# Patient Record
Sex: MALE | Race: WHITE | Employment: OTHER | URBAN - METROPOLITAN AREA
[De-identification: names, ages, dates, MRNs, and addresses within clinical notes are randomized per-mention and may not be internally consistent; named-entity substitution may affect disease eponyms.]

---

## 2017-07-17 ENCOUNTER — APPOINTMENT (OUTPATIENT)
Dept: GENERAL RADIOLOGY | Facility: HOSPITAL | Age: 77
End: 2017-07-17
Attending: EMERGENCY MEDICINE
Payer: MEDICARE

## 2017-07-17 ENCOUNTER — HOSPITAL ENCOUNTER (EMERGENCY)
Facility: HOSPITAL | Age: 77
Discharge: HOME | End: 2017-07-17
Attending: EMERGENCY MEDICINE
Payer: MEDICARE

## 2017-07-17 VITALS
HEIGHT: 67 IN | HEART RATE: 70 BPM | DIASTOLIC BLOOD PRESSURE: 66 MMHG | TEMPERATURE: 98 F | BODY MASS INDEX: 28.25 KG/M2 | SYSTOLIC BLOOD PRESSURE: 153 MMHG | WEIGHT: 180 LBS | OXYGEN SATURATION: 98 % | RESPIRATION RATE: 18 BRPM

## 2017-07-17 DIAGNOSIS — R26.2 INABILITY TO AMBULATE DUE TO HIP: ICD-10-CM
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
## 2017-07-17 PROCEDURE — 99283 EMERGENCY DEPT VISIT LOW MDM: CPT

## 2017-07-17 PROCEDURE — 73502 X-RAY EXAM HIP UNI 2-3 VIEWS: CPT | Performed by: EMERGENCY MEDICINE

## 2017-07-17 NOTE — ED INITIAL ASSESSMENT (HPI)
Pt fell last night out of bed from the bed to a carpeted floor. Pt denies symptoms prior to the fall. He states he then crawled to the bathroom d/t right hip pain. Denies loc or other pain.

## 2017-07-17 NOTE — ED PROVIDER NOTES
Patient Seen in: [facility] Emergency Department    History   Patient presents with:  Fall (musculoskeletal, neurologic)    Stated Complaint: fall hip pain    HPI    80-year-old male presents emergency department and fell last night out of bed onto Capsule SR 24 Hr,  Take 240 mg by mouth daily. Atorvastatin Calcium (LIPITOR) 20 MG Oral Tab,  Take 20 mg by mouth nightly. Warfarin Sodium (COUMADIN) 4 MG Oral Tab,  Take 4 mg by mouth See Admin Instructions.  Warfarin 5mg daily with 10mg on Tuesday on no carotid bruit  CV: Regular rate and rhythm no murmur rub  Respiratory: Clear to auscultation bilaterally no crackles no wheezes no accessory muscle use  Abdomen: Soft nontender nondistended, no rebound no guarding  no hepatosplenomegaly bowel sounds are definitely need some fall precautions but wants to go home.   Family is comfortable this.  ============================================================  ED Course  ------------------------------------------------------------  MDM     Patient was made aware

## 2017-09-15 ENCOUNTER — LAB ENCOUNTER (OUTPATIENT)
Dept: LAB | Facility: HOSPITAL | Age: 77
End: 2017-09-15
Attending: NURSE PRACTITIONER
Payer: MEDICARE

## 2017-09-15 DIAGNOSIS — Z85.46 HISTORY OF PROSTATE CANCER: ICD-10-CM

## 2017-09-15 DIAGNOSIS — R19.7 DIARRHEA, UNSPECIFIED TYPE: ICD-10-CM

## 2017-09-15 LAB
ALBUMIN SERPL-MCNC: 3.5 G/DL (ref 3.5–4.8)
ALP LIVER SERPL-CCNC: 97 U/L (ref 45–117)
ALT SERPL-CCNC: 26 U/L (ref 17–63)
AST SERPL-CCNC: 26 U/L (ref 15–41)
BASOPHILS # BLD AUTO: 0.04 X10(3) UL (ref 0–0.1)
BASOPHILS NFR BLD AUTO: 0.7 %
BILIRUB SERPL-MCNC: 0.9 MG/DL (ref 0.1–2)
BUN BLD-MCNC: 16 MG/DL (ref 8–20)
CALCIUM BLD-MCNC: 8.7 MG/DL (ref 8.3–10.3)
CHLORIDE: 109 MMOL/L (ref 101–111)
CO2: 26 MMOL/L (ref 22–32)
CREAT BLD-MCNC: 1.17 MG/DL (ref 0.7–1.3)
EOSINOPHIL # BLD AUTO: 0.19 X10(3) UL (ref 0–0.3)
EOSINOPHIL NFR BLD AUTO: 3.2 %
ERYTHROCYTE [DISTWIDTH] IN BLOOD BY AUTOMATED COUNT: 15.2 % (ref 11.5–16)
GLUCOSE BLD-MCNC: 127 MG/DL (ref 70–99)
HCT VFR BLD AUTO: 35 % (ref 37–53)
HGB BLD-MCNC: 11.1 G/DL (ref 13–17)
IMMATURE GRANULOCYTE COUNT: 0.05 X10(3) UL (ref 0–1)
IMMATURE GRANULOCYTE RATIO %: 0.8 %
LYMPHOCYTES # BLD AUTO: 1.16 X10(3) UL (ref 0.9–4)
LYMPHOCYTES NFR BLD AUTO: 19.6 %
M PROTEIN MFR SERPL ELPH: 6.6 G/DL (ref 6.1–8.3)
MCH RBC QN AUTO: 29 PG (ref 27–33.2)
MCHC RBC AUTO-ENTMCNC: 31.7 G/DL (ref 31–37)
MCV RBC AUTO: 91.4 FL (ref 80–99)
MONOCYTES # BLD AUTO: 1.02 X10(3) UL (ref 0.1–0.6)
MONOCYTES NFR BLD AUTO: 17.2 %
NEUTROPHIL ABS PRELIM: 3.46 X10 (3) UL (ref 1.3–6.7)
NEUTROPHILS # BLD AUTO: 3.46 X10(3) UL (ref 1.3–6.7)
NEUTROPHILS NFR BLD AUTO: 58.5 %
PLATELET # BLD AUTO: 199 10(3)UL (ref 150–450)
POTASSIUM SERPL-SCNC: 3.3 MMOL/L (ref 3.6–5.1)
PSA SERPL-MCNC: 0.13 NG/ML (ref 0.01–4)
RBC # BLD AUTO: 3.83 X10(6)UL (ref 3.8–5.8)
RED CELL DISTRIBUTION WIDTH-SD: 50.2 FL (ref 35.1–46.3)
SODIUM SERPL-SCNC: 142 MMOL/L (ref 136–144)
WBC # BLD AUTO: 5.9 X10(3) UL (ref 4–13)

## 2017-09-15 PROCEDURE — 36415 COLL VENOUS BLD VENIPUNCTURE: CPT

## 2017-09-15 PROCEDURE — 84153 ASSAY OF PSA TOTAL: CPT

## 2017-09-15 PROCEDURE — 85025 COMPLETE CBC W/AUTO DIFF WBC: CPT

## 2017-09-15 PROCEDURE — 80053 COMPREHEN METABOLIC PANEL: CPT

## 2017-09-27 ENCOUNTER — APPOINTMENT (OUTPATIENT)
Dept: LAB | Facility: HOSPITAL | Age: 77
End: 2017-09-27
Attending: NURSE PRACTITIONER
Payer: MEDICARE

## 2017-09-27 DIAGNOSIS — E87.6 HYPOKALEMIA: ICD-10-CM

## 2017-09-27 DIAGNOSIS — D64.9 ANEMIA, UNSPECIFIED TYPE: ICD-10-CM

## 2017-09-27 PROCEDURE — 36415 COLL VENOUS BLD VENIPUNCTURE: CPT

## 2017-09-27 PROCEDURE — 84132 ASSAY OF SERUM POTASSIUM: CPT

## 2017-09-27 PROCEDURE — 83550 IRON BINDING TEST: CPT

## 2017-09-27 PROCEDURE — 82728 ASSAY OF FERRITIN: CPT

## 2017-09-27 PROCEDURE — 83540 ASSAY OF IRON: CPT

## 2017-10-23 PROBLEM — R63.4 WEIGHT LOSS: Status: ACTIVE | Noted: 2017-10-23

## 2017-10-23 PROBLEM — D64.9 ANEMIA: Status: ACTIVE | Noted: 2017-10-23

## 2017-10-23 PROBLEM — K52.832 LYMPHOCYTIC COLITIS: Status: ACTIVE | Noted: 2017-10-23

## 2018-01-02 ENCOUNTER — APPOINTMENT (OUTPATIENT)
Dept: LAB | Age: 78
End: 2018-01-02
Attending: NURSE PRACTITIONER
Payer: MEDICARE

## 2018-01-02 DIAGNOSIS — D64.9 ANEMIA, UNSPECIFIED TYPE: ICD-10-CM

## 2018-01-02 LAB
FOLATE (FOLIC ACID), SERUM: 17.3 NG/ML (ref 8.7–24)
HAV AB SERPL IA-ACNC: 786 PG/ML (ref 193–986)

## 2018-01-02 PROCEDURE — 82746 ASSAY OF FOLIC ACID SERUM: CPT

## 2018-01-02 PROCEDURE — 36415 COLL VENOUS BLD VENIPUNCTURE: CPT

## 2018-01-02 PROCEDURE — 82607 VITAMIN B-12: CPT

## 2018-01-09 ENCOUNTER — NURSE ONLY (OUTPATIENT)
Dept: LAB | Age: 78
End: 2018-01-09
Attending: INTERNAL MEDICINE
Payer: MEDICARE

## 2018-01-09 DIAGNOSIS — D64.9 ANEMIA, UNSPECIFIED TYPE: ICD-10-CM

## 2018-01-09 LAB
BASOPHILS # BLD AUTO: 0.08 X10(3) UL (ref 0–0.1)
BASOPHILS NFR BLD AUTO: 1.5 %
EOSINOPHIL # BLD AUTO: 0.24 X10(3) UL (ref 0–0.3)
EOSINOPHIL NFR BLD AUTO: 4.5 %
ERYTHROCYTE [DISTWIDTH] IN BLOOD BY AUTOMATED COUNT: 14.6 % (ref 11.5–16)
HCT VFR BLD AUTO: 37.1 % (ref 37–53)
HGB BLD-MCNC: 11.9 G/DL (ref 13–17)
IMMATURE GRANULOCYTE COUNT: 0.03 X10(3) UL (ref 0–1)
IMMATURE GRANULOCYTE RATIO %: 0.6 %
LYMPHOCYTES # BLD AUTO: 1.2 X10(3) UL (ref 0.9–4)
LYMPHOCYTES NFR BLD AUTO: 22.3 %
MCH RBC QN AUTO: 28.4 PG (ref 27–33.2)
MCHC RBC AUTO-ENTMCNC: 32.1 G/DL (ref 31–37)
MCV RBC AUTO: 88.5 FL (ref 80–99)
MONOCYTES # BLD AUTO: 0.65 X10(3) UL (ref 0.1–0.6)
MONOCYTES NFR BLD AUTO: 12.1 %
NEUTROPHIL ABS PRELIM: 3.19 X10 (3) UL (ref 1.3–6.7)
NEUTROPHILS # BLD AUTO: 3.19 X10(3) UL (ref 1.3–6.7)
NEUTROPHILS NFR BLD AUTO: 59 %
PLATELET # BLD AUTO: 172 10(3)UL (ref 150–450)
RBC # BLD AUTO: 4.19 X10(6)UL (ref 3.8–5.8)
RED CELL DISTRIBUTION WIDTH-SD: 46.8 FL (ref 35.1–46.3)
WBC # BLD AUTO: 5.4 X10(3) UL (ref 4–13)

## 2018-01-09 PROCEDURE — 36415 COLL VENOUS BLD VENIPUNCTURE: CPT

## 2018-01-09 PROCEDURE — 85025 COMPLETE CBC W/AUTO DIFF WBC: CPT

## 2018-03-08 ENCOUNTER — NURSE ONLY (OUTPATIENT)
Dept: LAB | Age: 78
End: 2018-03-08
Attending: FAMILY MEDICINE
Payer: MEDICARE

## 2018-03-08 DIAGNOSIS — I10 HTN (HYPERTENSION): ICD-10-CM

## 2018-03-08 DIAGNOSIS — I48.91 ATRIAL FIBRILLATION (HCC): Primary | ICD-10-CM

## 2018-03-08 DIAGNOSIS — E78.2 MIXED HYPERLIPIDEMIA: ICD-10-CM

## 2018-03-08 DIAGNOSIS — E11.9 DIABETES MELLITUS (HCC): ICD-10-CM

## 2018-03-08 LAB
ALBUMIN SERPL-MCNC: 3.9 G/DL (ref 3.5–4.8)
ALP LIVER SERPL-CCNC: 130 U/L (ref 45–117)
ALT SERPL-CCNC: 20 U/L (ref 17–63)
AST SERPL-CCNC: 23 U/L (ref 15–41)
BILIRUB SERPL-MCNC: 1 MG/DL (ref 0.1–2)
BUN BLD-MCNC: 20 MG/DL (ref 8–20)
CALCIUM BLD-MCNC: 9.1 MG/DL (ref 8.3–10.3)
CHLORIDE: 107 MMOL/L (ref 101–111)
CHOLEST SMN-MCNC: 117 MG/DL (ref ?–200)
CO2: 25 MMOL/L (ref 22–32)
CREAT BLD-MCNC: 1.14 MG/DL (ref 0.7–1.3)
DIGOXIN: 0.1 NG/ML (ref 0.8–2)
EST. AVERAGE GLUCOSE BLD GHB EST-MCNC: 146 MG/DL (ref 68–126)
GLUCOSE BLD-MCNC: 137 MG/DL (ref 70–99)
HBA1C MFR BLD HPLC: 6.7 % (ref ?–5.7)
HDLC SERPL-MCNC: 44 MG/DL (ref 45–?)
HDLC SERPL: 2.66 {RATIO} (ref ?–4.97)
LDLC SERPL CALC-MCNC: 57 MG/DL (ref ?–130)
M PROTEIN MFR SERPL ELPH: 7.3 G/DL (ref 6.1–8.3)
NONHDLC SERPL-MCNC: 73 MG/DL (ref ?–130)
POTASSIUM SERPL-SCNC: 4.3 MMOL/L (ref 3.6–5.1)
SODIUM SERPL-SCNC: 140 MMOL/L (ref 136–144)
TRIGL SERPL-MCNC: 81 MG/DL (ref ?–150)
VLDLC SERPL CALC-MCNC: 16 MG/DL (ref 5–40)

## 2018-03-08 PROCEDURE — 36415 COLL VENOUS BLD VENIPUNCTURE: CPT

## 2018-03-08 PROCEDURE — 83036 HEMOGLOBIN GLYCOSYLATED A1C: CPT

## 2018-03-08 PROCEDURE — 80053 COMPREHEN METABOLIC PANEL: CPT

## 2018-03-08 PROCEDURE — 80162 ASSAY OF DIGOXIN TOTAL: CPT

## 2018-03-08 PROCEDURE — 80061 LIPID PANEL: CPT

## 2018-05-03 ENCOUNTER — NURSE ONLY (OUTPATIENT)
Dept: LAB | Age: 78
End: 2018-05-03
Attending: NURSE PRACTITIONER
Payer: MEDICARE

## 2018-05-03 DIAGNOSIS — Z79.01 LONG TERM (CURRENT) USE OF ANTICOAGULANTS: ICD-10-CM

## 2018-05-03 DIAGNOSIS — I48.91 ATRIAL FIBRILLATION (HCC): Primary | ICD-10-CM

## 2018-05-03 PROCEDURE — 36415 COLL VENOUS BLD VENIPUNCTURE: CPT

## 2018-05-03 PROCEDURE — 85025 COMPLETE CBC W/AUTO DIFF WBC: CPT

## 2018-05-03 PROCEDURE — 80162 ASSAY OF DIGOXIN TOTAL: CPT

## 2018-05-24 RX ORDER — DEXTROSE MONOHYDRATE 25 G/50ML
50 INJECTION, SOLUTION INTRAVENOUS
Status: CANCELLED | OUTPATIENT
Start: 2018-05-24

## 2018-05-24 RX ORDER — SODIUM CHLORIDE, SODIUM LACTATE, POTASSIUM CHLORIDE, CALCIUM CHLORIDE 600; 310; 30; 20 MG/100ML; MG/100ML; MG/100ML; MG/100ML
INJECTION, SOLUTION INTRAVENOUS CONTINUOUS
Status: CANCELLED | OUTPATIENT
Start: 2018-05-24

## 2018-05-24 RX ORDER — ACETAMINOPHEN 500 MG
1000 TABLET ORAL ONCE
Status: CANCELLED | OUTPATIENT
Start: 2018-05-24 | End: 2018-05-24

## 2018-05-24 RX ORDER — WARFARIN SODIUM 5 MG/1
5 TABLET ORAL NIGHTLY
Status: ON HOLD | COMMUNITY
End: 2018-06-26

## 2018-06-05 ENCOUNTER — NURSE ONLY (OUTPATIENT)
Dept: LAB | Age: 78
End: 2018-06-05
Attending: FAMILY MEDICINE
Payer: MEDICARE

## 2018-06-05 DIAGNOSIS — M16.12 PRIMARY OSTEOARTHRITIS OF LEFT HIP: ICD-10-CM

## 2018-06-05 DIAGNOSIS — Z01.818 PREOP EXAMINATION: Primary | ICD-10-CM

## 2018-06-05 DIAGNOSIS — E11.9 DIABETES MELLITUS (HCC): ICD-10-CM

## 2018-06-05 PROCEDURE — 85730 THROMBOPLASTIN TIME PARTIAL: CPT

## 2018-06-05 PROCEDURE — 36415 COLL VENOUS BLD VENIPUNCTURE: CPT

## 2018-06-05 PROCEDURE — 85610 PROTHROMBIN TIME: CPT

## 2018-06-05 PROCEDURE — 83036 HEMOGLOBIN GLYCOSYLATED A1C: CPT

## 2018-06-05 PROCEDURE — 80053 COMPREHEN METABOLIC PANEL: CPT

## 2018-06-07 ENCOUNTER — APPOINTMENT (OUTPATIENT)
Dept: LAB | Age: 78
End: 2018-06-07
Attending: FAMILY MEDICINE
Payer: MEDICARE

## 2018-06-07 DIAGNOSIS — Z01.818 PREOP EXAMINATION: ICD-10-CM

## 2018-06-07 DIAGNOSIS — E11.9 DIABETES MELLITUS (HCC): ICD-10-CM

## 2018-06-07 DIAGNOSIS — M16.12 PRIMARY OSTEOARTHRITIS OF LEFT HIP: ICD-10-CM

## 2018-06-07 PROCEDURE — 81003 URINALYSIS AUTO W/O SCOPE: CPT

## 2018-06-07 NOTE — CM/SW NOTE
06/07/18 1700   CM/SW Referral Data   Referral Source Family   Reason for Referral Discharge planning   Informant Children  (dtr)   Patient Info   Patient's Home Environment [facility]   Patient lives with Spouse   Patient Status Prior t

## 2018-06-18 ENCOUNTER — HOSPITAL ENCOUNTER (OUTPATIENT)
Dept: GENERAL RADIOLOGY | Facility: HOSPITAL | Age: 78
Discharge: HOME | End: 2018-06-18
Attending: FAMILY MEDICINE
Payer: MEDICARE

## 2018-06-18 ENCOUNTER — LABORATORY ENCOUNTER (OUTPATIENT)
Dept: LAB | Facility: HOSPITAL | Age: 78
End: 2018-06-18
Payer: MEDICARE

## 2018-06-18 ENCOUNTER — HOSPITAL ENCOUNTER (OUTPATIENT)
Dept: PHYSICAL THERAPY | Facility: HOSPITAL | Age: 78
Discharge: HOME | End: 2018-06-18
Payer: MEDICARE

## 2018-06-18 DIAGNOSIS — M16.12 DEGENERATIVE JOINT DISEASE OF LEFT HIP: ICD-10-CM

## 2018-06-18 DIAGNOSIS — M16.12 ARTHRITIS OF LEFT HIP: ICD-10-CM

## 2018-06-18 DIAGNOSIS — Z01.818 PREOP EXAMINATION: ICD-10-CM

## 2018-06-18 PROCEDURE — 86900 BLOOD TYPING SEROLOGIC ABO: CPT

## 2018-06-18 PROCEDURE — 71046 X-RAY EXAM CHEST 2 VIEWS: CPT | Performed by: FAMILY MEDICINE

## 2018-06-18 PROCEDURE — 86850 RBC ANTIBODY SCREEN: CPT

## 2018-06-18 PROCEDURE — 86901 BLOOD TYPING SEROLOGIC RH(D): CPT

## 2018-06-23 ENCOUNTER — APPOINTMENT (OUTPATIENT)
Dept: LAB | Facility: HOSPITAL | Age: 78
DRG: 470 | End: 2018-06-23
Attending: ORTHOPAEDIC SURGERY
Payer: MEDICARE

## 2018-06-23 DIAGNOSIS — M16.12 DEGENERATIVE JOINT DISEASE OF LEFT HIP: ICD-10-CM

## 2018-06-23 PROCEDURE — 87081 CULTURE SCREEN ONLY: CPT

## 2018-06-25 ENCOUNTER — ANESTHESIA EVENT (OUTPATIENT)
Dept: SURGERY | Facility: HOSPITAL | Age: 78
DRG: 470 | End: 2018-06-25
Payer: MEDICARE

## 2018-06-25 ENCOUNTER — LAB ENCOUNTER (OUTPATIENT)
Dept: LAB | Facility: HOSPITAL | Age: 78
DRG: 470 | End: 2018-06-25
Attending: FAMILY MEDICINE
Payer: MEDICARE

## 2018-06-25 DIAGNOSIS — Z01.818 PREOP EXAMINATION: Primary | ICD-10-CM

## 2018-06-25 PROCEDURE — 36415 COLL VENOUS BLD VENIPUNCTURE: CPT

## 2018-06-25 PROCEDURE — 87640 STAPH A DNA AMP PROBE: CPT

## 2018-06-25 PROCEDURE — 85025 COMPLETE CBC W/AUTO DIFF WBC: CPT

## 2018-06-25 PROCEDURE — 87641 MR-STAPH DNA AMP PROBE: CPT

## 2018-06-26 ENCOUNTER — HOSPITAL ENCOUNTER (INPATIENT)
Facility: HOSPITAL | Age: 78
LOS: 2 days | Discharge: HOME HEALTH | DRG: 470 | End: 2018-06-28
Attending: ORTHOPAEDIC SURGERY | Admitting: ORTHOPAEDIC SURGERY
Payer: MEDICARE

## 2018-06-26 ENCOUNTER — APPOINTMENT (OUTPATIENT)
Dept: GENERAL RADIOLOGY | Facility: HOSPITAL | Age: 78
DRG: 470 | End: 2018-06-26
Attending: PHYSICIAN ASSISTANT
Payer: MEDICARE

## 2018-06-26 ENCOUNTER — APPOINTMENT (OUTPATIENT)
Dept: GENERAL RADIOLOGY | Facility: HOSPITAL | Age: 78
DRG: 470 | End: 2018-06-26
Attending: ORTHOPAEDIC SURGERY
Payer: MEDICARE

## 2018-06-26 ENCOUNTER — ANESTHESIA (OUTPATIENT)
Dept: SURGERY | Facility: HOSPITAL | Age: 78
DRG: 470 | End: 2018-06-26
Payer: MEDICARE

## 2018-06-26 DIAGNOSIS — M16.12 DEGENERATIVE JOINT DISEASE OF LEFT HIP: Primary | ICD-10-CM

## 2018-06-26 PROBLEM — I25.10 CORONARY ATHEROSCLEROSIS: Chronic | Status: ACTIVE | Noted: 2018-06-26

## 2018-06-26 PROBLEM — I25.10 CORONARY ATHEROSCLEROSIS OF UNSPECIFIED TYPE OF VESSEL, NATIVE OR GRAFT: Chronic | Status: ACTIVE | Noted: 2018-06-26

## 2018-06-26 LAB
ERYTHROCYTE [DISTWIDTH] IN BLOOD BY AUTOMATED COUNT: 15.9 % (ref 11.5–16)
GLUCOSE BLD-MCNC: 149 MG/DL (ref 65–99)
GLUCOSE BLD-MCNC: 182 MG/DL (ref 65–99)
GLUCOSE BLD-MCNC: 245 MG/DL (ref 65–99)
HCT VFR BLD AUTO: 35 % (ref 37–53)
HGB BLD-MCNC: 11.1 G/DL (ref 13–17)
INR BLD: 1.58 (ref 0.9–1.1)
MCH RBC QN AUTO: 29.1 PG (ref 27–33.2)
MCHC RBC AUTO-ENTMCNC: 31.7 G/DL (ref 31–37)
MCV RBC AUTO: 91.6 FL (ref 80–99)
PLATELET # BLD AUTO: 134 10(3)UL (ref 150–450)
PSA SERPL DL<=0.01 NG/ML-MCNC: 19.4 SECONDS (ref 12.4–14.7)
RBC # BLD AUTO: 3.82 X10(6)UL (ref 3.8–5.8)
RED CELL DISTRIBUTION WIDTH-SD: 53 FL (ref 35.1–46.3)
WBC # BLD AUTO: 7.1 X10(3) UL (ref 4–13)

## 2018-06-26 PROCEDURE — 0SRB01A REPLACEMENT OF LEFT HIP JOINT WITH METAL SYNTHETIC SUBSTITUTE, UNCEMENTED, OPEN APPROACH: ICD-10-PCS | Performed by: ORTHOPAEDIC SURGERY

## 2018-06-26 PROCEDURE — 99223 1ST HOSP IP/OBS HIGH 75: CPT | Performed by: INTERNAL MEDICINE

## 2018-06-26 PROCEDURE — 73502 X-RAY EXAM HIP UNI 2-3 VIEWS: CPT | Performed by: PHYSICIAN ASSISTANT

## 2018-06-26 PROCEDURE — 76001 XR FLUOROSCOPE EXAM >1 HR EXTENSIVE (CPT=76001): CPT | Performed by: ORTHOPAEDIC SURGERY

## 2018-06-26 DEVICE — BONE SCREW 6.5X35 SELF-TAP: Type: IMPLANTABLE DEVICE | Site: HIP | Status: FUNCTIONAL

## 2018-06-26 DEVICE — BIOLOX® DELTA, CERAMIC FEMORAL HEAD, M, Ø 36/0, TAPER 12/14
Type: IMPLANTABLE DEVICE | Site: HIP | Status: FUNCTIONAL
Brand: BIOLOX® DELTA

## 2018-06-26 DEVICE — CONT LONG NEU LINER II 36X52: Type: IMPLANTABLE DEVICE | Site: HIP | Status: FUNCTIONAL

## 2018-06-26 DEVICE — CONT CLUSTER-HOLE SHELL 52 II: Type: IMPLANTABLE DEVICE | Site: HIP | Status: FUNCTIONAL

## 2018-06-26 DEVICE — IMPLANTABLE DEVICE: Type: IMPLANTABLE DEVICE | Site: HIP | Status: FUNCTIONAL

## 2018-06-26 RX ORDER — MEPERIDINE HYDROCHLORIDE 25 MG/ML
12.5 INJECTION INTRAMUSCULAR; INTRAVENOUS; SUBCUTANEOUS AS NEEDED
Status: DISCONTINUED | OUTPATIENT
Start: 2018-06-26 | End: 2018-06-26 | Stop reason: HOSPADM

## 2018-06-26 RX ORDER — SODIUM CHLORIDE, SODIUM LACTATE, POTASSIUM CHLORIDE, CALCIUM CHLORIDE 600; 310; 30; 20 MG/100ML; MG/100ML; MG/100ML; MG/100ML
INJECTION, SOLUTION INTRAVENOUS CONTINUOUS
Status: DISCONTINUED | OUTPATIENT
Start: 2018-06-26 | End: 2018-06-26 | Stop reason: HOSPADM

## 2018-06-26 RX ORDER — MIDAZOLAM HYDROCHLORIDE 1 MG/ML
1 INJECTION INTRAMUSCULAR; INTRAVENOUS EVERY 5 MIN PRN
Status: DISCONTINUED | OUTPATIENT
Start: 2018-06-26 | End: 2018-06-26 | Stop reason: HOSPADM

## 2018-06-26 RX ORDER — WARFARIN SODIUM 2 MG/1
4 TABLET ORAL ONCE
Status: DISCONTINUED | OUTPATIENT
Start: 2018-06-26 | End: 2018-06-26

## 2018-06-26 RX ORDER — OXYCODONE HYDROCHLORIDE 5 MG/1
2.5 TABLET ORAL EVERY 4 HOURS PRN
Status: DISPENSED | OUTPATIENT
Start: 2018-06-26 | End: 2018-06-28

## 2018-06-26 RX ORDER — ACETAMINOPHEN 500 MG
1000 TABLET ORAL ONCE
Status: DISCONTINUED | OUTPATIENT
Start: 2018-06-26 | End: 2018-06-26 | Stop reason: HOSPADM

## 2018-06-26 RX ORDER — HYDROMORPHONE HYDROCHLORIDE 1 MG/ML
0.8 INJECTION, SOLUTION INTRAMUSCULAR; INTRAVENOUS; SUBCUTANEOUS EVERY 2 HOUR PRN
Status: DISCONTINUED | OUTPATIENT
Start: 2018-06-26 | End: 2018-06-26

## 2018-06-26 RX ORDER — DIPHENHYDRAMINE HYDROCHLORIDE 50 MG/ML
12.5 INJECTION INTRAMUSCULAR; INTRAVENOUS EVERY 4 HOURS PRN
Status: DISCONTINUED | OUTPATIENT
Start: 2018-06-26 | End: 2018-06-26

## 2018-06-26 RX ORDER — HYDROMORPHONE HYDROCHLORIDE 1 MG/ML
0.2 INJECTION, SOLUTION INTRAMUSCULAR; INTRAVENOUS; SUBCUTANEOUS EVERY 2 HOUR PRN
Status: DISCONTINUED | OUTPATIENT
Start: 2018-06-26 | End: 2018-06-26

## 2018-06-26 RX ORDER — OXYCODONE HYDROCHLORIDE 5 MG/1
5 TABLET ORAL EVERY 4 HOURS PRN
Status: ACTIVE | OUTPATIENT
Start: 2018-06-26 | End: 2018-06-28

## 2018-06-26 RX ORDER — METOCLOPRAMIDE HYDROCHLORIDE 5 MG/ML
10 INJECTION INTRAMUSCULAR; INTRAVENOUS AS NEEDED
Status: DISCONTINUED | OUTPATIENT
Start: 2018-06-26 | End: 2018-06-26 | Stop reason: HOSPADM

## 2018-06-26 RX ORDER — HYDROMORPHONE HYDROCHLORIDE 1 MG/ML
0.4 INJECTION, SOLUTION INTRAMUSCULAR; INTRAVENOUS; SUBCUTANEOUS EVERY 5 MIN PRN
Status: DISCONTINUED | OUTPATIENT
Start: 2018-06-26 | End: 2018-06-26 | Stop reason: HOSPADM

## 2018-06-26 RX ORDER — BISACODYL 10 MG
10 SUPPOSITORY, RECTAL RECTAL
Status: DISCONTINUED | OUTPATIENT
Start: 2018-06-26 | End: 2018-06-28

## 2018-06-26 RX ORDER — ONDANSETRON 2 MG/ML
4 INJECTION INTRAMUSCULAR; INTRAVENOUS EVERY 4 HOURS PRN
Status: ACTIVE | OUTPATIENT
Start: 2018-06-26 | End: 2018-06-28

## 2018-06-26 RX ORDER — DIPHENHYDRAMINE HYDROCHLORIDE 50 MG/ML
12.5 INJECTION INTRAMUSCULAR; INTRAVENOUS AS NEEDED
Status: DISCONTINUED | OUTPATIENT
Start: 2018-06-26 | End: 2018-06-26 | Stop reason: HOSPADM

## 2018-06-26 RX ORDER — SODIUM CHLORIDE, SODIUM LACTATE, POTASSIUM CHLORIDE, CALCIUM CHLORIDE 600; 310; 30; 20 MG/100ML; MG/100ML; MG/100ML; MG/100ML
INJECTION, SOLUTION INTRAVENOUS CONTINUOUS
Status: DISCONTINUED | OUTPATIENT
Start: 2018-06-26 | End: 2018-06-28

## 2018-06-26 RX ORDER — DIPHENHYDRAMINE HCL 25 MG
25 CAPSULE ORAL EVERY 4 HOURS PRN
Status: DISCONTINUED | OUTPATIENT
Start: 2018-06-26 | End: 2018-06-26

## 2018-06-26 RX ORDER — ATORVASTATIN CALCIUM 10 MG/1
10 TABLET, FILM COATED ORAL NIGHTLY
COMMUNITY

## 2018-06-26 RX ORDER — ONDANSETRON 2 MG/ML
4 INJECTION INTRAMUSCULAR; INTRAVENOUS AS NEEDED
Status: DISCONTINUED | OUTPATIENT
Start: 2018-06-26 | End: 2018-06-26 | Stop reason: HOSPADM

## 2018-06-26 RX ORDER — HYDROMORPHONE HYDROCHLORIDE 1 MG/ML
0.4 INJECTION, SOLUTION INTRAMUSCULAR; INTRAVENOUS; SUBCUTANEOUS EVERY 2 HOUR PRN
Status: ACTIVE | OUTPATIENT
Start: 2018-06-26 | End: 2018-06-28

## 2018-06-26 RX ORDER — DEXTROSE MONOHYDRATE 25 G/50ML
50 INJECTION, SOLUTION INTRAVENOUS
Status: DISCONTINUED | OUTPATIENT
Start: 2018-06-26 | End: 2018-06-26 | Stop reason: HOSPADM

## 2018-06-26 RX ORDER — POLYETHYLENE GLYCOL 3350 17 G/17G
17 POWDER, FOR SOLUTION ORAL DAILY PRN
Status: DISCONTINUED | OUTPATIENT
Start: 2018-06-26 | End: 2018-06-28

## 2018-06-26 RX ORDER — TIZANIDINE 2 MG/1
2 TABLET ORAL 3 TIMES DAILY PRN
Status: DISCONTINUED | OUTPATIENT
Start: 2018-06-26 | End: 2018-06-28

## 2018-06-26 RX ORDER — HYDROMORPHONE HYDROCHLORIDE 1 MG/ML
INJECTION, SOLUTION INTRAMUSCULAR; INTRAVENOUS; SUBCUTANEOUS
Status: COMPLETED
Start: 2018-06-26 | End: 2018-06-26

## 2018-06-26 RX ORDER — METOCLOPRAMIDE HYDROCHLORIDE 5 MG/ML
10 INJECTION INTRAMUSCULAR; INTRAVENOUS EVERY 6 HOURS PRN
Status: ACTIVE | OUTPATIENT
Start: 2018-06-26 | End: 2018-06-28

## 2018-06-26 RX ORDER — DOCUSATE SODIUM 100 MG/1
100 CAPSULE, LIQUID FILLED ORAL 2 TIMES DAILY
Status: DISCONTINUED | OUTPATIENT
Start: 2018-06-26 | End: 2018-06-28

## 2018-06-26 RX ORDER — OXYCODONE HYDROCHLORIDE 10 MG/1
10 TABLET ORAL EVERY 4 HOURS PRN
Status: DISCONTINUED | OUTPATIENT
Start: 2018-06-26 | End: 2018-06-26

## 2018-06-26 RX ORDER — TRAMADOL HYDROCHLORIDE 50 MG/1
50 TABLET ORAL EVERY 12 HOURS
Status: DISCONTINUED | OUTPATIENT
Start: 2018-06-26 | End: 2018-06-28

## 2018-06-26 RX ORDER — BUPIVACAINE HYDROCHLORIDE AND EPINEPHRINE 5; 5 MG/ML; UG/ML
INJECTION, SOLUTION EPIDURAL; INTRACAUDAL; PERINEURAL AS NEEDED
Status: DISCONTINUED | OUTPATIENT
Start: 2018-06-26 | End: 2018-06-26 | Stop reason: HOSPADM

## 2018-06-26 RX ORDER — ATORVASTATIN CALCIUM 10 MG/1
10 TABLET, FILM COATED ORAL NIGHTLY
Status: DISCONTINUED | OUTPATIENT
Start: 2018-06-26 | End: 2018-06-28

## 2018-06-26 RX ORDER — HYDROMORPHONE HYDROCHLORIDE 1 MG/ML
0.3 INJECTION, SOLUTION INTRAMUSCULAR; INTRAVENOUS; SUBCUTANEOUS EVERY 2 HOUR PRN
Status: ACTIVE | OUTPATIENT
Start: 2018-06-26 | End: 2018-06-28

## 2018-06-26 RX ORDER — SODIUM CHLORIDE, SODIUM LACTATE, POTASSIUM CHLORIDE, CALCIUM CHLORIDE 600; 310; 30; 20 MG/100ML; MG/100ML; MG/100ML; MG/100ML
INJECTION, SOLUTION INTRAVENOUS CONTINUOUS
Status: DISCONTINUED | OUTPATIENT
Start: 2018-06-26 | End: 2018-06-26

## 2018-06-26 RX ORDER — SENNOSIDES 8.6 MG
17.2 TABLET ORAL NIGHTLY
Status: DISCONTINUED | OUTPATIENT
Start: 2018-06-26 | End: 2018-06-28

## 2018-06-26 RX ORDER — METFORMIN HYDROCHLORIDE 500 MG/1
500 TABLET, EXTENDED RELEASE ORAL AS NEEDED
Status: ON HOLD | COMMUNITY
End: 2018-06-28

## 2018-06-26 RX ORDER — DILTIAZEM HYDROCHLORIDE 240 MG/1
240 CAPSULE, COATED, EXTENDED RELEASE ORAL DAILY
Status: DISCONTINUED | OUTPATIENT
Start: 2018-06-26 | End: 2018-06-28

## 2018-06-26 RX ORDER — DILTIAZEM HYDROCHLORIDE 240 MG/1
240 CAPSULE, COATED, EXTENDED RELEASE ORAL DAILY
COMMUNITY

## 2018-06-26 RX ORDER — WARFARIN SODIUM 4 MG/1
4 TABLET ORAL NIGHTLY
COMMUNITY
End: 2021-09-26

## 2018-06-26 RX ORDER — HYDROMORPHONE HYDROCHLORIDE 1 MG/ML
0.4 INJECTION, SOLUTION INTRAMUSCULAR; INTRAVENOUS; SUBCUTANEOUS EVERY 2 HOUR PRN
Status: DISCONTINUED | OUTPATIENT
Start: 2018-06-26 | End: 2018-06-26

## 2018-06-26 RX ORDER — HYDROMORPHONE HYDROCHLORIDE 1 MG/ML
0.2 INJECTION, SOLUTION INTRAMUSCULAR; INTRAVENOUS; SUBCUTANEOUS EVERY 2 HOUR PRN
Status: ACTIVE | OUTPATIENT
Start: 2018-06-26 | End: 2018-06-28

## 2018-06-26 RX ORDER — OXYCODONE HYDROCHLORIDE 15 MG/1
15 TABLET ORAL EVERY 4 HOURS PRN
Status: DISCONTINUED | OUTPATIENT
Start: 2018-06-26 | End: 2018-06-26

## 2018-06-26 RX ORDER — SODIUM PHOSPHATE, DIBASIC AND SODIUM PHOSPHATE, MONOBASIC 7; 19 G/133ML; G/133ML
1 ENEMA RECTAL ONCE AS NEEDED
Status: DISCONTINUED | OUTPATIENT
Start: 2018-06-26 | End: 2018-06-28

## 2018-06-26 RX ORDER — METOPROLOL TARTRATE 100 MG/1
100 TABLET ORAL 2 TIMES DAILY
Status: DISCONTINUED | OUTPATIENT
Start: 2018-06-26 | End: 2018-06-28

## 2018-06-26 RX ORDER — CEFAZOLIN SODIUM/WATER 2 G/20 ML
2 SYRINGE (ML) INTRAVENOUS EVERY 8 HOURS
Status: COMPLETED | OUTPATIENT
Start: 2018-06-26 | End: 2018-06-27

## 2018-06-26 RX ORDER — ACETAMINOPHEN 325 MG/1
TABLET ORAL
Status: COMPLETED
Start: 2018-06-26 | End: 2018-06-26

## 2018-06-26 RX ORDER — CEFAZOLIN SODIUM/WATER 2 G/20 ML
2 SYRINGE (ML) INTRAVENOUS ONCE
Status: COMPLETED | OUTPATIENT
Start: 2018-06-26 | End: 2018-06-26

## 2018-06-26 RX ORDER — DIPHENHYDRAMINE HYDROCHLORIDE 50 MG/ML
25 INJECTION INTRAMUSCULAR; INTRAVENOUS ONCE AS NEEDED
Status: DISCONTINUED | OUTPATIENT
Start: 2018-06-26 | End: 2018-06-26

## 2018-06-26 RX ORDER — ACETAMINOPHEN 325 MG/1
650 TABLET ORAL ONCE
Status: COMPLETED | OUTPATIENT
Start: 2018-06-26 | End: 2018-06-26

## 2018-06-26 RX ORDER — ACETAMINOPHEN 325 MG/1
650 TABLET ORAL 4 TIMES DAILY
Status: DISPENSED | OUTPATIENT
Start: 2018-06-26 | End: 2018-06-28

## 2018-06-26 RX ORDER — WARFARIN SODIUM 2 MG/1
4 TABLET ORAL
Status: COMPLETED | OUTPATIENT
Start: 2018-06-26 | End: 2018-06-26

## 2018-06-26 RX ORDER — BUPIVACAINE HYDROCHLORIDE 2.5 MG/ML
INJECTION, SOLUTION EPIDURAL; INFILTRATION; INTRACAUDAL AS NEEDED
Status: DISCONTINUED | OUTPATIENT
Start: 2018-06-26 | End: 2018-06-26 | Stop reason: HOSPADM

## 2018-06-26 RX ORDER — OXYCODONE HYDROCHLORIDE 5 MG/1
5 TABLET ORAL EVERY 4 HOURS PRN
Status: DISCONTINUED | OUTPATIENT
Start: 2018-06-26 | End: 2018-06-26

## 2018-06-26 RX ORDER — OXYCODONE HYDROCHLORIDE 10 MG/1
10 TABLET ORAL EVERY 4 HOURS PRN
Status: ACTIVE | OUTPATIENT
Start: 2018-06-26 | End: 2018-06-28

## 2018-06-26 RX ORDER — NALOXONE HYDROCHLORIDE 0.4 MG/ML
80 INJECTION, SOLUTION INTRAMUSCULAR; INTRAVENOUS; SUBCUTANEOUS AS NEEDED
Status: DISCONTINUED | OUTPATIENT
Start: 2018-06-26 | End: 2018-06-26 | Stop reason: HOSPADM

## 2018-06-26 NOTE — ANESTHESIA POSTPROCEDURE EVALUATION
[address] Patient Status:  Surgery Admit   Age/Gender 66year old male MRN [id number]   [facility] SURGERY Attending Kinsey Bird MD   Hosp Day # 0 PCP COREY MCCRACKEN       Anesthesia Post-op Note    Procedure(s):  LEFT T

## 2018-06-26 NOTE — CONSULTS
[facility]  Report of Consultation    [place] Patient Status:  Inpatient    1940 MRN [id number]   [facility] 3SW-A Attending Hui Fleming MD   Hosp Day # 0 PCP Holden Mullins     Reason for Consultation:  Medical management st Comment: [employer]  9/25/2015: COLONOSCOPY N/A      Comment: Procedure: COLONOSCOPY;  Surgeon: David Campos DO;  Location:  ENDOSCOPY  No date: HIP REPLACEMENT SURGERY  No date: KNEE REPLACEMENT SURGERY  No date: LAMINECTOMY      Co Continuous  •  oxyCODONE HCl (OXY-IR) cap/tab 2.5 mg, 2.5 mg, Oral, Q4H PRN **OR** oxyCODONE HCl (OXY-IR) cap/tab 5 mg, 5 mg, Oral, Q4H PRN **OR** OxyCODONE HCl IR (ROXICODONE) immediate release tab 10 mg, 10 mg, Oral, Q4H PRN  •  HYDROmorphone HCl (DILAUD Epic.        ASSESSMENT / PLAN:   Severe left hip degenerative joint disease osteoarthritis status post left total hip arthroplasty– managed by orthopedics    1. Essential hypertension-continue home medication metoprolol and diltiazem.   Follow blood pressu

## 2018-06-26 NOTE — BRIEF OP NOTE
Pre-Operative Diagnosis: LEFT HIP DEGENERATIVE JOINT DISEASE     Post-Operative Diagnosis: LEFT HIP DEGENERATIVE JOINT DISEASE      Procedure Performed:   Procedure(s):  LEFT TOTAL HIP ARTHROPLASTY    Surgeon(s) and Role:     Alis Baltazar MD - Primary

## 2018-06-26 NOTE — OPERATIVE REPORT
[facility]    PATIENT'S NAME: Javon Santiago   ATTENDING PHYSICIAN: Nikia Lozano M.D. OPERATING PHYSICIAN: Nikia Lozano M.D.    PATIENT ACCOUNT#:   [de-identified]    LOCATION:  [address] [place] [address]  MEDICAL RECORD #:   [id number]       DATE OF BIRTH: fascia iliaca block was then performed by the anesthesiologist without apparent difficulty. The left lower extremity was prepped and draped in the usual sterile fashion.   After surgical time-out and infusion of prophylactic antibiotics, an oblique incisio fashion. Once again, various size trial neck and heads were place. The hip was reduced, taken through a range of motion, visualized with fluoroscopy, felt to be stable with appropriate positioning. Trials were again removed.   Wound was copiously irrigat

## 2018-06-26 NOTE — PROGRESS NOTES
[facility] Dosing Service  Warfarin (Coumadin) Initial Dosing    Estanislado Severin is a 66year old male with a history of afib (on outpatient coumadin) for whom pharmacy has been consulted to dose warfarin (COUMADIN) for by Tejal Grace PA-C.   Based on th

## 2018-06-26 NOTE — ANESTHESIA PREPROCEDURE EVALUATION
PRE-OP EVALUATION    Patient Name: rFanny Mendez    Pre-op Diagnosis: LEFT HIP DEGENERATIVE JOINT DISEASE    Procedure(s):  LEFT TOTAL HIP ARTHROPLASTY, DIRECT ANTERIOR APPROACH    Surgeon(s) and Role:     Kinsey Damon MD - Primary    Pre-op vitals re Cardiovascular      ECG reviewed.           (+) obesity  (+) hypertension   (+) hyperlipidemia  (+) CAD    (+) CABG/stent  (+) pacemaker/AICD                          Endo/Other      (+) diabetes         (+) anemia      (+) clottin Plan: general and regional  NPO status verified and patient meets guidelines. Post-procedure pain management plan discussed with surgeon and patient.   Surgeon requests: regional block  Comment: Spinal contraindicated due to elevated INR   Plan/risks d

## 2018-06-26 NOTE — CM/SW NOTE
Confirmed with pt his choice of [facility] is Jessika Cochran [phone].  Sent [facility] referral.    Jacqueline Whitehead, LCSW  pgr [phone]

## 2018-06-26 NOTE — CM/SW NOTE
Call from [place] at [facility] [phone]. This pt was referred to [facility] preoperatively and they are able to accept pt. CM/SW will see pt post operatively and sent information to [facility].

## 2018-06-26 NOTE — PHYSICAL THERAPY NOTE
PHYSICAL THERAPY HIP EVALUATION - INPATIENT     Room Number: 351/351-A  Evaluation Date: 6/26/2018  Type of Evaluation: Initial  Physician Order: PT Eval and Treat    Presenting Problem: s/p left JASPREET 6/26/18  Reason for Therapy: Mobility Dysfunction and Cathern [facility])   Home Layout: One level                Lives With: Spouse (dtr who is an rn is coming to stay with pt)  Drives: Yes  Patient Owned Equipment: Rolling walker;Cane (uses cane in apartment, rw when out of apartment)  Patient Regularly Uses: Glasses on the side of the bed?: A Little   How much help from another person does the patient currently need. ..   -   Moving to and from a bed to a chair (including a wheelchair)?: A Little   -   Need to walk in hospital room?: A Little   -   Climbing 3-5 steps w and TKA, pacemaker. In this PT evaluation, the patient presents with the following impairments left hip pain, edema, dec rom, strength, activity tolerance.   Functional outcome measures completed include Results of the AM-PAC \"6 clicks\" Inpatient Daily M

## 2018-06-26 NOTE — INTERVAL H&P NOTE
Pre-op Diagnosis: LEFT HIP DEGENERATIVE JOINT DISEASE    The above referenced H&P was reviewed by Breana Stevens MD on 6/26/2018, the patient was examined and no significant changes have occurred in the patient's condition since the H&P was performed.   I d

## 2018-06-27 LAB
ERYTHROCYTE [DISTWIDTH] IN BLOOD BY AUTOMATED COUNT: 15.8 % (ref 11.5–16)
EST. AVERAGE GLUCOSE BLD GHB EST-MCNC: 146 MG/DL (ref 68–126)
GLUCOSE BLD-MCNC: 176 MG/DL (ref 65–99)
GLUCOSE BLD-MCNC: 185 MG/DL (ref 65–99)
GLUCOSE BLD-MCNC: 188 MG/DL (ref 65–99)
GLUCOSE BLD-MCNC: 188 MG/DL (ref 65–99)
HBA1C MFR BLD HPLC: 6.7 % (ref ?–5.7)
HCT VFR BLD AUTO: 30.8 % (ref 37–53)
HGB BLD-MCNC: 10 G/DL (ref 13–17)
INR BLD: 1.65 (ref 0.9–1.1)
MCH RBC QN AUTO: 29.7 PG (ref 27–33.2)
MCHC RBC AUTO-ENTMCNC: 32.5 G/DL (ref 31–37)
MCV RBC AUTO: 91.4 FL (ref 80–99)
PLATELET # BLD AUTO: 131 10(3)UL (ref 150–450)
PSA SERPL DL<=0.01 NG/ML-MCNC: 20.1 SECONDS (ref 12.4–14.7)
RBC # BLD AUTO: 3.37 X10(6)UL (ref 3.8–5.8)
RED CELL DISTRIBUTION WIDTH-SD: 51.9 FL (ref 35.1–46.3)
WBC # BLD AUTO: 8.3 X10(3) UL (ref 4–13)

## 2018-06-27 PROCEDURE — 99232 SBSQ HOSP IP/OBS MODERATE 35: CPT | Performed by: HOSPITALIST

## 2018-06-27 PROCEDURE — 3E0T3BZ INTRODUCTION OF ANESTHETIC AGENT INTO PERIPHERAL NERVES AND PLEXI, PERCUTANEOUS APPROACH: ICD-10-PCS | Performed by: ANESTHESIOLOGY

## 2018-06-27 RX ORDER — HYDROCODONE BITARTRATE AND ACETAMINOPHEN 5; 325 MG/1; MG/1
2 TABLET ORAL EVERY 4 HOURS PRN
Status: DISCONTINUED | OUTPATIENT
Start: 2018-06-28 | End: 2018-06-28

## 2018-06-27 RX ORDER — WARFARIN SODIUM 2 MG/1
4 TABLET ORAL
Status: COMPLETED | OUTPATIENT
Start: 2018-06-27 | End: 2018-06-27

## 2018-06-27 RX ORDER — DEXTROSE MONOHYDRATE 25 G/50ML
50 INJECTION, SOLUTION INTRAVENOUS
Status: DISCONTINUED | OUTPATIENT
Start: 2018-06-27 | End: 2018-06-28

## 2018-06-27 RX ORDER — LISINOPRIL 5 MG/1
5 TABLET ORAL DAILY
Status: DISCONTINUED | OUTPATIENT
Start: 2018-06-27 | End: 2018-06-28

## 2018-06-27 RX ORDER — HYDROCODONE BITARTRATE AND ACETAMINOPHEN 5; 325 MG/1; MG/1
1 TABLET ORAL EVERY 4 HOURS PRN
Status: DISCONTINUED | OUTPATIENT
Start: 2018-06-28 | End: 2018-06-28

## 2018-06-27 NOTE — PHYSICAL THERAPY NOTE
PHYSICAL THERAPY HIP TREATMENT NOTE - INPATIENT      Room Number: 570/653-N     Session: 1&2  Number of Visits to Meet Established Goals: 4    Presenting Problem: s/p left JASPREET 6/26/18    Problem List  Active Problems:    Diabetes mellitus ([facility]    [employer] RESTRICTION  Weight Bearing Restriction: L lower extremity           L Lower Extremity: Weight Bearing as Tolerated    PAIN ASSESSMENT   Ratin (No pain at rest but increased to 5 with activity)  Location: Left hip at surgical site  Management Technique to gait pattern. Cues provided to improve step through. PM: Pt attended PM group session for mobility training and exercises. Pt shows improved tolerance to increased repetitions of exercises with cues for proper technique.  Pt continues to benefit fro Plan: Bed mobility; Endurance; Energy conservation;Patient education;Gait training;Range of motion;Strengthening;Transfer training  Rehab Potential : Good  Frequency (Obs): BID    CURRENT GOALS    Goal #1  Patient is able to demonstrate supine - sit EOB @ le

## 2018-06-27 NOTE — PROGRESS NOTES
[address] Patient Status:  Inpatient    1940 MRN [id number]   [facility] 3SW-A Attending David Canada MD   Hosp Day # 1 PCP COREY MCCRACKEN     Subjective:  Post-Operative Day: 1 Status Post left Total Hip Arthropla

## 2018-06-27 NOTE — OCCUPATIONAL THERAPY NOTE
OCCUPATIONAL THERAPY EVALUATION - INPATIENT     Room Number: 351/351-A  Evaluation Date: 6/27/2018  Type of Evaluation: Initial  Presenting Problem: s/p L JASPREET 6/26/18    Physician Order: IP Consult to Occupational Therapy  Reason for Therapy: ADL/IADL Dysf LAMINECTOMY      Comment: no hardware  No date: SKIN SURGERY  No date: TOTAL HIP REPLACEMENT Right  No date: TOTAL KNEE REPLACEMENT Right    OCCUPATIONAL PROFILE    HOME SITUATION  Type of Home: Independent living facility ([facility])  Home Layout: One l ASSESSMENT  AM-PAC ‘6-Clicks’ Inpatient Daily Activity Short Form  How much help from another person does the patient currently need…  -   Putting on and taking off regular lower body clothing?: A Little  -   Bathing (including washing, rinsing, drying)?: following performance deficits: increased pain, decreased balance, decreased endurance, decreased knowledge of hip precautions and incorporation into ADLs, decreased knowledge of adaptive techniques.  These deficits impact the patient’s ability to participa tasks

## 2018-06-27 NOTE — PROGRESS NOTES
Patient and coaches (wife and daughter) attended group discharge education class. Discharge education provided utilizing \"hip/knee replacement discharge instructions\" sheet. Teach back done. Questions solicited and answered. Tolerated activity well.

## 2018-06-27 NOTE — CM/SW NOTE
Spoke with Soy Seth with [facility] [phone]. Pt is accepted for RN/PT services. Update sent via [facility] re: d/c tomorrow with [facility] for 6/29. Will need to sent AVS at d/c.

## 2018-06-27 NOTE — PROGRESS NOTES
Pharmacy Dosing Service: Warfarin (Coumadin)  Keren Tinsley is a 66year old male with a history of afib (on outpatient coumadin) for whom pharmacy was consulted to dose warfarin (COUMADIN) on 6/26/18 by Scout Melvin PA-C.   Based on this indication, goal

## 2018-06-27 NOTE — PROGRESS NOTES
[facility]  Progress Note     Milan Rajan Patient Status:  Inpatient    1940 MRN [id number]   [facility] 3SW-A Attending Kelby Parisi MD   Hosp Day # 1 PCP Jenifer Samaniego     Chief Complaint: Medical management    S: Patient do 100 mg Oral BID   • atorvastatin  10 mg Oral Nightly   • DilTIAZem HCl ER Coated Beads  240 mg Oral Daily   • Metoprolol Tartrate  100 mg Oral BID       ASSESSMENT / PLAN:     1. S/p L JASPREET, per surgery  2.  DMII, hyperglycemia protocol, BS mildly elevated

## 2018-06-27 NOTE — RESPIRATORY THERAPY NOTE
NII - Equipment Use Daily Summary:                  . Set Mode:  CPAP WITH C-FLEX                . Usage in hours: 7:00                . 90% Pressure (EPAP) level: 4.0                . 90% Insp. Pressure (IPAP): James Verduzco AHI: 22.6                .  Elroy Doss

## 2018-06-28 VITALS
SYSTOLIC BLOOD PRESSURE: 122 MMHG | HEIGHT: 67 IN | WEIGHT: 206 LBS | HEART RATE: 90 BPM | DIASTOLIC BLOOD PRESSURE: 54 MMHG | RESPIRATION RATE: 18 BRPM | TEMPERATURE: 98 F | BODY MASS INDEX: 32.33 KG/M2 | OXYGEN SATURATION: 98 %

## 2018-06-28 PROBLEM — G47.33 OSA ON CPAP: Status: ACTIVE | Noted: 2018-06-28

## 2018-06-28 PROBLEM — Z99.89 OSA ON CPAP: Status: ACTIVE | Noted: 2018-06-28

## 2018-06-28 LAB
ERYTHROCYTE [DISTWIDTH] IN BLOOD BY AUTOMATED COUNT: 15.8 % (ref 11.5–16)
GLUCOSE BLD-MCNC: 153 MG/DL (ref 65–99)
GLUCOSE BLD-MCNC: 153 MG/DL (ref 65–99)
HCT VFR BLD AUTO: 30.6 % (ref 37–53)
HGB BLD-MCNC: 9.5 G/DL (ref 13–17)
INR BLD: 1.82 (ref 0.9–1.1)
MCH RBC QN AUTO: 28.3 PG (ref 27–33.2)
MCHC RBC AUTO-ENTMCNC: 31 G/DL (ref 31–37)
MCV RBC AUTO: 91.1 FL (ref 80–99)
PLATELET # BLD AUTO: 136 10(3)UL (ref 150–450)
PSA SERPL DL<=0.01 NG/ML-MCNC: 21.7 SECONDS (ref 12.4–14.7)
RBC # BLD AUTO: 3.36 X10(6)UL (ref 3.8–5.8)
RED CELL DISTRIBUTION WIDTH-SD: 52.6 FL (ref 35.1–46.3)
WBC # BLD AUTO: 9.8 X10(3) UL (ref 4–13)

## 2018-06-28 PROCEDURE — 99232 SBSQ HOSP IP/OBS MODERATE 35: CPT | Performed by: HOSPITALIST

## 2018-06-28 RX ORDER — WARFARIN SODIUM 2 MG/1
4 TABLET ORAL
Status: DISCONTINUED | OUTPATIENT
Start: 2018-06-28 | End: 2018-06-28

## 2018-06-28 RX ORDER — ONDANSETRON 4 MG/1
4 TABLET, ORALLY DISINTEGRATING ORAL EVERY 8 HOURS PRN
Qty: 20 TABLET | Refills: 1 | Status: SHIPPED | OUTPATIENT
Start: 2018-06-28 | End: 2018-07-27

## 2018-06-28 RX ORDER — HYDROCODONE BITARTRATE AND ACETAMINOPHEN 5; 325 MG/1; MG/1
1-2 TABLET ORAL EVERY 4 HOURS PRN
Qty: 50 TABLET | Refills: 0 | Status: SHIPPED | OUTPATIENT
Start: 2018-06-28 | End: 2018-07-27

## 2018-06-28 RX ORDER — AMOXICILLIN 250 MG
2 CAPSULE ORAL 2 TIMES DAILY
Qty: 120 TABLET | Refills: 0 | Status: SHIPPED | OUTPATIENT
Start: 2018-06-28 | End: 2019-02-04 | Stop reason: ALTCHOICE

## 2018-06-28 NOTE — DISCHARGE SUMMARY
[facility]  Discharge Summary    Naheed Willams Patient Status:  Inpatient    1940 MRN [id number]   [facility] 3SW-A Attending Chriss Marion MD   Hosp Day # 2 PCP Catarina Akhtar     Date of Admission: 2018 Disposition: Home or Se CHANGED    Acetaminophen (ACETAMINOPHEN EXTRA STRENGTH) 500 MG Oral Cap  Take 1,000 mg by mouth one time. DilTIAZem HCl ER Coated Beads (CARTIA XT) 240 MG Oral Capsule SR 24 Hr  Take 240 mg by mouth daily.     Warfarin Sodium 4 MG Oral Tab  Take 4 mg b

## 2018-06-28 NOTE — PROGRESS NOTES
[address] Patient Status:  Inpatient    1940 MRN [id number]   [facility] 3SW-A Attending Saeid Tariq MD   Hosp Day # 2 PCP COREY MCCRACKEN     Subjective:  Post-Operative Day: 2 Status Post left Total Hip Arthropla

## 2018-06-28 NOTE — PHYSICAL THERAPY NOTE
PHYSICAL THERAPY HIP TREATMENT NOTE - INPATIENT      Room Number: 437/434-U     Session: 3  Number of Visits to Meet Established Goals: 4    Presenting Problem: s/p left JASPREET 6/26/18    Problem List  Active Problems:    Di[facility] ([facility]    Essential ASSESSMENT   Ratin  Location: L hip  Management Techniques:  Activity promotion    BALANCE  Static Sitting: Good  Dynamic Sitting: Fair -  Static Standing: Poor +  Dynamic Standing: Poor +  ACTIVITY TOLERANCE  Room air  Spo2 monitored throughout activit with pt. Pt was trained on tub/bench transfer to simulate car transfer with mod assist level of assist while maintaining hip precautions. Hip precautions reviewed. Daughter observed session.      Exercises AM Session   Ankle Pumps     20 reps   Quad Sets 20 Patient is able to ambulate 300 feet with assistive device at assistance level: modified independent    Goal #4     Patient will negotiate 4 stairs/one curb w/ assistive device and supervision   Goal #5     Patient verbalizes and/or demonstrates all preca

## 2018-06-28 NOTE — PROGRESS NOTES
Abductor pillow provided and reviewed with patient for use at home. Patient verbalized understanding.

## 2018-06-28 NOTE — CM/SW NOTE
Informed by RN during d/c rounds that pt will d/c today. AVS sent via [facility]. Larissa Reeves [facility] [phone] re: above.

## 2018-06-28 NOTE — PROGRESS NOTES
[facility]  Progress Note     Maya Barger Patient Status:  Inpatient    1940 MRN [id number]   [facility] 3SW-A Attending Pascual Snell MD   Hosp Day # 2 PCP Joselito Sierra     Chief Complaint: Medical management    S: Patient do Insulin Aspart Pen  2-10 Units Subcutaneous TID CC and HS   • Senna  17.2 mg Oral Nightly   • docusate sodium  100 mg Oral BID   • atorvastatin  10 mg Oral Nightly   • DilTIAZem HCl ER Coated Beads  240 mg Oral Daily   • Metoprolol Tartrate  100 mg Oral BI

## 2018-06-28 NOTE — PROGRESS NOTES
Acute Pain Service    Post Op Day 2 Ortho Note    Assessed patient in chair. Patient rates pain 1-2/10 at rest and 3-4/10 with activity. Patient states Christina Pressley is working well to manage pain; denies itching/nausea/dizziness.     Patient able to bear weight on

## 2018-06-28 NOTE — RESPIRATORY THERAPY NOTE
NII : EQUIPMENT USE: DAILY SUMMARY                                            SET MODE:                                          USAGE IN HOURS:                                          90% EXP. PRESSURE(EPAP):

## 2018-06-28 NOTE — PROGRESS NOTES
Pharmacy Dosing Service: Warfarin (Coumadin)  Aayush Juarez is a 66year old male with a history of afib (on outpatient coumadin) for whom pharmacy was consulted to dose warfarin (COUMADIN) on 6/26/18 by Rhonda Taylor PA-C.   Based on this indication, goal

## 2018-06-28 NOTE — OCCUPATIONAL THERAPY NOTE
OCCUPATIONAL THERAPY TREATMENT NOTE - INPATIENT     Room Number: 351/351-A  Session: 1   Number of Visits to Meet Established Goals: 2    Presenting Problem: s/p L JASPREET 6/26/18    History related to current admission: Patient is 66year old male admitted on Right    SUBJECTIVE  \"I'm not moving right now, but you can talk to me\"    Patient self-stated goal is to dance again, reports he likes to marli.      OBJECTIVE  Precautions: JASPREET - anterior    WEIGHT BEARING RESTRICTION  Weight Bearing Restriction: [place] larry plan, with good verbal understanding. Multiple questions, all addressed at this time. Will reattempt tomorrow to address goals if patient does not discharge this pm as expected.   Patient End of Session: Up in chair;Needs met;Call light within reach;RN awar

## 2018-06-28 NOTE — CM/SW NOTE
06/28/18 1400   Discharge disposition   Expected discharge disposition Home-Health   Name of [address] services after discharge Skilled home care   Discharge transportation Private car

## 2018-07-05 ENCOUNTER — TELEPHONE (OUTPATIENT)
Dept: FAMILY MEDICINE CLINIC | Facility: CLINIC | Age: 78
End: 2018-07-05

## 2018-07-05 NOTE — TELEPHONE ENCOUNTER
Patient completed release of medical records form to obtain medical records from previous provider.  Release faxed to Dr. Cory Enamorado at [phone]

## 2018-07-23 ENCOUNTER — HOSPITAL ENCOUNTER (OUTPATIENT)
Dept: PHYSICAL THERAPY | Facility: HOSPITAL | Age: 78
Setting detail: THERAPIES SERIES
Discharge: HOME | End: 2018-07-23
Attending: ORTHOPAEDIC SURGERY
Payer: MEDICARE

## 2018-07-23 DIAGNOSIS — Z96.642 STATUS POST LEFT HIP REPLACEMENT: ICD-10-CM

## 2018-07-23 PROCEDURE — 97110 THERAPEUTIC EXERCISES: CPT

## 2018-07-23 PROCEDURE — 97161 PT EVAL LOW COMPLEX 20 MIN: CPT

## 2018-07-23 NOTE — PROGRESS NOTES
LOWER EXTREMITY EVALUATION:   Referring Physician: Dr. Ashley Velazquez  Diagnosis: L JASPREET     Date of Service: 7/23/2018     PATIENT SUMMARY   Galo Ellsworth is a 66year old male who presents to therapy today s/p L JASPREET on June 26th.  He states that he was walking moderately limited    Quads: R mildly limited; L mildly limited    Strength/MMT:   Hip Knee   Flexion: N/T  Extension: R 3/5; L 4/5  Abduction: R 4-/5; L 3-/5  ER: R 4/5; L 4-/5 Flexion: R 5/5; L 5/5  Extension: R 5/5; L 5/5        Special tests: none perf [phone].  If you have any questions, please contact me at Dept: [phone]    Sincerely,  Electronically signed by therapist: Chris Celestin PT    [de-identified] certification required: Yes  I certify the need for these services furnished under this tristen

## 2018-07-25 ENCOUNTER — APPOINTMENT (OUTPATIENT)
Dept: PHYSICAL THERAPY | Facility: HOSPITAL | Age: 78
End: 2018-07-25
Attending: ORTHOPAEDIC SURGERY
Payer: MEDICARE

## 2018-07-26 ENCOUNTER — TELEPHONE (OUTPATIENT)
Dept: FAMILY MEDICINE CLINIC | Facility: CLINIC | Age: 78
End: 2018-07-26

## 2018-07-26 PROBLEM — G47.33 OBSTRUCTIVE SLEEP APNEA: Status: ACTIVE | Noted: 2018-06-28

## 2018-07-27 ENCOUNTER — OFFICE VISIT (OUTPATIENT)
Dept: FAMILY MEDICINE CLINIC | Facility: CLINIC | Age: 78
End: 2018-07-27
Payer: MEDICARE

## 2018-07-27 VITALS
HEART RATE: 84 BPM | HEIGHT: 67 IN | DIASTOLIC BLOOD PRESSURE: 62 MMHG | BODY MASS INDEX: 31.39 KG/M2 | RESPIRATION RATE: 16 BRPM | TEMPERATURE: 98 F | SYSTOLIC BLOOD PRESSURE: 112 MMHG | WEIGHT: 200 LBS

## 2018-07-27 DIAGNOSIS — N18.30 CHRONIC KIDNEY DISEASE, STAGE III (MODERATE) (HCC): ICD-10-CM

## 2018-07-27 DIAGNOSIS — K52.832 LYMPHOCYTIC COLITIS: ICD-10-CM

## 2018-07-27 DIAGNOSIS — I25.10 ATHEROSCLEROSIS OF NATIVE CORONARY ARTERY OF NATIVE HEART WITHOUT ANGINA PECTORIS: ICD-10-CM

## 2018-07-27 DIAGNOSIS — G47.33 OBSTRUCTIVE SLEEP APNEA: ICD-10-CM

## 2018-07-27 DIAGNOSIS — I10 ESSENTIAL HYPERTENSION: ICD-10-CM

## 2018-07-27 DIAGNOSIS — R80.9 MICROALBUMINURIA DUE TO TYPE 2 DIABETES MELLITUS (HCC): ICD-10-CM

## 2018-07-27 DIAGNOSIS — E78.5 HYPERLIPIDEMIA, UNSPECIFIED HYPERLIPIDEMIA TYPE: ICD-10-CM

## 2018-07-27 DIAGNOSIS — E11.00 TYPE 2 DIABETES MELLITUS WITH HYPEROSMOLARITY WITHOUT COMA, WITHOUT LONG-TERM CURRENT USE OF INSULIN (HCC): Primary | ICD-10-CM

## 2018-07-27 DIAGNOSIS — D64.9 ANEMIA, UNSPECIFIED TYPE: ICD-10-CM

## 2018-07-27 DIAGNOSIS — Z95.0 CARDIAC PACEMAKER IN SITU: ICD-10-CM

## 2018-07-27 DIAGNOSIS — E11.29 MICROALBUMINURIA DUE TO TYPE 2 DIABETES MELLITUS (HCC): ICD-10-CM

## 2018-07-27 DIAGNOSIS — C61 PROSTATE CANCER (HCC): ICD-10-CM

## 2018-07-27 DIAGNOSIS — I49.5 SINOATRIAL NODE DYSFUNCTION (HCC): ICD-10-CM

## 2018-07-27 PROBLEM — R63.4 WEIGHT LOSS: Status: RESOLVED | Noted: 2017-10-23 | Resolved: 2018-07-27

## 2018-07-27 PROCEDURE — 99205 OFFICE O/P NEW HI 60 MIN: CPT | Performed by: FAMILY MEDICINE

## 2018-07-27 PROCEDURE — 1111F DSCHRG MED/CURRENT MED MERGE: CPT | Performed by: FAMILY MEDICINE

## 2018-07-27 NOTE — PROGRESS NOTES
Patient presents with:  Hospital F/U: follow up on left hip replacement     HPI:   Cresencio Wyatt is a 66year old male who presents to the office as a new patient. He is getting a new doctor. Used to see Kiesha Wills - who is out in [facility].   HE moved cl mellitus ([facility]     Essential hypertension     Obese     Prostate cancer ([facility]     Lymphocytic colitis     Anemia     Weight loss     Coronary atherosclerosis     Obstructive sleep apnea     Spinal stenosis of lumbar region     Sinoatrial node dysfunction ( ENDOSCOPY   • Hip replacement surgery Left 06/26/2018   • Hip replacement surgery Right     2000   • Knee replacement surgery      after 2000   • Laminectomy      no hardware   • Skin surgery     • Total hip replacement Right    • Total knee replacement Ri sounds normal; no masses,  no organomegaly  Extremities: extremities normal, atraumatic, no cyanosis or edema  Pulses: 2+ and symmetric  Neurologic: Alert and oriented X 3, normal strength and tone. . Normal coordination and gait     ASSESSMENT AND PLAN: visit.     Ignacia Duverney M.D.   EMG 3  07/27/18

## 2018-07-30 ENCOUNTER — HOSPITAL ENCOUNTER (OUTPATIENT)
Dept: PHYSICAL THERAPY | Facility: HOSPITAL | Age: 78
Setting detail: THERAPIES SERIES
Discharge: HOME | End: 2018-07-30
Attending: ORTHOPAEDIC SURGERY
Payer: MEDICARE

## 2018-07-30 PROCEDURE — 97110 THERAPEUTIC EXERCISES: CPT

## 2018-07-30 NOTE — PROGRESS NOTES
Dx: Manuel Mckay         Authorized # of Visits:  10         Next MD visit: none scheduled  Fall Risk: standard         Precautions: n/a             Subjective: Patient reports that he has not been doing his exercises at home.  Walks short distances at home w/o

## 2018-07-31 ENCOUNTER — NURSE ONLY (OUTPATIENT)
Dept: LAB | Age: 78
End: 2018-07-31
Attending: FAMILY MEDICINE
Payer: MEDICARE

## 2018-07-31 DIAGNOSIS — R69 DIAGNOSIS UNKNOWN: Primary | ICD-10-CM

## 2018-07-31 LAB
ALBUMIN SERPL-MCNC: 3.4 G/DL (ref 3.5–4.8)
ALBUMIN/GLOB SERPL: 1 {RATIO} (ref 1–2)
ALP LIVER SERPL-CCNC: 159 U/L (ref 45–117)
ALT SERPL-CCNC: 25 U/L (ref 17–63)
ANION GAP SERPL CALC-SCNC: 8 MMOL/L (ref 0–18)
AST SERPL-CCNC: 20 U/L (ref 15–41)
BILIRUB SERPL-MCNC: 0.6 MG/DL (ref 0.1–2)
BUN BLD-MCNC: 18 MG/DL (ref 8–20)
BUN/CREAT SERPL: 17.1 (ref 10–20)
CALCIUM BLD-MCNC: 8.5 MG/DL (ref 8.3–10.3)
CHLORIDE SERPL-SCNC: 112 MMOL/L (ref 101–111)
CO2 SERPL-SCNC: 22 MMOL/L (ref 22–32)
CREAT BLD-MCNC: 1.05 MG/DL (ref 0.7–1.3)
DEPRECATED HBV CORE AB SER IA-ACNC: 173.1 NG/ML (ref 30–530)
ERYTHROCYTE [DISTWIDTH] IN BLOOD BY AUTOMATED COUNT: 15.9 % (ref 11.5–16)
GLOBULIN PLAS-MCNC: 3.4 G/DL (ref 2.5–3.7)
GLUCOSE BLD-MCNC: 115 MG/DL (ref 70–99)
HCT VFR BLD AUTO: 32.8 % (ref 37–53)
HGB BLD-MCNC: 10 G/DL (ref 13–17)
IRON SATURATION: 12 % (ref 20–50)
IRON: 36 UG/DL (ref 45–182)
M PROTEIN MFR SERPL ELPH: 6.8 G/DL (ref 6.1–8.3)
MCH RBC QN AUTO: 27.8 PG (ref 27–33.2)
MCHC RBC AUTO-ENTMCNC: 30.5 G/DL (ref 31–37)
MCV RBC AUTO: 91.1 FL (ref 80–99)
OSMOLALITY SERPL CALC.SUM OF ELEC: 297 MOSM/KG (ref 275–295)
PLATELET # BLD AUTO: 205 10(3)UL (ref 150–450)
POTASSIUM SERPL-SCNC: 4.3 MMOL/L (ref 3.6–5.1)
RBC # BLD AUTO: 3.6 X10(6)UL (ref 3.8–5.8)
RED CELL DISTRIBUTION WIDTH-SD: 52.7 FL (ref 35.1–46.3)
SODIUM SERPL-SCNC: 142 MMOL/L (ref 136–144)
TOTAL IRON BINDING CAPACITY: 302 UG/DL (ref 240–450)
TRANSFERRIN SERPL-MCNC: 203 MG/DL (ref 200–360)
WBC # BLD AUTO: 5.6 X10(3) UL (ref 4–13)

## 2018-07-31 PROCEDURE — 36415 COLL VENOUS BLD VENIPUNCTURE: CPT

## 2018-07-31 PROCEDURE — 83550 IRON BINDING TEST: CPT

## 2018-07-31 PROCEDURE — 85027 COMPLETE CBC AUTOMATED: CPT

## 2018-07-31 PROCEDURE — 82728 ASSAY OF FERRITIN: CPT

## 2018-07-31 PROCEDURE — 80053 COMPREHEN METABOLIC PANEL: CPT

## 2018-07-31 PROCEDURE — 83540 ASSAY OF IRON: CPT

## 2018-08-01 ENCOUNTER — HOSPITAL ENCOUNTER (OUTPATIENT)
Dept: PHYSICAL THERAPY | Facility: HOSPITAL | Age: 78
Setting detail: THERAPIES SERIES
Discharge: HOME | End: 2018-08-01
Attending: ORTHOPAEDIC SURGERY
Payer: MEDICARE

## 2018-08-01 PROCEDURE — 97110 THERAPEUTIC EXERCISES: CPT

## 2018-08-01 NOTE — PROGRESS NOTES
Dx: Arnoldo Chau         Authorized # of Visits:  10         Next MD visit: none scheduled  Fall Risk: standard         Precautions: n/a             Subjective: Patient states that he felt ok after last session, just worn out. No pain in the L hip.     Objective

## 2018-08-03 ENCOUNTER — NURSE ONLY (OUTPATIENT)
Dept: LAB | Age: 78
End: 2018-08-03
Attending: FAMILY MEDICINE
Payer: MEDICARE

## 2018-08-03 DIAGNOSIS — E11.00 DM HYPEROSMOLARITY TYPE II (HCC): Primary | ICD-10-CM

## 2018-08-03 LAB
CREAT UR-SCNC: 75.4 MG/DL
MICROALBUMIN UR-MCNC: 6.09 MG/DL
MICROALBUMIN/CREAT 24H UR-RTO: 80.8 UG/MG (ref ?–30)

## 2018-08-03 PROCEDURE — 82043 UR ALBUMIN QUANTITATIVE: CPT

## 2018-08-03 PROCEDURE — 82570 ASSAY OF URINE CREATININE: CPT

## 2018-08-06 ENCOUNTER — APPOINTMENT (OUTPATIENT)
Dept: PHYSICAL THERAPY | Facility: HOSPITAL | Age: 78
End: 2018-08-06
Attending: ORTHOPAEDIC SURGERY
Payer: MEDICARE

## 2018-08-08 ENCOUNTER — HOSPITAL ENCOUNTER (OUTPATIENT)
Dept: PHYSICAL THERAPY | Facility: HOSPITAL | Age: 78
Setting detail: THERAPIES SERIES
Discharge: HOME | End: 2018-08-08
Attending: ORTHOPAEDIC SURGERY
Payer: MEDICARE

## 2018-08-08 PROCEDURE — 97110 THERAPEUTIC EXERCISES: CPT

## 2018-08-08 NOTE — PROGRESS NOTES
Dx: Laz Gilmore         Authorized # of Visits:  10         Next MD visit: none scheduled  Fall Risk: standard         Precautions: n/a             Subjective: Patient states that he felt ok after last session, just worn out. No pain in the L hip.     Objective Passive L hip flexion, ER, IR, abduction Passive L hip flexion, ER, IR, abduction Forward step up, 4\", 2x10           Charges: Issac 3( 45 min)    Total Timed Treatment: 45 min     Total Treatment Time: 45 min

## 2018-08-09 ENCOUNTER — HOSPITAL ENCOUNTER (OUTPATIENT)
Dept: PHYSICAL THERAPY | Facility: HOSPITAL | Age: 78
Setting detail: THERAPIES SERIES
Discharge: HOME | End: 2018-08-09
Attending: ORTHOPAEDIC SURGERY
Payer: MEDICARE

## 2018-08-09 PROCEDURE — 97110 THERAPEUTIC EXERCISES: CPT

## 2018-08-09 NOTE — PROGRESS NOTES
Dx:  Raissa Marquez         Authorized # of Visits:  10         Next MD visit: none scheduled  Fall Risk: standard         Precautions: n/a             Subjective: Patient reports that his hip is feeling stronger and he is starting to feel more confident with walk [id number] Heel raise, 2x15 Cone taps, 15  Standing march, 15 Supine hip extension stretch off table, 2x30 sec      Feet together trunk rotation, 3 min Partial forward lunge on step, 4\" step, [id number] Scott step over, 3 laps Supine quad stretch off table,

## 2018-08-13 ENCOUNTER — HOSPITAL ENCOUNTER (OUTPATIENT)
Dept: PHYSICAL THERAPY | Facility: HOSPITAL | Age: 78
Setting detail: THERAPIES SERIES
Discharge: HOME | End: 2018-08-13
Attending: ORTHOPAEDIC SURGERY
Payer: MEDICARE

## 2018-08-13 PROCEDURE — 97110 THERAPEUTIC EXERCISES: CPT

## 2018-08-13 NOTE — PROGRESS NOTES
Dx:  Lenin Jones         Authorized # of Visits:  10         Next MD visit: none scheduled  Fall Risk: standard         Precautions: n/a             Subjective: Patient reports that he felt more sluggish and achy over the weekend but that he feels better dinesh walk, yellow TB, 2 laps Standing hip extension, 15 Lateral walk, yellow TB, 3 laps     Partial forward lunge on step, 4\" step, [id number] Heel raise, 2x15 Cone taps, 15  Standing march, 15 Supine hip extension stretch off table, 2x30 sec Supine hip extensio

## 2018-08-15 ENCOUNTER — HOSPITAL ENCOUNTER (OUTPATIENT)
Dept: PHYSICAL THERAPY | Facility: HOSPITAL | Age: 78
Setting detail: THERAPIES SERIES
Discharge: HOME | End: 2018-08-15
Attending: ORTHOPAEDIC SURGERY
Payer: MEDICARE

## 2018-08-15 PROCEDURE — 97110 THERAPEUTIC EXERCISES: CPT

## 2018-08-15 NOTE — PROGRESS NOTES
Dx: Jo Ann Jackson         Authorized # of Visits:  10         Next MD visit: none scheduled  Fall Risk: standard         Precautions: n/a             Subjective: Patient hasn't felt as sluggish.  He feels that exercise and therapy aren't wearing him out as much a walk, 3 laps in // bars Lateral walk, 3 laps in // bars S/l modified abduction to extension, 2x10 Shuttle, 62#, 2x10  SLP 50#, 10 Black TB mini squat, 15 Shuttle, 62#, 2x15  SLP 50#, 15    Forward step up, 4\", 2x8 Forward step up, 4\", 2x10 Lateral walk,

## 2018-08-20 ENCOUNTER — HOSPITAL ENCOUNTER (OUTPATIENT)
Dept: PHYSICAL THERAPY | Facility: HOSPITAL | Age: 78
Setting detail: THERAPIES SERIES
Discharge: HOME | End: 2018-08-20
Attending: ORTHOPAEDIC SURGERY
Payer: MEDICARE

## 2018-08-20 PROCEDURE — 97110 THERAPEUTIC EXERCISES: CPT

## 2018-08-20 NOTE — PROGRESS NOTES
Dx: Raissa Marquez         Authorized # of Visits:  10         Next MD visit: none scheduled  Fall Risk: standard         Precautions: n/a             Subjective: Patient has not been using his RW since previous session and is only using SC.  He is slow and gets t Lateral walk, 3 laps in // bars Lateral walk, 3 laps in // bars S/l modified abduction to extension, 2x10 Shuttle, 62#, 2x10  SLP 50#, 10 Black TB mini squat, 15 Shuttle, 62#, 2x15  SLP 50#, 15 Supine marching, 2x10   Forward step up, 4\", 2x8 Forward st

## 2018-08-22 ENCOUNTER — HOSPITAL ENCOUNTER (OUTPATIENT)
Dept: PHYSICAL THERAPY | Facility: HOSPITAL | Age: 78
Setting detail: THERAPIES SERIES
Discharge: HOME | End: 2018-08-22
Attending: ORTHOPAEDIC SURGERY
Payer: MEDICARE

## 2018-08-22 PROCEDURE — 97110 THERAPEUTIC EXERCISES: CPT

## 2018-08-22 NOTE — PROGRESS NOTES
Dx:  Robby Manual         Authorized # of Visits:  10         Next MD visit: none scheduled  Fall Risk: standard         Precautions: n/a             Subjective: Patient feels that his hip is a little looser but that he feels \"a little shaky\" walking with the rollup, 20 Weight shift to single leg stance, 3 min Sit<>stand modified, 2x10 Sit<>stand modified, 2x6 Sit<>stand modified, 2x7 Yellow TB hip abduction, extension, 2x10 each      Lateral walk, 3 laps in // bars Lateral walk, 3 laps in // bars S/l modified

## 2018-08-28 ENCOUNTER — APPOINTMENT (OUTPATIENT)
Dept: PHYSICAL THERAPY | Facility: HOSPITAL | Age: 78
End: 2018-08-28
Attending: ORTHOPAEDIC SURGERY
Payer: MEDICARE

## 2018-08-31 ENCOUNTER — APPOINTMENT (OUTPATIENT)
Dept: PHYSICAL THERAPY | Facility: HOSPITAL | Age: 78
End: 2018-08-31
Attending: ORTHOPAEDIC SURGERY
Payer: MEDICARE

## 2018-09-05 ENCOUNTER — HOSPITAL ENCOUNTER (OUTPATIENT)
Dept: PHYSICAL THERAPY | Facility: HOSPITAL | Age: 78
Setting detail: THERAPIES SERIES
Discharge: HOME | End: 2018-09-05
Attending: ORTHOPAEDIC SURGERY
Payer: MEDICARE

## 2018-09-05 PROCEDURE — 97110 THERAPEUTIC EXERCISES: CPT

## 2018-09-05 NOTE — PROGRESS NOTES
LOWER EXTREMITY PROGRESS:   Referring Physician: Dr. Ignacia Marie  Diagnosis: L JASPREET     Date of Service: 9/5/2018     PATIENT SUMMARY   Lima Block is a 66year old male who has attended 10 sessions of PT following L JASPREET in June.  He has improved significant within 6 weeks. (progressing)  Patient will be able to walk 1/2 mile with no AD within 6 weeks.(progressing)  Patient will achieve TAE in FOTO score within 6 weeks to improve functional mobility.     Frequency / Duration: Patient will be seen for 2 x/week o

## 2018-09-11 ENCOUNTER — APPOINTMENT (OUTPATIENT)
Dept: PHYSICAL THERAPY | Facility: HOSPITAL | Age: 78
End: 2018-09-11
Attending: ORTHOPAEDIC SURGERY
Payer: MEDICARE

## 2018-09-12 ENCOUNTER — HOSPITAL ENCOUNTER (OUTPATIENT)
Dept: PHYSICAL THERAPY | Facility: HOSPITAL | Age: 78
Setting detail: THERAPIES SERIES
Discharge: HOME | End: 2018-09-12
Attending: ORTHOPAEDIC SURGERY
Payer: MEDICARE

## 2018-09-12 PROCEDURE — 97110 THERAPEUTIC EXERCISES: CPT

## 2018-09-12 NOTE — PROGRESS NOTES
Dx:  Roslyn Rene         Authorized # of Visits:  10         Next MD visit: none scheduled  Fall Risk: standard         Precautions: n/a             Subjective: Patient reports feeling fine in general but is having a hard time motivating himself to be active an step, 3 laps karaoke step, 3 laps     Sit<>stand, 2x5 Supine marching, 2x15 SB rollup, 20 Weight shift to single leg stance, 3 min Sit<>stand modified, 2x10 Sit<>stand modified, 2x6 Sit<>stand modified, 2x7 Yellow TB hip abduction, extension, 2x10 each red

## 2018-09-17 ENCOUNTER — HOSPITAL ENCOUNTER (OUTPATIENT)
Dept: PHYSICAL THERAPY | Facility: HOSPITAL | Age: 78
Setting detail: THERAPIES SERIES
Discharge: HOME | End: 2018-09-17
Attending: ORTHOPAEDIC SURGERY
Payer: MEDICARE

## 2018-09-17 PROCEDURE — 97110 THERAPEUTIC EXERCISES: CPT

## 2018-09-17 NOTE — PROGRESS NOTES
Dx: Teri Moss         Authorized # of Visits:  10         Next MD visit: none scheduled  Fall Risk: standard         Precautions: n/a             Subjective: Patient reports having a sore back over the weekend but no pain in the hip.  He has started taking a full range, 20 Scott step over, 4 laps Forward step up, 6\", 2x10 Standing march, 20 Seated knee extension, 3#, 2x10 karaoke step, 3 laps karaoke step, 3 laps SB hip flexion, 20    Sit<>stand, 2x5 Supine marching, 2x15 SB rollup, 20 Weight shift to single Passive L hip flexion, ER, IR, abduction Forward step up, 4\", 2x10     Gait trainng, 10 min          Charges: Issac 3( 45 min)    Total Timed Treatment: 45 min     Total Treatment Time: 45 min

## 2018-09-20 ENCOUNTER — HOSPITAL ENCOUNTER (OUTPATIENT)
Dept: PHYSICAL THERAPY | Facility: HOSPITAL | Age: 78
Setting detail: THERAPIES SERIES
Discharge: HOME | End: 2018-09-20
Attending: FAMILY MEDICINE
Payer: MEDICARE

## 2018-09-20 PROCEDURE — 97110 THERAPEUTIC EXERCISES: CPT

## 2018-09-20 PROCEDURE — 97140 MANUAL THERAPY 1/> REGIONS: CPT

## 2018-09-20 NOTE — PROGRESS NOTES
Dx: Donavan Garcia         Authorized # of Visits:  10         Next MD visit: none scheduled  Fall Risk: standard         Precautions: n/a             Subjective: Patient states that LLE swelling has gone down slightly since previous session.      Objective: hip f step up, 6\", 2x10 Standing march, 20 Seated knee extension, 3#, 2x10 karaoke step, 3 laps karaoke step, 3 laps SB hip flexion, 20 10\" toe taps and lateral, 2x15 each   Sit<>stand, 2x5 Supine marching, 2x15 SB rollup, 20 Weight shift to single leg stance, Standing march w/ pole, 20 Standing march w/ pole, 20     Passive L hip flexion, ER, IR, abduction Passive L hip flexion, ER, IR, abduction Forward step up, 4\", 2x10     Gait trainng, 10 min          Charges: Issac 2( 35 min), manual x1  (10 min)    Total T

## 2018-09-25 ENCOUNTER — HOSPITAL ENCOUNTER (OUTPATIENT)
Dept: PHYSICAL THERAPY | Facility: HOSPITAL | Age: 78
Setting detail: THERAPIES SERIES
Discharge: HOME | End: 2018-09-25
Attending: FAMILY MEDICINE
Payer: MEDICARE

## 2018-09-25 PROCEDURE — 97110 THERAPEUTIC EXERCISES: CPT

## 2018-09-25 PROCEDURE — 97140 MANUAL THERAPY 1/> REGIONS: CPT

## 2018-09-25 NOTE — PROGRESS NOTES
Dx:  Lorenza Soto         Authorized # of Visits:  10         Next MD visit: none scheduled  Fall Risk: standard         Precautions: n/a             Subjective: Patient reports that the swelling is still present but that he has not been elevating his leg as muc range, 20 Clam w/ assist for full range, 20 Scott step over, 4 laps Forward step up, 6\", 2x10 Standing march, 20 Seated knee extension, 3#, 2x10 karaoke step, 3 laps karaoke step, 3 laps SB hip flexion, 20 10\" toe taps and lateral, 2x15 each Forward beba yellow TB, 3 laps  SLR, 3x5 SLR, 2x10 Ankle pumps in 90-90 position, 30 10\" toe taps and lateral, 2x15 each      Gait training, 5 min SL stance, 3 min Passive flexion, abduction, ER, IR, 10 min Seated knee extension, blue TB, 2x10 Supine hip extension str

## 2018-11-05 DIAGNOSIS — I10 ESSENTIAL HYPERTENSION: ICD-10-CM

## 2018-11-05 DIAGNOSIS — C61 PROSTATE CANCER (HCC): Primary | ICD-10-CM

## 2018-11-06 RX ORDER — METOPROLOL TARTRATE 100 MG/1
100 TABLET ORAL 2 TIMES DAILY
Qty: 60 TABLET | Refills: 5 | Status: SHIPPED | OUTPATIENT
Start: 2018-11-06

## 2018-11-06 RX ORDER — TAMSULOSIN HYDROCHLORIDE 0.4 MG/1
0.4 CAPSULE ORAL DAILY
Qty: 30 CAPSULE | Refills: 5 | Status: SHIPPED | OUTPATIENT
Start: 2018-11-06 | End: 2018-12-06

## 2019-01-21 ENCOUNTER — TELEPHONE (OUTPATIENT)
Dept: FAMILY MEDICINE CLINIC | Facility: CLINIC | Age: 79
End: 2019-01-21

## 2019-01-29 ENCOUNTER — TELEPHONE (OUTPATIENT)
Dept: FAMILY MEDICINE CLINIC | Facility: CLINIC | Age: 79
End: 2019-01-29

## 2019-02-04 ENCOUNTER — OFFICE VISIT (OUTPATIENT)
Dept: FAMILY MEDICINE CLINIC | Facility: CLINIC | Age: 79
End: 2019-02-04
Payer: MEDICARE

## 2019-02-04 VITALS
SYSTOLIC BLOOD PRESSURE: 124 MMHG | HEIGHT: 65.5 IN | TEMPERATURE: 99 F | BODY MASS INDEX: 34.21 KG/M2 | WEIGHT: 207.81 LBS | HEART RATE: 88 BPM | DIASTOLIC BLOOD PRESSURE: 58 MMHG | RESPIRATION RATE: 18 BRPM

## 2019-02-04 DIAGNOSIS — Z95.0 CARDIAC PACEMAKER IN SITU: ICD-10-CM

## 2019-02-04 DIAGNOSIS — I25.10 ATHEROSCLEROSIS OF NATIVE CORONARY ARTERY OF NATIVE HEART WITHOUT ANGINA PECTORIS: ICD-10-CM

## 2019-02-04 DIAGNOSIS — I10 ESSENTIAL HYPERTENSION: ICD-10-CM

## 2019-02-04 DIAGNOSIS — C61 PROSTATE CANCER (HCC): ICD-10-CM

## 2019-02-04 DIAGNOSIS — D50.9 IRON DEFICIENCY ANEMIA, UNSPECIFIED IRON DEFICIENCY ANEMIA TYPE: ICD-10-CM

## 2019-02-04 DIAGNOSIS — Z00.00 ENCOUNTER FOR ANNUAL HEALTH EXAMINATION: Primary | ICD-10-CM

## 2019-02-04 DIAGNOSIS — G47.33 OBSTRUCTIVE SLEEP APNEA: ICD-10-CM

## 2019-02-04 DIAGNOSIS — N18.30 TYPE 2 DIABETES MELLITUS WITH STAGE 3 CHRONIC KIDNEY DISEASE, WITHOUT LONG-TERM CURRENT USE OF INSULIN (HCC): ICD-10-CM

## 2019-02-04 DIAGNOSIS — I49.5 SINOATRIAL NODE DYSFUNCTION (HCC): ICD-10-CM

## 2019-02-04 DIAGNOSIS — E11.29 MICROALBUMINURIA DUE TO TYPE 2 DIABETES MELLITUS (HCC): ICD-10-CM

## 2019-02-04 DIAGNOSIS — E78.5 HYPERLIPIDEMIA, UNSPECIFIED HYPERLIPIDEMIA TYPE: ICD-10-CM

## 2019-02-04 DIAGNOSIS — E11.22 TYPE 2 DIABETES MELLITUS WITH STAGE 3 CHRONIC KIDNEY DISEASE, WITHOUT LONG-TERM CURRENT USE OF INSULIN (HCC): ICD-10-CM

## 2019-02-04 DIAGNOSIS — R80.9 MICROALBUMINURIA DUE TO TYPE 2 DIABETES MELLITUS (HCC): ICD-10-CM

## 2019-02-04 DIAGNOSIS — Z13.31 DEPRESSION SCREENING: ICD-10-CM

## 2019-02-04 PROCEDURE — G0439 PPPS, SUBSEQ VISIT: HCPCS | Performed by: FAMILY MEDICINE

## 2019-02-04 PROCEDURE — G0444 DEPRESSION SCREEN ANNUAL: HCPCS | Performed by: FAMILY MEDICINE

## 2019-02-04 RX ORDER — TAMSULOSIN HYDROCHLORIDE 0.4 MG/1
1 CAPSULE ORAL DAILY
COMMUNITY
Start: 2019-01-05 | End: 2019-08-13

## 2019-02-04 RX ORDER — FUROSEMIDE 20 MG/1
40 TABLET ORAL DAILY
Status: ON HOLD | COMMUNITY
Start: 2019-02-01 | End: 2021-09-26

## 2019-02-04 RX ORDER — ATORVASTATIN CALCIUM 10 MG/1
1 TABLET, FILM COATED ORAL DAILY
COMMUNITY
Start: 2018-08-22 | End: 2019-08-21

## 2019-02-04 NOTE — PROGRESS NOTES
HPI:   Cresencio Wyatt is a 66year old male who presents for a Medicare Subsequent Annual Wellness visit (Pt already had Initial Annual Wellness). Preventative Screening  Colonoscopy - around 9/2015. Eligible if anemia worsens, but declining at present. below: He has no issues with dressing and bathing based on screening of functional status. He has Walking problems based on screening of functional status.    Difficulty walking?: Yes(walker)             Depression Screening (Xiao 30 Anemia     Coronary atherosclerosis     Obstructive sleep apnea     Spinal stenosis of lumbar region     Sinoatrial node dysfunction (HCC)     Mitral valve disorder     Chronic kidney disease, stage III (moderate) (HCC)     Cardiac pacemaker in situ     [facility] (2010), Coronary atherosclerosis of unspecified type of vessel, native or graft, Diabetes mellitus ([facility] Diarrhea, unspecified, Easy bruising, Exposure to unspecified radiation (2010), Frequent urination, High blood pressure, High cholesterol Visual Acuity  Right Eye Visual Acuity: Uncorrected Right Eye Chart Acuity: 20/140   Left Eye Visual Acuity: Uncorrected Left Eye Chart Acuity: 20/40   Both Eyes Visual Acuity: Uncorrected Both Eyes Chart Acuity: 20/40   Able To Tolerate Visual Acuity: Yes • Pneumococcal (Prevnar 13) 08/02/2016   • Pneumovax 23 03/30/2015        ASSESSMENT AND OTHER RELEVANT CHRONIC CONDITIONS:   River Stoddard is a 66year old male who presents for a Medicare Assessment.      PLAN SUMMARY:     River Stoddard was seen in the radiation therapy  Was seeing urology. Check PSA  - PSA, DIAGNOSTIC; Future    12.  Depression screening  routine  - DEPRESSION SCREEN ANNUAL        Diet assessment: good     PLAN:  The patient indicates understanding of these issues and agrees to the plan Diabetics, FHx Glaucoma, AA>50, > 65 No flowsheet data found.     Prostate Cancer Screening      PSA  Annually PSA due on 09/15/2018  Update Health Maintenance if applicable     Immunizations (Update Immunization Activity if applicable)     Influenz (H)       No flowsheet data found.     Creat/alb ratio  Annually Malb/Cre Calc (ug/mg)   Date Value   08/03/2018 80.8 (H)        LDL  Annually LDL Cholesterol Calc (mg/dL)   Date Value   08/29/2008 76     LDL Cholesterol (mg/dL)   Date Value   03/08/2018 57

## 2019-02-04 NOTE — PATIENT INSTRUCTIONS
Lisandro Harley's SCREENING SCHEDULE   Tests on this list are recommended by your physician but may not be covered, or covered at this frequency, by your insurer. Please check with your insurance carrier before scheduling to verify coverage.     PREVENTATIV than 100 cigarettes in their lifetime   • Anyone with a family history    Colorectal Cancer Screening Covered up to Age 76     Colonoscopy Screen   Covered every 10 years- more often if abnormal Colonoscopy due on 09/25/2018 Update [facility] if ap Not covered by Medicare Part B) No orders found for this or any previous visit.  This may be covered with your prescription benefits, but Medicare does not cover unless Medically needed    Zoster (Not covered by Medicare Part B) No orders found for this or

## 2019-02-05 ENCOUNTER — APPOINTMENT (OUTPATIENT)
Dept: LAB | Age: 79
End: 2019-02-05
Attending: FAMILY MEDICINE
Payer: MEDICARE

## 2019-02-05 DIAGNOSIS — I10 ESSENTIAL HYPERTENSION: ICD-10-CM

## 2019-02-05 DIAGNOSIS — E11.22 TYPE 2 DIABETES MELLITUS WITH STAGE 3 CHRONIC KIDNEY DISEASE, WITHOUT LONG-TERM CURRENT USE OF INSULIN (HCC): ICD-10-CM

## 2019-02-05 DIAGNOSIS — E78.5 HYPERLIPIDEMIA, UNSPECIFIED HYPERLIPIDEMIA TYPE: ICD-10-CM

## 2019-02-05 DIAGNOSIS — C61 PROSTATE CANCER (HCC): ICD-10-CM

## 2019-02-05 DIAGNOSIS — D50.9 IRON DEFICIENCY ANEMIA, UNSPECIFIED IRON DEFICIENCY ANEMIA TYPE: ICD-10-CM

## 2019-02-05 DIAGNOSIS — N18.30 TYPE 2 DIABETES MELLITUS WITH STAGE 3 CHRONIC KIDNEY DISEASE, WITHOUT LONG-TERM CURRENT USE OF INSULIN (HCC): ICD-10-CM

## 2019-02-05 DIAGNOSIS — I25.10 ATHEROSCLEROSIS OF NATIVE CORONARY ARTERY OF NATIVE HEART WITHOUT ANGINA PECTORIS: ICD-10-CM

## 2019-02-05 LAB
ALBUMIN SERPL-MCNC: 3.3 G/DL (ref 3.1–4.5)
ALBUMIN/GLOB SERPL: 0.8 {RATIO} (ref 1–2)
ALP LIVER SERPL-CCNC: 146 U/L (ref 45–117)
ALT SERPL-CCNC: 21 U/L (ref 17–63)
ANION GAP SERPL CALC-SCNC: 9 MMOL/L (ref 0–18)
AST SERPL-CCNC: 16 U/L (ref 15–41)
BILIRUB SERPL-MCNC: 0.6 MG/DL (ref 0.1–2)
BUN BLD-MCNC: 27 MG/DL (ref 8–20)
BUN/CREAT SERPL: 21.4 (ref 10–20)
CALCIUM BLD-MCNC: 8.7 MG/DL (ref 8.3–10.3)
CHLORIDE SERPL-SCNC: 108 MMOL/L (ref 101–111)
CHOLEST SMN-MCNC: 113 MG/DL (ref ?–200)
CO2 SERPL-SCNC: 23 MMOL/L (ref 22–32)
CREAT BLD-MCNC: 1.26 MG/DL (ref 0.7–1.3)
DEPRECATED HBV CORE AB SER IA-ACNC: 227.6 NG/ML (ref 30–530)
DEPRECATED RDW RBC AUTO: 49.8 FL (ref 35.1–46.3)
ERYTHROCYTE [DISTWIDTH] IN BLOOD BY AUTOMATED COUNT: 15 % (ref 11–15)
EST. AVERAGE GLUCOSE BLD GHB EST-MCNC: 134 MG/DL (ref 68–126)
GLOBULIN PLAS-MCNC: 4 G/DL (ref 2.8–4.4)
GLUCOSE BLD-MCNC: 133 MG/DL (ref 70–99)
HBA1C MFR BLD HPLC: 6.3 % (ref ?–5.7)
HCT VFR BLD AUTO: 35.7 % (ref 39–53)
HDLC SERPL-MCNC: 39 MG/DL (ref 40–59)
HGB BLD-MCNC: 11.4 G/DL (ref 13–17.5)
IRON SATURATION: 19 % (ref 20–50)
IRON: 56 UG/DL (ref 45–182)
LDLC SERPL CALC-MCNC: 58 MG/DL (ref ?–100)
M PROTEIN MFR SERPL ELPH: 7.3 G/DL (ref 6.4–8.2)
MCH RBC QN AUTO: 28.9 PG (ref 26–34)
MCHC RBC AUTO-ENTMCNC: 31.9 G/DL (ref 31–37)
MCV RBC AUTO: 90.4 FL (ref 80–100)
NONHDLC SERPL-MCNC: 74 MG/DL (ref ?–130)
OSMOLALITY SERPL CALC.SUM OF ELEC: 297 MOSM/KG (ref 275–295)
PLATELET # BLD AUTO: 245 10(3)UL (ref 150–450)
POTASSIUM SERPL-SCNC: 4.1 MMOL/L (ref 3.6–5.1)
PSA SERPL-MCNC: 0.18 NG/ML (ref 0.01–4)
RBC # BLD AUTO: 3.95 X10(6)UL (ref 3.8–5.8)
SODIUM SERPL-SCNC: 140 MMOL/L (ref 136–144)
TOTAL IRON BINDING CAPACITY: 292 UG/DL (ref 240–450)
TRANSFERRIN SERPL-MCNC: 196 MG/DL (ref 200–360)
TRIGL SERPL-MCNC: 79 MG/DL (ref 30–149)
VLDLC SERPL CALC-MCNC: 16 MG/DL (ref 0–30)
WBC # BLD AUTO: 5.9 X10(3) UL (ref 4–11)

## 2019-02-05 PROCEDURE — 36415 COLL VENOUS BLD VENIPUNCTURE: CPT

## 2019-02-05 PROCEDURE — 83550 IRON BINDING TEST: CPT

## 2019-02-05 PROCEDURE — 83540 ASSAY OF IRON: CPT

## 2019-02-05 PROCEDURE — 84153 ASSAY OF PSA TOTAL: CPT

## 2019-02-05 PROCEDURE — 80061 LIPID PANEL: CPT

## 2019-02-05 PROCEDURE — 83036 HEMOGLOBIN GLYCOSYLATED A1C: CPT

## 2019-02-05 PROCEDURE — 82728 ASSAY OF FERRITIN: CPT

## 2019-02-05 PROCEDURE — 80053 COMPREHEN METABOLIC PANEL: CPT

## 2019-02-05 PROCEDURE — 85027 COMPLETE CBC AUTOMATED: CPT

## 2019-02-07 ENCOUNTER — APPOINTMENT (OUTPATIENT)
Dept: LAB | Age: 79
End: 2019-02-07
Attending: FAMILY MEDICINE
Payer: MEDICARE

## 2019-02-07 DIAGNOSIS — R80.9 MICROALBUMINURIA DUE TO TYPE 2 DIABETES MELLITUS (HCC): ICD-10-CM

## 2019-02-07 DIAGNOSIS — E11.29 MICROALBUMINURIA DUE TO TYPE 2 DIABETES MELLITUS (HCC): ICD-10-CM

## 2019-02-07 DIAGNOSIS — N18.30 TYPE 2 DIABETES MELLITUS WITH STAGE 3 CHRONIC KIDNEY DISEASE, WITHOUT LONG-TERM CURRENT USE OF INSULIN (HCC): ICD-10-CM

## 2019-02-07 DIAGNOSIS — E11.22 TYPE 2 DIABETES MELLITUS WITH STAGE 3 CHRONIC KIDNEY DISEASE, WITHOUT LONG-TERM CURRENT USE OF INSULIN (HCC): ICD-10-CM

## 2019-02-07 LAB
CREAT UR-SCNC: 115 MG/DL
MICROALBUMIN UR-MCNC: 1.57 MG/DL
MICROALBUMIN/CREAT 24H UR-RTO: 13.7 UG/MG (ref ?–30)

## 2019-02-07 PROCEDURE — 82043 UR ALBUMIN QUANTITATIVE: CPT

## 2019-02-07 PROCEDURE — 82570 ASSAY OF URINE CREATININE: CPT

## 2019-03-13 ENCOUNTER — PATIENT OUTREACH (OUTPATIENT)
Dept: CASE MANAGEMENT | Age: 79
End: 2019-03-13

## 2019-06-05 ENCOUNTER — TELEPHONE (OUTPATIENT)
Dept: FAMILY MEDICINE CLINIC | Facility: CLINIC | Age: 79
End: 2019-06-05

## 2019-06-05 DIAGNOSIS — G47.33 OSA (OBSTRUCTIVE SLEEP APNEA): Primary | ICD-10-CM

## 2019-06-05 NOTE — TELEPHONE ENCOUNTER
Referral request DME CPAP supplies    [facility]  Fax number [phone]    Full face mask  Headgear  Tubing  Filters  Water chamber  Chinstrap    Patient seen by Dr. Nieves Castillo M.D. 2/4/19  Office notes from Dr. Nieves Castillo M.D. 2/4/19  NII - on B

## 2019-08-13 RX ORDER — TAMSULOSIN HYDROCHLORIDE 0.4 MG/1
0.4 CAPSULE ORAL DAILY
Qty: 90 CAPSULE | Refills: 3 | Status: SHIPPED | OUTPATIENT
Start: 2019-08-13 | End: 2020-05-13

## 2019-08-13 NOTE — TELEPHONE ENCOUNTER
Please refill his Tamsulosin to [facility] scheduled a follow up appt with Dr. Aisha Prado for 8/21/19 at 10 am in case Dr. Aisha Prado wanted to see him because he has not prescribed this medication before because the patient is new with our office since

## 2019-08-21 ENCOUNTER — OFFICE VISIT (OUTPATIENT)
Dept: FAMILY MEDICINE CLINIC | Facility: CLINIC | Age: 79
End: 2019-08-21
Payer: MEDICARE

## 2019-08-21 ENCOUNTER — TELEPHONE (OUTPATIENT)
Dept: FAMILY MEDICINE CLINIC | Facility: CLINIC | Age: 79
End: 2019-08-21

## 2019-08-21 VITALS
RESPIRATION RATE: 18 BRPM | TEMPERATURE: 97 F | DIASTOLIC BLOOD PRESSURE: 66 MMHG | SYSTOLIC BLOOD PRESSURE: 122 MMHG | WEIGHT: 216.19 LBS | HEIGHT: 67 IN | HEART RATE: 68 BPM | BODY MASS INDEX: 33.93 KG/M2

## 2019-08-21 DIAGNOSIS — E11.22 TYPE 2 DIABETES MELLITUS WITH STAGE 3 CHRONIC KIDNEY DISEASE, WITHOUT LONG-TERM CURRENT USE OF INSULIN (HCC): Primary | ICD-10-CM

## 2019-08-21 DIAGNOSIS — H26.9 CATARACT OF RIGHT EYE, UNSPECIFIED CATARACT TYPE: ICD-10-CM

## 2019-08-21 DIAGNOSIS — N18.30 TYPE 2 DIABETES MELLITUS WITH STAGE 3 CHRONIC KIDNEY DISEASE, WITHOUT LONG-TERM CURRENT USE OF INSULIN (HCC): Primary | ICD-10-CM

## 2019-08-21 DIAGNOSIS — I10 ESSENTIAL HYPERTENSION: ICD-10-CM

## 2019-08-21 DIAGNOSIS — I49.5 SSS (SICK SINUS SYNDROME) (HCC): ICD-10-CM

## 2019-08-21 DIAGNOSIS — Z45.010 PACEMAKER AT END OF BATTERY LIFE: ICD-10-CM

## 2019-08-21 DIAGNOSIS — R60.0 BILATERAL LOWER EXTREMITY EDEMA: ICD-10-CM

## 2019-08-21 DIAGNOSIS — Z01.818 PREOP EXAMINATION: ICD-10-CM

## 2019-08-21 DIAGNOSIS — E78.5 HYPERLIPIDEMIA, UNSPECIFIED HYPERLIPIDEMIA TYPE: ICD-10-CM

## 2019-08-21 PROBLEM — R73.03 PREDIABETES: Status: ACTIVE | Noted: 2019-08-21

## 2019-08-21 LAB
CARTRIDGE LOT#: ABNORMAL NUMERIC
HEMOGLOBIN A1C: 6.4 % (ref 4.3–5.6)

## 2019-08-21 PROCEDURE — 99214 OFFICE O/P EST MOD 30 MIN: CPT | Performed by: FAMILY MEDICINE

## 2019-08-21 PROCEDURE — 83036 HEMOGLOBIN GLYCOSYLATED A1C: CPT | Performed by: FAMILY MEDICINE

## 2019-08-21 NOTE — TELEPHONE ENCOUNTER
Received pre-op paperwork from [facility] requesting H&P clearance, patient having Right cataract surgery on 09/10/19 with Dr Ragini Galvin.

## 2019-08-21 NOTE — PROGRESS NOTES
Patient presents with:  Medication Follow-Up: 6 mos f/u     HPI:   Joe Cole is a 78year old male who presents to the office for med review and preop exam    Preop - R eye cataract surgery planned for 9/10/19 with Dr. Billy Stevenson.       . He feels pret organomegaly  Neurological - alert, oriented, normal speech, no focal findings or movement disorder noted  Extremities - edema bilateral.  1+ pitting.   Bilateral barefoot skin diabetic exam is normal, visualized feet and the appearance is normal.  Willy Durán

## 2019-08-23 PROBLEM — H25.9 AGE-RELATED CATARACT OF RIGHT EYE: Status: ACTIVE | Noted: 2019-08-23

## 2019-09-23 LAB
ACTIVATED PTT: 26.8 SECONDS (ref 22.4–35.8)
ANION GAP: 8 (ref 8–16)
BASOPHIL ABSOLUTE AUTO: 0.1 X10S9/L (ref 0–0.2)
BASOPHILS: 1 % (ref 0–2)
BUN/CREAT RATIO: 15 (ref 10–25)
CALCIUM: 9.2 MG/DL (ref 8.8–10)
CHLORIDE: 109 MMOL/L (ref 98–107)
CO2 TOTAL: 26 MMOL/L (ref 22–31)
CREAT SERPL-MCNC: 1.36 MG/DL (ref 0.72–1.25)
EGFR AFRICAN AMERICAN: 57
EOSINOPHIL ABSOLUTE AUTO: 0.3 X10S9/L (ref 0–0.7)
EOSINOPHILS: 6 % (ref 0–10)
GLUCOSE, BLOOD: 121 MG/DL (ref 70–100)
HEMATOCRIT: 36.3 % (ref 42–52)
HEMOGLOBIN: 12 G/DL (ref 14–18)
LYMPHOCYTE NUMBER: 0.7 X10S9/L (ref 1.2–3.4)
LYMPHOCYTES: 12 % (ref 21–52)
MEAN CORPUSCULAR HBG CONC: 33.1 G/DL (ref 32–36)
MEAN CORPUSCULAR HEMOGLOBIN: 29.4 PG (ref 27–33)
MEAN CORPUSCULAR VOLUME: 88.8 FL (ref 80–94)
MEAN PLATELET VOLUME: 7.5 FL (ref 7.4–10.4)
MONOCYTE #: 0.7 X10S9/L (ref 0.1–0.6)
MONOCYTES: 11 % (ref 2–9)
NEUTROPHIL ABSOLUTE COUNT: 4.1 X10S9/L (ref 1.2–6.5)
PLATELET COUNT: 202 X10S9/L (ref 130–400)
POTASSIUM: 4.4 MMOL/L (ref 3.4–5.1)
PROTHROMBIN TIME-PATIENT: 15.5 SECONDS (ref 9.3–11.7)
PT-INR: 1.6 (ref 1–3.9)
RED BLOOD COUNT: 4.08 X10S12/L (ref 4.7–6.1)
RED CELL DISTRIBUTION WIDTH: 15.8 % (ref 11.5–14.5)
SEGMENTED NEUTROPHILS: 70 % (ref 42–75)
SODIUM: 143 MMOL/L (ref 136–145)
UREA NITROGEN (BUN): 21 MG/DL (ref 8.4–25.7)
WHITE BLOOD COUNT: 5.9 X10S9/L (ref 4.8–10.8)

## 2019-09-24 ENCOUNTER — TELEPHONE (OUTPATIENT)
Dept: FAMILY MEDICINE CLINIC | Facility: CLINIC | Age: 79
End: 2019-09-24

## 2019-09-24 RX ORDER — DILTIAZEM HYDROCHLORIDE 240 MG/1
240 CAPSULE, COATED, EXTENDED RELEASE ORAL DAILY
Refills: 0 | OUTPATIENT
Start: 2019-09-24

## 2019-09-24 NOTE — TELEPHONE ENCOUNTER
Notes show that Dr. Mahesh Silvestre does not prescribe this medication. Per Dr. Mahesh Silvestre (verbal) contacted [facility] pharmacy to make sure they had correct prescribing provider, Dr. Perez Fry.   Kath Rutledge, at [place], said she would make sure this refill was sent to cor

## 2019-09-26 LAB — POINT OF CARE GLUCOSE: 105 MG/DL (ref 65–100)

## 2020-02-04 ENCOUNTER — TELEPHONE (OUTPATIENT)
Dept: FAMILY MEDICINE CLINIC | Facility: CLINIC | Age: 80
End: 2020-02-04

## 2020-02-10 ENCOUNTER — OFFICE VISIT (OUTPATIENT)
Dept: FAMILY MEDICINE CLINIC | Facility: CLINIC | Age: 80
End: 2020-02-10
Payer: MEDICARE

## 2020-02-10 VITALS
HEIGHT: 67 IN | WEIGHT: 210 LBS | DIASTOLIC BLOOD PRESSURE: 58 MMHG | SYSTOLIC BLOOD PRESSURE: 118 MMHG | HEART RATE: 88 BPM | BODY MASS INDEX: 32.96 KG/M2 | RESPIRATION RATE: 20 BRPM | TEMPERATURE: 98 F

## 2020-02-10 DIAGNOSIS — I49.5 SINOATRIAL NODE DYSFUNCTION (HCC): ICD-10-CM

## 2020-02-10 DIAGNOSIS — Z12.5 SCREENING FOR MALIGNANT NEOPLASM OF PROSTATE: ICD-10-CM

## 2020-02-10 DIAGNOSIS — Z95.0 CARDIAC PACEMAKER IN SITU: ICD-10-CM

## 2020-02-10 DIAGNOSIS — I10 ESSENTIAL HYPERTENSION: ICD-10-CM

## 2020-02-10 DIAGNOSIS — Z00.00 ENCOUNTER FOR ANNUAL HEALTH EXAMINATION: Primary | ICD-10-CM

## 2020-02-10 DIAGNOSIS — R73.01 ELEVATED FASTING GLUCOSE: ICD-10-CM

## 2020-02-10 DIAGNOSIS — I49.5 SSS (SICK SINUS SYNDROME) (HCC): ICD-10-CM

## 2020-02-10 DIAGNOSIS — I25.10 ATHEROSCLEROSIS OF NATIVE CORONARY ARTERY OF NATIVE HEART WITHOUT ANGINA PECTORIS: ICD-10-CM

## 2020-02-10 DIAGNOSIS — M43.10 ACQUIRED SPONDYLOLISTHESIS: ICD-10-CM

## 2020-02-10 DIAGNOSIS — M48.061 SPINAL STENOSIS OF LUMBAR REGION, UNSPECIFIED WHETHER NEUROGENIC CLAUDICATION PRESENT: ICD-10-CM

## 2020-02-10 DIAGNOSIS — E78.5 HYPERLIPIDEMIA, UNSPECIFIED HYPERLIPIDEMIA TYPE: ICD-10-CM

## 2020-02-10 DIAGNOSIS — K52.832 LYMPHOCYTIC COLITIS: ICD-10-CM

## 2020-02-10 DIAGNOSIS — C61 PROSTATE CANCER (HCC): ICD-10-CM

## 2020-02-10 DIAGNOSIS — I05.9 MITRAL VALVE DISORDER: ICD-10-CM

## 2020-02-10 PROBLEM — G47.33 OBSTRUCTIVE SLEEP APNEA: Status: RESOLVED | Noted: 2018-06-28 | Resolved: 2020-02-10

## 2020-02-10 PROBLEM — H25.9 AGE-RELATED CATARACT OF RIGHT EYE: Status: RESOLVED | Noted: 2019-08-23 | Resolved: 2020-02-10

## 2020-02-10 PROBLEM — D64.9 ANEMIA: Status: RESOLVED | Noted: 2017-10-23 | Resolved: 2020-02-10

## 2020-02-10 PROCEDURE — G0439 PPPS, SUBSEQ VISIT: HCPCS | Performed by: FAMILY MEDICINE

## 2020-02-10 PROCEDURE — 96160 PT-FOCUSED HLTH RISK ASSMT: CPT | Performed by: FAMILY MEDICINE

## 2020-02-10 PROCEDURE — 99397 PER PM REEVAL EST PAT 65+ YR: CPT | Performed by: FAMILY MEDICINE

## 2020-02-10 NOTE — PROGRESS NOTES
HPI:   Ronald Ko is a 78year old male who presents for a MA (Medicare Advantage) Supervisit (Once per calendar year). Preventative Screening  Colonoscopy - n/a - not indicated at 78yo  Prostate - on Flomax.   Normal PSA in the past.     Immunization quitting: 15.1      Smokeless tobacco: Never Used       Mr. Manny Em does not currently take aspirin. On warfarin    CAGE Alcohol screening   Naheed Willams was screened for Alcohol abuse and had a score of 0 so is at low risk.      Patient Care Team: Patient MG Oral Tab, Take 4 mg by mouth daily. atorvastatin 10 MG Oral Tab, Take 10 mg by mouth nightly. lisinopril (PRINIVIL,ZESTRIL) 5 MG Oral Tab, Take 5 mg by mouth daily.   FREESTYLE LITE TEST VI STRP, TEST THREE TIMES A DAY  FREESTYLE LITE PUNEET, use as dire BMI 32.89 kg/m²   Estimated body mass index is 32.89 kg/m² as calculated from the following:    Height as of this encounter: 67\". Weight as of this encounter: 210 lb (95.3 kg).     Medicare Hearing Assessment  (Required for AWV/SWV)    Hearing Screening Influenza 09/19/2014   • Pneumococcal (Prevnar 13) 08/02/2016   • Pneumovax 23 03/30/2015        ASSESSMENT AND OTHER RELEVANT CHRONIC CONDITIONS:   Kyle De Anda is a 78year old male who presents for a Medicare Assessment.      PLAN SUMMARY:     Prince Mccord without trying?: 2 - No  Has your appetite been poor?: No  How does the patient maintain a good energy level?: Other(some exercise)  How would you describe your daily physical activity?: Light  How would you describe your current health state?: Good  How d (Pneumovax)  Covered Once after 65 03/30/2015 Please get once after your 65th birthday    Hepatitis B for Moderate/High Risk No vaccine history found Medium/high risk factors:   End-stage renal disease   Hemophiliacs who received Factor VIII or IX concentr

## 2020-02-11 ENCOUNTER — NURSE ONLY (OUTPATIENT)
Dept: LAB | Age: 80
End: 2020-02-11
Attending: FAMILY MEDICINE
Payer: MEDICARE

## 2020-02-11 DIAGNOSIS — I10 HYPERTENSION, ESSENTIAL: ICD-10-CM

## 2020-02-11 DIAGNOSIS — E11.22 TYPE 2 DIABETES MELLITUS WITH ESRD (END-STAGE RENAL DISEASE) (HCC): Primary | ICD-10-CM

## 2020-02-11 DIAGNOSIS — Z12.5 SCREENING FOR MALIGNANT NEOPLASM OF PROSTATE: ICD-10-CM

## 2020-02-11 DIAGNOSIS — N18.30 CHRONIC RENAL DISEASE, STAGE III (HCC): ICD-10-CM

## 2020-02-11 DIAGNOSIS — E78.5 HYPERLIPEMIA: ICD-10-CM

## 2020-02-11 DIAGNOSIS — I10 ESSENTIAL HYPERTENSION: ICD-10-CM

## 2020-02-11 DIAGNOSIS — N18.6 TYPE 2 DIABETES MELLITUS WITH ESRD (END-STAGE RENAL DISEASE) (HCC): Primary | ICD-10-CM

## 2020-02-11 DIAGNOSIS — R73.01 ELEVATED FASTING GLUCOSE: ICD-10-CM

## 2020-02-11 DIAGNOSIS — E78.5 HYPERLIPIDEMIA, UNSPECIFIED HYPERLIPIDEMIA TYPE: ICD-10-CM

## 2020-02-11 LAB
ALBUMIN SERPL-MCNC: 3.5 G/DL (ref 3.4–5)
ALBUMIN/GLOB SERPL: 0.9 {RATIO} (ref 1–2)
ALP LIVER SERPL-CCNC: 106 U/L (ref 45–117)
ALT SERPL-CCNC: 18 U/L (ref 16–61)
ANION GAP SERPL CALC-SCNC: 5 MMOL/L (ref 0–18)
AST SERPL-CCNC: 16 U/L (ref 15–37)
BILIRUB SERPL-MCNC: 0.8 MG/DL (ref 0.1–2)
BUN BLD-MCNC: 21 MG/DL (ref 7–18)
BUN/CREAT SERPL: 17.5 (ref 10–20)
CALCIUM BLD-MCNC: 8.3 MG/DL (ref 8.5–10.1)
CHLORIDE SERPL-SCNC: 110 MMOL/L (ref 98–112)
CHOLEST SMN-MCNC: 115 MG/DL (ref ?–200)
CO2 SERPL-SCNC: 25 MMOL/L (ref 21–32)
COMPLEXED PSA SERPL-MCNC: 0.21 NG/ML (ref ?–4)
CREAT BLD-MCNC: 1.2 MG/DL (ref 0.7–1.3)
CREAT UR-SCNC: 71.7 MG/DL
EST. AVERAGE GLUCOSE BLD GHB EST-MCNC: 134 MG/DL (ref 68–126)
GLOBULIN PLAS-MCNC: 3.9 G/DL (ref 2.8–4.4)
GLUCOSE BLD-MCNC: 138 MG/DL (ref 70–99)
HBA1C MFR BLD HPLC: 6.3 % (ref ?–5.7)
HDLC SERPL-MCNC: 37 MG/DL (ref 40–59)
LDLC SERPL CALC-MCNC: 64 MG/DL (ref ?–100)
M PROTEIN MFR SERPL ELPH: 7.4 G/DL (ref 6.4–8.2)
MICROALBUMIN UR-MCNC: 0.94 MG/DL
MICROALBUMIN/CREAT 24H UR-RTO: 13.1 UG/MG (ref ?–30)
NONHDLC SERPL-MCNC: 78 MG/DL (ref ?–130)
OSMOLALITY SERPL CALC.SUM OF ELEC: 295 MOSM/KG (ref 275–295)
PATIENT FASTING Y/N/NP: YES
PATIENT FASTING Y/N/NP: YES
POTASSIUM SERPL-SCNC: 4.1 MMOL/L (ref 3.5–5.1)
SODIUM SERPL-SCNC: 140 MMOL/L (ref 136–145)
TRIGL SERPL-MCNC: 69 MG/DL (ref 30–149)
VLDLC SERPL CALC-MCNC: 14 MG/DL (ref 0–30)

## 2020-02-11 PROCEDURE — 83036 HEMOGLOBIN GLYCOSYLATED A1C: CPT

## 2020-02-11 PROCEDURE — 80061 LIPID PANEL: CPT

## 2020-02-11 PROCEDURE — 82043 UR ALBUMIN QUANTITATIVE: CPT

## 2020-02-11 PROCEDURE — 36415 COLL VENOUS BLD VENIPUNCTURE: CPT

## 2020-02-11 PROCEDURE — 82570 ASSAY OF URINE CREATININE: CPT

## 2020-02-11 PROCEDURE — 80053 COMPREHEN METABOLIC PANEL: CPT

## 2020-02-11 NOTE — PROGRESS NOTES
Discussed test results with Micheal Pfeiffer by phone giving him 's comments. Micheal Pfeiffer verbalized understanding.

## 2020-03-18 ENCOUNTER — TELEPHONE (OUTPATIENT)
Dept: FAMILY MEDICINE CLINIC | Facility: CLINIC | Age: 80
End: 2020-03-18

## 2020-03-18 DIAGNOSIS — Z95.0 PACEMAKER: ICD-10-CM

## 2020-03-18 DIAGNOSIS — I05.9 MITRAL VALVE DISORDER: ICD-10-CM

## 2020-03-18 DIAGNOSIS — I49.5 SICK SINUS SYNDROME (HCC): ICD-10-CM

## 2020-03-18 DIAGNOSIS — I25.10 DISEASE OF CARDIOVASCULAR SYSTEM: Primary | ICD-10-CM

## 2020-03-18 NOTE — TELEPHONE ENCOUNTER
Attempted to call [facility] Team to get approval.  I was on hold for 45 minutes. Will call back later.

## 2020-03-18 NOTE — TELEPHONE ENCOUNTER
Referral request INR Home Monitoring    [employer]  [address] [facility], [address] [facility] [phone]  Tax ID [id number]     x 13 units     AUTH NUMBER PER [facility] [id number]  VALID 3/18/2020 TO 3/17/2021

## 2020-04-10 ENCOUNTER — TELEPHONE (OUTPATIENT)
Dept: FAMILY MEDICINE CLINIC | Facility: CLINIC | Age: 80
End: 2020-04-10

## 2020-04-10 NOTE — TELEPHONE ENCOUNTER
Received a fax from [facility] - Dr Orquidea Epstein, Cardiologist  For upcoming appointment on 4/14/2020  This provider is out of network

## 2020-04-14 NOTE — TELEPHONE ENCOUNTER
Please call patient and advise Dr Marcello Reeves, cardiologist is out of network, an in-network cardio can be recommended

## 2020-04-14 NOTE — TELEPHONE ENCOUNTER
Called and talked to patient explained that this MD is out of network and his insurance won't pay for this doctor.  He is going to contact [employer] and the doctor and then call back

## 2020-05-13 ENCOUNTER — TELEPHONE (OUTPATIENT)
Dept: FAMILY MEDICINE CLINIC | Facility: CLINIC | Age: 80
End: 2020-05-13

## 2020-05-13 RX ORDER — TAMSULOSIN HYDROCHLORIDE 0.4 MG/1
0.4 CAPSULE ORAL DAILY
Qty: 90 CAPSULE | Refills: 1 | Status: SHIPPED | OUTPATIENT
Start: 2020-05-13 | End: 2020-10-12

## 2020-05-13 NOTE — TELEPHONE ENCOUNTER
Received fax from [facility] requesting Tamsulosin 0.4 mg to be filled by them. Spoke with Emy Moe. He does want Tamsulosin to be filled by [facility].   Refill request sent electronically to [facility]

## 2020-05-20 ENCOUNTER — VIRTUAL PHONE E/M (OUTPATIENT)
Dept: FAMILY MEDICINE CLINIC | Facility: CLINIC | Age: 80
End: 2020-05-20
Payer: MEDICARE

## 2020-05-20 ENCOUNTER — TELEPHONE (OUTPATIENT)
Dept: FAMILY MEDICINE CLINIC | Facility: CLINIC | Age: 80
End: 2020-05-20

## 2020-05-20 DIAGNOSIS — I05.9 MITRAL VALVE DISORDER: ICD-10-CM

## 2020-05-20 DIAGNOSIS — I49.5 SINOATRIAL NODE DYSFUNCTION (HCC): ICD-10-CM

## 2020-05-20 DIAGNOSIS — Z95.0 CARDIAC PACEMAKER IN SITU: Primary | ICD-10-CM

## 2020-05-20 DIAGNOSIS — I25.10 ATHEROSCLEROSIS OF NATIVE CORONARY ARTERY OF NATIVE HEART WITHOUT ANGINA PECTORIS: Chronic | ICD-10-CM

## 2020-05-20 DIAGNOSIS — I10 ESSENTIAL HYPERTENSION: ICD-10-CM

## 2020-05-20 DIAGNOSIS — R21 SKIN RASH: Primary | ICD-10-CM

## 2020-05-20 DIAGNOSIS — E78.5 HYPERLIPIDEMIA, UNSPECIFIED HYPERLIPIDEMIA TYPE: ICD-10-CM

## 2020-05-20 PROCEDURE — 99441 PHONE E/M BY PHYS 5-10 MIN: CPT | Performed by: FAMILY MEDICINE

## 2020-05-20 NOTE — TELEPHONE ENCOUNTER
SA dysfunction s/p pacemaker  Assessment and Plan:    RCA stent in 2013 by dr. Virgilio Little 3.5x12 mm    2. Atherosclerosis of native coronary artery of native heart without angina pectoris I25.10 414.01   3. Paroxysmal atrial fibrillation ([facility]) I48.0 427. 3

## 2020-05-20 NOTE — TELEPHONE ENCOUNTER
Patient called back confused as to who is finding him a cardiologist. I went to his insurance web site and printed a short list of cardiologists in the area who take his insurance will give to Dr Stockton Getting to choose.

## 2020-05-21 NOTE — TELEPHONE ENCOUNTER
Please call patient let him know Dr Jessica Rehman would like him to see Dr Swift Or referral has be submitted to your insurance and approve for 5 visit, expires 10/17/2020    Dr Angela Sanderson [phone]  He has office if [facility]

## 2020-06-04 ENCOUNTER — TELEPHONE (OUTPATIENT)
Dept: FAMILY MEDICINE CLINIC | Facility: CLINIC | Age: 80
End: 2020-06-04

## 2020-06-04 RX ORDER — WHITE PETROLATUM 41 % TOPICAL OINTMENT
1 3 TIMES DAILY PRN
Qty: 400 G | Refills: 1 | Status: SHIPPED | OUTPATIENT
Start: 2020-06-04 | End: 2021-10-15

## 2020-06-04 NOTE — TELEPHONE ENCOUNTER
For rashes like this I need to see it  We tried steroid cream  Could certainly do benadryl  - should help itching, might cause sedation. Is able to get access to his phone yet to send a picture? Or is another staff meber able to use their phone?

## 2020-06-04 NOTE — TELEPHONE ENCOUNTER
Called and talked to pateint he stated eucerin cream helping so talked to Dr Mona Red and he OK'd sending cream to pharmacy also discussed with the patient taking 25 mg of benadryl at night to see if it helps with the itching and how sleepy he gets patient O

## 2020-06-04 NOTE — TELEPHONE ENCOUNTER
Called patient and he has itching all over body denies rash he feels his skin is very dry and tried luberderm.  Did not try benadryl for this

## 2020-06-04 NOTE — TELEPHONE ENCOUNTER
Patient called wants to know if should do another virtual visit with Dr Christa Haynes, he received a script for his rash on 05/20 but now feels like his entire body itches, please advise.

## 2020-06-08 ENCOUNTER — TELEPHONE (OUTPATIENT)
Dept: FAMILY MEDICINE CLINIC | Facility: CLINIC | Age: 80
End: 2020-06-08

## 2020-06-08 NOTE — TELEPHONE ENCOUNTER
Pt has a rash on his legs, arms, stomach and back and he was given a medication and it is not helping and he can come in for a appt or he needs a stronger medication.

## 2020-06-09 ENCOUNTER — OFFICE VISIT (OUTPATIENT)
Dept: FAMILY MEDICINE CLINIC | Facility: CLINIC | Age: 80
End: 2020-06-09
Payer: MEDICARE

## 2020-06-09 VITALS
TEMPERATURE: 98 F | OXYGEN SATURATION: 96 % | SYSTOLIC BLOOD PRESSURE: 108 MMHG | RESPIRATION RATE: 24 BRPM | DIASTOLIC BLOOD PRESSURE: 54 MMHG | BODY MASS INDEX: 37.92 KG/M2 | HEIGHT: 65.5 IN | HEART RATE: 92 BPM | WEIGHT: 230.38 LBS

## 2020-06-09 DIAGNOSIS — R60.0 BILATERAL LEG EDEMA: ICD-10-CM

## 2020-06-09 DIAGNOSIS — R21 SKIN RASH: Primary | ICD-10-CM

## 2020-06-09 PROCEDURE — 99213 OFFICE O/P EST LOW 20 MIN: CPT | Performed by: FAMILY MEDICINE

## 2020-06-09 RX ORDER — METHYLPREDNISOLONE 4 MG/1
TABLET ORAL
Qty: 1 KIT | Refills: 0 | Status: SHIPPED | OUTPATIENT
Start: 2020-06-09 | End: 2021-02-17

## 2020-06-09 RX ORDER — DIPHENHYDRAMINE HCL 25 MG
25 CAPSULE ORAL EVERY 6 HOURS PRN
COMMUNITY
End: 2021-02-17

## 2020-06-09 NOTE — PATIENT INSTRUCTIONS
The plan    1) increase lasix (furosemide) to 40mg - 2 pills each morning    2) start the steroid pack    3) get active - biking would be great    4) try to wear the compression socks    5) get the ultrasound of the legs to make sure no blood clot.

## 2020-06-10 ENCOUNTER — TELEPHONE (OUTPATIENT)
Dept: FAMILY MEDICINE CLINIC | Facility: CLINIC | Age: 80
End: 2020-06-10

## 2020-06-10 NOTE — TELEPHONE ENCOUNTER
VIKAS..Pt calling to update us on his condition. Steroid prescribed by Dr Kayleigh Triana for his rash is helping.  He is very pleased

## 2020-06-15 ENCOUNTER — TELEPHONE (OUTPATIENT)
Dept: FAMILY MEDICINE CLINIC | Facility: CLINIC | Age: 80
End: 2020-06-15

## 2020-06-15 NOTE — TELEPHONE ENCOUNTER
Called and talked to patient he has lost a lot of fluid with the prednisone and benadryl. He feels there is no reason for get the US now besides if he leaves the facility he has to quarantine for 14 days.  He also wants to continue taking the benadryl

## 2020-06-15 NOTE — TELEPHONE ENCOUNTER
Pt states he is feeling better. Has not gone for US as he states that if he leaves his room he will have to [place] for 2 wks. Asking if he can take Benadryl?

## 2020-06-22 ENCOUNTER — TELEPHONE (OUTPATIENT)
Dept: FAMILY MEDICINE CLINIC | Facility: CLINIC | Age: 80
End: 2020-06-22

## 2020-06-22 DIAGNOSIS — R21 SKIN RASH: Primary | ICD-10-CM

## 2020-06-22 NOTE — TELEPHONE ENCOUNTER
Patient is calling stating he's been taking benadyl and medication prescribed by Dr. Dasilva Dates however, pt still complaints of ongoing itchiness and redness. Would like to know what else he can try. Benadryl is making him feel tired. Please contact patient.

## 2020-06-22 NOTE — TELEPHONE ENCOUNTER
I do think its time to see derm  I'm not sure what is causing this rash  Would appreciate their input    Dr. [facility]  [address] [place] [address] [address]  [place], [address]  [phone]

## 2020-07-22 ENCOUNTER — TELEPHONE (OUTPATIENT)
Dept: FAMILY MEDICINE CLINIC | Facility: CLINIC | Age: 80
End: 2020-07-22

## 2020-07-22 NOTE — TELEPHONE ENCOUNTER
Patient called states he needs a new referral to see Dr Ashwin Rosen at [facility] [phone].

## 2020-07-24 NOTE — TELEPHONE ENCOUNTER
TC to Dr. Garret post to see if they take the [facility]. Per office staff at Dr. Garret post he has been seen their twice already and they are working on this for patient. Asked  to return my call.

## 2020-07-24 NOTE — TELEPHONE ENCOUNTER
Spoke with  at Dr. Jorgito Javier office they are trying to get contracted with [address] If they are unable to get contracted they will let Narda Paula Abdi know that they can't see him in their office anymore. The  will let Narda  Paula Trinidad

## 2020-08-06 ENCOUNTER — TELEPHONE (OUTPATIENT)
Dept: FAMILY MEDICINE CLINIC | Facility: CLINIC | Age: 80
End: 2020-08-06

## 2020-08-06 DIAGNOSIS — R73.01 ELEVATED FASTING GLUCOSE: Primary | ICD-10-CM

## 2020-08-06 NOTE — TELEPHONE ENCOUNTER
Pt states BS levels have been a little higher then usual. BS ranging 140-150 and had one 185  Pt states feel good- no headaches, no dizziness, no lightheaded feeling, no weakness. Last A1C was 2/11/20 6.3.  Pt not on any diabetic meds  Pt wants to know kevin

## 2020-08-11 ENCOUNTER — NURSE ONLY (OUTPATIENT)
Dept: LAB | Age: 80
End: 2020-08-11
Attending: FAMILY MEDICINE
Payer: MEDICARE

## 2020-08-11 DIAGNOSIS — R73.01 IMPAIRED FASTING GLUCOSE: Primary | ICD-10-CM

## 2020-08-11 LAB
EST. AVERAGE GLUCOSE BLD GHB EST-MCNC: 137 MG/DL (ref 68–126)
HBA1C MFR BLD HPLC: 6.4 % (ref ?–5.7)

## 2020-08-11 PROCEDURE — 36415 COLL VENOUS BLD VENIPUNCTURE: CPT

## 2020-08-11 PROCEDURE — 83036 HEMOGLOBIN GLYCOSYLATED A1C: CPT

## 2020-08-11 NOTE — TELEPHONE ENCOUNTER
Spoke with  at Dr. Combs Life office again today to see if they were able to get contracted with [facility]. They are still awaiting to hear back from [facility]. They understand if they are not contracted with [facility] then Mr. Naheed Proctor will need t

## 2020-09-21 ENCOUNTER — TELEPHONE (OUTPATIENT)
Dept: FAMILY MEDICINE CLINIC | Facility: CLINIC | Age: 80
End: 2020-09-21

## 2020-09-21 DIAGNOSIS — R73.01 ELEVATED FASTING GLUCOSE: Primary | ICD-10-CM

## 2020-09-21 NOTE — TELEPHONE ENCOUNTER
Called patient and discussed restarting metformin daily and he will make an appointment to see us in 2-3 months

## 2020-10-12 RX ORDER — TAMSULOSIN HYDROCHLORIDE 0.4 MG/1
0.4 CAPSULE ORAL DAILY
Qty: 90 CAPSULE | Refills: 0 | Status: SHIPPED | OUTPATIENT
Start: 2020-10-12 | End: 2020-12-18

## 2020-10-12 NOTE — TELEPHONE ENCOUNTER
Refill request for:    Requested Prescriptions     Pending Prescriptions Disp Refills   • tamsulosin (FLOMAX) cap [Pharmacy Med Name: TAMSULOSIN HYDROCHLORIDE 0.4 MG Capsule] 90 capsule 1     Sig: TAKE 1 CAPSULE (0.4 MG TOTAL) BY MOUTH DAILY.         Last P

## 2020-10-15 ENCOUNTER — NURSE ONLY (OUTPATIENT)
Dept: LAB | Age: 80
End: 2020-10-15
Attending: INTERNAL MEDICINE
Payer: MEDICARE

## 2020-10-15 DIAGNOSIS — I10 ESSENTIAL HYPERTENSION, MALIGNANT: ICD-10-CM

## 2020-10-15 DIAGNOSIS — I25.10 CORONARY ATHEROSCLEROSIS OF NATIVE CORONARY ARTERY: Primary | ICD-10-CM

## 2020-10-15 DIAGNOSIS — R73.01 ELEVATED FASTING GLUCOSE: ICD-10-CM

## 2020-10-15 DIAGNOSIS — E78.2 MIXED HYPERLIPIDEMIA: ICD-10-CM

## 2020-10-15 DIAGNOSIS — Z79.01 LONG TERM (CURRENT) USE OF ANTICOAGULANTS: ICD-10-CM

## 2020-10-15 PROCEDURE — 83036 HEMOGLOBIN GLYCOSYLATED A1C: CPT

## 2020-10-15 PROCEDURE — 36415 COLL VENOUS BLD VENIPUNCTURE: CPT

## 2020-10-15 PROCEDURE — 80053 COMPREHEN METABOLIC PANEL: CPT

## 2020-10-15 PROCEDURE — 85025 COMPLETE CBC W/AUTO DIFF WBC: CPT

## 2020-10-26 ENCOUNTER — TELEPHONE (OUTPATIENT)
Dept: FAMILY MEDICINE CLINIC | Facility: CLINIC | Age: 80
End: 2020-10-26

## 2020-10-26 RX ORDER — BLOOD SUGAR DIAGNOSTIC
STRIP MISCELLANEOUS
Qty: 100 STRIP | Refills: 3 | Status: SHIPPED | OUTPATIENT
Start: 2020-10-26 | End: 2021-10-26

## 2020-10-26 NOTE — TELEPHONE ENCOUNTER
Patient called, states was notified insurance not covering the glucose test stripes(Embrace), asked if can get an alternative or different brand called in?

## 2020-10-29 ENCOUNTER — TELEPHONE (OUTPATIENT)
Dept: FAMILY MEDICINE CLINIC | Facility: CLINIC | Age: 80
End: 2020-10-29

## 2020-10-29 DIAGNOSIS — I05.9 RHEUMATIC DISEASE OF MITRAL VALVE: Primary | ICD-10-CM

## 2020-10-29 NOTE — TELEPHONE ENCOUNTER
Referral request Dr. Nannette Gomez M.D.    [id number]    Diagnosis code I05.9    6 visits    Auth per [facility] [id number] valid 10/29/2020 to 4/27/2021

## 2020-12-09 ENCOUNTER — TELEPHONE (OUTPATIENT)
Dept: FAMILY MEDICINE CLINIC | Facility: CLINIC | Age: 80
End: 2020-12-09

## 2020-12-09 RX ORDER — DILTIAZEM HYDROCHLORIDE 240 MG/1
240 CAPSULE, EXTENDED RELEASE ORAL DAILY
COMMUNITY
Start: 2020-10-13 | End: 2021-02-17

## 2020-12-09 NOTE — TELEPHONE ENCOUNTER
Pt is calling he received his medication Furosemide and he said the directions are 1 pill daily and he received a 90 day supply. It should be 2 pills a day and 90 day supply but 180 pills.   He just wanted us to know because he will be running out of medic

## 2020-12-09 NOTE — TELEPHONE ENCOUNTER
Called and talked to patient and we never prescribed this medication it was his cardiologist he will call them for refill

## 2020-12-14 PROBLEM — R73.03 PREDIABETES: Status: RESOLVED | Noted: 2019-08-21 | Resolved: 2020-12-14

## 2020-12-14 PROBLEM — D69.6 THROMBOCYTOPENIA: Chronic | Status: ACTIVE | Noted: 2020-12-14

## 2020-12-14 PROBLEM — E11.22 TYPE 2 DIABETES MELLITUS WITH DIABETIC CHRONIC KIDNEY DISEASE (HCC): Chronic | Status: ACTIVE | Noted: 2020-12-14

## 2020-12-14 PROBLEM — E66.01 SEVERE OBESITY (BMI 35.0-39.9) WITH COMORBIDITY (HCC): Chronic | Status: ACTIVE | Noted: 2020-12-14

## 2020-12-14 PROBLEM — D69.6 THROMBOCYTOPENIA (HCC): Chronic | Status: ACTIVE | Noted: 2020-12-14

## 2020-12-18 RX ORDER — TAMSULOSIN HYDROCHLORIDE 0.4 MG/1
0.4 CAPSULE ORAL DAILY
Qty: 90 CAPSULE | Refills: 2 | Status: SHIPPED | OUTPATIENT
Start: 2020-12-18 | End: 2021-08-06

## 2020-12-18 NOTE — TELEPHONE ENCOUNTER
Refill request for:    Requested Prescriptions     Pending Prescriptions Disp Refills   • tamsulosin (FLOMAX) cap [Pharmacy Med Name: TAMSULOSIN HYDROCHLORIDE 0.4 MG Capsule] 90 capsule 0     Sig: TAKE 1 CAPSULE EVERY DAY        Last Prescribed Quantity Re

## 2021-01-05 ENCOUNTER — TELEPHONE (OUTPATIENT)
Dept: FAMILY MEDICINE CLINIC | Facility: CLINIC | Age: 81
End: 2021-01-05

## 2021-01-05 DIAGNOSIS — Z95.0 CARDIAC PACEMAKER IN SITU: ICD-10-CM

## 2021-01-05 DIAGNOSIS — I48.91 ATRIAL FIBRILLATION, UNSPECIFIED TYPE (HCC): Primary | ICD-10-CM

## 2021-01-05 NOTE — TELEPHONE ENCOUNTER
Referral request Dr. Shanice Julian M.D.     Procedure code: 36672    1 visit    Diagnosis: I48.91, Z95.0    Auth per [facility] [id number] valid 1/5/2021 to 2/4/2021

## 2021-01-05 NOTE — TELEPHONE ENCOUNTER
Referral request Dr. Shanice Julian M.D.     Office visit    6 visits CPT 11289    Diagnosis: I48.91, Z95.0    Auth per [facility] [id number] valid 1/5/2021 to 7/4/2021

## 2021-01-26 DIAGNOSIS — Z23 NEED FOR VACCINATION: ICD-10-CM

## 2021-01-30 ENCOUNTER — TELEPHONE (OUTPATIENT)
Dept: FAMILY MEDICINE CLINIC | Facility: CLINIC | Age: 81
End: 2021-01-30

## 2021-02-03 NOTE — TELEPHONE ENCOUNTER
Mr Angelika Heart called back to see if we could do video visit. In order to do one for your MA Supervisit with Dr. Sumeet Sarabia we will need you to be on My Chart, have access to a computer, have a scale to weigh yourself and a BP cuff.   LM for patient with the detail

## 2021-02-10 ENCOUNTER — IMMUNIZATION (OUTPATIENT)
Dept: LAB | Age: 81
End: 2021-02-10
Attending: HOSPITALIST
Payer: MEDICARE

## 2021-02-10 DIAGNOSIS — Z23 NEED FOR VACCINATION: Primary | ICD-10-CM

## 2021-02-10 PROCEDURE — 0001A SARSCOV2 VAC 30MCG/0.3ML IM: CPT

## 2021-02-11 ENCOUNTER — TELEPHONE (OUTPATIENT)
Dept: FAMILY MEDICINE CLINIC | Facility: CLINIC | Age: 81
End: 2021-02-11

## 2021-02-17 ENCOUNTER — TELEPHONE (OUTPATIENT)
Dept: FAMILY MEDICINE CLINIC | Facility: CLINIC | Age: 81
End: 2021-02-17

## 2021-02-17 ENCOUNTER — OFFICE VISIT (OUTPATIENT)
Dept: FAMILY MEDICINE CLINIC | Facility: CLINIC | Age: 81
End: 2021-02-17
Payer: MEDICARE

## 2021-02-17 VITALS
WEIGHT: 226.63 LBS | DIASTOLIC BLOOD PRESSURE: 60 MMHG | HEIGHT: 67 IN | HEART RATE: 72 BPM | BODY MASS INDEX: 35.57 KG/M2 | SYSTOLIC BLOOD PRESSURE: 110 MMHG | OXYGEN SATURATION: 98 % | RESPIRATION RATE: 16 BRPM

## 2021-02-17 DIAGNOSIS — R60.0 BILATERAL LEG EDEMA: ICD-10-CM

## 2021-02-17 DIAGNOSIS — E78.2 MIXED HYPERLIPIDEMIA: ICD-10-CM

## 2021-02-17 DIAGNOSIS — D69.6 THROMBOCYTOPENIA (HCC): Chronic | ICD-10-CM

## 2021-02-17 DIAGNOSIS — E66.01 SEVERE OBESITY (BMI 35.0-39.9) WITH COMORBIDITY (HCC): Chronic | ICD-10-CM

## 2021-02-17 DIAGNOSIS — Z95.0 CARDIAC PACEMAKER IN SITU: ICD-10-CM

## 2021-02-17 DIAGNOSIS — E11.22 TYPE 2 DIABETES MELLITUS WITH STAGE 3A CHRONIC KIDNEY DISEASE, WITHOUT LONG-TERM CURRENT USE OF INSULIN (HCC): Chronic | ICD-10-CM

## 2021-02-17 DIAGNOSIS — K52.832 LYMPHOCYTIC COLITIS: ICD-10-CM

## 2021-02-17 DIAGNOSIS — I49.5 SSS (SICK SINUS SYNDROME) (HCC): ICD-10-CM

## 2021-02-17 DIAGNOSIS — I48.0 PAROXYSMAL ATRIAL FIBRILLATION (HCC): ICD-10-CM

## 2021-02-17 DIAGNOSIS — C61 PROSTATE CANCER (HCC): ICD-10-CM

## 2021-02-17 DIAGNOSIS — M43.10 ACQUIRED SPONDYLOLISTHESIS: ICD-10-CM

## 2021-02-17 DIAGNOSIS — I10 ESSENTIAL HYPERTENSION: ICD-10-CM

## 2021-02-17 DIAGNOSIS — I25.10 ATHEROSCLEROSIS OF NATIVE CORONARY ARTERY OF NATIVE HEART WITHOUT ANGINA PECTORIS: Chronic | ICD-10-CM

## 2021-02-17 DIAGNOSIS — N18.31 TYPE 2 DIABETES MELLITUS WITH STAGE 3A CHRONIC KIDNEY DISEASE, WITHOUT LONG-TERM CURRENT USE OF INSULIN (HCC): Chronic | ICD-10-CM

## 2021-02-17 DIAGNOSIS — I05.9 MITRAL VALVE DISORDER: ICD-10-CM

## 2021-02-17 DIAGNOSIS — Z00.00 ENCOUNTER FOR ANNUAL HEALTH EXAMINATION: Primary | ICD-10-CM

## 2021-02-17 DIAGNOSIS — M48.061 SPINAL STENOSIS OF LUMBAR REGION, UNSPECIFIED WHETHER NEUROGENIC CLAUDICATION PRESENT: ICD-10-CM

## 2021-02-17 PROCEDURE — 3078F DIAST BP <80 MM HG: CPT | Performed by: FAMILY MEDICINE

## 2021-02-17 PROCEDURE — 3074F SYST BP LT 130 MM HG: CPT | Performed by: FAMILY MEDICINE

## 2021-02-17 PROCEDURE — 96160 PT-FOCUSED HLTH RISK ASSMT: CPT | Performed by: FAMILY MEDICINE

## 2021-02-17 PROCEDURE — 99397 PER PM REEVAL EST PAT 65+ YR: CPT | Performed by: FAMILY MEDICINE

## 2021-02-17 PROCEDURE — 3008F BODY MASS INDEX DOCD: CPT | Performed by: FAMILY MEDICINE

## 2021-02-17 PROCEDURE — G0439 PPPS, SUBSEQ VISIT: HCPCS | Performed by: FAMILY MEDICINE

## 2021-02-17 RX ORDER — METOLAZONE 2.5 MG/1
2.5 TABLET ORAL
Status: ON HOLD | COMMUNITY
Start: 2021-02-14 | End: 2021-07-01

## 2021-02-17 NOTE — PATIENT INSTRUCTIONS
Lisandro Harley's SCREENING SCHEDULE   Tests on this list are recommended by your physician but may not be covered, or covered at this frequency, by your insurer. Please check with your insurance carrier before scheduling to verify coverage.     PREVENTATIV more than 100 cigarettes in their lifetime   • Anyone with a family history    Colorectal Cancer Screening Covered up to Age 76     Colonoscopy Screen   Covered every 10 years- more often if abnormal Colonoscopy due on 09/25/2018 Update [facility] covered with a cut with metal- TD and TDaP Not covered by Medicare Part B) No orders found for this or any previous visit.  This may be covered with your prescription benefits, but Medicare does not cover unless Medically needed    Zoster (Not covered by Me

## 2021-02-17 NOTE — TELEPHONE ENCOUNTER
Ynes Griffin MD  P Emg 06 Clinical Staff             Can we call over to [facility] and have them check his blood work tomorrow morning. Sajan Sanchez says if we let them know they are able to collect there.

## 2021-02-17 NOTE — PROGRESS NOTES
HPI:   Kyle De Anda is a [de-identified]year old male who presents for a MA (Medicare Advantage) Supervisit (Once per calendar year). Preventative Screening  Colonoscopy - at 80 no indication.   Prostate - h/o prostate CA treated with radiation perhaps 8-10 yrs ago the explanation and discussion of advance directives standard forms performed Face to Face with patient and Family/surrogate (if present), and forms available to patient in AVS     He does NOT have a Power of  for [employer] on file in [address] [place] [address] Yonny Silvestre kg)  06/09/20 : 230 lb 6.4 oz (104.5 kg)  02/10/20 : 210 lb (95.3 kg)     Last Cholesterol Labs:   Lab Results   Component Value Date    CHOLEST 115 02/11/2020    HDL 37 (L) 02/11/2020    LDL 64 02/11/2020    TRIG 69 02/11/2020          Last Chemistry Labs PUNEET, use as directed    •  FREESTYLE CONTROL SOLUTION VI LIQD, prn for calibratioj    •  FREESTYLE LANCETS MISC, test tid       MEDICAL INFORMATION:   He  has a past medical history of Arrhythmia, Arthritis, Back problem, Cancer ([facility] (2010), Coronary ath encounter: 226 lb 9.6 oz (102.8 kg).     Medicare Hearing Assessment  (Required for AWV/SWV)    Hearing Screening    Screening Method: Whisper Test  Whisper Test Result: Pass                Visual Acuity  Right Eye Visual Acuity: Uncorrected Right Eye Chart 08/02/2016   • Pneumovax 23 03/30/2015        ASSESSMENT AND OTHER RELEVANT CHRONIC CONDITIONS:   Ismael Fisher is a [de-identified]year old male who presents for a Medicare Assessment.      PLAN SUMMARY:     Ismael Fisher was seen in the office today:  had no chief co active  Will monitor. 13. Prostate cancer [facility])  Radiation treatment, and PSA reduction from this  Will continue serial PSA checks at this time  No longer seeing the urology team.  Vinay Tubbs. - PSA, DIAGNOSTIC; Future    14.  Spinal sten Preventative Physical Exam only, or if medically necessary Electrocardiogram date    Colorectal Cancer Screening      Colonoscopy Screen every 10 years Colonoscopy due on 09/25/2018 Update Health Maintenance if applicable    Flex Sigmoidoscopy Screen every Potassium (mmol/L)   Date Value   10/15/2020 4.3   06/08/2010 3.0 (L)    No flowsheet data found.     Creatinine  Annually Creatinine, Serum (mg/dL)   Date Value   06/08/2010 1.25     Creatinine (mg/dL)   Date Value   10/15/2020 1.24    No flowsheet data fo

## 2021-02-18 ENCOUNTER — NURSE ONLY (OUTPATIENT)
Dept: LAB | Age: 81
End: 2021-02-18
Attending: FAMILY MEDICINE
Payer: MEDICARE

## 2021-02-18 DIAGNOSIS — E11.22 TYPE 2 DIABETES MELLITUS WITH STAGE 3A CHRONIC KIDNEY DISEASE, WITHOUT LONG-TERM CURRENT USE OF INSULIN (HCC): Chronic | ICD-10-CM

## 2021-02-18 DIAGNOSIS — N18.31 TYPE 2 DIABETES MELLITUS WITH STAGE 3A CHRONIC KIDNEY DISEASE, WITHOUT LONG-TERM CURRENT USE OF INSULIN (HCC): Chronic | ICD-10-CM

## 2021-02-18 DIAGNOSIS — I10 ESSENTIAL HYPERTENSION: ICD-10-CM

## 2021-02-18 DIAGNOSIS — I25.10 ATHEROSCLEROSIS OF NATIVE CORONARY ARTERY OF NATIVE HEART WITHOUT ANGINA PECTORIS: Chronic | ICD-10-CM

## 2021-02-18 DIAGNOSIS — E78.2 MIXED HYPERLIPIDEMIA: ICD-10-CM

## 2021-02-18 DIAGNOSIS — C61 PROSTATE CANCER (HCC): ICD-10-CM

## 2021-02-18 DIAGNOSIS — D69.6 THROMBOCYTOPENIA (HCC): Chronic | ICD-10-CM

## 2021-02-18 LAB
ALBUMIN SERPL-MCNC: 3.6 G/DL (ref 3.4–5)
ALBUMIN/GLOB SERPL: 1.1 {RATIO} (ref 1–2)
ALP LIVER SERPL-CCNC: 92 U/L
ALT SERPL-CCNC: 23 U/L
ANION GAP SERPL CALC-SCNC: 5 MMOL/L (ref 0–18)
AST SERPL-CCNC: 17 U/L (ref 15–37)
BILIRUB SERPL-MCNC: 0.6 MG/DL (ref 0.1–2)
BUN BLD-MCNC: 36 MG/DL (ref 7–18)
BUN/CREAT SERPL: 24.2 (ref 10–20)
CALCIUM BLD-MCNC: 8.9 MG/DL (ref 8.5–10.1)
CHLORIDE SERPL-SCNC: 111 MMOL/L (ref 98–112)
CHOLEST SMN-MCNC: 150 MG/DL (ref ?–200)
CO2 SERPL-SCNC: 26 MMOL/L (ref 21–32)
CREAT BLD-MCNC: 1.49 MG/DL
DEPRECATED RDW RBC AUTO: 50.8 FL (ref 35.1–46.3)
ERYTHROCYTE [DISTWIDTH] IN BLOOD BY AUTOMATED COUNT: 14.9 % (ref 11–15)
EST. AVERAGE GLUCOSE BLD GHB EST-MCNC: 157 MG/DL (ref 68–126)
GLOBULIN PLAS-MCNC: 3.4 G/DL (ref 2.8–4.4)
GLUCOSE BLD-MCNC: 153 MG/DL (ref 70–99)
HBA1C MFR BLD HPLC: 7.1 % (ref ?–5.7)
HCT VFR BLD AUTO: 35.2 %
HDLC SERPL-MCNC: 42 MG/DL (ref 40–59)
HGB BLD-MCNC: 11 G/DL
LDLC SERPL CALC-MCNC: 86 MG/DL (ref ?–100)
M PROTEIN MFR SERPL ELPH: 7 G/DL (ref 6.4–8.2)
MCH RBC QN AUTO: 28.9 PG (ref 26–34)
MCHC RBC AUTO-ENTMCNC: 31.3 G/DL (ref 31–37)
MCV RBC AUTO: 92.6 FL
NONHDLC SERPL-MCNC: 108 MG/DL (ref ?–130)
OSMOLALITY SERPL CALC.SUM OF ELEC: 305 MOSM/KG (ref 275–295)
PATIENT FASTING Y/N/NP: YES
PATIENT FASTING Y/N/NP: YES
PLATELET # BLD AUTO: 180 10(3)UL (ref 150–450)
POTASSIUM SERPL-SCNC: 4.4 MMOL/L (ref 3.5–5.1)
PSA SERPL-MCNC: 0.54 NG/ML (ref ?–4)
RBC # BLD AUTO: 3.8 X10(6)UL
SODIUM SERPL-SCNC: 142 MMOL/L (ref 136–145)
TRIGL SERPL-MCNC: 112 MG/DL (ref 30–149)
VLDLC SERPL CALC-MCNC: 22 MG/DL (ref 0–30)
WBC # BLD AUTO: 6.7 X10(3) UL (ref 4–11)

## 2021-02-18 PROCEDURE — 80053 COMPREHEN METABOLIC PANEL: CPT

## 2021-02-18 PROCEDURE — 83036 HEMOGLOBIN GLYCOSYLATED A1C: CPT

## 2021-02-18 PROCEDURE — 36415 COLL VENOUS BLD VENIPUNCTURE: CPT

## 2021-02-18 PROCEDURE — 84153 ASSAY OF PSA TOTAL: CPT

## 2021-02-18 PROCEDURE — 85027 COMPLETE CBC AUTOMATED: CPT

## 2021-02-18 PROCEDURE — 80061 LIPID PANEL: CPT

## 2021-03-03 ENCOUNTER — IMMUNIZATION (OUTPATIENT)
Dept: LAB | Age: 81
End: 2021-03-03
Attending: HOSPITALIST
Payer: MEDICARE

## 2021-03-03 DIAGNOSIS — Z23 NEED FOR VACCINATION: Primary | ICD-10-CM

## 2021-03-03 PROCEDURE — 0002A SARSCOV2 VAC 30MCG/0.3ML IM: CPT

## 2021-03-12 ENCOUNTER — TELEPHONE (OUTPATIENT)
Dept: FAMILY MEDICINE CLINIC | Facility: CLINIC | Age: 81
End: 2021-03-12

## 2021-03-12 DIAGNOSIS — G47.33 OSA (OBSTRUCTIVE SLEEP APNEA): Primary | ICD-10-CM

## 2021-03-12 NOTE — TELEPHONE ENCOUNTER
Pt states his CPAP machine is no longer working and needs a new one ordered. No changes with ins. May call pt with any questions.  [phone]

## 2021-03-12 NOTE — TELEPHONE ENCOUNTER
TC to patient to discuss new CPAP machine. We have no record of patient's last sleep study. He can't remember how low it has been since he had a sleep study. He does not follow-up with pulmonology either.   He has been using the same supplies for a long

## 2021-03-12 NOTE — TELEPHONE ENCOUNTER
Referral request Dr. Nayeli Hoffman M.D.,Pulmonology    Six visits    Diagnosis: NII    Auth per [facility] [id number] valid 3/12/21 to 09/08/2021

## 2021-03-16 NOTE — TELEPHONE ENCOUNTER
Left message with patient to see if he was able to make an appt with pulmonology regarding CPAP machine. Asked patient to return my call.

## 2021-05-28 ENCOUNTER — TELEPHONE (OUTPATIENT)
Dept: FAMILY MEDICINE CLINIC | Facility: CLINIC | Age: 81
End: 2021-05-28

## 2021-05-28 DIAGNOSIS — I25.10 ATHEROSCLEROSIS OF NATIVE CORONARY ARTERY OF NATIVE HEART WITHOUT ANGINA PECTORIS: Primary | ICD-10-CM

## 2021-05-28 NOTE — TELEPHONE ENCOUNTER
CONTINUATION OF CARE - APPT SCHEDULED FOR WED.  JUNE 2, 2021    ARRHYTHMIA DEVICE SCAN RESULT  SSS (sick sinus syndrome)   Sinoatrial node dysfunction    Paroxysmal atrial fibrillation

## 2021-05-28 NOTE — TELEPHONE ENCOUNTER
We received a fax from [facility] requesting a referral to Dr Carmen Moe, for continuation of care, Office visit scheduled for Wed June 2nd.    Referral Placed

## 2021-06-29 ENCOUNTER — APPOINTMENT (OUTPATIENT)
Dept: GENERAL RADIOLOGY | Facility: HOSPITAL | Age: 81
End: 2021-06-29
Attending: EMERGENCY MEDICINE
Payer: MEDICARE

## 2021-06-29 ENCOUNTER — HOSPITAL ENCOUNTER (OUTPATIENT)
Facility: HOSPITAL | Age: 81
Setting detail: OBSERVATION
Discharge: HOME | End: 2021-07-01
Attending: EMERGENCY MEDICINE | Admitting: HOSPITALIST
Payer: MEDICARE

## 2021-06-29 DIAGNOSIS — R42 DIZZINESS: ICD-10-CM

## 2021-06-29 DIAGNOSIS — N39.0 URINARY TRACT INFECTION WITHOUT HEMATURIA, SITE UNSPECIFIED: Primary | ICD-10-CM

## 2021-06-29 PROBLEM — E11.9 TYPE 2 DIABETES MELLITUS WITHOUT COMPLICATION, WITHOUT LONG-TERM CURRENT USE OF INSULIN (HCC): Status: ACTIVE | Noted: 2020-12-14

## 2021-06-29 LAB
ALBUMIN SERPL-MCNC: 3.4 G/DL (ref 3.4–5)
ALBUMIN/GLOB SERPL: 1 {RATIO} (ref 1–2)
ALP LIVER SERPL-CCNC: 93 U/L
ALT SERPL-CCNC: 22 U/L
ANION GAP SERPL CALC-SCNC: 5 MMOL/L (ref 0–18)
AST SERPL-CCNC: 17 U/L (ref 15–37)
BASOPHILS # BLD AUTO: 0.03 X10(3) UL (ref 0–0.2)
BASOPHILS NFR BLD AUTO: 0.5 %
BILIRUB SERPL-MCNC: 0.6 MG/DL (ref 0.1–2)
BILIRUB UR QL STRIP.AUTO: NEGATIVE
BUN BLD-MCNC: 21 MG/DL (ref 7–18)
BUN/CREAT SERPL: 14.4 (ref 10–20)
CALCIUM BLD-MCNC: 8.3 MG/DL (ref 8.5–10.1)
CHLORIDE SERPL-SCNC: 112 MMOL/L (ref 98–112)
CO2 SERPL-SCNC: 22 MMOL/L (ref 21–32)
COLOR UR AUTO: YELLOW
CREAT BLD-MCNC: 1.46 MG/DL
DEPRECATED RDW RBC AUTO: 47.2 FL (ref 35.1–46.3)
EOSINOPHIL # BLD AUTO: 0.31 X10(3) UL (ref 0–0.7)
EOSINOPHIL NFR BLD AUTO: 5.1 %
ERYTHROCYTE [DISTWIDTH] IN BLOOD BY AUTOMATED COUNT: 14.5 % (ref 11–15)
EST. AVERAGE GLUCOSE BLD GHB EST-MCNC: 140 MG/DL (ref 68–126)
GLOBULIN PLAS-MCNC: 3.4 G/DL (ref 2.8–4.4)
GLUCOSE BLD-MCNC: 154 MG/DL (ref 70–99)
GLUCOSE BLD-MCNC: 164 MG/DL (ref 70–99)
GLUCOSE BLD-MCNC: 164 MG/DL (ref 70–99)
GLUCOSE UR STRIP.AUTO-MCNC: NEGATIVE MG/DL
HBA1C MFR BLD HPLC: 6.5 % (ref ?–5.7)
HCT VFR BLD AUTO: 35.5 %
HGB BLD-MCNC: 11.6 G/DL
HYALINE CASTS #/AREA URNS AUTO: PRESENT /LPF
IMM GRANULOCYTES # BLD AUTO: 0.03 X10(3) UL (ref 0–1)
IMM GRANULOCYTES NFR BLD: 0.5 %
INR BLD: 2.08 (ref 0.89–1.11)
KETONES UR STRIP.AUTO-MCNC: NEGATIVE MG/DL
LYMPHOCYTES # BLD AUTO: 0.78 X10(3) UL (ref 1–4)
LYMPHOCYTES NFR BLD AUTO: 12.8 %
M PROTEIN MFR SERPL ELPH: 6.8 G/DL (ref 6.4–8.2)
MCH RBC QN AUTO: 29.1 PG (ref 26–34)
MCHC RBC AUTO-ENTMCNC: 32.7 G/DL (ref 31–37)
MCV RBC AUTO: 89.2 FL
MONOCYTES # BLD AUTO: 0.59 X10(3) UL (ref 0.1–1)
MONOCYTES NFR BLD AUTO: 9.7 %
NEUTROPHILS # BLD AUTO: 4.35 X10 (3) UL (ref 1.5–7.7)
NEUTROPHILS # BLD AUTO: 4.35 X10(3) UL (ref 1.5–7.7)
NEUTROPHILS NFR BLD AUTO: 71.4 %
NITRITE UR QL STRIP.AUTO: NEGATIVE
OSMOLALITY SERPL CALC.SUM OF ELEC: 295 MOSM/KG (ref 275–295)
PH UR STRIP.AUTO: 6 [PH] (ref 5–8)
PLATELET # BLD AUTO: 168 10(3)UL (ref 150–450)
POTASSIUM SERPL-SCNC: 3.8 MMOL/L (ref 3.5–5.1)
PROT UR STRIP.AUTO-MCNC: NEGATIVE MG/DL
PSA SERPL DL<=0.01 NG/ML-MCNC: 23.9 SECONDS (ref 12.4–14.6)
RBC # BLD AUTO: 3.98 X10(6)UL
RBC UR QL AUTO: NEGATIVE
SODIUM SERPL-SCNC: 139 MMOL/L (ref 136–145)
SP GR UR STRIP.AUTO: 1.01 (ref 1–1.03)
TROPONIN I SERPL-MCNC: <0.045 NG/ML (ref ?–0.04)
UROBILINOGEN UR STRIP.AUTO-MCNC: <2 MG/DL
WBC # BLD AUTO: 6.1 X10(3) UL (ref 4–11)
WBC #/AREA URNS AUTO: >50 /HPF

## 2021-06-29 PROCEDURE — 99220 INITIAL OBSERVATION CARE,LEVL III: CPT | Performed by: HOSPITALIST

## 2021-06-29 PROCEDURE — 71045 X-RAY EXAM CHEST 1 VIEW: CPT | Performed by: EMERGENCY MEDICINE

## 2021-06-29 RX ORDER — MELATONIN
3 NIGHTLY PRN
Status: DISCONTINUED | OUTPATIENT
Start: 2021-06-29 | End: 2021-07-01

## 2021-06-29 RX ORDER — ONDANSETRON 2 MG/ML
4 INJECTION INTRAMUSCULAR; INTRAVENOUS EVERY 4 HOURS PRN
Status: DISCONTINUED | OUTPATIENT
Start: 2021-06-29 | End: 2021-06-29 | Stop reason: HOSPADM

## 2021-06-29 RX ORDER — ACETAMINOPHEN 325 MG/1
650 TABLET ORAL EVERY 6 HOURS PRN
Status: DISCONTINUED | OUTPATIENT
Start: 2021-06-29 | End: 2021-07-01

## 2021-06-29 RX ORDER — ATORVASTATIN CALCIUM 10 MG/1
10 TABLET, FILM COATED ORAL NIGHTLY
Status: DISCONTINUED | OUTPATIENT
Start: 2021-06-29 | End: 2021-07-01

## 2021-06-29 RX ORDER — ONDANSETRON 2 MG/ML
4 INJECTION INTRAMUSCULAR; INTRAVENOUS EVERY 6 HOURS PRN
Status: DISCONTINUED | OUTPATIENT
Start: 2021-06-29 | End: 2021-07-01

## 2021-06-29 RX ORDER — DEXTROSE MONOHYDRATE 25 G/50ML
50 INJECTION, SOLUTION INTRAVENOUS
Status: DISCONTINUED | OUTPATIENT
Start: 2021-06-29 | End: 2021-07-01

## 2021-06-29 RX ORDER — POLYETHYLENE GLYCOL 3350 17 G/17G
17 POWDER, FOR SOLUTION ORAL DAILY PRN
Status: DISCONTINUED | OUTPATIENT
Start: 2021-06-29 | End: 2021-07-01

## 2021-06-29 RX ORDER — SODIUM PHOSPHATE, DIBASIC AND SODIUM PHOSPHATE, MONOBASIC 7; 19 G/133ML; G/133ML
1 ENEMA RECTAL ONCE AS NEEDED
Status: DISCONTINUED | OUTPATIENT
Start: 2021-06-29 | End: 2021-07-01

## 2021-06-29 RX ORDER — TAMSULOSIN HYDROCHLORIDE 0.4 MG/1
0.4 CAPSULE ORAL DAILY
Status: DISCONTINUED | OUTPATIENT
Start: 2021-06-30 | End: 2021-07-01

## 2021-06-29 RX ORDER — SODIUM CHLORIDE 9 MG/ML
INJECTION, SOLUTION INTRAVENOUS CONTINUOUS
Status: DISCONTINUED | OUTPATIENT
Start: 2021-06-29 | End: 2021-07-01

## 2021-06-29 RX ORDER — DILTIAZEM HYDROCHLORIDE 120 MG/1
240 CAPSULE, EXTENDED RELEASE ORAL DAILY
Status: DISCONTINUED | OUTPATIENT
Start: 2021-06-30 | End: 2021-07-01

## 2021-06-29 RX ORDER — METOPROLOL TARTRATE 100 MG/1
100 TABLET ORAL
Status: DISCONTINUED | OUTPATIENT
Start: 2021-06-29 | End: 2021-07-01

## 2021-06-29 RX ORDER — WARFARIN SODIUM 2 MG/1
4 TABLET ORAL DAILY
Status: DISCONTINUED | OUTPATIENT
Start: 2021-06-29 | End: 2021-07-01

## 2021-06-29 RX ORDER — METOCLOPRAMIDE HYDROCHLORIDE 5 MG/ML
5 INJECTION INTRAMUSCULAR; INTRAVENOUS EVERY 8 HOURS PRN
Status: DISCONTINUED | OUTPATIENT
Start: 2021-06-29 | End: 2021-07-01

## 2021-06-29 RX ORDER — MINERAL OIL/PETROLATUM,WHITE
1 CREAM (GRAM) TOPICAL 3 TIMES DAILY PRN
Status: DISCONTINUED | OUTPATIENT
Start: 2021-06-29 | End: 2021-07-01

## 2021-06-29 RX ORDER — BISACODYL 10 MG
10 SUPPOSITORY, RECTAL RECTAL
Status: DISCONTINUED | OUTPATIENT
Start: 2021-06-29 | End: 2021-07-01

## 2021-06-29 NOTE — ED PROVIDER NOTES
Patient Seen in: [facility] Emergency Department      History   Patient presents with:  Hypotension    Stated Complaint: hypotension     HPI/Subjective:   HPI    42-year-old male with past medical history of coronary disease, diabetes, hypertension stated as uncontrolled    • Visual impairment    • Wears glasses               Past Surgical History:   Procedure Laterality Date   • CARDIAC PACEMAKER PLACEMENT  2010    medtronic   • COLONOSCOPY N/A 9/25/2015    Procedure: COLONOSCOPY;  Surgeon: Dee Red Rhythm: Normal rate and regular rhythm. Pulmonary:      Effort: Pulmonary effort is normal.      Breath sounds: Normal breath sounds. Abdominal:      Palpations: Abdomen is soft. Tenderness: There is no abdominal tenderness.    Musculoskeletal: Differential With Platelet.   Procedure                               Abnormality         Status                     ---------                               -----------         ------                     CBC W/ DIFFERENTIAL[[id number]]          Abnormal breathing. Will obtain chest x-ray along with basic labs including troponin and UA. Will give fluids and closely observe patient's hemodynamics while in the ED.    3:04 pm  Patient's blood pressure improved following IV fluids. His UA does suggest a UTI.

## 2021-06-29 NOTE — PLAN OF CARE
NURSING ADMISSION NOTE      Patient admitted via cart from ED. Oriented to room. Safety precautions initiated. Bed in low position. Call light in reach. Pt. A&O x4. VSS on admission to the floor. Admission navigator completed.  Pt. Being admitted

## 2021-06-29 NOTE — H&P
DIONTE HOSPITALIST  History and Physical     Galo Ellsworth Patient Status:  Emergency    1940 MRN [id number]   Location [facility] Attending [facility] Day # 0 PCP Jae Wakefield MD     Chief Compla SURGERY Left 06/26/2018   • HIP REPLACEMENT SURGERY Right     2000   • KNEE REPLACEMENT SURGERY Left     after 2000   • LAMINECTOMY      no hardware   • SKIN SURGERY     • TOTAL HIP REPLACEMENT Right    • TOTAL KNEE REPLACEMENT Right        Social History: Apply 1 Application topically 3 (three) times daily as needed. , Disp: 400 g, Rfl: 1  triamcinolone acetonide 0.1 % External Cream, Apply topically 2 (two) times daily as needed. , Disp: 60 g, Rfl: 1  FREESTYLE LITE TEST VI STRP, TEST THREE TIMES A DAY, Disp Labs   Lab 06/29/21 0946   PTP 23.9*   INR 2.08*       Recent Labs   Lab 06/29/21 0946   TROP <0.045       Imaging: Imaging data reviewed in Epic. ASSESSMENT / PLAN:     1. Possible UTI  1. IV abx  2. UCx  2. Generalized weaknes 2/2 above  1.  PT/OT

## 2021-06-29 NOTE — ED INITIAL ASSESSMENT (HPI)
Patient reports waking up this morning, went to breakfast and felt ok. Patient went to the restroom to have a BM and started to not feel well. BP 90/60 per EMS, .    Denies chest pain or SOB

## 2021-06-30 LAB
ANION GAP SERPL CALC-SCNC: 8 MMOL/L (ref 0–18)
ATRIAL RATE: 49 BPM
BASOPHILS # BLD AUTO: 0.02 X10(3) UL (ref 0–0.2)
BASOPHILS NFR BLD AUTO: 0.4 %
BUN BLD-MCNC: 18 MG/DL (ref 7–18)
BUN/CREAT SERPL: 17 (ref 10–20)
CALCIUM BLD-MCNC: 8.2 MG/DL (ref 8.5–10.1)
CHLORIDE SERPL-SCNC: 112 MMOL/L (ref 98–112)
CO2 SERPL-SCNC: 22 MMOL/L (ref 21–32)
CREAT BLD-MCNC: 1.06 MG/DL
DEPRECATED RDW RBC AUTO: 46.7 FL (ref 35.1–46.3)
EOSINOPHIL # BLD AUTO: 0.31 X10(3) UL (ref 0–0.7)
EOSINOPHIL NFR BLD AUTO: 5.6 %
ERYTHROCYTE [DISTWIDTH] IN BLOOD BY AUTOMATED COUNT: 14.6 % (ref 11–15)
GLUCOSE BLD-MCNC: 124 MG/DL (ref 70–99)
GLUCOSE BLD-MCNC: 129 MG/DL (ref 70–99)
GLUCOSE BLD-MCNC: 129 MG/DL (ref 70–99)
GLUCOSE BLD-MCNC: 137 MG/DL (ref 70–99)
GLUCOSE BLD-MCNC: 168 MG/DL (ref 70–99)
HCT VFR BLD AUTO: 33 %
HGB BLD-MCNC: 10.7 G/DL
IMM GRANULOCYTES # BLD AUTO: 0.03 X10(3) UL (ref 0–1)
IMM GRANULOCYTES NFR BLD: 0.5 %
INR BLD: 2.06 (ref 0.89–1.11)
LYMPHOCYTES # BLD AUTO: 0.78 X10(3) UL (ref 1–4)
LYMPHOCYTES NFR BLD AUTO: 14.1 %
MCH RBC QN AUTO: 28.8 PG (ref 26–34)
MCHC RBC AUTO-ENTMCNC: 32.4 G/DL (ref 31–37)
MCV RBC AUTO: 88.7 FL
MONOCYTES # BLD AUTO: 0.65 X10(3) UL (ref 0.1–1)
MONOCYTES NFR BLD AUTO: 11.7 %
NEUTROPHILS # BLD AUTO: 3.76 X10 (3) UL (ref 1.5–7.7)
NEUTROPHILS # BLD AUTO: 3.76 X10(3) UL (ref 1.5–7.7)
NEUTROPHILS NFR BLD AUTO: 67.7 %
OSMOLALITY SERPL CALC.SUM OF ELEC: 297 MOSM/KG (ref 275–295)
P-R INTERVAL: 204 MS
PLATELET # BLD AUTO: 143 10(3)UL (ref 150–450)
POTASSIUM SERPL-SCNC: 3.5 MMOL/L (ref 3.5–5.1)
PSA SERPL DL<=0.01 NG/ML-MCNC: 23.7 SECONDS (ref 12.4–14.6)
Q-T INTERVAL: 450 MS
QRS DURATION: 162 MS
QTC CALCULATION (BEZET): 495 MS
R AXIS: -70 DEGREES
RBC # BLD AUTO: 3.72 X10(6)UL
SODIUM SERPL-SCNC: 142 MMOL/L (ref 136–145)
T AXIS: 121 DEGREES
VENTRICULAR RATE: 73 BPM
WBC # BLD AUTO: 5.6 X10(3) UL (ref 4–11)

## 2021-06-30 PROCEDURE — 99225 SUBSEQUENT OBSERVATION CARE: CPT | Performed by: INTERNAL MEDICINE

## 2021-06-30 RX ORDER — POTASSIUM CHLORIDE 20 MEQ/1
40 TABLET, EXTENDED RELEASE ORAL EVERY 4 HOURS
Status: COMPLETED | OUTPATIENT
Start: 2021-06-30 | End: 2021-07-01

## 2021-06-30 NOTE — PHYSICAL THERAPY NOTE
PHYSICAL THERAPY QUICK EVALUATION - INPATIENT    Room Number: 405/405-A  Evaluation Date: 6/30/2021  Presenting Problem: UTI  Physician Order: PT Eval and Treat    Problem List  Principal Problem:    Urinary tract infection without hematuria, site unspec No  Patient Owned Equipment: Rolling walker  Patient Regularly Uses: Glasses    Prior Level of [place]: Pt lives alone at [facility] ER. Pt typically independent with ADL and mobility. Pt ambulates with RW or cane.      SUBJECTIVE  \"I don't kno step through gait pattern and good vinny. Pt demonstrate good safety awareness and no LOB. Pt returned to room and performed toilet transfer without assist. Pt returned to sitting up in bedside chair. Pt educated on activity recommendations.  Pt left with

## 2021-06-30 NOTE — CM/SW NOTE
Pt noted to be from [facility]. Ambulating in room w/a walker/stand by assist. Awaiting PT eval. Will follow up to confirm dc plan.

## 2021-06-30 NOTE — PROGRESS NOTES
DIONTE HOSPITALIST  Progress Note     Kourtney Hand Patient Status:  Observation    1940 MRN [id number]   [facility] 4NW-A Attending Andrew Barnard MD   Hosp Day # 0 PCP Vicente Brito MD     Chief Complaint: I feel good    S: Patient results for input(s): PCT in the last 168 hours. Cardiac  Recent Labs   Lab 06/29/21  0946   TROP <0.045       Creatinine Kinase  No results for input(s): CK in the last 168 hours.     Inflammatory Markers  No results for input(s): CRP, LINSEY, LDH, DDIMER

## 2021-06-30 NOTE — OCCUPATIONAL THERAPY NOTE
OCCUPATIONAL THERAPY QUICK EVALUATION - INPATIENT    Room Number: 405/405-A  Evaluation Date: 6/30/2021     Type of Evaluation: Quick Eval  Presenting Problem: UTI    Physician Order: IP Consult to Occupational Therapy  Reason for Therapy:  ADL/IADL Dysfun after 2000   • LAMINECTOMY      no hardware   • SKIN SURGERY     • TOTAL HIP REPLACEMENT Right    • TOTAL KNEE REPLACEMENT Right        OCCUPATIONAL PROFILE    HOME SITUATION  Type of Home: Independent living facility  Home Layout: One level  Lives With Supervision    Skilled Therapy Provided: Pt received upright in chair. The therapy session began by pt performing sit to stand from chair and participating in hallway walk.  During hallway walk, pt demonstrates the activity tolerance, endurance, and balance Profile/Medical History  MODERATE - Expanded review of history including review of medical or therapy record   Specific performance deficits impacting engagement in ADL/IADL  MODERATE  3 - 5 performance deficits   Client Assessment/Performance Deficits  MO

## 2021-07-01 VITALS
RESPIRATION RATE: 16 BRPM | DIASTOLIC BLOOD PRESSURE: 53 MMHG | TEMPERATURE: 98 F | OXYGEN SATURATION: 95 % | HEART RATE: 60 BPM | WEIGHT: 210.81 LBS | SYSTOLIC BLOOD PRESSURE: 137 MMHG | BODY MASS INDEX: 33.88 KG/M2 | HEIGHT: 66 IN

## 2021-07-01 LAB
GLUCOSE BLD-MCNC: 141 MG/DL (ref 70–99)
INR BLD: 1.88 (ref 0.89–1.11)
POTASSIUM SERPL-SCNC: 4.5 MMOL/L (ref 3.5–5.1)
PSA SERPL DL<=0.01 NG/ML-MCNC: 22.1 SECONDS (ref 12.4–14.6)

## 2021-07-01 PROCEDURE — 99217 OBSERVATION CARE DISCHARGE: CPT | Performed by: INTERNAL MEDICINE

## 2021-07-01 RX ORDER — LISINOPRIL 5 MG/1
5 TABLET ORAL DAILY
Refills: 0 | Status: ON HOLD | COMMUNITY
Start: 2021-07-01 | End: 2021-09-23 | Stop reason: ALTCHOICE

## 2021-07-01 RX ORDER — CIPROFLOXACIN 500 MG/1
500 TABLET, FILM COATED ORAL EVERY 12 HOURS SCHEDULED
Qty: 14 TABLET | Refills: 0 | Status: SHIPPED | OUTPATIENT
Start: 2021-07-01 | End: 2021-07-08

## 2021-07-01 RX ORDER — CIPROFLOXACIN 500 MG/1
500 TABLET, FILM COATED ORAL EVERY 12 HOURS SCHEDULED
Status: DISCONTINUED | OUTPATIENT
Start: 2021-07-01 | End: 2021-07-01

## 2021-07-01 NOTE — CM/SW NOTE
Met with Pt at bedside to discuss discharge needs. Discussed with Pt regarding [place] [address] services and transportation. Pt declined [place] [address] services. Pt stated his daughter or [address] [place] [address],[address] provides transportation as needed.  Pt has no needs at the present moment

## 2021-07-01 NOTE — DISCHARGE SUMMARY
[facility] HOSPITAL[facility]  DISCHARGE SUMMARY     Aayush Juarez Patient Status:  Observation    1940 MRN [id number]   [facility] 4NW-A Attending No att. providers found   Hosp Day # 0 PCP Nieves Castillo MD     Date of Admission: 2021  Da additional instructions      Take 1 tablet (5 mg total) by mouth daily.  Hold if blood pressure less than 110/70   Refills: 0        CONTINUE taking these medications      Instructions Prescription details   atorvastatin 10 MG Tabs  Commonly known as: [facility] [address] [facility] [address]    Phone: [phone]   · Ciprofloxacin HCl 500 MG Tabs         [place]    Follow-up appointment:   Jeromy Morrell MD  [address] [address] [place] [address] [address] [facility]    Schedule an appoi

## 2021-07-01 NOTE — CM/SW NOTE
PT recommending home. Pt does not appear to have any SW needs at this time.  SW to remain available and follow up if any other needs arise

## 2021-07-01 NOTE — PROGRESS NOTES
Patient denies any pain, on room air. Were still waiting for culture results, so hospitalist can determine what antibitioc patient will be discharged on, talked to lab results should be avaialble tomorrow.

## 2021-07-02 ENCOUNTER — PATIENT OUTREACH (OUTPATIENT)
Dept: CASE MANAGEMENT | Age: 81
End: 2021-07-02

## 2021-07-02 DIAGNOSIS — Z02.9 ENCOUNTERS FOR ADMINISTRATIVE PURPOSE: ICD-10-CM

## 2021-07-09 ENCOUNTER — OFFICE VISIT (OUTPATIENT)
Dept: FAMILY MEDICINE CLINIC | Facility: CLINIC | Age: 81
End: 2021-07-09
Payer: MEDICARE

## 2021-07-09 VITALS
DIASTOLIC BLOOD PRESSURE: 56 MMHG | TEMPERATURE: 98 F | SYSTOLIC BLOOD PRESSURE: 106 MMHG | HEART RATE: 60 BPM | BODY MASS INDEX: 33.91 KG/M2 | WEIGHT: 211 LBS | HEIGHT: 66 IN

## 2021-07-09 DIAGNOSIS — N30.01 ACUTE CYSTITIS WITH HEMATURIA: Primary | ICD-10-CM

## 2021-07-09 DIAGNOSIS — A41.9 SEPSIS WITHOUT ACUTE ORGAN DYSFUNCTION, DUE TO UNSPECIFIED ORGANISM (HCC): ICD-10-CM

## 2021-07-09 PROCEDURE — 3074F SYST BP LT 130 MM HG: CPT | Performed by: FAMILY MEDICINE

## 2021-07-09 PROCEDURE — 1111F DSCHRG MED/CURRENT MED MERGE: CPT | Performed by: FAMILY MEDICINE

## 2021-07-09 PROCEDURE — 99495 TRANSJ CARE MGMT MOD F2F 14D: CPT | Performed by: FAMILY MEDICINE

## 2021-07-09 PROCEDURE — 3008F BODY MASS INDEX DOCD: CPT | Performed by: FAMILY MEDICINE

## 2021-07-09 PROCEDURE — 3078F DIAST BP <80 MM HG: CPT | Performed by: FAMILY MEDICINE

## 2021-07-09 NOTE — PROGRESS NOTES
Patient presents with: Follow - Up: Hospital Visit, June 29th, UTI    HPI:    Kyle De Anda is a 80year old male here today for hospital follow up.    He was discharged from Inpatient hospital, [facility] to Home   Admission Date: 6/29/21   Discharge daily. Glucose Blood (ACCU-CHEK PANKAJ PLUS) In Vitro Strip, Test once daily.  Accu-check pankaj testing once daily non insulin E11.9  Glucose Blood (EMBRACE BLOOD GLUCOSE TEST) In Vitro Strip, Use to test once daily  Emollient (EUCERIN SKIN CALMING) Externa replacement surgery (Left). He family history is not on file. He  reports that he quit smoking about 16 years ago. He has a 60.00 pack-year smoking history. He has never used smokeless tobacco. He reports current alcohol use.  He reports that he does n Providencia rettgeriAbnormal     Note: This organism is known to possess inducible b-lactamases. Isolates that are initially susceptible may become resistant to all b-lactam antibiotics after initiation of therapy.  Monitoring patients during and after ther discharge.      Aravind Aranda MD, 7/9/2021

## 2021-07-13 NOTE — PROGRESS NOTES
Several attempts made to reach the patient with no return call. Patient completed HFU on 7/9/2021. Closing encounter.

## 2021-08-06 RX ORDER — TAMSULOSIN HYDROCHLORIDE 0.4 MG/1
CAPSULE ORAL
Qty: 90 CAPSULE | Refills: 2 | Status: SHIPPED | OUTPATIENT
Start: 2021-08-06

## 2021-08-06 NOTE — TELEPHONE ENCOUNTER
Refill request for:    Requested Prescriptions     Pending Prescriptions Disp Refills   • tamsulosin (FLOMAX) cap [Pharmacy Med Name: TAMSULOSIN HYDROCHLORIDE 0.4 MG Capsule] 90 capsule 2     Sig: TAKE 1 CAPSULE EVERY DAY        Last Prescribed Quantity Re

## 2021-09-22 ENCOUNTER — TELEPHONE (OUTPATIENT)
Dept: FAMILY MEDICINE CLINIC | Facility: CLINIC | Age: 81
End: 2021-09-22

## 2021-09-22 ENCOUNTER — HOSPITAL ENCOUNTER (INPATIENT)
Facility: HOSPITAL | Age: 81
LOS: 3 days | Discharge: HOME | DRG: 291 | End: 2021-09-26
Attending: EMERGENCY MEDICINE | Admitting: HOSPITALIST
Payer: MEDICARE

## 2021-09-22 ENCOUNTER — APPOINTMENT (OUTPATIENT)
Dept: CT IMAGING | Facility: HOSPITAL | Age: 81
DRG: 291 | End: 2021-09-22
Attending: EMERGENCY MEDICINE
Payer: MEDICARE

## 2021-09-22 ENCOUNTER — APPOINTMENT (OUTPATIENT)
Dept: GENERAL RADIOLOGY | Facility: HOSPITAL | Age: 81
DRG: 291 | End: 2021-09-22
Attending: EMERGENCY MEDICINE
Payer: MEDICARE

## 2021-09-22 DIAGNOSIS — N28.9 RENAL INSUFFICIENCY: ICD-10-CM

## 2021-09-22 DIAGNOSIS — J44.1 COPD EXACERBATION (HCC): ICD-10-CM

## 2021-09-22 DIAGNOSIS — E87.6 HYPOKALEMIA: ICD-10-CM

## 2021-09-22 DIAGNOSIS — R77.8 ELEVATED TROPONIN: ICD-10-CM

## 2021-09-22 DIAGNOSIS — J90 PLEURAL EFFUSION: ICD-10-CM

## 2021-09-22 DIAGNOSIS — I50.9 ACUTE ON CHRONIC CONGESTIVE HEART FAILURE, UNSPECIFIED HEART FAILURE TYPE (HCC): ICD-10-CM

## 2021-09-22 DIAGNOSIS — I48.0 PAROXYSMAL ATRIAL FIBRILLATION (HCC): ICD-10-CM

## 2021-09-22 DIAGNOSIS — R06.00 DYSPNEA, UNSPECIFIED TYPE: Primary | ICD-10-CM

## 2021-09-22 DIAGNOSIS — R79.89 ELEVATED LFTS: ICD-10-CM

## 2021-09-22 DIAGNOSIS — K80.20 CALCULUS OF GALLBLADDER WITHOUT CHOLECYSTITIS WITHOUT OBSTRUCTION: ICD-10-CM

## 2021-09-22 LAB
ALBUMIN SERPL-MCNC: 3.1 G/DL (ref 3.4–5)
ALBUMIN/GLOB SERPL: 0.7 {RATIO} (ref 1–2)
ALP LIVER SERPL-CCNC: 264 U/L
ALT SERPL-CCNC: 189 U/L
ANION GAP SERPL CALC-SCNC: 13 MMOL/L (ref 0–18)
AST SERPL-CCNC: 67 U/L (ref 15–37)
BASOPHILS # BLD AUTO: 0.02 X10(3) UL (ref 0–0.2)
BASOPHILS NFR BLD AUTO: 0.3 %
BILIRUB SERPL-MCNC: 1.4 MG/DL (ref 0.1–2)
BUN BLD-MCNC: 33 MG/DL (ref 7–18)
CALCIUM BLD-MCNC: 8.2 MG/DL (ref 8.5–10.1)
CHLORIDE SERPL-SCNC: 105 MMOL/L (ref 98–112)
CO2 SERPL-SCNC: 18 MMOL/L (ref 21–32)
CREAT BLD-MCNC: 1.77 MG/DL
D-DIMER: 1.92 UG/ML FEU (ref ?–0.81)
EOSINOPHIL # BLD AUTO: 0.07 X10(3) UL (ref 0–0.7)
EOSINOPHIL NFR BLD AUTO: 0.9 %
ERYTHROCYTE [DISTWIDTH] IN BLOOD BY AUTOMATED COUNT: 15.6 %
GLOBULIN PLAS-MCNC: 4.2 G/DL (ref 2.8–4.4)
GLUCOSE BLD-MCNC: 141 MG/DL (ref 70–99)
HCT VFR BLD AUTO: 35.2 %
HGB BLD-MCNC: 11.4 G/DL
IMM GRANULOCYTES # BLD AUTO: 0.04 X10(3) UL (ref 0–1)
IMM GRANULOCYTES NFR BLD: 0.5 %
INR BLD: 2.22 (ref 0.89–1.11)
LYMPHOCYTES # BLD AUTO: 0.72 X10(3) UL (ref 1–4)
LYMPHOCYTES NFR BLD AUTO: 9.6 %
MCH RBC QN AUTO: 27.3 PG (ref 26–34)
MCHC RBC AUTO-ENTMCNC: 32.4 G/DL (ref 31–37)
MCV RBC AUTO: 84.4 FL
MONOCYTES # BLD AUTO: 0.18 X10(3) UL (ref 0.1–1)
MONOCYTES NFR BLD AUTO: 2.4 %
NEUTROPHILS # BLD AUTO: 6.47 X10 (3) UL (ref 1.5–7.7)
NEUTROPHILS # BLD AUTO: 6.47 X10(3) UL (ref 1.5–7.7)
NEUTROPHILS NFR BLD AUTO: 86.3 %
NT-PROBNP SERPL-MCNC: ABNORMAL PG/ML (ref ?–450)
OSMOLALITY SERPL CALC.SUM OF ELEC: 292 MOSM/KG (ref 275–295)
PLATELET # BLD AUTO: 215 10(3)UL (ref 150–450)
POTASSIUM SERPL-SCNC: 3.3 MMOL/L (ref 3.5–5.1)
PROT SERPL-MCNC: 7.3 G/DL (ref 6.4–8.2)
PROTHROMBIN TIME: 25.1 SECONDS (ref 12.2–14.5)
RBC # BLD AUTO: 4.17 X10(6)UL
SARS-COV-2 RNA RESP QL NAA+PROBE: NOT DETECTED
SODIUM SERPL-SCNC: 136 MMOL/L (ref 136–145)
TROPONIN I SERPL-MCNC: 0.1 NG/ML (ref ?–0.04)
WBC # BLD AUTO: 7.5 X10(3) UL (ref 4–11)

## 2021-09-22 PROCEDURE — 71045 X-RAY EXAM CHEST 1 VIEW: CPT | Performed by: EMERGENCY MEDICINE

## 2021-09-22 PROCEDURE — 71260 CT THORAX DX C+: CPT | Performed by: EMERGENCY MEDICINE

## 2021-09-22 PROCEDURE — 99223 1ST HOSP IP/OBS HIGH 75: CPT | Performed by: INTERNAL MEDICINE

## 2021-09-22 RX ORDER — POTASSIUM CHLORIDE 20 MEQ/1
40 TABLET, EXTENDED RELEASE ORAL ONCE
Status: COMPLETED | OUTPATIENT
Start: 2021-09-22 | End: 2021-09-22

## 2021-09-22 RX ORDER — ASPIRIN 81 MG/1
324 TABLET, CHEWABLE ORAL ONCE
Status: COMPLETED | OUTPATIENT
Start: 2021-09-22 | End: 2021-09-22

## 2021-09-22 RX ORDER — METHYLPREDNISOLONE SODIUM SUCCINATE 125 MG/2ML
125 INJECTION, POWDER, LYOPHILIZED, FOR SOLUTION INTRAMUSCULAR; INTRAVENOUS ONCE
Status: COMPLETED | OUTPATIENT
Start: 2021-09-22 | End: 2021-09-22

## 2021-09-22 NOTE — TELEPHONE ENCOUNTER
Talked to daughter and yesterday patient had night sweats with slurred speech and SOB doing better now not having any trouble now. Daughter wanted to know if there was any blood test to run to see what happened. I told her that he should heve gone to the ED to be seen but she was not there at that time.

## 2021-09-22 NOTE — TELEPHONE ENCOUNTER
OM for daughter Doris Bermudez to return a call to triage to discuss bringing Jaron Dumont in for an appointment. Called Lisandro and spoke with him, telling him Casandra Thompson would like him to be seen. He will call Doris Bermudez to see if she can bring him for an appointment tomorrow 9/23/2021. Lisandro's speech was clear and he is feeling much better today.

## 2021-09-23 ENCOUNTER — APPOINTMENT (OUTPATIENT)
Dept: ULTRASOUND IMAGING | Facility: HOSPITAL | Age: 81
DRG: 291 | End: 2021-09-23
Attending: INTERNAL MEDICINE
Payer: MEDICARE

## 2021-09-23 ENCOUNTER — APPOINTMENT (OUTPATIENT)
Dept: CV DIAGNOSTICS | Facility: HOSPITAL | Age: 81
DRG: 291 | End: 2021-09-23
Attending: INTERNAL MEDICINE
Payer: MEDICARE

## 2021-09-23 PROBLEM — R06.00 DYSPNEA, UNSPECIFIED TYPE: Status: ACTIVE | Noted: 2021-09-23

## 2021-09-23 PROBLEM — N28.9 RENAL INSUFFICIENCY: Status: ACTIVE | Noted: 2021-09-23

## 2021-09-23 PROBLEM — J44.1 COPD EXACERBATION (HCC): Status: ACTIVE | Noted: 2021-09-23

## 2021-09-23 PROBLEM — R77.8 ELEVATED TROPONIN: Status: ACTIVE | Noted: 2021-09-23

## 2021-09-23 PROBLEM — E87.6 HYPOKALEMIA: Status: ACTIVE | Noted: 2021-09-23

## 2021-09-23 PROBLEM — J90 PLEURAL EFFUSION: Status: ACTIVE | Noted: 2021-09-23

## 2021-09-23 PROBLEM — R79.89 ELEVATED TROPONIN: Status: ACTIVE | Noted: 2021-09-23

## 2021-09-23 LAB
ALBUMIN SERPL-MCNC: 2.9 G/DL (ref 3.4–5)
ALP LIVER SERPL-CCNC: 248 U/L
ALT SERPL-CCNC: 157 U/L
ANION GAP SERPL CALC-SCNC: 10 MMOL/L (ref 0–18)
AST SERPL-CCNC: 51 U/L (ref 15–37)
ATRIAL RATE: 96 BPM
BASOPHILS # BLD AUTO: 0.01 X10(3) UL (ref 0–0.2)
BASOPHILS NFR BLD AUTO: 0.2 %
BILIRUB DIRECT SERPL-MCNC: 0.5 MG/DL (ref 0–0.2)
BILIRUB SERPL-MCNC: 1 MG/DL (ref 0.1–2)
BILIRUB UR QL STRIP.AUTO: NEGATIVE
BUN BLD-MCNC: 36 MG/DL (ref 7–18)
CALCIUM BLD-MCNC: 8.5 MG/DL (ref 8.5–10.1)
CHLORIDE SERPL-SCNC: 107 MMOL/L (ref 98–112)
CLARITY UR REFRACT.AUTO: CLEAR
CO2 SERPL-SCNC: 20 MMOL/L (ref 21–32)
COLOR UR AUTO: YELLOW
CREAT BLD-MCNC: 1.64 MG/DL
CRP SERPL-MCNC: 14.9 MG/DL (ref ?–0.3)
EOSINOPHIL # BLD AUTO: 0 X10(3) UL (ref 0–0.7)
EOSINOPHIL NFR BLD AUTO: 0 %
ERYTHROCYTE [DISTWIDTH] IN BLOOD BY AUTOMATED COUNT: 15.7 %
EST. AVERAGE GLUCOSE BLD GHB EST-MCNC: 151 MG/DL (ref 68–126)
GLUCOSE BLD-MCNC: 304 MG/DL (ref 70–99)
GLUCOSE BLD-MCNC: 392 MG/DL (ref 70–99)
GLUCOSE BLD-MCNC: 394 MG/DL (ref 70–99)
GLUCOSE BLD-MCNC: 409 MG/DL (ref 70–99)
GLUCOSE UR STRIP.AUTO-MCNC: 150 MG/DL
HAV IGM SER QL: NONREACTIVE
HBA1C MFR BLD HPLC: 6.9 % (ref ?–5.7)
HBV CORE IGM SER QL: NONREACTIVE
HBV SURFACE AG SERPL QL IA: NONREACTIVE
HCT VFR BLD AUTO: 32.6 %
HCV AB SERPL QL IA: NONREACTIVE
HGB BLD-MCNC: 10.4 G/DL
IMM GRANULOCYTES # BLD AUTO: 0.04 X10(3) UL (ref 0–1)
IMM GRANULOCYTES NFR BLD: 0.7 %
INR BLD: 2.31 (ref 0.89–1.11)
KETONES UR STRIP.AUTO-MCNC: NEGATIVE MG/DL
LEUKOCYTE ESTERASE UR QL STRIP.AUTO: NEGATIVE
LYMPHOCYTES # BLD AUTO: 0.43 X10(3) UL (ref 1–4)
LYMPHOCYTES NFR BLD AUTO: 7.3 %
MCH RBC QN AUTO: 26.9 PG (ref 26–34)
MCHC RBC AUTO-ENTMCNC: 31.9 G/DL (ref 31–37)
MCV RBC AUTO: 84.2 FL
MONOCYTES # BLD AUTO: 0.18 X10(3) UL (ref 0.1–1)
MONOCYTES NFR BLD AUTO: 3 %
NEUTROPHILS # BLD AUTO: 5.25 X10 (3) UL (ref 1.5–7.7)
NEUTROPHILS # BLD AUTO: 5.25 X10(3) UL (ref 1.5–7.7)
NEUTROPHILS NFR BLD AUTO: 88.8 %
NITRITE UR QL STRIP.AUTO: NEGATIVE
OSMOLALITY SERPL CALC.SUM OF ELEC: 304 MOSM/KG (ref 275–295)
PH UR STRIP.AUTO: 5 [PH] (ref 5–8)
PLATELET # BLD AUTO: 194 10(3)UL (ref 150–450)
POTASSIUM SERPL-SCNC: 4 MMOL/L (ref 3.5–5.1)
PROCALCITONIN SERPL-MCNC: 23.83 NG/ML (ref ?–0.16)
PROCALCITONIN SERPL-MCNC: 32.71 NG/ML (ref ?–0.16)
PROT SERPL-MCNC: 7 G/DL (ref 6.4–8.2)
PROT UR STRIP.AUTO-MCNC: 30 MG/DL
PROTHROMBIN TIME: 25.9 SECONDS (ref 12.2–14.5)
Q-T INTERVAL: 458 MS
QRS DURATION: 190 MS
QTC CALCULATION (BEZET): 528 MS
R AXIS: -65 DEGREES
RBC # BLD AUTO: 3.87 X10(6)UL
RBC #/AREA URNS AUTO: >10 /HPF
SODIUM SERPL-SCNC: 137 MMOL/L (ref 136–145)
SP GR UR STRIP.AUTO: 1.02 (ref 1–1.03)
T AXIS: 101 DEGREES
UROBILINOGEN UR STRIP.AUTO-MCNC: <2 MG/DL
VENTRICULAR RATE: 80 BPM
WBC # BLD AUTO: 5.9 X10(3) UL (ref 4–11)

## 2021-09-23 PROCEDURE — 99233 SBSQ HOSP IP/OBS HIGH 50: CPT | Performed by: HOSPITALIST

## 2021-09-23 PROCEDURE — 5A09357 ASSISTANCE WITH RESPIRATORY VENTILATION, LESS THAN 24 CONSECUTIVE HOURS, CONTINUOUS POSITIVE AIRWAY PRESSURE: ICD-10-PCS | Performed by: INTERNAL MEDICINE

## 2021-09-23 PROCEDURE — 93306 TTE W/DOPPLER COMPLETE: CPT | Performed by: INTERNAL MEDICINE

## 2021-09-23 PROCEDURE — 76700 US EXAM ABDOM COMPLETE: CPT | Performed by: INTERNAL MEDICINE

## 2021-09-23 RX ORDER — DEXTROSE MONOHYDRATE 25 G/50ML
50 INJECTION, SOLUTION INTRAVENOUS
Status: DISCONTINUED | OUTPATIENT
Start: 2021-09-23 | End: 2021-09-26

## 2021-09-23 RX ORDER — FUROSEMIDE 10 MG/ML
40 INJECTION INTRAMUSCULAR; INTRAVENOUS ONCE
Status: COMPLETED | OUTPATIENT
Start: 2021-09-23 | End: 2021-09-23

## 2021-09-23 RX ORDER — TAMSULOSIN HYDROCHLORIDE 0.4 MG/1
0.4 CAPSULE ORAL DAILY
Status: DISCONTINUED | OUTPATIENT
Start: 2021-09-23 | End: 2021-09-26

## 2021-09-23 RX ORDER — ZOLPIDEM TARTRATE 5 MG/1
5 TABLET ORAL NIGHTLY PRN
Status: DISCONTINUED | OUTPATIENT
Start: 2021-09-23 | End: 2021-09-26

## 2021-09-23 RX ORDER — METOPROLOL TARTRATE 100 MG/1
100 TABLET ORAL
Status: DISCONTINUED | OUTPATIENT
Start: 2021-09-23 | End: 2021-09-26

## 2021-09-23 RX ORDER — DEXTROSE MONOHYDRATE 25 G/50ML
50 INJECTION, SOLUTION INTRAVENOUS
Status: DISCONTINUED | OUTPATIENT
Start: 2021-09-23 | End: 2021-09-23

## 2021-09-23 RX ORDER — ATORVASTATIN CALCIUM 10 MG/1
10 TABLET, FILM COATED ORAL NIGHTLY
Status: DISCONTINUED | OUTPATIENT
Start: 2021-09-23 | End: 2021-09-23

## 2021-09-23 RX ORDER — WARFARIN SODIUM 2 MG/1
4 TABLET ORAL NIGHTLY
Status: DISCONTINUED | OUTPATIENT
Start: 2021-09-23 | End: 2021-09-24

## 2021-09-23 RX ORDER — ALBUTEROL SULFATE 90 UG/1
2 AEROSOL, METERED RESPIRATORY (INHALATION) EVERY 4 HOURS PRN
Status: DISCONTINUED | OUTPATIENT
Start: 2021-09-23 | End: 2021-09-24

## 2021-09-23 RX ORDER — DILTIAZEM HYDROCHLORIDE 240 MG/1
240 CAPSULE, COATED, EXTENDED RELEASE ORAL DAILY
Status: DISCONTINUED | OUTPATIENT
Start: 2021-09-23 | End: 2021-09-26

## 2021-09-23 NOTE — ED PROVIDER NOTES
Patient Seen in: [facility] 8ne-a      History   Patient presents with:  Difficulty Breathing    Stated Complaint: SOB    Subjective:   HPI    57-year-old male with history of A. fib status post PPM, diabetes mellitus, hypertension, high cholesterol medtronic   • COLONOSCOPY N/A 9/25/2015    Procedure: COLONOSCOPY;  Surgeon: Delroy Ford DO;  Location:  ENDOSCOPY   • HIP REPLACEMENT SURGERY Left 06/26/2018   • HIP REPLACEMENT SURGERY Right     2000   • KNEE REPLACEMENT SURGERY Left     after 20 no ptosis, moves all 4 extremities equally    ED Course     Labs Reviewed   COMP METABOLIC PANEL (14) - Abnormal; Notable for the following components:       Result Value    Glucose 141 (*)     Potassium 3.3 (*)     CO2 18.0 (*)     BUN 33 (*)     Creatini Bilirubin, Direct 0.5 (*)     Albumin 2.9 (*)     All other components within normal limits   C-REACTIVE PROTEIN - Abnormal; Notable for the following components:    C-Reactive Protein 14.90 (*)     All other components within normal limits   URINALYSIS WI Please view results for these tests on the individual orders. RAINBOW DRAW BLUE   RAINBOW DRAW LAVENDER   RAINBOW DRAW LIGHT GREEN   RAINBOW DRAW GOLD   BLOOD CULTURE   BLOOD CULTURE     EKG    Rate, intervals and axes as noted on EKG Report.   Ra specified.         Medications Prescribed:  Current Discharge Medication List                          Hospital Problems             Present on Admission  Date Reviewed: 7/9/2021          ICD-10-CM Noted POA    * (Principal) Dyspnea, unspecified type R06.00

## 2021-09-23 NOTE — ED QUICK NOTES
Orders for admission, patient is aware of plan and ready to go upstairs. Any questions, please call ED RN Re Faith  at extension [phone]. Vaccinated?  yes  Type of COVID test sent: rapid  COVID Suspicion level: low       Titratable drug(s) infusing: no  Rat

## 2021-09-23 NOTE — ED INITIAL ASSESSMENT (HPI)
Pt arrives with complaints of SOB for the past 2 days worsening. PT does not wear oxygen at home. Pt arrives at 90% on RA. Pt denies any chest pain with inhalation.

## 2021-09-23 NOTE — PLAN OF CARE
Pt is A+Ox4. CHELSEA. VSS. Received patient on 2L per NC, now weaned to RA. . Vpaced/occasionally AV paced on tele. IV Lasix. Echo and US abdomen done today. Tolerating diet. Voids via urinal. BM today.  Pt and daughter at bedside updated on POC at this time Encourage pt to monitor pain and request assistance  - Assess pain using appropriate pain scale  - Administer analgesics based on type and severity of pain and evaluate response  - Implement non-pharmacological measures as appropriate and evaluate response Consider post-discharge preferences of patient/family/discharge partner  - Complete POLST form as appropriate  - Assess patient's ability to be responsible for managing their own health  - Refer to Case Management Department for coordinating discharge plan

## 2021-09-23 NOTE — CONSULTS
[facility]    Report of Consultation    Kevin Gee Patient Status:  Inpatient    1940 MRN [id number]   [facility] 8NE-A Attending Lexi Hatch MD   Hosp Day # 0 PCP Jenifer Nguyen MD     Date of Admission:  2021  Date uncontrolled    • Visual impairment    • Wears glasses      Past Surgical History:   Procedure Laterality Date   • CARDIAC PACEMAKER PLACEMENT  2010    medtronic   • COLONOSCOPY N/A 9/25/2015    Procedure: COLONOSCOPY;  Surgeon: DAISY Viera wheezes anteriorly  Abdomen: Soft, non-tender. Extremities: Without clubbing, cyanosis or edema. Neurologic: Alert   Skin: Warm and dry.      Laboratories and Data:  Diagnostics:  EKG: V-paced - underlying atrial fibrillation  Echo:   Stress Test:   Cath MD  9/23/2021  10:28 AM

## 2021-09-23 NOTE — PAYOR COMM NOTE
--------------  ADMISSION REVIEW     Rosita Zuñiga [place] [facility] [place]scriber #:  [id number]  Authorization Number: [id number]           H&P - H&P Note      Chief Complaint: Shortness of breath    History of Present Illness: Neli Mancini is a 80year old male with 2000   • LAMINECTOMY      no hardware   • SKIN SURGERY     • TOTAL HIP REPLACEMENT Right    • TOTAL KNEE REPLACEMENT Right      Physical Exam:    /89   Pulse 69   Temp 98.7 °F (37.1 °C) (Oral)   Resp 14   SpO2 93%   General: No acute distress.  Alert diuresis  · Anemia   · Chronic at baseline  · Elevated LFTs ALT>AST, Cirrhotic appearing liver on CT   · Hepatitis panel, Abdominal ultrasound  · trend  · CAD  · DM2, well controlled  · hyperglycemia protocol   · ISS  · Prostate cancer  · NII on CPAP  · Af 18 126/66 95 % — Nasal cannula 3 L/min AR    09/23/21 0526 — — — 125/63 98 % — Nasal cannula 2.5 L/min JG    09/23/21 0449 — 75 21 120/57 97 % — Nasal cannula 2.5 L/min EC    09/22/21 1928 98.7 °F (37.1 °C) 74 18 140/51 90 % — Nasal cannula 2 L/min AC

## 2021-09-23 NOTE — H&P
DIONTE HOSPITALIST  History and Physical     Terryann Slim Patient Status:  Emergency    1940 MRN [id number]   Location [address] [facility] [address] Attending Beau Hadley, [facility] Day # 0 PCP Gary Hoang MD     Chief Complaint: Tk Young • HIP REPLACEMENT SURGERY Right     2000   • KNEE REPLACEMENT SURGERY Left     after 2000   • LAMINECTOMY      no hardware   • SKIN SURGERY     • TOTAL HIP REPLACEMENT Right    • TOTAL KNEE REPLACEMENT Right        Social History:  reports that he quit s directed, Disp: 1 Device, Rfl: 0  FREESTYLE CONTROL SOLUTION VI LIQD, prn for calibratioj, Disp: 1 Each, Rfl: prn  FREESTYLE LANCETS MISC, test tid, Disp: 300, Rfl: 3        Review of Systems:   A comprehensive 14 point review of systems was completed. 09/22/21 1924   DDIMER 1.92*       Imaging: Imaging data reviewed in Epic.       ASSESSMENT / PLAN:     · Shortness of Breath, suspected CHF, possible exacerbation of COPD  · Lasix  · Cardiology consulted  · ECHO  · Hold on further steroids for now  · Elev

## 2021-09-24 LAB
ALBUMIN SERPL-MCNC: 2.6 G/DL (ref 3.4–5)
ALBUMIN/GLOB SERPL: 0.7 {RATIO} (ref 1–2)
ALP LIVER SERPL-CCNC: 209 U/L
ALT SERPL-CCNC: 129 U/L
ANION GAP SERPL CALC-SCNC: 7 MMOL/L (ref 0–18)
AST SERPL-CCNC: 41 U/L (ref 15–37)
BILIRUB SERPL-MCNC: 0.7 MG/DL (ref 0.1–2)
BUN BLD-MCNC: 38 MG/DL (ref 7–18)
CALCIUM BLD-MCNC: 8.3 MG/DL (ref 8.5–10.1)
CHLORIDE SERPL-SCNC: 108 MMOL/L (ref 98–112)
CO2 SERPL-SCNC: 21 MMOL/L (ref 21–32)
CREAT BLD-MCNC: 1.29 MG/DL
GLOBULIN PLAS-MCNC: 3.9 G/DL (ref 2.8–4.4)
GLUCOSE BLD-MCNC: 226 MG/DL (ref 70–99)
GLUCOSE BLD-MCNC: 237 MG/DL (ref 70–99)
GLUCOSE BLD-MCNC: 238 MG/DL (ref 70–99)
GLUCOSE BLD-MCNC: 238 MG/DL (ref 70–99)
GLUCOSE BLD-MCNC: 240 MG/DL (ref 70–99)
INR BLD: 2.55 (ref 0.89–1.11)
OSMOLALITY SERPL CALC.SUM OF ELEC: 298 MOSM/KG (ref 275–295)
POTASSIUM SERPL-SCNC: 3.4 MMOL/L (ref 3.5–5.1)
PROCALCITONIN SERPL-MCNC: 15.32 NG/ML (ref ?–0.16)
PROT SERPL-MCNC: 6.5 G/DL (ref 6.4–8.2)
PROTHROMBIN TIME: 28 SECONDS (ref 12.2–14.5)
SODIUM SERPL-SCNC: 136 MMOL/L (ref 136–145)

## 2021-09-24 PROCEDURE — 99233 SBSQ HOSP IP/OBS HIGH 50: CPT | Performed by: HOSPITALIST

## 2021-09-24 RX ORDER — TORSEMIDE 20 MG/1
20 TABLET ORAL DAILY
Status: DISCONTINUED | OUTPATIENT
Start: 2021-09-25 | End: 2021-09-25

## 2021-09-24 RX ORDER — SPIRONOLACTONE 25 MG/1
25 TABLET ORAL
Status: DISCONTINUED | OUTPATIENT
Start: 2021-09-24 | End: 2021-09-25

## 2021-09-24 RX ORDER — SPIRONOLACTONE 25 MG/1
25 TABLET ORAL DAILY
Status: DISCONTINUED | OUTPATIENT
Start: 2021-09-24 | End: 2021-09-24

## 2021-09-24 RX ORDER — IPRATROPIUM BROMIDE AND ALBUTEROL SULFATE 2.5; .5 MG/3ML; MG/3ML
3 SOLUTION RESPIRATORY (INHALATION) EVERY 4 HOURS PRN
Status: DISCONTINUED | OUTPATIENT
Start: 2021-09-24 | End: 2021-09-26

## 2021-09-24 RX ORDER — FUROSEMIDE 10 MG/ML
40 INJECTION INTRAMUSCULAR; INTRAVENOUS ONCE
Status: COMPLETED | OUTPATIENT
Start: 2021-09-24 | End: 2021-09-24

## 2021-09-24 RX ORDER — WARFARIN SODIUM 2 MG/1
2 TABLET ORAL NIGHTLY
Status: DISCONTINUED | OUTPATIENT
Start: 2021-09-24 | End: 2021-09-26

## 2021-09-24 RX ORDER — TORSEMIDE 5 MG/1
10 TABLET ORAL DAILY
Status: DISCONTINUED | OUTPATIENT
Start: 2021-09-24 | End: 2021-09-24

## 2021-09-24 NOTE — PLAN OF CARE
Pt.is alert and oriented times four. Lungs with crackles in bases. He is v pacing on monitor. Lower extremities with 2 plus edema noted. He has no c/o pain.

## 2021-09-24 NOTE — CONSULTS
[facility]                       Gastroenterology [address] [facility]    Ismael Fisher Patient Status:  Inpatient    1940 MRN [id number]   [facility] 8NE-A Attending Isak Montemayor MD   [facility] Day # 1 PCP Demetrio Irvin unspecified radiation 2010    prostate   • Frequent urination    • High blood pressure    • High cholesterol    • Irregular bowel habits    • Leaking of urine    • Leg swelling    • Osteoarthritis    • Pacemaker    • Renal disorder     Stage III-moderate Blocker)  tamsulosin (FLOMAX) cap 0.4 mg, 0.4 mg, Oral, Daily  [COMPLETED] Perflutren Lipid Microsphere (DEFINITY) 6.52 MG/ML injection 1.5 mL, 1.5 mL, Intravenous, ONCE PRN  [COMPLETED] furosemide (LASIX) injection 40 mg, 40 mg, Intravenous, Once  glucose Respiratory: +COPD, +obstructive sleep apnea            Hematologic: +thrombocytopenia            Dermatologic: The patient reports no recent rashes or chronic skin disorders            Rheumatologic: The patient reports no history of chronic arthritis BUN 33* 36* 38*   CREATSERUM 1.77* 1.64* 1.29   GFRAA 41* 45* 60   GFRNAA 35* 39* 52*   CA 8.2* 8.5 8.3*    137 136   K 3.3* 4.0 3.4*    107 108   CO2 18.0* 20.0* 21.0     Recent Labs   Lab 09/23/21  0530   RBC 3.87   HGB 10.4*   HCT 32.6* 20-year-old with PMH of CAD, hypertension, atrial fibrillation with pacemaker in situ, hyperlipidemia, COPD, obstructive sleep apnea, type 2 diabetes mellitus, CKD, prostate cancer, thrombocytopenia, cirrhosis, who is consulted for elevated LFTs & cholelit to see what the CT abdomen pelvis shows and what the trend of the LFTs are doing  -INR is 2.55; therefore patient would likely need to be reversed prior to EUS unless drop in INR tomm am if they choose to proceed with the procedure  -We will discuss with t

## 2021-09-24 NOTE — PROGRESS NOTES
Up in chair. Feels well. No chest pain or SOB.     Afebrile  154/72  69 regular - paced    Lungs clear  Ht RRR  abd soft  Ext no edema  Neuro intact    38/1.3   INR 2.6    Blood cultures positive for E coli - no obvious source - urine bland    Good urine ou

## 2021-09-24 NOTE — PLAN OF CARE
Assumed care for pt at 19:30 on 9/23    A&Ox4. Requested medication to help him sleep- ambien 5 given. Denies pain. Slightly dyspneic after returning from .  VSS on RA while awake, during sleep      09/24/21 0058 09/24/21 0059 09/24/21 0100   Oxygen Thera type and severity of pain and evaluate response  - Implement non-pharmacological measures as appropriate and evaluate response  - Consider cultural and social influences on pain and pain management  - Manage/alleviate anxiety  - Utilize distraction and/or ability to be responsible for managing their own health  - Refer to Case Management Department for coordinating discharge planning if the patient needs post-hospital services based on physician/LIP order or complex needs related to functional status, cogni

## 2021-09-24 NOTE — PROGRESS NOTES
[facility]     Hospitalist Progress Note     Modesta Height Patient Status:  Inpatient    1940 MRN [id number]   [facility] 8NE-A Attending Christina Davidson MD   Hosp Day # 0 PCP Yue Rouse MD     Chief Complaint: sob    Subjective 14.90*   DDIMER 1.92*  --        Imaging: Imaging data reviewed in Epic.     Medications:   • dilTIAZem  240 mg Oral Daily   • metoprolol tartrate  100 mg Oral 2x Daily(Beta Blocker)   • tamsulosin  0.4 mg Oral Daily   • warfarin  4 mg Oral Nightly   • insu Based on patients current state of illness, I anticipate that, after discharge, patient will require TBD.

## 2021-09-24 NOTE — CONSULTS
INFECTIOUS DISEASE CONSULTATION    Neda Palumbo Patient Status:  Inpatient    1940 MRN [id number]   [facility] 8NE-A Attending Clarence Vasques MD   [facility] Day # 1 [place], [place] Relation Age of Onset   • Diabetes Neg    • Heart Disorder Neg    • Hypertension Neg       reports that he quit smoking about 16 years ago. He has a 60.00 pack-year smoking history. He has never used smokeless tobacco. He reports current alcohol use.  He re facility-administered medications on file prior to encounter. tamsulosin (FLOMAX) cap, TAKE 1 CAPSULE EVERY DAY, Disp: 90 capsule, Rfl: 2  furosemide 20 MG Oral Tab, Take 40 mg by mouth daily.   , Disp: , Rfl:   Metoprolol Tartrate 100 MG Oral Tab, Take 1 erythema, no open wounds      Laboratory Data:  Laboratory data reviewed      Recent Labs   Lab 09/23/21  0530   RBC 3.87   HGB 10.4*   HCT 32.6*   MCV 84.2   MCH 26.9   MCHC 31.9   RDW 15.7   NEPRELIM 5.25   WBC 5.9   .0       Recent Labs   Lab 09/ Dizziness     Dyspnea     Acute on chronic congestive heart failure (HCC)     Elevated troponin     Dyspnea, unspecified type     Pleural effusion     Hypokalemia     Renal insufficiency     COPD exacerbation (HCC)     Sepsis due to Escherichia coli ([facility] [address]

## 2021-09-24 NOTE — PAYOR COMM NOTE
--------------  CONTINUED STAY REVIEW    Caitlin Jackson [place] [facility] [place] #:  [id number]  Authorization Number: [id number]        9/23 HOSP    · Shortness of Breath, suspected CHF, exacerbation of COPD?, ascites, new dx of liver cirrhosis, due to alcohol abu GFRAA 41* 45* 60   GFRNAA 35* 39* 52*   CA 8.2* 8.5 8.3*   ALB 3.1* 2.9* 2.6*    137 136   K 3.3* 4.0 3.4*    107 108   CO2 18.0* 20.0* 21.0   ALKPHO 264* 248* 209*   AST 67* 51* 41*   * 157* 129*   BILT 1.4 1.0 0.7   TP 7.3 7.0 6.5 7120 Given  Subcutaneous (Left Upper Arm) Teresita Lee RN    9/23/2021 2120 Given  Subcutaneous (Left Upper Arm) Teresita Lee RN      Insulin Aspart Pen (NOVOLOG) 100 UNIT/ML flexpen 12 Units     Date Action Dose Route User    9/23/2021 1811

## 2021-09-24 NOTE — PROGRESS NOTES
[facility]     Hospitalist Progress Note     Joe Cole Patient Status:  Inpatient    1940 MRN [id number]   [facility] 8NE-A Attending Summer Morgan MD   [facility] Day # 1 PCP Louie Monterroso MD     Chief Complaint: chills    Subj PCT 32.71* 23.83* 15.32*       Cardiac  Recent Labs   Lab 09/22/21 1924   TROP 0.099*   PBNP 10,910*       Creatinine Kinase  No results for input(s): CK in the last 168 hours.     Inflammatory Markers  Recent Labs   Lab 09/22/21 1924 09/23/21  0530   C

## 2021-09-25 ENCOUNTER — ANESTHESIA (OUTPATIENT)
Dept: ENDOSCOPY | Facility: HOSPITAL | Age: 81
DRG: 291 | End: 2021-09-25
Payer: MEDICARE

## 2021-09-25 ENCOUNTER — APPOINTMENT (OUTPATIENT)
Dept: GENERAL RADIOLOGY | Facility: HOSPITAL | Age: 81
DRG: 291 | End: 2021-09-25
Attending: INTERNAL MEDICINE
Payer: MEDICARE

## 2021-09-25 ENCOUNTER — APPOINTMENT (OUTPATIENT)
Dept: CT IMAGING | Facility: HOSPITAL | Age: 81
DRG: 291 | End: 2021-09-25
Attending: INTERNAL MEDICINE
Payer: MEDICARE

## 2021-09-25 ENCOUNTER — ANESTHESIA EVENT (OUTPATIENT)
Dept: ENDOSCOPY | Facility: HOSPITAL | Age: 81
DRG: 291 | End: 2021-09-25
Payer: MEDICARE

## 2021-09-25 LAB
ANION GAP SERPL CALC-SCNC: 8 MMOL/L (ref 0–18)
ANTIBODY SCREEN: NEGATIVE
BUN BLD-MCNC: 41 MG/DL (ref 7–18)
CALCIUM BLD-MCNC: 8.4 MG/DL (ref 8.5–10.1)
CHLORIDE SERPL-SCNC: 104 MMOL/L (ref 98–112)
CO2 SERPL-SCNC: 27 MMOL/L (ref 21–32)
CREAT BLD-MCNC: 1.4 MG/DL
E COLI DNA BLD POS QL NAA+NON-PROBE: DETECTED
GLUCOSE BLD-MCNC: 100 MG/DL (ref 70–99)
GLUCOSE BLD-MCNC: 104 MG/DL (ref 70–99)
GLUCOSE BLD-MCNC: 106 MG/DL (ref 70–99)
GLUCOSE BLD-MCNC: 115 MG/DL (ref 70–99)
GLUCOSE BLD-MCNC: 175 MG/DL (ref 70–99)
INR BLD: 1.83 (ref 0.89–1.11)
INR BLD: 2.48 (ref 0.89–1.11)
INR: 2.2 (ref 0.8–1.3)
OSMOLALITY SERPL CALC.SUM OF ELEC: 298 MOSM/KG (ref 275–295)
POTASSIUM SERPL-SCNC: 3.4 MMOL/L (ref 3.5–5.1)
PROTHROMBIN TIME: 21.6 SECONDS (ref 12.2–14.5)
PROTHROMBIN TIME: 27.4 SECONDS (ref 12.2–14.5)
RH BLOOD TYPE: POSITIVE
SODIUM SERPL-SCNC: 139 MMOL/L (ref 136–145)

## 2021-09-25 PROCEDURE — 0DJ08ZZ INSPECTION OF UPPER INTESTINAL TRACT, VIA NATURAL OR ARTIFICIAL OPENING ENDOSCOPIC: ICD-10-PCS | Performed by: INTERNAL MEDICINE

## 2021-09-25 PROCEDURE — 99232 SBSQ HOSP IP/OBS MODERATE 35: CPT | Performed by: HOSPITALIST

## 2021-09-25 PROCEDURE — 30233K1 TRANSFUSION OF NONAUTOLOGOUS FROZEN PLASMA INTO PERIPHERAL VEIN, PERCUTANEOUS APPROACH: ICD-10-PCS | Performed by: NURSE PRACTITIONER

## 2021-09-25 PROCEDURE — 0FC98ZZ EXTIRPATION OF MATTER FROM COMMON BILE DUCT, VIA NATURAL OR ARTIFICIAL OPENING ENDOSCOPIC: ICD-10-PCS | Performed by: INTERNAL MEDICINE

## 2021-09-25 PROCEDURE — 74177 CT ABD & PELVIS W/CONTRAST: CPT | Performed by: INTERNAL MEDICINE

## 2021-09-25 RX ORDER — LIDOCAINE HYDROCHLORIDE 10 MG/ML
INJECTION, SOLUTION EPIDURAL; INFILTRATION; INTRACAUDAL; PERINEURAL AS NEEDED
Status: DISCONTINUED | OUTPATIENT
Start: 2021-09-25 | End: 2021-09-25 | Stop reason: SURG

## 2021-09-25 RX ORDER — HYDROMORPHONE HYDROCHLORIDE 1 MG/ML
0.4 INJECTION, SOLUTION INTRAMUSCULAR; INTRAVENOUS; SUBCUTANEOUS EVERY 5 MIN PRN
Status: DISCONTINUED | OUTPATIENT
Start: 2021-09-25 | End: 2021-09-25 | Stop reason: HOSPADM

## 2021-09-25 RX ORDER — DEXTROSE MONOHYDRATE 25 G/50ML
50 INJECTION, SOLUTION INTRAVENOUS
Status: DISCONTINUED | OUTPATIENT
Start: 2021-09-25 | End: 2021-09-25 | Stop reason: HOSPADM

## 2021-09-25 RX ORDER — NALOXONE HYDROCHLORIDE 0.4 MG/ML
80 INJECTION, SOLUTION INTRAMUSCULAR; INTRAVENOUS; SUBCUTANEOUS AS NEEDED
Status: DISCONTINUED | OUTPATIENT
Start: 2021-09-25 | End: 2021-09-25 | Stop reason: HOSPADM

## 2021-09-25 RX ORDER — SODIUM CHLORIDE, SODIUM LACTATE, POTASSIUM CHLORIDE, CALCIUM CHLORIDE 600; 310; 30; 20 MG/100ML; MG/100ML; MG/100ML; MG/100ML
INJECTION, SOLUTION INTRAVENOUS CONTINUOUS
Status: DISCONTINUED | OUTPATIENT
Start: 2021-09-25 | End: 2021-09-26

## 2021-09-25 RX ORDER — SODIUM CHLORIDE 9 MG/ML
INJECTION, SOLUTION INTRAVENOUS ONCE
Status: COMPLETED | OUTPATIENT
Start: 2021-09-25 | End: 2021-09-25

## 2021-09-25 RX ADMIN — LIDOCAINE HYDROCHLORIDE 50 MG: 10 INJECTION, SOLUTION EPIDURAL; INFILTRATION; INTRACAUDAL; PERINEURAL at 18:43:00

## 2021-09-25 NOTE — PLAN OF CARE
Assumed care of pt at 1930 a/o x4 in no apparent distress and wanting to go to bed for the night already. Monitor shows  w/PVC's and asymptomatic with them.   BP stable currently and only on 1L per nc with sats mid 90's at rest.  Coumadin on hold for pos measures for life threatening arrhythmias  - Monitor electrolytes and administer replacement therapy as ordered  Outcome: Progressing     Problem: RISK FOR INFECTION - ADULT  Goal: Absence of fever/infection during anticipated neutropenic period  Descripti

## 2021-09-25 NOTE — PROGRESS NOTES
Resting comfortably supine. No difficulty breathing. Feels well.     Afebrile  114/74  66 regular    Lungs clear  Ht RRR  abd soft  Ext no edema    Good diuresis since admission    41/1.4   K+ 3.4   INR 2.5      A/P: Compensated cardiac status - chronic at

## 2021-09-25 NOTE — PLAN OF CARE
Pt. Sent to his CT scan. Endoscopy called regarding possible EGD today. Notified them of INR of 2.45.

## 2021-09-25 NOTE — PLAN OF CARE
Pt returned to room. He is alert and oriented times four. Lungs with crackles in bases posteriorly. He has no c/o pain. He is v tach on monitor. Pt. To remain NPO for EGD.  Talked with endoscopy again regarding time for patient's test.

## 2021-09-25 NOTE — PROGRESS NOTES
INFECTIOUS DISEASE PROGRESS NOTE    Hari Becker Patient Status:  Inpatient    1940 MRN [id number]   [facility] 8NE-A Attending Alcides Gross MD   [facility] Day # 2 PCP Lilibeth Minium, PRN  No current facility-administered medications on file prior to encounter. tamsulosin (FLOMAX) cap, TAKE 1 CAPSULE EVERY DAY, Disp: 90 capsule, Rfl: 2  furosemide 20 MG Oral Tab, Take 40 mg by mouth daily.   , Disp: , Rfl:   Metoprolol Tartrate 100 MG O lesions.  No erythema, no open wounds    Laboratory Data:  Laboratory data reviewed      Recent Labs   Lab 09/23/21  0530   RBC 3.87   HGB 10.4*   HCT 32.6*   MCV 84.2   MCH 26.9   MCHC 31.9   RDW 15.7   NEPRELIM 5.25   WBC 5.9   .0       Recent Labs mellitus without complication, without long-term current use of insulin (HCC)     Severe obesity (BMI 35.0-39. 9) with comorbidity ([address]     Urinary tract infection without hematuria     Hypotension     Urinary tract infection without hematuria, site unspec

## 2021-09-25 NOTE — ANESTHESIA PREPROCEDURE EVALUATION
PRE-OP EVALUATION    Patient Name: Marisela Gardiner    Admit Diagnosis: Hypokalemia [E87.6]  Pleural effusion [J90]  Renal insufficiency [N28.9]  Elevated troponin [R77.8]  COPD exacerbation ([address] [J44.1]  Dyspnea, unspecified type [R06.00]    Pre-op Diagnosi furosemide (LASIX) injection 40 mg, 40 mg, Intravenous, Once  glucose (DEX4) oral liquid 15 g, 15 g, Oral, Q15 Min PRN   Or  glucose-vitamin C (DEX-4) chewable tab 4 tablet, 4 tablet, Oral, Q15 Min PRN   Or  dextrose 50 % injection 50 mL, 50 mL, Intravenou Rfl: 1, Unknown at Unknown time  Warfarin Sodium 4 MG Oral Tab, Take 4 mg by mouth nightly.  , Disp: , Rfl: , 9/21/2021 at 2100  atorvastatin 10 MG Oral Tab, Take 10 mg by mouth nightly., Disp: , Rfl: , 9/21/2021 at 2100  FREESTYLE LITE TEST VI STRP, TEST reviewed.   Lab Results   Component Value Date    WBC 5.9 09/23/2021    RBC 3.87 09/23/2021    HGB 10.4 (L) 09/23/2021    HCT 32.6 (L) 09/23/2021    MCV 84.2 09/23/2021    MCH 26.9 09/23/2021    MCHC 31.9 09/23/2021    RDW 15.7 09/23/2021    .0 09/23

## 2021-09-25 NOTE — INTERVAL H&P NOTE
Pre-op Diagnosis: Calculus of gallbladder without cholecystitis without obstruction [K80.20]  Elevated LFTs [R79.89]    The above referenced H&P was reviewed by Sharyle Cabot, DO on 9/25/2021, the patient was examined and no significant changes have occu

## 2021-09-25 NOTE — PROGRESS NOTES
[facility]     Hospitalist Progress Note     Jay Meza Patient Status:  Inpatient    1940 MRN [id number]   [facility] 8NE-A Attending Brant Membreno MD   [facility] Day # 2 PCP Jenn Glynn MD     Chief Complaint: chills    Subj 09/24/21  0538 09/25/21  0458   PTP 25.9* 28.0* 27.4*   INR 2.31* 2.55* 2.48*            COVID-19 Lab Results    COVID-19  Lab Results   Component Value Date    COVID19 Not Detected 09/22/2021       Pro-Calcitonin  Recent Labs   Lab 09/22/21  1924 09/23/21 MD    Supplementary Documentation:     Quality:  · DVT Prophylaxis: INR>2  · CODE status: full  · St: no  · Central line: no      Estimated date of discharge: TBD  Discharge is dependent on: clinical stability  At this point Mr. Gerald White is expected to be

## 2021-09-26 VITALS
HEART RATE: 62 BPM | DIASTOLIC BLOOD PRESSURE: 62 MMHG | SYSTOLIC BLOOD PRESSURE: 103 MMHG | WEIGHT: 196.88 LBS | OXYGEN SATURATION: 100 % | RESPIRATION RATE: 17 BRPM | BODY MASS INDEX: 32 KG/M2 | TEMPERATURE: 98 F

## 2021-09-26 LAB
ALBUMIN SERPL-MCNC: 2.6 G/DL (ref 3.4–5)
ALBUMIN/GLOB SERPL: 0.8 {RATIO} (ref 1–2)
ALP LIVER SERPL-CCNC: 246 U/L
ALT SERPL-CCNC: 194 U/L
ANION GAP SERPL CALC-SCNC: 10 MMOL/L (ref 0–18)
AST SERPL-CCNC: 170 U/L (ref 15–37)
BILIRUB SERPL-MCNC: 1.7 MG/DL (ref 0.1–2)
BLOOD TYPE BARCODE: 8400
BUN BLD-MCNC: 34 MG/DL (ref 7–18)
CALCIUM BLD-MCNC: 7.8 MG/DL (ref 8.5–10.1)
CHLORIDE SERPL-SCNC: 105 MMOL/L (ref 98–112)
CO2 SERPL-SCNC: 23 MMOL/L (ref 21–32)
CREAT BLD-MCNC: 1.14 MG/DL
GLOBULIN PLAS-MCNC: 3.4 G/DL (ref 2.8–4.4)
GLUCOSE BLD-MCNC: 110 MG/DL (ref 70–99)
GLUCOSE BLD-MCNC: 113 MG/DL (ref 70–99)
GLUCOSE BLD-MCNC: 169 MG/DL (ref 70–99)
INR BLD: 2.1 (ref 0.89–1.11)
OSMOLALITY SERPL CALC.SUM OF ELEC: 294 MOSM/KG (ref 275–295)
POTASSIUM SERPL-SCNC: 3.3 MMOL/L (ref 3.5–5.1)
PROT SERPL-MCNC: 6 G/DL (ref 6.4–8.2)
PROTHROMBIN TIME: 24 SECONDS (ref 12.2–14.5)
SODIUM SERPL-SCNC: 138 MMOL/L (ref 136–145)

## 2021-09-26 PROCEDURE — 99239 HOSP IP/OBS DSCHRG MGMT >30: CPT | Performed by: HOSPITALIST

## 2021-09-26 RX ORDER — POTASSIUM CHLORIDE 20 MEQ/1
40 TABLET, EXTENDED RELEASE ORAL EVERY 4 HOURS
Status: COMPLETED | OUTPATIENT
Start: 2021-09-26 | End: 2021-09-26

## 2021-09-26 RX ORDER — FUROSEMIDE 20 MG/1
20 TABLET ORAL DAILY
Qty: 30 TABLET | Refills: 1 | Status: SHIPPED | OUTPATIENT
Start: 2021-09-26 | End: 2021-09-26

## 2021-09-26 RX ORDER — WARFARIN SODIUM 2 MG/1
2 TABLET ORAL NIGHTLY
Qty: 30 TABLET | Refills: 2 | Status: SHIPPED | OUTPATIENT
Start: 2021-09-26 | End: 2022-02-07 | Stop reason: ALTCHOICE

## 2021-09-26 RX ORDER — CEFADROXIL 500 MG/1
500 CAPSULE ORAL 2 TIMES DAILY
Qty: 28 CAPSULE | Refills: 0 | Status: SHIPPED | OUTPATIENT
Start: 2021-09-26 | End: 2021-10-10

## 2021-09-26 RX ORDER — POTASSIUM CHLORIDE 750 MG/1
10 TABLET, EXTENDED RELEASE ORAL 2 TIMES DAILY
Qty: 30 TABLET | Refills: 1 | Status: SHIPPED | OUTPATIENT
Start: 2021-09-26 | End: 2022-02-07

## 2021-09-26 RX ORDER — TORSEMIDE 20 MG/1
10 TABLET ORAL DAILY
Qty: 30 TABLET | Refills: 0 | Status: SHIPPED | OUTPATIENT
Start: 2021-09-26 | End: 2022-05-13

## 2021-09-26 NOTE — PLAN OF CARE
Assumed care of patient at 0730. Alert and oriented x4. Tele rhythm a-v paced. On room air. Breath sounds diminished bilaterally. Bed locked and in low position. Call light and personal items within reach.  No c/o chest pain, sob, or n/v. Pt continent of bobby replacement therapy as ordered  Outcome: Progressing     Problem: PAIN - ADULT  Goal: Verbalizes/displays adequate comfort level or patient's stated pain goal  Description: INTERVENTIONS:  - Encourage pt to monitor pain and request assistance  - Assess joyce for needed discharge resources and transportation as appropriate  - Identify discharge learning needs (meds, wound care, etc)  - Arrange for interpreters to assist at discharge as needed  - Consider post-discharge preferences of patient/family/discharge pa

## 2021-09-26 NOTE — PROGRESS NOTES
Progress Note  Ashley Fernandez Patient Status:  Inpatient    1940 MRN [id number]   [facility] 8NE-A Attending Moira Cristobal MD   [address] Day # 3 PCP Remington Long MD     Subjective:  Sitting up in the chair.  No complaints of chest joyce Units Subcutaneous TID CC and HS   • Umeclidinium Bromide  1 puff Inhalation Daily   • [Held by provider] warfarin  2 mg Oral Nightly   • dilTIAZem  240 mg Oral Daily   • metoprolol tartrate  100 mg Oral 2x Daily(Beta Blocker)   • tamsulosin  0.4 mg Oral D

## 2021-09-26 NOTE — OPERATIVE REPORT
Ronald Ko Patient Status:  Inpatient    1940 MRN [id number]   Location [address] [place] [address] Attending Georges Mast MD   [facility] Day # 2 PCP María Elena Welsh MD     PREOPERATIVE DIAGNOSIS/INDICATION: E. Coli bacteremia, shantanu intrahepatics. Contrast was injected to confirm CBD cannulation. A stone was visualized in the distal CBD. The CBD was mildly dilated measuring 7 mm. Biliary sphincterotomy was performed using ERBE endocut electrocautery.  There was no post sphincterotomy

## 2021-09-26 NOTE — PROGRESS NOTES
[facility]     Hospitalist Progress Note     Estanislado Severin Patient Status:  Inpatient    1940 MRN [id number]   [facility] 8NE-A Attending Rosmery Redmond MD   [facility] Day # 3 PCP Geeta Avalos MD     Chief Complaint: chills    Subj 1.0  --  0.7  --  1.7   TP 7.0  --  6.5  --  6.0*    < > = values in this interval not displayed. Estimated Creatinine Clearance: 45.9 mL/min (based on SCr of 1.14 mg/dL).     Recent Labs   Lab 09/25/21  0458 09/25/21  1443 09/25/21  1517 09/26/21  05 coumadin  • KRYSTLE/CKD with cardiorenal syndrome  o Cr stable  o Likely CKD III euvolemic baseline  • AOCD  • CAD  • Dm2  o A1c 6.9, likely falsely low  o Insulin protocol  • Prostate CA  • metab acidosis  • NII      Plan of care: as above. EUS noted.   No sig

## 2021-09-26 NOTE — ANESTHESIA POSTPROCEDURE EVALUATION
[address] Patient Status:  Inpatient   Age/Gender 80year old male MRN [id number]   Location [address] [place] [address] Attending Gustavo Ni, 1840 [facility] Day # 2 PCP Ajit Ayala MD       Anesthesia Post-op Note    EN

## 2021-09-26 NOTE — OPERATIVE REPORT
Maya Flor Patient Status:  Inpatient    1940 MRN [id number]   Location [address] [place] [address] Attending Gordo Eagle MD   [facility] Day # 2 PCP Della Mcgrath MD     PREOPERATIVE DIAGNOSIS/INDICATION: E. Coli bacteremia, elevat squamocolumnar junction was 40 cm from the incisors and regular. Stomach: The gastric mucosa was normal including retroflexion views of fundus and cardia.    Duodenum: The duodenal bulb was normal. The examined descending duodenum was normal.     ENDOSO

## 2021-09-26 NOTE — PLAN OF CARE
Assumed care of pt upon returning from GI lab a/ox 4 in no apparent distress. Monitor reapplied showing  and stable BP. Meds caught up from earlier when pt absent from floor.   IV site patent without redness or swelling noted and he was able to eat a no arrhythmias  - Monitor electrolytes and administer replacement therapy as ordered  Outcome: Progressing     Problem: PAIN - ADULT  Goal: Verbalizes/displays adequate comfort level or patient's stated pain goal  Description: INTERVENTIONS:  - Encourage pt t

## 2021-09-26 NOTE — PROGRESS NOTES
Gastroenterology Progress Note  Patient Name: Milan Rajan  Chief Complaint: Choledocholithiasis  S: The patient reports no nausea/vomiting or abdominal pain. No melena or hematochezia. O: BP 92/42 (BP Location: Right arm)   Pulse 63   Temp 98.2 °F (36.

## 2021-09-26 NOTE — DISCHARGE PLANNING
Pt okay for discharge per all consults. AVS reviewed with pt and pt's daughter, verbalized understanding. Appropriate prescriptions sent to pharmacy. IV removed, tele box cleaned and returned. All belongings sent with pt.  Transported off unit via [employer]

## 2021-09-27 ENCOUNTER — PATIENT OUTREACH (OUTPATIENT)
Dept: CASE MANAGEMENT | Age: 81
End: 2021-09-27

## 2021-09-27 ENCOUNTER — TELEPHONE (OUTPATIENT)
Dept: FAMILY MEDICINE CLINIC | Facility: CLINIC | Age: 81
End: 2021-09-27

## 2021-09-27 DIAGNOSIS — Z02.9 ENCOUNTERS FOR UNSPECIFIED ADMINISTRATIVE PURPOSE: ICD-10-CM

## 2021-09-27 DIAGNOSIS — R06.00 DYSPNEA, UNSPECIFIED TYPE: ICD-10-CM

## 2021-09-27 PROCEDURE — 1111F DSCHRG MED/CURRENT MED MERGE: CPT

## 2021-09-27 NOTE — PROGRESS NOTES
Patient has follow up appt needing to be scheduled with Dr. Leida Gibbons (Cardiology) in 2 weeks (by 10-10-21). Pt requests appt on Monday or Wednesdays anytime until 1 pm as that is when his daughter can drive him.     Please contact patient to schedule

## 2021-09-27 NOTE — TELEPHONE ENCOUNTER
Pt discharged 9-26-21, dyspnea. Pt does not have HFU appt scheduled at this time. NCM attempted to schedule HFU however no 30 min slots are available in PCP schedule within timeframe.  Pt is requesting appt on Monday or Wednesday mornings until 1pm as jie

## 2021-09-27 NOTE — PROGRESS NOTES
Initial Post Discharge Follow Up   Discharge Date: 9/26/21  Contact Date: 9/27/2021    Consent Verification:  Assessment Completed With: Patient  HIPAA Verified?   Yes    Discharge Dx:  Dyspnea     Was TCC ordered: no    General:   • How have you been si External Cream Apply 1 Application topically 3 (three) times daily as needed. 400 g 1   • Metoprolol Tartrate 100 MG Oral Tab Take 1 tablet (100 mg total) by mouth 2 (two) times daily.  60 tablet 5   • dilTIAZem 240 MG Oral Capsule SR 24 Hr Take 240 mg by m yes     Who manages Warfarin/Coumadin?  PCP    Who is monitoring PT/INR?  (Coumadin Clinic, HH, PCP, cardiology, etc): Coumadin Clinic  NCM Reviewed next PT/INR appointment with pt:  Yes  Interventions:  None       Needs post D/C:   Now that you are home, a

## 2021-09-27 NOTE — PAYOR COMM NOTE
Discharge Notification    Patient Name: Em Llanes: Michelle Holt MA O  Subscriber #: [id number]  Authorization Number: [id number]  Admit Date/Time: 9/22/2021 7:13 PM  Discharge Date/Time: 9/26/2021 5:40 PM

## 2021-09-28 NOTE — PROGRESS NOTES
1st attempt Cardiology apt request    [address] [facility] [address]  [phone]    Unable to contact patient. Will try again.

## 2021-09-29 NOTE — PROGRESS NOTES
2nd attempt Cardiology apt request     [facility]  [address] [address] [phone]    Unable to contact patient. Will try again.

## 2021-09-30 NOTE — PROGRESS NOTES
3rd attempt Cardiology apt request     [facility]  [address] [address]  [phone]    Unable to contact patient after multiple attempts. No apt made. Closing encounter.

## 2021-10-01 NOTE — DISCHARGE SUMMARY
[facility] [place] HOSPITALIST  DISCHARGE SUMMARY     Perez Hill Patient Status:  Inpatient    1940 MRN [id number]   [facility] 8NE-A Attending MD Pramod Aguilar [facility] Day # 3 PCP Ignacio Torrez MD     Date of Admission: 2021  Date of Disc recommendations (brief descriptions):  • Liver cirrhosis    Consultants:  • Cards  • G    Discharge Medication List:     Discharge Medications      START taking these medications      Instructions Prescription details   Cefadroxil 500 MG Caps  Commonly kno Strip  Refills: prn     Embrace Blood Glucose Test Strp      Use to test once daily   Quantity: 100 strip  Refills: 0     Accu-Chek Xiomara Plus Strp      Test once daily.  Accu-check xiomara testing once daily non insulin E11.9   Quantity: 100 strip  Refills: S2. No rubs, no JVD  Abdomen: Soft, nontender, nondistended. Positive bowel sounds.     -----------------------------------------------------------------------------------------------  PATIENT DISCHARGE INSTRUCTIONS: See electronic chart    Porfirio Sicard,

## 2021-10-12 ENCOUNTER — OFFICE VISIT (OUTPATIENT)
Dept: FAMILY MEDICINE CLINIC | Facility: CLINIC | Age: 81
End: 2021-10-12
Payer: MEDICARE

## 2021-10-12 VITALS
RESPIRATION RATE: 14 BRPM | HEIGHT: 65.5 IN | HEART RATE: 66 BPM | SYSTOLIC BLOOD PRESSURE: 116 MMHG | WEIGHT: 191 LBS | DIASTOLIC BLOOD PRESSURE: 60 MMHG | BODY MASS INDEX: 31.44 KG/M2

## 2021-10-12 DIAGNOSIS — D64.9 NORMOCYTIC ANEMIA: ICD-10-CM

## 2021-10-12 DIAGNOSIS — N18.31 TYPE 2 DIABETES MELLITUS WITH STAGE 3A CHRONIC KIDNEY DISEASE, WITHOUT LONG-TERM CURRENT USE OF INSULIN (HCC): ICD-10-CM

## 2021-10-12 DIAGNOSIS — R21 RASH OF FACE: ICD-10-CM

## 2021-10-12 DIAGNOSIS — E11.22 TYPE 2 DIABETES MELLITUS WITH STAGE 3A CHRONIC KIDNEY DISEASE, WITHOUT LONG-TERM CURRENT USE OF INSULIN (HCC): ICD-10-CM

## 2021-10-12 DIAGNOSIS — A41.51 SEPSIS DUE TO ESCHERICHIA COLI, UNSPECIFIED WHETHER ACUTE ORGAN DYSFUNCTION PRESENT (HCC): ICD-10-CM

## 2021-10-12 DIAGNOSIS — J44.1 COPD EXACERBATION (HCC): Primary | ICD-10-CM

## 2021-10-12 DIAGNOSIS — K70.30 ALCOHOLIC CIRRHOSIS OF LIVER WITHOUT ASCITES (HCC): ICD-10-CM

## 2021-10-12 DIAGNOSIS — N17.9 ACUTE KIDNEY INJURY (HCC): ICD-10-CM

## 2021-10-12 DIAGNOSIS — J44.1 COPD EXACERBATION (HCC): ICD-10-CM

## 2021-10-12 DIAGNOSIS — L85.3 DRY SKIN: ICD-10-CM

## 2021-10-12 PROBLEM — R06.00 DYSPNEA, UNSPECIFIED TYPE: Status: RESOLVED | Noted: 2021-09-23 | Resolved: 2021-10-12

## 2021-10-12 PROBLEM — R06.00 DYSPNEA: Status: RESOLVED | Noted: 2021-09-22 | Resolved: 2021-10-12

## 2021-10-12 PROBLEM — R42 DIZZINESS: Status: RESOLVED | Noted: 2021-06-29 | Resolved: 2021-10-12

## 2021-10-12 PROCEDURE — 3008F BODY MASS INDEX DOCD: CPT | Performed by: FAMILY MEDICINE

## 2021-10-12 PROCEDURE — 99215 OFFICE O/P EST HI 40 MIN: CPT | Performed by: FAMILY MEDICINE

## 2021-10-12 PROCEDURE — 3074F SYST BP LT 130 MM HG: CPT | Performed by: FAMILY MEDICINE

## 2021-10-12 PROCEDURE — 3078F DIAST BP <80 MM HG: CPT | Performed by: FAMILY MEDICINE

## 2021-10-12 RX ORDER — POTASSIUM CHLORIDE 750 MG/1
TABLET, FILM COATED, EXTENDED RELEASE ORAL
COMMUNITY
Start: 2021-10-09

## 2021-10-12 RX ORDER — ALBUTEROL SULFATE 90 UG/1
AEROSOL, METERED RESPIRATORY (INHALATION)
Qty: 42.5 G | Refills: 0 | Status: SHIPPED | OUTPATIENT
Start: 2021-10-12

## 2021-10-12 RX ORDER — ALBUTEROL SULFATE 90 UG/1
2 AEROSOL, METERED RESPIRATORY (INHALATION) EVERY 4 HOURS PRN
Qty: 18 G | Refills: 1 | Status: SHIPPED | OUTPATIENT
Start: 2021-10-12 | End: 2021-10-12

## 2021-10-12 NOTE — PROGRESS NOTES
Patient presents with:  Hospital F/U: had breathing problem, was weezing      HPI:   Hari Becker is a 80year old male with PMH CAD, DM, prostate CA who presents to the office for hospital follow up. Hospitalized 9/22-9/26/21 for E. Coli sepsis.     [place] TAKE 1 CAPSULE EVERY DAY, Disp: 90 capsule, Rfl: 2  Emollient (EUCERIN SKIN CALMING) External Cream, Apply 1 Application topically 3 (three) times daily as needed. , Disp: 400 g, Rfl: 1  Metoprolol Tartrate 100 MG Oral Tab, Take 1 tablet (100 mg total) by m of Onset   • Diabetes Neg    • Heart Disorder Neg    • Hypertension Neg        Social History    Tobacco Use      Smoking status: Former Smoker        Packs/day: 1.50        Years: 40.00        Pack years: 61        Quit date: 1/1/2005        Years since q  Consider MRI with Eovist to further evaluate. 4. Cholelithiasis within a distended gallbladder.     5. Colonic diverticulosis without evidence of diverticulitis.  6. Small bilateral pleural effusions.    7. Pericardial effusion.       CT chest: CONCLUSIO Monocytes %      % 2.4   Eosinophils %      % 0.9   Basophils %      % 0.3   Immature Granulocyte %      % 0.5   Glucose      70 - 99 mg/dL 141 (H)   Sodium      136 - 145 mmol/L 136   Potassium      3.5 - 5.1 mmol/L 3.3 (L)   Chloride      98 - 112 mmol liver without ascites ([address]  Noted on imaging  Will monitor labs.      4. Acute kidney injury ([address]  Acute worsening at presentation, but improved over the hospital course with treatment and hydration  Normal at time of DC  Will recheck levels  - COMP METAB

## 2021-10-12 NOTE — TELEPHONE ENCOUNTER
Requested Prescriptions     Pending Prescriptions Disp Refills   • ALBUTEROL 108 (90 Base) MCG/ACT Inhalation Aero Soln [Pharmacy Med Name: ALBUTEROL HFA INH (200 PUFFS)8.5GM] 42.5 g 0     Sig: INHALE 2 PUFFS BY MOUTH EVERY 4 HOURS AS NEEDED FOR WHEEZING O

## 2021-10-15 RX ORDER — WHITE PETROLATUM 41 % TOPICAL OINTMENT
1 3 TIMES DAILY PRN
Qty: 400 G | Refills: 1 | Status: SHIPPED | OUTPATIENT
Start: 2021-10-15

## 2021-10-15 NOTE — TELEPHONE ENCOUNTER
Pt is requesting for Dr. Christa Haynes to send an ointment cream over to his pharmacy. Pt states Dr. Christa Haynes did prescribe it before pt did not know how to spell the name of the medication. Pt states Dr. Christa Haynes would know what ointment he was referring to.

## 2021-10-21 NOTE — TELEPHONE ENCOUNTER
Pt is calling he was in the hospital and they prescribed jardiance and he is out of the medication. Please send to [facility] [address].  Please call when sent

## 2021-11-16 ENCOUNTER — NURSE ONLY (OUTPATIENT)
Dept: LAB | Age: 81
End: 2021-11-16
Attending: INTERNAL MEDICINE
Payer: MEDICARE

## 2021-11-16 DIAGNOSIS — E78.5 HYPERLIPIDEMIA: ICD-10-CM

## 2021-11-16 DIAGNOSIS — I10 ESSENTIAL HYPERTENSION: Primary | ICD-10-CM

## 2021-11-16 PROCEDURE — 36415 COLL VENOUS BLD VENIPUNCTURE: CPT

## 2021-11-16 PROCEDURE — 83880 ASSAY OF NATRIURETIC PEPTIDE: CPT

## 2021-12-03 ENCOUNTER — TELEPHONE (OUTPATIENT)
Dept: FAMILY MEDICINE CLINIC | Facility: CLINIC | Age: 81
End: 2021-12-03

## 2021-12-03 DIAGNOSIS — I25.10 ATHEROSCLEROSIS OF NATIVE CORONARY ARTERY OF NATIVE HEART WITHOUT ANGINA PECTORIS: Primary | ICD-10-CM

## 2021-12-03 NOTE — TELEPHONE ENCOUNTER
Referral request Dr. Marylen Mode, M.D.     Six visits    Diagnosis: I25.10    Auth per [facility] [id number] valid 12/3 to 6/1/2022

## 2022-02-03 ENCOUNTER — TELEPHONE (OUTPATIENT)
Dept: FAMILY MEDICINE CLINIC | Facility: CLINIC | Age: 82
End: 2022-02-03

## 2022-02-07 ENCOUNTER — OFFICE VISIT (OUTPATIENT)
Dept: FAMILY MEDICINE CLINIC | Facility: CLINIC | Age: 82
End: 2022-02-07
Payer: MEDICARE

## 2022-02-07 VITALS
HEIGHT: 67 IN | SYSTOLIC BLOOD PRESSURE: 112 MMHG | RESPIRATION RATE: 16 BRPM | TEMPERATURE: 98 F | DIASTOLIC BLOOD PRESSURE: 62 MMHG | WEIGHT: 202 LBS | BODY MASS INDEX: 31.71 KG/M2 | OXYGEN SATURATION: 98 % | HEART RATE: 80 BPM

## 2022-02-07 DIAGNOSIS — I48.0 PAROXYSMAL ATRIAL FIBRILLATION (HCC): ICD-10-CM

## 2022-02-07 DIAGNOSIS — Z85.46 HISTORY OF PROSTATE CANCER: ICD-10-CM

## 2022-02-07 DIAGNOSIS — E11.9 TYPE 2 DIABETES MELLITUS WITHOUT COMPLICATION, WITHOUT LONG-TERM CURRENT USE OF INSULIN (HCC): ICD-10-CM

## 2022-02-07 DIAGNOSIS — Z95.0 CARDIAC PACEMAKER IN SITU: ICD-10-CM

## 2022-02-07 DIAGNOSIS — D69.6 THROMBOCYTOPENIA (HCC): Chronic | ICD-10-CM

## 2022-02-07 DIAGNOSIS — K52.832 LYMPHOCYTIC COLITIS: ICD-10-CM

## 2022-02-07 DIAGNOSIS — E78.2 MIXED HYPERLIPIDEMIA: ICD-10-CM

## 2022-02-07 DIAGNOSIS — I25.10 ATHEROSCLEROSIS OF NATIVE CORONARY ARTERY OF NATIVE HEART WITHOUT ANGINA PECTORIS: Chronic | ICD-10-CM

## 2022-02-07 DIAGNOSIS — I05.9 MITRAL VALVE DISORDER: ICD-10-CM

## 2022-02-07 DIAGNOSIS — I10 ESSENTIAL HYPERTENSION: ICD-10-CM

## 2022-02-07 DIAGNOSIS — M48.061 SPINAL STENOSIS OF LUMBAR REGION, UNSPECIFIED WHETHER NEUROGENIC CLAUDICATION PRESENT: ICD-10-CM

## 2022-02-07 DIAGNOSIS — I27.0 PRIMARY PULMONARY HYPERTENSION (HCC): ICD-10-CM

## 2022-02-07 DIAGNOSIS — M43.10 ACQUIRED SPONDYLOLISTHESIS: ICD-10-CM

## 2022-02-07 DIAGNOSIS — Z00.00 ENCOUNTER FOR ANNUAL HEALTH EXAMINATION: Primary | ICD-10-CM

## 2022-02-07 DIAGNOSIS — K70.30 ALCOHOLIC CIRRHOSIS OF LIVER WITHOUT ASCITES (HCC): ICD-10-CM

## 2022-02-07 PROBLEM — N28.9 RENAL INSUFFICIENCY: Status: RESOLVED | Noted: 2021-09-23 | Resolved: 2022-02-07

## 2022-02-07 PROBLEM — J90 PLEURAL EFFUSION: Status: RESOLVED | Noted: 2021-09-23 | Resolved: 2022-02-07

## 2022-02-07 PROBLEM — N39.0 URINARY TRACT INFECTION WITHOUT HEMATURIA, SITE UNSPECIFIED: Status: RESOLVED | Noted: 2021-06-29 | Resolved: 2022-02-07

## 2022-02-07 PROBLEM — J44.1 COPD EXACERBATION (HCC): Status: RESOLVED | Noted: 2021-09-23 | Resolved: 2022-02-07

## 2022-02-07 PROBLEM — R79.89 ELEVATED TROPONIN: Status: RESOLVED | Noted: 2021-09-23 | Resolved: 2022-02-07

## 2022-02-07 PROBLEM — E87.6 HYPOKALEMIA: Status: RESOLVED | Noted: 2021-09-23 | Resolved: 2022-02-07

## 2022-02-07 PROBLEM — N39.0 URINARY TRACT INFECTION WITHOUT HEMATURIA: Status: RESOLVED | Noted: 2021-06-29 | Resolved: 2022-02-07

## 2022-02-07 PROBLEM — E66.01 SEVERE OBESITY (BMI 35.0-39.9) WITH COMORBIDITY (HCC): Chronic | Status: RESOLVED | Noted: 2020-12-14 | Resolved: 2022-02-07

## 2022-02-07 PROBLEM — R77.8 ELEVATED TROPONIN: Status: RESOLVED | Noted: 2021-09-23 | Resolved: 2022-02-07

## 2022-02-07 PROCEDURE — 99397 PER PM REEVAL EST PAT 65+ YR: CPT | Performed by: FAMILY MEDICINE

## 2022-02-07 PROCEDURE — 3074F SYST BP LT 130 MM HG: CPT | Performed by: FAMILY MEDICINE

## 2022-02-07 PROCEDURE — 96160 PT-FOCUSED HLTH RISK ASSMT: CPT | Performed by: FAMILY MEDICINE

## 2022-02-07 PROCEDURE — G0439 PPPS, SUBSEQ VISIT: HCPCS | Performed by: FAMILY MEDICINE

## 2022-02-07 PROCEDURE — 3078F DIAST BP <80 MM HG: CPT | Performed by: FAMILY MEDICINE

## 2022-02-07 PROCEDURE — 3008F BODY MASS INDEX DOCD: CPT | Performed by: FAMILY MEDICINE

## 2022-02-07 RX ORDER — APIXABAN 5 MG/1
5 TABLET, FILM COATED ORAL 2 TIMES DAILY
COMMUNITY
Start: 2021-11-06

## 2022-02-08 ENCOUNTER — NURSE ONLY (OUTPATIENT)
Dept: LAB | Age: 82
End: 2022-02-08
Attending: FAMILY MEDICINE
Payer: MEDICARE

## 2022-02-08 DIAGNOSIS — Z85.46 HISTORY OF PROSTATE CANCER: ICD-10-CM

## 2022-02-08 DIAGNOSIS — A41.51 SEPSIS DUE TO ESCHERICHIA COLI, UNSPECIFIED WHETHER ACUTE ORGAN DYSFUNCTION PRESENT (HCC): ICD-10-CM

## 2022-02-08 DIAGNOSIS — N17.9 ACUTE KIDNEY INJURY (HCC): ICD-10-CM

## 2022-02-08 DIAGNOSIS — D69.6 THROMBOCYTOPENIA (HCC): Chronic | ICD-10-CM

## 2022-02-08 DIAGNOSIS — K70.30 ALCOHOLIC CIRRHOSIS OF LIVER WITHOUT ASCITES (HCC): ICD-10-CM

## 2022-02-08 DIAGNOSIS — I25.10 ATHEROSCLEROSIS OF NATIVE CORONARY ARTERY OF NATIVE HEART WITHOUT ANGINA PECTORIS: Chronic | ICD-10-CM

## 2022-02-08 DIAGNOSIS — E11.9 TYPE 2 DIABETES MELLITUS WITHOUT COMPLICATION, WITHOUT LONG-TERM CURRENT USE OF INSULIN (HCC): ICD-10-CM

## 2022-02-08 DIAGNOSIS — E78.2 MIXED HYPERLIPIDEMIA: ICD-10-CM

## 2022-02-08 DIAGNOSIS — I10 ESSENTIAL HYPERTENSION: ICD-10-CM

## 2022-02-08 DIAGNOSIS — D64.9 NORMOCYTIC ANEMIA: ICD-10-CM

## 2022-02-08 LAB
ALBUMIN SERPL-MCNC: 3.8 G/DL (ref 3.4–5)
ALBUMIN/GLOB SERPL: 1.2 {RATIO} (ref 1–2)
ALP LIVER SERPL-CCNC: 99 U/L
ALT SERPL-CCNC: 18 U/L
ANION GAP SERPL CALC-SCNC: 7 MMOL/L (ref 0–18)
AST SERPL-CCNC: 16 U/L (ref 15–37)
BASOPHILS # BLD AUTO: 0.04 X10(3) UL (ref 0–0.2)
BASOPHILS NFR BLD AUTO: 0.6 %
BILIRUB SERPL-MCNC: 0.8 MG/DL (ref 0.1–2)
BUN BLD-MCNC: 26 MG/DL (ref 7–18)
CALCIUM BLD-MCNC: 8.9 MG/DL (ref 8.5–10.1)
CHLORIDE SERPL-SCNC: 109 MMOL/L (ref 98–112)
CHOLEST SERPL-MCNC: 122 MG/DL (ref ?–200)
CO2 SERPL-SCNC: 24 MMOL/L (ref 21–32)
CREAT BLD-MCNC: 1.46 MG/DL
CREAT UR-SCNC: 82.4 MG/DL
EOSINOPHIL # BLD AUTO: 0.31 X10(3) UL (ref 0–0.7)
EOSINOPHIL NFR BLD AUTO: 5 %
ERYTHROCYTE [DISTWIDTH] IN BLOOD BY AUTOMATED COUNT: 15.6 %
EST. AVERAGE GLUCOSE BLD GHB EST-MCNC: 148 MG/DL (ref 68–126)
FASTING PATIENT LIPID ANSWER: YES
FASTING STATUS PATIENT QL REPORTED: YES
GLOBULIN PLAS-MCNC: 3.1 G/DL (ref 2.8–4.4)
GLUCOSE BLD-MCNC: 146 MG/DL (ref 70–99)
HBA1C MFR BLD: 6.8 % (ref ?–5.7)
HCT VFR BLD AUTO: 38.8 %
HDLC SERPL-MCNC: 37 MG/DL (ref 40–59)
HGB BLD-MCNC: 11.6 G/DL
IMM GRANULOCYTES # BLD AUTO: 0.03 X10(3) UL (ref 0–1)
IMM GRANULOCYTES NFR BLD: 0.5 %
LDLC SERPL CALC-MCNC: 70 MG/DL (ref ?–100)
LYMPHOCYTES # BLD AUTO: 0.87 X10(3) UL (ref 1–4)
LYMPHOCYTES NFR BLD AUTO: 14.1 %
MCH RBC QN AUTO: 26.4 PG (ref 26–34)
MCHC RBC AUTO-ENTMCNC: 29.9 G/DL (ref 31–37)
MCV RBC AUTO: 88.2 FL
MICROALBUMIN UR-MCNC: 1.02 MG/DL
MICROALBUMIN/CREAT 24H UR-RTO: 12.4 UG/MG (ref ?–30)
MONOCYTES # BLD AUTO: 0.69 X10(3) UL (ref 0.1–1)
MONOCYTES NFR BLD AUTO: 11.2 %
NEUTROPHILS # BLD AUTO: 4.22 X10 (3) UL (ref 1.5–7.7)
NEUTROPHILS # BLD AUTO: 4.22 X10(3) UL (ref 1.5–7.7)
NEUTROPHILS NFR BLD AUTO: 68.6 %
NONHDLC SERPL-MCNC: 85 MG/DL (ref ?–130)
OSMOLALITY SERPL CALC.SUM OF ELEC: 297 MOSM/KG (ref 275–295)
PLATELET # BLD AUTO: 202 10(3)UL (ref 150–450)
POTASSIUM SERPL-SCNC: 4.4 MMOL/L (ref 3.5–5.1)
PROT SERPL-MCNC: 6.9 G/DL (ref 6.4–8.2)
PSA SERPL-MCNC: 0.32 NG/ML (ref ?–4)
RBC # BLD AUTO: 4.4 X10(6)UL
SODIUM SERPL-SCNC: 140 MMOL/L (ref 136–145)
VLDLC SERPL CALC-MCNC: 12 MG/DL (ref 0–30)
WBC # BLD AUTO: 6.2 X10(3) UL (ref 4–11)

## 2022-02-08 PROCEDURE — 82043 UR ALBUMIN QUANTITATIVE: CPT

## 2022-02-08 PROCEDURE — 85025 COMPLETE CBC W/AUTO DIFF WBC: CPT

## 2022-02-08 PROCEDURE — 84153 ASSAY OF PSA TOTAL: CPT

## 2022-02-08 PROCEDURE — 83036 HEMOGLOBIN GLYCOSYLATED A1C: CPT

## 2022-02-08 PROCEDURE — 82570 ASSAY OF URINE CREATININE: CPT

## 2022-02-08 PROCEDURE — 80053 COMPREHEN METABOLIC PANEL: CPT

## 2022-02-08 PROCEDURE — 80061 LIPID PANEL: CPT

## 2022-02-08 PROCEDURE — 36415 COLL VENOUS BLD VENIPUNCTURE: CPT

## 2022-03-31 RX ORDER — TAMSULOSIN HYDROCHLORIDE 0.4 MG/1
CAPSULE ORAL
Qty: 90 CAPSULE | Refills: 2 | Status: SHIPPED | OUTPATIENT
Start: 2022-03-31

## 2022-05-09 ENCOUNTER — OFFICE VISIT (OUTPATIENT)
Dept: FAMILY MEDICINE CLINIC | Facility: CLINIC | Age: 82
End: 2022-05-09
Payer: MEDICARE

## 2022-05-09 VITALS
SYSTOLIC BLOOD PRESSURE: 130 MMHG | TEMPERATURE: 97 F | WEIGHT: 219.19 LBS | RESPIRATION RATE: 20 BRPM | DIASTOLIC BLOOD PRESSURE: 68 MMHG | HEART RATE: 84 BPM | BODY MASS INDEX: 35.65 KG/M2 | HEIGHT: 65.75 IN

## 2022-05-09 DIAGNOSIS — N18.32 TYPE 2 DIABETES MELLITUS WITH STAGE 3B CHRONIC KIDNEY DISEASE, WITHOUT LONG-TERM CURRENT USE OF INSULIN (HCC): ICD-10-CM

## 2022-05-09 DIAGNOSIS — G47.33 OSA (OBSTRUCTIVE SLEEP APNEA): ICD-10-CM

## 2022-05-09 DIAGNOSIS — E11.22 TYPE 2 DIABETES MELLITUS WITH STAGE 3B CHRONIC KIDNEY DISEASE, WITHOUT LONG-TERM CURRENT USE OF INSULIN (HCC): ICD-10-CM

## 2022-05-09 DIAGNOSIS — G47.9 DIFFICULTY SLEEPING: Primary | ICD-10-CM

## 2022-05-09 RX ORDER — TRAZODONE HYDROCHLORIDE 50 MG/1
50 TABLET ORAL NIGHTLY PRN
Qty: 30 TABLET | Refills: 0 | Status: SHIPPED | OUTPATIENT
Start: 2022-05-09

## 2022-05-09 NOTE — PATIENT INSTRUCTIONS
Sleep Hygiene habits:   Go  to bed at approximately the same time each night. Establish a pre-bedtime routine, that is the same every night (i.e. brush teeth, read book for 30 minutes, deep breathing exercises etc.)    Get routine exercise, minimum of 30 minutes daily- but not in the 3 hours prior to going to sleep. Try not to nap during the day. No caffeine after 1pm.     No screens, tablets etc for 1 hour prior to bed. No reading, eating, work, etc in bed- bed is for sleeping only. If going to bed and unable to fall asleep or if awakening at night and unable to fall asleep in 15-20 minutes- get out of bed, read book or magazine for 15-20 minutes, no screens. If \"mind racing\" try writing thoughts down on paper. Then try to go back to sleep, if still not able to sleep after 15-20 minutes repeat above process. Do not lay in bed awake. This can effectively change sleep habits in 2-6 weeks for many people.

## 2022-05-10 ENCOUNTER — NURSE ONLY (OUTPATIENT)
Dept: LAB | Age: 82
End: 2022-05-10
Attending: NURSE PRACTITIONER
Payer: MEDICARE

## 2022-05-10 DIAGNOSIS — E11.22 TYPE 2 DIABETES MELLITUS WITH STAGE 3B CHRONIC KIDNEY DISEASE, WITHOUT LONG-TERM CURRENT USE OF INSULIN (HCC): ICD-10-CM

## 2022-05-10 DIAGNOSIS — N18.32 TYPE 2 DIABETES MELLITUS WITH STAGE 3B CHRONIC KIDNEY DISEASE, WITHOUT LONG-TERM CURRENT USE OF INSULIN (HCC): ICD-10-CM

## 2022-05-10 LAB
ANION GAP SERPL CALC-SCNC: 7 MMOL/L (ref 0–18)
BUN BLD-MCNC: 31 MG/DL (ref 7–18)
CALCIUM BLD-MCNC: 8.8 MG/DL (ref 8.5–10.1)
CHLORIDE SERPL-SCNC: 109 MMOL/L (ref 98–112)
CO2 SERPL-SCNC: 23 MMOL/L (ref 21–32)
CREAT BLD-MCNC: 1.49 MG/DL
EST. AVERAGE GLUCOSE BLD GHB EST-MCNC: 154 MG/DL (ref 68–126)
FASTING STATUS PATIENT QL REPORTED: YES
GLUCOSE BLD-MCNC: 171 MG/DL (ref 70–99)
HBA1C MFR BLD: 7 % (ref ?–5.7)
OSMOLALITY SERPL CALC.SUM OF ELEC: 299 MOSM/KG (ref 275–295)
POTASSIUM SERPL-SCNC: 4 MMOL/L (ref 3.5–5.1)
SODIUM SERPL-SCNC: 139 MMOL/L (ref 136–145)

## 2022-05-10 PROCEDURE — 83036 HEMOGLOBIN GLYCOSYLATED A1C: CPT

## 2022-05-10 PROCEDURE — 80048 BASIC METABOLIC PNL TOTAL CA: CPT

## 2022-05-10 PROCEDURE — 36415 COLL VENOUS BLD VENIPUNCTURE: CPT

## 2022-05-11 ENCOUNTER — TELEPHONE (OUTPATIENT)
Dept: FAMILY MEDICINE CLINIC | Facility: CLINIC | Age: 82
End: 2022-05-11

## 2022-05-11 NOTE — TELEPHONE ENCOUNTER
Patient notified. Patient verbalized understanding of information given. Med list updated. Metformin pending to [facility]. Patient reports he has been taking metformin but it is an old prescription and likely . Medication not filled recently in this office.

## 2022-05-11 NOTE — TELEPHONE ENCOUNTER
Message  Received: Faith Ceron NP  P Em[facility] 03 Clinical Staff  A1C has crept up a bit. Renal function remains low but stable with prior readings. If unable to afford Jardiance can remain off. Can take Metformin 500mg 1 tab BID and we need to monitor renal function every 2-3 months. Needs to focus on good diabetic diet as well. Rest of labs are stable. See me for follow up in 3-4 weeks as planned. Thanks.

## 2022-05-13 ENCOUNTER — TELEPHONE (OUTPATIENT)
Dept: FAMILY MEDICINE CLINIC | Facility: CLINIC | Age: 82
End: 2022-05-13

## 2022-05-13 RX ORDER — TORSEMIDE 10 MG/1
10 TABLET ORAL DAILY
COMMUNITY
Start: 2021-11-15

## 2022-05-13 NOTE — TELEPHONE ENCOUNTER
Yes, furosemide was apparently stopped in favor of torsemide some time ago. Appears neither were managed by us. Should really follow up with provider who manages these. Will need visit with us for adjustments. Thanks.

## 2022-05-13 NOTE — TELEPHONE ENCOUNTER
Pt calling to request another diuretic as furosemide is not really working for him anymore. He's noticed and increase in swelling on his legs the last few days. Pt would like a call back from [facility].

## 2022-05-16 ENCOUNTER — PATIENT MESSAGE (OUTPATIENT)
Dept: FAMILY MEDICINE CLINIC | Facility: CLINIC | Age: 82
End: 2022-05-16

## 2022-05-17 NOTE — TELEPHONE ENCOUNTER
From: Carine Hill  To: Ilda Deras NP  Sent: 5/16/2022 8:20 PM CDT  Subject: blood test results/medications    Hi,   This is Adam Otoole, Yung Harley's daughter. I wanted to follow up on the result of my dad's glucose and A1C results. Is there a medication he should be taking for his diabetes? He was taking Jardiance and then stopped that. Should he be taking the metformin, with his elevated kidney function numbers? Should he be put on some other medication for diabetes? Hi is not on any diabetes meds right now.  Thank you,  Jae Barnard

## 2022-05-23 ENCOUNTER — TELEPHONE (OUTPATIENT)
Dept: FAMILY MEDICINE CLINIC | Facility: CLINIC | Age: 82
End: 2022-05-23

## 2022-05-23 NOTE — TELEPHONE ENCOUNTER
Pt reports he's had 2-3 episodes of diarrhea this morning. Diarrhea started at 9am. He took Imodium. No diarrhea since then. Will continue to monitor at this time. Keep diet bland, encourage fluid intake. If no improvement will call back. He denies abd pain at this time. He verbalized understanding and agrees with plan.

## 2022-05-23 NOTE — TELEPHONE ENCOUNTER
Pt is calling he said he has diarrhea and is not feeling well. He said it was quite bad. His pulse seems ok. Should he be seen he is asking for a nurse to call him back. Stomach sensation he feels something is going on down there.

## 2022-06-01 RX ORDER — TRAZODONE HYDROCHLORIDE 50 MG/1
50 TABLET ORAL NIGHTLY PRN
Qty: 90 TABLET | Refills: 1 | Status: SHIPPED | OUTPATIENT
Start: 2022-06-01

## 2022-06-01 NOTE — TELEPHONE ENCOUNTER
Pt is asking for a refill on the trazodone. Also wants to know if it can be a 3 months worth refill. Send to [facility] on [address].

## 2022-06-03 RX ORDER — FUROSEMIDE 20 MG/1
20 TABLET ORAL DAILY
Qty: 30 TABLET | Refills: 1 | Status: CANCELLED | OUTPATIENT
Start: 2022-06-03

## 2022-06-03 NOTE — TELEPHONE ENCOUNTER
Pt is almost out of the torsemide. It wasn't prescribed by our office and he wants to know if we will refill it. He says he takes 20 mg, not 10 that is indicated on his chart.

## 2022-06-04 RX ORDER — TORSEMIDE 20 MG/1
20 TABLET ORAL DAILY
Qty: 90 TABLET | Refills: 1 | Status: SHIPPED | OUTPATIENT
Start: 2022-06-04

## 2022-06-06 ENCOUNTER — OFFICE VISIT (OUTPATIENT)
Dept: FAMILY MEDICINE CLINIC | Facility: CLINIC | Age: 82
End: 2022-06-06
Payer: MEDICARE

## 2022-06-06 VITALS
HEIGHT: 65.75 IN | BODY MASS INDEX: 33.18 KG/M2 | WEIGHT: 204 LBS | HEART RATE: 79 BPM | SYSTOLIC BLOOD PRESSURE: 126 MMHG | TEMPERATURE: 98 F | RESPIRATION RATE: 16 BRPM | DIASTOLIC BLOOD PRESSURE: 72 MMHG

## 2022-06-06 DIAGNOSIS — G47.9 DIFFICULTY SLEEPING: Primary | ICD-10-CM

## 2022-06-06 DIAGNOSIS — N18.32 TYPE 2 DIABETES MELLITUS WITH STAGE 3B CHRONIC KIDNEY DISEASE, WITHOUT LONG-TERM CURRENT USE OF INSULIN (HCC): ICD-10-CM

## 2022-06-06 DIAGNOSIS — E11.22 TYPE 2 DIABETES MELLITUS WITH STAGE 3B CHRONIC KIDNEY DISEASE, WITHOUT LONG-TERM CURRENT USE OF INSULIN (HCC): ICD-10-CM

## 2022-06-06 DIAGNOSIS — R63.4 WEIGHT LOSS: ICD-10-CM

## 2022-06-06 PROCEDURE — 3078F DIAST BP <80 MM HG: CPT | Performed by: NURSE PRACTITIONER

## 2022-06-06 PROCEDURE — 99214 OFFICE O/P EST MOD 30 MIN: CPT | Performed by: NURSE PRACTITIONER

## 2022-06-06 PROCEDURE — 3074F SYST BP LT 130 MM HG: CPT | Performed by: NURSE PRACTITIONER

## 2022-06-06 PROCEDURE — 3008F BODY MASS INDEX DOCD: CPT | Performed by: NURSE PRACTITIONER

## 2022-06-09 ENCOUNTER — OFFICE VISIT (OUTPATIENT)
Dept: SLEEP CENTER | Age: 82
End: 2022-06-09
Attending: NURSE PRACTITIONER
Payer: MEDICARE

## 2022-06-09 DIAGNOSIS — G47.33 OSA (OBSTRUCTIVE SLEEP APNEA): ICD-10-CM

## 2022-06-09 PROCEDURE — 95806 SLEEP STUDY UNATT&RESP EFFT: CPT

## 2022-06-13 PROBLEM — G47.33 OSA (OBSTRUCTIVE SLEEP APNEA): Status: ACTIVE | Noted: 2018-06-28

## 2022-06-13 NOTE — PROCEDURES
[address] [address]       Accredited by the [employer] Sleep Medicine (AASM)    PATIENT'S NAME:        Henrique Riojas  ATTENDING PHYSICIAN:   Eduardo Gonzalez DO  REFERRING PHYSICIAN:     PATIENT ACCOUNT #:     [de-identified]        LOCATION:       [address] [place] [address] #:      [id number]        YOB: 1940  DATE OF STUDY:         06/09/2022    SLEEP STUDY REPORT    STUDY TYPE:  Unattended sleep study. CLINICAL HISTORY:  This is an 80-year-old gentleman whose height is 5 fee 6 inches, weight is 210 pounds, and body mass index is 34. The patient has a history of snoring, prolonged nightly awakenings, and witnessed apnea. This is a report of his home sleep test.    UNATTENDED SLEEP STUDY RECORDING PARAMETERS:  The patient underwent a formal technically adequate unattended diagnostic sleep study [facility] The study was performed in accordance with the AASM standard for Out of Center Sleep Testing. The four-channel Type III HST measures the following parameters:  flow, respiratory effort, pulse, and oxygen saturation. SCORING:  This study was scored in accordance with AASM scoring rules and Medicare rule 1B. FINDINGS:  The flow evaluation recording time began at 10:53 p.m. and ended at 6:30 a.m. for a duration of 7 hours and 37 minutes. Light snoring was recorded. There were a total of 271 hypopneas and 70 apneas. Overall apnea-hypopnea index was 44.8. Supine index was 57, non-supine 43.9. Lowest oxyhemoglobin desaturation was seen at a howie of 83%. Total time spent with oxyhemoglobin saturations less than 88% was 4 hours and 2 minutes. Average pulse was 61. IMPRESSION:  Overall apnea-hypopnea index was 44.8, consistent with severe obstructive sleep apnea. Lowest oxyhemoglobin desaturation was seen at a howie of 83%. DIAGNOSIS:  Obstructive sleep apnea. RECOMMENDATIONS:    1.    At the request of the referring physician, we will confer with the patient regarding these findings and present treatment options. 2.   Auto PAP trial to be set up for settings of 5 to 15.  3.   Close clinical followup is advised. 4.   Caution should be used with administration of anesthetic or sedative agents including alcohol. 5.   As daytime sleepiness is a complaint, the patient should avoid driving while drowsy. Thank you for your confidence in the [facility]. Please do not hesitate to contact us at [phone].     Dictated By Norma Shultz DO  d: 06/13/2022 14:56:48  t: 06/13/2022 16:04:12  Job [id number]  UR/

## 2022-06-15 ENCOUNTER — LAB ENCOUNTER (OUTPATIENT)
Dept: LAB | Facility: HOSPITAL | Age: 82
End: 2022-06-15
Attending: Other
Payer: MEDICARE

## 2022-06-15 DIAGNOSIS — Z11.59 SCREENING EXAMINATION FOR POLIOMYELITIS: ICD-10-CM

## 2022-06-15 DIAGNOSIS — Z01.818 PREOP EXAMINATION: ICD-10-CM

## 2022-06-16 LAB — SARS-COV-2 RNA RESP QL NAA+PROBE: NOT DETECTED

## 2022-06-18 ENCOUNTER — OFFICE VISIT (OUTPATIENT)
Dept: SLEEP CENTER | Age: 82
End: 2022-06-18
Attending: Other
Payer: MEDICARE

## 2022-06-18 DIAGNOSIS — G47.33 OSA (OBSTRUCTIVE SLEEP APNEA): ICD-10-CM

## 2022-06-18 PROCEDURE — 95811 POLYSOM 6/>YRS CPAP 4/> PARM: CPT

## 2022-06-30 ENCOUNTER — TELEPHONE (OUTPATIENT)
Dept: FAMILY MEDICINE CLINIC | Facility: CLINIC | Age: 82
End: 2022-06-30

## 2022-06-30 DIAGNOSIS — Z99.89 OSA ON CPAP: Primary | ICD-10-CM

## 2022-06-30 DIAGNOSIS — G47.33 OSA ON CPAP: Primary | ICD-10-CM

## 2022-06-30 NOTE — TELEPHONE ENCOUNTER
Referral request RITESH Aldana  Fax number: [phone]                          NANCY per Bethany Camacho is [id number] valid 6/28 to 8/27/22    Also Brenda Humphrey per Bethany Camacho [id number] valid 6/28/22 to 8/27/22

## 2022-08-23 ENCOUNTER — TELEPHONE (OUTPATIENT)
Dept: FAMILY MEDICINE CLINIC | Facility: CLINIC | Age: 82
End: 2022-08-23

## 2022-08-23 DIAGNOSIS — G47.33 OBSTRUCTIVE SLEEP APNEA: Primary | ICD-10-CM

## 2022-08-23 NOTE — TELEPHONE ENCOUNTER
Reached patient for medication adherence consult. Per insurance report, patient is past due for refill on atorvastatin. Patient states he is still taking the atorvastatin. He was able to locate medication bottle and has ~ 30 tablets left. Patient cannot think of reason as to why so much medication remaining. Did provide education and stressed the importance of taking atorvastatin consistently everyday to get the most benefit. Patient denies forgetting or missing medication doses. Patient denies any other questions or concerns with medications at this time.

## 2022-08-23 NOTE — TELEPHONE ENCOUNTER
Referral request Dr. Makayla Mcneill, DO    6 visits    Diagnosis: G47.33    Auth number: [id number] valid 8/23 to 2/19/23

## 2022-09-13 ENCOUNTER — LAB REQUISITION (OUTPATIENT)
Dept: LAB | Facility: HOSPITAL | Age: 82
End: 2022-09-13
Payer: MEDICARE

## 2022-09-13 DIAGNOSIS — N18.32 CHRONIC KIDNEY DISEASE, STAGE 3B (HCC): ICD-10-CM

## 2022-09-13 DIAGNOSIS — E11.22 TYPE 2 DIABETES MELLITUS WITH DIABETIC CHRONIC KIDNEY DISEASE (HCC): ICD-10-CM

## 2022-09-13 LAB
ALBUMIN SERPL-MCNC: 3.5 G/DL (ref 3.4–5)
ALBUMIN/GLOB SERPL: 1.1 {RATIO} (ref 1–2)
ALP LIVER SERPL-CCNC: 91 U/L
ALT SERPL-CCNC: 12 U/L
ANION GAP SERPL CALC-SCNC: 2 MMOL/L (ref 0–18)
AST SERPL-CCNC: 17 U/L (ref 15–37)
BILIRUB SERPL-MCNC: 0.4 MG/DL (ref 0.1–2)
BUN BLD-MCNC: 22 MG/DL (ref 7–18)
CALCIUM BLD-MCNC: 8.7 MG/DL (ref 8.5–10.1)
CHLORIDE SERPL-SCNC: 112 MMOL/L (ref 98–112)
CO2 SERPL-SCNC: 26 MMOL/L (ref 21–32)
CREAT BLD-MCNC: 1.15 MG/DL
EST. AVERAGE GLUCOSE BLD GHB EST-MCNC: 137 MG/DL (ref 68–126)
GFR SERPLBLD BASED ON 1.73 SQ M-ARVRAT: 64 ML/MIN/1.73M2 (ref 60–?)
GLOBULIN PLAS-MCNC: 3.1 G/DL (ref 2.8–4.4)
GLUCOSE BLD-MCNC: 127 MG/DL (ref 70–99)
HBA1C MFR BLD: 6.4 % (ref ?–5.7)
OSMOLALITY SERPL CALC.SUM OF ELEC: 295 MOSM/KG (ref 275–295)
POTASSIUM SERPL-SCNC: 4.1 MMOL/L (ref 3.5–5.1)
PROT SERPL-MCNC: 6.6 G/DL (ref 6.4–8.2)
SODIUM SERPL-SCNC: 140 MMOL/L (ref 136–145)

## 2022-09-13 PROCEDURE — 80053 COMPREHEN METABOLIC PANEL: CPT | Performed by: NURSE PRACTITIONER

## 2022-09-13 PROCEDURE — 83036 HEMOGLOBIN GLYCOSYLATED A1C: CPT | Performed by: NURSE PRACTITIONER

## 2022-09-21 ENCOUNTER — OFFICE VISIT (OUTPATIENT)
Dept: FAMILY MEDICINE CLINIC | Facility: CLINIC | Age: 82
End: 2022-09-21

## 2022-09-21 VITALS
RESPIRATION RATE: 16 BRPM | HEART RATE: 88 BPM | OXYGEN SATURATION: 94 % | HEIGHT: 67 IN | SYSTOLIC BLOOD PRESSURE: 130 MMHG | DIASTOLIC BLOOD PRESSURE: 78 MMHG | WEIGHT: 201.81 LBS | BODY MASS INDEX: 31.67 KG/M2 | TEMPERATURE: 97 F

## 2022-09-21 DIAGNOSIS — I10 ESSENTIAL HYPERTENSION: Primary | ICD-10-CM

## 2022-09-21 DIAGNOSIS — N18.32 TYPE 2 DIABETES MELLITUS WITH STAGE 3B CHRONIC KIDNEY DISEASE, WITHOUT LONG-TERM CURRENT USE OF INSULIN (HCC): ICD-10-CM

## 2022-09-21 DIAGNOSIS — I48.0 PAROXYSMAL ATRIAL FIBRILLATION (HCC): ICD-10-CM

## 2022-09-21 DIAGNOSIS — E11.22 TYPE 2 DIABETES MELLITUS WITH STAGE 3B CHRONIC KIDNEY DISEASE, WITHOUT LONG-TERM CURRENT USE OF INSULIN (HCC): ICD-10-CM

## 2022-09-21 PROCEDURE — 3078F DIAST BP <80 MM HG: CPT | Performed by: NURSE PRACTITIONER

## 2022-09-21 PROCEDURE — 3075F SYST BP GE 130 - 139MM HG: CPT | Performed by: NURSE PRACTITIONER

## 2022-09-21 PROCEDURE — 3008F BODY MASS INDEX DOCD: CPT | Performed by: NURSE PRACTITIONER

## 2022-09-21 PROCEDURE — 99214 OFFICE O/P EST MOD 30 MIN: CPT | Performed by: NURSE PRACTITIONER

## 2022-09-21 RX ORDER — METOPROLOL TARTRATE 100 MG/1
100 TABLET ORAL 2 TIMES DAILY
Qty: 90 TABLET | Refills: 1 | Status: SHIPPED | OUTPATIENT
Start: 2022-09-21

## 2022-10-05 ENCOUNTER — MED REC SCAN ONLY (OUTPATIENT)
Facility: CLINIC | Age: 82
End: 2022-10-05

## 2022-10-07 ENCOUNTER — TELEPHONE (OUTPATIENT)
Dept: FAMILY MEDICINE CLINIC | Facility: CLINIC | Age: 82
End: 2022-10-07

## 2022-10-07 RX ORDER — ATORVASTATIN CALCIUM 10 MG/1
10 TABLET, FILM COATED ORAL NIGHTLY
Qty: 30 TABLET | Refills: 0 | Status: SHIPPED | OUTPATIENT
Start: 2022-10-07 | End: 2022-10-10

## 2022-10-07 NOTE — TELEPHONE ENCOUNTER
Patient is requesting to speak with Willie Vallejo about Atorvastatin. Patient states that Destiny Chaney is unaware he takes this medication. Patient will be due for a refill and that [facility] will be sending a request for the refill. Patient would like Destiny Chaney to authorize the refill but will need a short term supply sent to his local pharmacy as he is down to his last pill.  Please call

## 2022-10-07 NOTE — TELEPHONE ENCOUNTER
Agree with 30 day refill, thanks for sending, will await refill request from Julianna Turner for longer term refills. Thanks.

## 2022-10-10 RX ORDER — ATORVASTATIN CALCIUM 10 MG/1
TABLET, FILM COATED ORAL
Qty: 90 TABLET | Refills: 0 | Status: SHIPPED | OUTPATIENT
Start: 2022-10-10

## 2022-10-11 ENCOUNTER — TELEPHONE (OUTPATIENT)
Dept: FAMILY MEDICINE CLINIC | Facility: CLINIC | Age: 82
End: 2022-10-11

## 2022-10-11 DIAGNOSIS — G47.33 OSA (OBSTRUCTIVE SLEEP APNEA): Primary | ICD-10-CM

## 2022-10-27 ENCOUNTER — TELEPHONE (OUTPATIENT)
Dept: FAMILY MEDICINE CLINIC | Facility: CLINIC | Age: 82
End: 2022-10-27

## 2022-10-27 DIAGNOSIS — E78.2 MIXED HYPERLIPIDEMIA: Primary | ICD-10-CM

## 2022-10-27 RX ORDER — ATORVASTATIN CALCIUM 10 MG/1
10 TABLET, FILM COATED ORAL NIGHTLY
Qty: 90 TABLET | Refills: 0 | Status: SHIPPED | OUTPATIENT
Start: 2022-10-27

## 2022-10-27 NOTE — TELEPHONE ENCOUNTER
atorvastatin was already refilled on 10/10/22 [facility]  [facility] [place]

## 2022-10-27 NOTE — TELEPHONE ENCOUNTER
LOV 09/21/2022  Last Lipid 02/08/2022  No upcoming OV scheduled   Will refill until due for next lipid and MAW Feb 2023

## 2022-11-10 DIAGNOSIS — E78.2 MIXED HYPERLIPIDEMIA: ICD-10-CM

## 2022-11-11 ENCOUNTER — TELEPHONE (OUTPATIENT)
Dept: FAMILY MEDICINE CLINIC | Facility: CLINIC | Age: 82
End: 2022-11-11

## 2022-11-11 RX ORDER — ATORVASTATIN CALCIUM 10 MG/1
TABLET, FILM COATED ORAL
Qty: 90 TABLET | Refills: 0 | OUTPATIENT
Start: 2022-11-11

## 2022-11-11 NOTE — TELEPHONE ENCOUNTER
Medication refilled on 10/27/22 for 90 day supply, 0 refill. Called and spoke to pt about atorvastatin med refill received from [facility].  States he is okay for now don't need any refill from [employer], deny request.

## 2022-11-16 ENCOUNTER — OFFICE VISIT (OUTPATIENT)
Facility: CLINIC | Age: 82
End: 2022-11-16
Payer: MEDICARE

## 2022-11-16 VITALS
DIASTOLIC BLOOD PRESSURE: 60 MMHG | BODY MASS INDEX: 31.55 KG/M2 | OXYGEN SATURATION: 95 % | HEIGHT: 67 IN | RESPIRATION RATE: 16 BRPM | HEART RATE: 72 BPM | WEIGHT: 201 LBS | SYSTOLIC BLOOD PRESSURE: 112 MMHG

## 2022-11-16 DIAGNOSIS — I48.91 ATRIAL FIBRILLATION, UNSPECIFIED TYPE (HCC): Primary | ICD-10-CM

## 2022-11-16 DIAGNOSIS — I27.0 PRIMARY PULMONARY HYPERTENSION (HCC): ICD-10-CM

## 2022-11-16 DIAGNOSIS — Z95.0 CARDIAC PACEMAKER IN SITU: ICD-10-CM

## 2022-11-16 DIAGNOSIS — I10 ESSENTIAL HYPERTENSION: ICD-10-CM

## 2022-11-16 DIAGNOSIS — G47.33 OSA (OBSTRUCTIVE SLEEP APNEA): ICD-10-CM

## 2022-11-16 PROCEDURE — 3008F BODY MASS INDEX DOCD: CPT | Performed by: OTHER

## 2022-11-16 PROCEDURE — 3074F SYST BP LT 130 MM HG: CPT | Performed by: OTHER

## 2022-11-16 PROCEDURE — 99214 OFFICE O/P EST MOD 30 MIN: CPT | Performed by: OTHER

## 2022-11-16 PROCEDURE — 3078F DIAST BP <80 MM HG: CPT | Performed by: OTHER

## 2022-11-16 NOTE — PATIENT INSTRUCTIONS
Please be advised:   Do not drive while sleepy   Take CPAP/BiPAP machine to any procedure that requires sedation     When should I clean my machine & supplies? Clean mask cushions or nasal pillow, headgear, tubing, and humidifier chamber with mild soap (Whitney) and water   If water chamber has hard water buildup (white crust), soak in warm water & vinegar mix 50/50. Rinse and hang dry     DAILY   Wipe mask cushions or nasal pillow   Empty & rinse water chamber- refill with distilled water     WEEKLY   Clean mask cushions or nasal pillow, headgear, tubing, and humidifier chamber with mild soap (Whitney) and water   If water chamber has hard water buildup (white crust), soak in warm water & vinegar mix 50/50. Rinse and hang dry     When should supplies be replaced? Contact your home care company for replacement supplies, or if your machine is malfunctioning   *Below is a general guideline of what we recommend. Replacement of supplies differs depending on your insurance company*   MONTHLY: Replace filter and mask cushion   6 MONTHS: Replace headgear and tubing     Travel Tips   Keep CPAP/BiPAP in original bag when traveling, and place luggage tag on bag   Most airlines consider CPAP/BiPAP to be a medical device, therefore it is a free carry-on item   If unable to get distilled water, bottled water is safe while traveling.  DO NOT use tap water   When traveling outside the [place] only a power adapter is necessary (CPAP can operate without a converter), bring an extension cord   Consider purchasing or renting a travel CPAP (not covered by insurance)     Dry Mouth/Nose   Turn up the humidity on your machine (select \"Options\" from the home screen)   Place a cool mist humidifier at your bedside   Over-the-counter remedies: Biotene, XyliMelts, NasoGel     Air Leak   Try adjusting your mask/headgear while laying in sleeping position vs. sitting up   Wash and dry your face prior to putting your mask on   If applicable: shave facial hair at night (or before wearing CPAP)   Purchase \"RemZzzs\" (through home care co., [employer], or remzzzs. com)   100% cotton knit barrier that goes between your mask cushion and your skin   Replace your mask cushion (at least once per month) and/or headgear (every 3-6 months)     Nasal Congestion   CPAP therapy can cause nasal passages to dry out, & mucous membranes try to protect the nasal passages by producing excess mucous, so congestion results. Over-the counter remedies: Flonase, Nasacort, Sinus Rinses (Neti-Pot), DO NOT USE Afrin   Try a mask that goes over the nose and mouth     Skin Irritation   Clean supplies regularly (Citrus II Mask Wipes, Control III disinfectant solution)   Try over the counter creams such as hydrocortisone 1% (apply in the morning after showering)   Your headgear may be too tight, replace supplies so you don't need to adjust so tightly   Try RemZzzs (100% cotton knit barrier that goes between your mask cushion and your skin)     Gas/Bloating   Try a different sleeping position to keep air out of the stomach. Lay on the left side or rotate to the right side. Incline with pillows or lay flat.    Over-the-counter remedies: Simethicone

## 2022-11-17 DIAGNOSIS — G47.33 OSA (OBSTRUCTIVE SLEEP APNEA): Primary | ICD-10-CM

## 2022-12-02 ENCOUNTER — TELEPHONE (OUTPATIENT)
Dept: FAMILY MEDICINE CLINIC | Facility: CLINIC | Age: 82
End: 2022-12-02

## 2022-12-02 DIAGNOSIS — D69.6 THROMBOCYTOPENIA (HCC): ICD-10-CM

## 2022-12-02 DIAGNOSIS — N18.32 TYPE 2 DIABETES MELLITUS WITH STAGE 3B CHRONIC KIDNEY DISEASE, WITHOUT LONG-TERM CURRENT USE OF INSULIN (HCC): ICD-10-CM

## 2022-12-02 DIAGNOSIS — I10 ESSENTIAL HYPERTENSION: Primary | ICD-10-CM

## 2022-12-02 DIAGNOSIS — E11.22 TYPE 2 DIABETES MELLITUS WITH STAGE 3B CHRONIC KIDNEY DISEASE, WITHOUT LONG-TERM CURRENT USE OF INSULIN (HCC): ICD-10-CM

## 2022-12-02 DIAGNOSIS — Z85.46 HISTORY OF PROSTATE CANCER: ICD-10-CM

## 2022-12-02 DIAGNOSIS — E78.2 MIXED HYPERLIPIDEMIA: ICD-10-CM

## 2022-12-02 NOTE — TELEPHONE ENCOUNTER
Please enter lab orders for the patient's upcoming physical appointment. Physical scheduled: Your appointments     Date & Time Appointment Department [facility])    Feb 06, 2023 10:00 AM CST Medicare Annual Well Visit with Mae Donnelly MD [facility], [address], [place]  ([address])            Avril Herrera Duty [address] Isabela Newman [id number]         Preferred lab: [facility])     The patient has been notified to complete fasting labs prior to their physical appointment.

## 2022-12-03 ENCOUNTER — HOSPITAL ENCOUNTER (INPATIENT)
Facility: HOSPITAL | Age: 82
LOS: 2 days | Discharge: HOME HEALTH | End: 2022-12-07
Attending: EMERGENCY MEDICINE | Admitting: HOSPITALIST
Payer: MEDICARE

## 2022-12-03 ENCOUNTER — APPOINTMENT (OUTPATIENT)
Dept: GENERAL RADIOLOGY | Facility: HOSPITAL | Age: 82
End: 2022-12-03
Attending: EMERGENCY MEDICINE
Payer: MEDICARE

## 2022-12-03 ENCOUNTER — HOSPITAL ENCOUNTER (INPATIENT)
Facility: HOSPITAL | Age: 82
LOS: 2 days | Discharge: HOME | End: 2022-12-07
Attending: EMERGENCY MEDICINE | Admitting: HOSPITALIST
Payer: MEDICARE

## 2022-12-03 ENCOUNTER — APPOINTMENT (OUTPATIENT)
Dept: ULTRASOUND IMAGING | Facility: HOSPITAL | Age: 82
End: 2022-12-03
Attending: HOSPITALIST
Payer: MEDICARE

## 2022-12-03 DIAGNOSIS — R06.00 DYSPNEA, UNSPECIFIED TYPE: Primary | ICD-10-CM

## 2022-12-03 LAB
ADENOVIRUS PCR:: NOT DETECTED
ALBUMIN SERPL-MCNC: 3.6 G/DL (ref 3.4–5)
ALBUMIN/GLOB SERPL: 1.1 {RATIO} (ref 1–2)
ALP LIVER SERPL-CCNC: 85 U/L
ALT SERPL-CCNC: 24 U/L
ANION GAP SERPL CALC-SCNC: 6 MMOL/L (ref 0–18)
AST SERPL-CCNC: 23 U/L (ref 15–37)
ATRIAL RATE: 63 BPM
B PARAPERT DNA SPEC QL NAA+PROBE: NOT DETECTED
B PERT DNA SPEC QL NAA+PROBE: NOT DETECTED
BASOPHILS # BLD AUTO: 0.02 X10(3) UL (ref 0–0.2)
BASOPHILS NFR BLD AUTO: 0.2 %
BILIRUB SERPL-MCNC: 1.1 MG/DL (ref 0.1–2)
BUN BLD-MCNC: 23 MG/DL (ref 7–18)
C PNEUM DNA SPEC QL NAA+PROBE: NOT DETECTED
CALCIUM BLD-MCNC: 8.7 MG/DL (ref 8.5–10.1)
CHLORIDE SERPL-SCNC: 108 MMOL/L (ref 98–112)
CO2 SERPL-SCNC: 24 MMOL/L (ref 21–32)
CORONAVIRUS 229E PCR:: NOT DETECTED
CORONAVIRUS HKU1 PCR:: NOT DETECTED
CORONAVIRUS NL63 PCR:: NOT DETECTED
CORONAVIRUS OC43 PCR:: NOT DETECTED
CREAT BLD-MCNC: 1.33 MG/DL
EOSINOPHIL # BLD AUTO: 0.1 X10(3) UL (ref 0–0.7)
EOSINOPHIL NFR BLD AUTO: 1.1 %
ERYTHROCYTE [DISTWIDTH] IN BLOOD BY AUTOMATED COUNT: 15 %
FLUAV + FLUBV RNA SPEC NAA+PROBE: NEGATIVE
FLUAV + FLUBV RNA SPEC NAA+PROBE: NEGATIVE
FLUAV RNA SPEC QL NAA+PROBE: NOT DETECTED
FLUBV RNA SPEC QL NAA+PROBE: NOT DETECTED
GFR SERPLBLD BASED ON 1.73 SQ M-ARVRAT: 53 ML/MIN/1.73M2 (ref 60–?)
GLOBULIN PLAS-MCNC: 3.2 G/DL (ref 2.8–4.4)
GLUCOSE BLD-MCNC: 233 MG/DL (ref 70–99)
GLUCOSE BLD-MCNC: 308 MG/DL (ref 70–99)
GLUCOSE BLD-MCNC: 329 MG/DL (ref 70–99)
HCT VFR BLD AUTO: 34.8 %
HGB BLD-MCNC: 11.1 G/DL
IMM GRANULOCYTES # BLD AUTO: 0.05 X10(3) UL (ref 0–1)
IMM GRANULOCYTES NFR BLD: 0.5 %
LYMPHOCYTES # BLD AUTO: 0.43 X10(3) UL (ref 1–4)
LYMPHOCYTES NFR BLD AUTO: 4.6 %
MCH RBC QN AUTO: 28.2 PG (ref 26–34)
MCHC RBC AUTO-ENTMCNC: 31.9 G/DL (ref 31–37)
MCV RBC AUTO: 88.3 FL
METAPNEUMOVIRUS PCR:: NOT DETECTED
MONOCYTES # BLD AUTO: 0.72 X10(3) UL (ref 0.1–1)
MONOCYTES NFR BLD AUTO: 7.7 %
MYCOPLASMA PNEUMONIA PCR:: NOT DETECTED
NEUTROPHILS # BLD AUTO: 8.09 X10 (3) UL (ref 1.5–7.7)
NEUTROPHILS # BLD AUTO: 8.09 X10(3) UL (ref 1.5–7.7)
NEUTROPHILS NFR BLD AUTO: 85.9 %
NT-PROBNP SERPL-MCNC: 2520 PG/ML (ref ?–450)
OSMOLALITY SERPL CALC.SUM OF ELEC: 297 MOSM/KG (ref 275–295)
P-R INTERVAL: 174 MS
PARAINFLUENZA 1 PCR:: NOT DETECTED
PARAINFLUENZA 2 PCR:: NOT DETECTED
PARAINFLUENZA 3 PCR:: NOT DETECTED
PARAINFLUENZA 4 PCR:: NOT DETECTED
PLATELET # BLD AUTO: 185 10(3)UL (ref 150–450)
POTASSIUM SERPL-SCNC: 4.4 MMOL/L (ref 3.5–5.1)
PROCALCITONIN SERPL-MCNC: <0.05 NG/ML (ref ?–0.16)
PROT SERPL-MCNC: 6.8 G/DL (ref 6.4–8.2)
Q-T INTERVAL: 486 MS
QRS DURATION: 176 MS
QTC CALCULATION (BEZET): 516 MS
R AXIS: -69 DEGREES
RBC # BLD AUTO: 3.94 X10(6)UL
RHINOVIRUS/ENTERO PCR:: DETECTED
RSV RNA SPEC NAA+PROBE: NEGATIVE
RSV RNA SPEC QL NAA+PROBE: NOT DETECTED
SARS-COV-2 RNA NPH QL NAA+NON-PROBE: NOT DETECTED
SARS-COV-2 RNA RESP QL NAA+PROBE: NOT DETECTED
SARS-COV-2 RNA RESP QL NAA+PROBE: NOT DETECTED
SODIUM SERPL-SCNC: 138 MMOL/L (ref 136–145)
T AXIS: 121 DEGREES
TROPONIN I HIGH SENSITIVITY: 14 NG/L
VENTRICULAR RATE: 68 BPM
WBC # BLD AUTO: 9.4 X10(3) UL (ref 4–11)

## 2022-12-03 PROCEDURE — 71045 X-RAY EXAM CHEST 1 VIEW: CPT | Performed by: EMERGENCY MEDICINE

## 2022-12-03 PROCEDURE — 99223 1ST HOSP IP/OBS HIGH 75: CPT | Performed by: HOSPITALIST

## 2022-12-03 PROCEDURE — 93971 EXTREMITY STUDY: CPT | Performed by: HOSPITALIST

## 2022-12-03 RX ORDER — MELATONIN
3 NIGHTLY PRN
Status: DISCONTINUED | OUTPATIENT
Start: 2022-12-03 | End: 2022-12-07

## 2022-12-03 RX ORDER — METHYLPREDNISOLONE SODIUM SUCCINATE 125 MG/2ML
60 INJECTION, POWDER, LYOPHILIZED, FOR SOLUTION INTRAMUSCULAR; INTRAVENOUS EVERY 8 HOURS
Status: DISCONTINUED | OUTPATIENT
Start: 2022-12-03 | End: 2022-12-05

## 2022-12-03 RX ORDER — ATORVASTATIN CALCIUM 10 MG/1
10 TABLET, FILM COATED ORAL NIGHTLY
Status: DISCONTINUED | OUTPATIENT
Start: 2022-12-03 | End: 2022-12-07

## 2022-12-03 RX ORDER — FUROSEMIDE 10 MG/ML
40 INJECTION INTRAMUSCULAR; INTRAVENOUS ONCE
Status: COMPLETED | OUTPATIENT
Start: 2022-12-03 | End: 2022-12-03

## 2022-12-03 RX ORDER — SODIUM PHOSPHATE, DIBASIC AND SODIUM PHOSPHATE, MONOBASIC 7; 19 G/133ML; G/133ML
1 ENEMA RECTAL ONCE AS NEEDED
Status: DISCONTINUED | OUTPATIENT
Start: 2022-12-03 | End: 2022-12-07

## 2022-12-03 RX ORDER — IPRATROPIUM BROMIDE AND ALBUTEROL SULFATE 2.5; .5 MG/3ML; MG/3ML
3 SOLUTION RESPIRATORY (INHALATION) EVERY 6 HOURS PRN
Status: DISCONTINUED | OUTPATIENT
Start: 2022-12-03 | End: 2022-12-07

## 2022-12-03 RX ORDER — ALBUTEROL SULFATE 90 UG/1
AEROSOL, METERED RESPIRATORY (INHALATION)
Status: COMPLETED
Start: 2022-12-03 | End: 2022-12-03

## 2022-12-03 RX ORDER — POLYETHYLENE GLYCOL 3350 17 G/17G
17 POWDER, FOR SOLUTION ORAL DAILY PRN
Status: DISCONTINUED | OUTPATIENT
Start: 2022-12-03 | End: 2022-12-07

## 2022-12-03 RX ORDER — NICOTINE POLACRILEX 4 MG
30 LOZENGE BUCCAL
Status: DISCONTINUED | OUTPATIENT
Start: 2022-12-03 | End: 2022-12-07

## 2022-12-03 RX ORDER — ACETAMINOPHEN 500 MG
1000 TABLET ORAL EVERY 4 HOURS PRN
Status: DISCONTINUED | OUTPATIENT
Start: 2022-12-03 | End: 2022-12-07

## 2022-12-03 RX ORDER — METOPROLOL TARTRATE 100 MG/1
100 TABLET ORAL 2 TIMES DAILY
Status: DISCONTINUED | OUTPATIENT
Start: 2022-12-03 | End: 2022-12-07

## 2022-12-03 RX ORDER — DEXTROSE MONOHYDRATE 25 G/50ML
50 INJECTION, SOLUTION INTRAVENOUS
Status: DISCONTINUED | OUTPATIENT
Start: 2022-12-03 | End: 2022-12-07

## 2022-12-03 RX ORDER — SENNOSIDES 8.6 MG
17.2 TABLET ORAL NIGHTLY PRN
Status: DISCONTINUED | OUTPATIENT
Start: 2022-12-03 | End: 2022-12-07

## 2022-12-03 RX ORDER — BENZONATATE 100 MG/1
200 CAPSULE ORAL 3 TIMES DAILY PRN
Status: DISCONTINUED | OUTPATIENT
Start: 2022-12-03 | End: 2022-12-07

## 2022-12-03 RX ORDER — ONDANSETRON 2 MG/ML
4 INJECTION INTRAMUSCULAR; INTRAVENOUS EVERY 6 HOURS PRN
Status: DISCONTINUED | OUTPATIENT
Start: 2022-12-03 | End: 2022-12-03

## 2022-12-03 RX ORDER — NICOTINE POLACRILEX 4 MG
15 LOZENGE BUCCAL
Status: DISCONTINUED | OUTPATIENT
Start: 2022-12-03 | End: 2022-12-07

## 2022-12-03 RX ORDER — DILTIAZEM HYDROCHLORIDE 240 MG/1
240 CAPSULE, COATED, EXTENDED RELEASE ORAL DAILY
Status: DISCONTINUED | OUTPATIENT
Start: 2022-12-04 | End: 2022-12-07

## 2022-12-03 RX ORDER — METHYLPREDNISOLONE SODIUM SUCCINATE 125 MG/2ML
125 INJECTION, POWDER, LYOPHILIZED, FOR SOLUTION INTRAMUSCULAR; INTRAVENOUS ONCE
Status: COMPLETED | OUTPATIENT
Start: 2022-12-03 | End: 2022-12-03

## 2022-12-03 RX ORDER — ALBUTEROL SULFATE 90 UG/1
8 AEROSOL, METERED RESPIRATORY (INHALATION) 4 TIMES DAILY
Status: DISCONTINUED | OUTPATIENT
Start: 2022-12-03 | End: 2022-12-03

## 2022-12-03 RX ORDER — ALBUTEROL SULFATE 90 UG/1
2 AEROSOL, METERED RESPIRATORY (INHALATION) 4 TIMES DAILY
Status: DISCONTINUED | OUTPATIENT
Start: 2022-12-03 | End: 2022-12-03

## 2022-12-03 RX ORDER — FUROSEMIDE 10 MG/ML
20 INJECTION INTRAMUSCULAR; INTRAVENOUS ONCE
Status: COMPLETED | OUTPATIENT
Start: 2022-12-03 | End: 2022-12-03

## 2022-12-03 RX ORDER — METOCLOPRAMIDE HYDROCHLORIDE 5 MG/ML
5 INJECTION INTRAMUSCULAR; INTRAVENOUS EVERY 8 HOURS PRN
Status: DISCONTINUED | OUTPATIENT
Start: 2022-12-03 | End: 2022-12-07

## 2022-12-03 RX ORDER — TAMSULOSIN HYDROCHLORIDE 0.4 MG/1
0.4 CAPSULE ORAL DAILY
Status: DISCONTINUED | OUTPATIENT
Start: 2022-12-04 | End: 2022-12-07

## 2022-12-03 RX ORDER — BISACODYL 10 MG
10 SUPPOSITORY, RECTAL RECTAL
Status: DISCONTINUED | OUTPATIENT
Start: 2022-12-03 | End: 2022-12-07

## 2022-12-03 RX ORDER — IPRATROPIUM BROMIDE AND ALBUTEROL SULFATE 2.5; .5 MG/3ML; MG/3ML
3 SOLUTION RESPIRATORY (INHALATION)
Status: DISCONTINUED | OUTPATIENT
Start: 2022-12-03 | End: 2022-12-07

## 2022-12-03 NOTE — ED QUICK NOTES
Orders for admission, patient is aware of plan and ready to go upstairs. Any questions, please call ED RN Mel Dueñas at extension [phone].      Patient Covid vaccination status: Fully vaccinated     COVID Test Ordered in ED: Rapid SARS-CoV-2 by PCR    COVID Suspicion at Admission: N/A    Running Infusions:  None    Mental Status/LOC at time of transport: A/Ox3    Other pertinent information:   CIWA score: N/A   NIH score:  N/A

## 2022-12-03 NOTE — PROGRESS NOTES
12/03/22 1739   Clinical Encounter Type   Visited With Patient   Routine Visit Introduction   Continue Visiting Yes   Patient's Supportive Strategies/Resources Patient has strong Hindu hortensia. He has an adult daughter who lives nearby. He has supportive family and lives in a [facility] community. Referral To    Oriental orthodox Encounters   Oriental orthodox Needs Prayer   Spiritual Requests During Visit / Hospitalization [address] Encounters   Communion Patient wants communion   Patient Spiritual Encounters   Spiritual Assessment Completed Yes   Taxonomy   Intended Effects Demonstrate caring and concern   Methods Assist with spiritual/Restorationism practices; Accompany someone in their spiritual/Restorationism practice outside your hortensia tradition; Offer spiritual/Restorationism support   Interventions Acknowledge current situation; Active listening; Ask guided questions; Ask guided questions about hortensia;Communicate patient's needs/concerns to others; Identify supportive relationship(s);[place];Prayer for healing; Share words of hope and inspiration    remains available for spiritual care as needed at phone #[phone].

## 2022-12-03 NOTE — PROGRESS NOTES
[facility] Pharmacy Note:  Renal Dose Adjustment for Metoclopramide (REGLAN)    Adrianna Vaz has been prescribed Metoclopramide (REGLAN) 10 mg every 8 hours as needed for nausea/vomiting,. Estimated Creatinine Clearance: 38.6 mL/min (A) (based on SCr of 1.33 mg/dL (H)). Calculated creatinine clearance is < 40 ml/min, therefore, the dose of Metoclopramide (REGLAN) has been changed to 5 mg every 8 hours as needed for nausea/vomiting per P&T approved protocol. Pharmacy will continue to follow, and if renal function improves, will resume the original order.        Thank you,  Ashley Hatch, PharmD  12/3/2022 4:19 PM

## 2022-12-03 NOTE — PLAN OF CARE
Assumed care at [phone]. Pt is A&Ox4. Pt is on 1L NC, lung sounds diminished and expiratory wheezes are heard bilaterally. A-V paced on tele, VSS. No complaints of cardiac symptoms. Continent of B&B. Denies pain at this time. Standby assist with walker, tolerating well. Plan of care reviewed with patient, verbalizes understanding, all needs addressed at this time. Call light at bedside. Bed in lowest position and locked.        Problem: Patient/Family Goals  Goal: Patient/Family Long Term Goal  Description: Patient's Long Term Goal: go home    Interventions:  - follow up appointments  - follow medication regimen  - See additional Care Plan goals for specific interventions  Outcome: Progressing  Goal: Patient/Family Short Term Goal  Description: Patient's Short Term Goal: manage pneumonia    Interventions:   - IV abx  - doctors to see  - monitor vs and tele monitor with continuous pulse ox  - See additional Care Plan goals for specific interventions  Outcome: Progressing     Problem: RESPIRATORY - ADULT  Goal: Achieves optimal ventilation and oxygenation  Description: INTERVENTIONS:  - Assess for changes in respiratory status  - Assess for changes in mentation and behavior  - Position to facilitate oxygenation and minimize respiratory effort  - Oxygen supplementation based on oxygen saturation or ABGs  - Provide Smoking Cessation handout, if applicable  - Encourage broncho-pulmonary hygiene including cough, deep breathe, Incentive Spirometry  - Assess the need for suctioning and perform as needed  - Assess and instruct to report SOB or any respiratory difficulty  - Respiratory Therapy support as indicated  - Manage/alleviate anxiety  - Monitor for signs/symptoms of CO2 retention  Outcome: Progressing

## 2022-12-04 ENCOUNTER — APPOINTMENT (OUTPATIENT)
Dept: GENERAL RADIOLOGY | Facility: HOSPITAL | Age: 82
End: 2022-12-04
Attending: INTERNAL MEDICINE
Payer: MEDICARE

## 2022-12-04 LAB
ANION GAP SERPL CALC-SCNC: 7 MMOL/L (ref 0–18)
BASOPHILS # BLD AUTO: 0.01 X10(3) UL (ref 0–0.2)
BASOPHILS NFR BLD AUTO: 0.1 %
BUN BLD-MCNC: 32 MG/DL (ref 7–18)
CALCIUM BLD-MCNC: 8.5 MG/DL (ref 8.5–10.1)
CHLORIDE SERPL-SCNC: 107 MMOL/L (ref 98–112)
CO2 SERPL-SCNC: 23 MMOL/L (ref 21–32)
CREAT BLD-MCNC: 1.45 MG/DL
EOSINOPHIL # BLD AUTO: 0 X10(3) UL (ref 0–0.7)
EOSINOPHIL NFR BLD AUTO: 0 %
ERYTHROCYTE [DISTWIDTH] IN BLOOD BY AUTOMATED COUNT: 15.1 %
GFR SERPLBLD BASED ON 1.73 SQ M-ARVRAT: 48 ML/MIN/1.73M2 (ref 60–?)
GLUCOSE BLD-MCNC: 250 MG/DL (ref 70–99)
GLUCOSE BLD-MCNC: 253 MG/DL (ref 70–99)
GLUCOSE BLD-MCNC: 278 MG/DL (ref 70–99)
GLUCOSE BLD-MCNC: 301 MG/DL (ref 70–99)
GLUCOSE BLD-MCNC: 382 MG/DL (ref 70–99)
HCT VFR BLD AUTO: 32.4 %
HGB BLD-MCNC: 10.2 G/DL
IMM GRANULOCYTES # BLD AUTO: 0.05 X10(3) UL (ref 0–1)
IMM GRANULOCYTES NFR BLD: 0.7 %
LYMPHOCYTES # BLD AUTO: 0.54 X10(3) UL (ref 1–4)
LYMPHOCYTES NFR BLD AUTO: 7.1 %
MAGNESIUM SERPL-MCNC: 2 MG/DL (ref 1.6–2.6)
MCH RBC QN AUTO: 28.1 PG (ref 26–34)
MCHC RBC AUTO-ENTMCNC: 31.5 G/DL (ref 31–37)
MCV RBC AUTO: 89.3 FL
MONOCYTES # BLD AUTO: 0.21 X10(3) UL (ref 0.1–1)
MONOCYTES NFR BLD AUTO: 2.7 %
NEUTROPHILS # BLD AUTO: 6.84 X10 (3) UL (ref 1.5–7.7)
NEUTROPHILS # BLD AUTO: 6.84 X10(3) UL (ref 1.5–7.7)
NEUTROPHILS NFR BLD AUTO: 89.4 %
OSMOLALITY SERPL CALC.SUM OF ELEC: 299 MOSM/KG (ref 275–295)
PLATELET # BLD AUTO: 159 10(3)UL (ref 150–450)
POTASSIUM SERPL-SCNC: 4.5 MMOL/L (ref 3.5–5.1)
RBC # BLD AUTO: 3.63 X10(6)UL
SODIUM SERPL-SCNC: 137 MMOL/L (ref 136–145)
WBC # BLD AUTO: 7.7 X10(3) UL (ref 4–11)

## 2022-12-04 PROCEDURE — 99232 SBSQ HOSP IP/OBS MODERATE 35: CPT | Performed by: INTERNAL MEDICINE

## 2022-12-04 PROCEDURE — 71046 X-RAY EXAM CHEST 2 VIEWS: CPT | Performed by: INTERNAL MEDICINE

## 2022-12-04 NOTE — PLAN OF CARE
Pt alert able to make needs known. Denies any pain. Cont on  antibiotics. Cont on Iv steroids. On 2LPM of oxygen NC with saturation in the low to mid 90s . Droplet and contact isolation maintained. Able to ambulate x 1 with a walker. Needs encouragement to get out of bed, benefits discussed. Plan of care discussed with pt , verbalized understanding. Call light within reach .        Problem: Patient/Family Goals  Goal: Patient/Family Long Term Goal  Description: Patient's Long Term Goal: go home    Interventions:  - follow up appointments  - follow medication regimen  - See additional Care Plan goals for specific interventions  Outcome: Progressing  Goal: Patient/Family Short Term Goal  Description: Patient's Short Term Goal: manage pneumonia    Interventions:   - IV abx  - doctors to see  - monitor vs and tele monitor with continuous pulse ox  - See additional Care Plan goals for specific interventions  Outcome: Progressing     Problem: RESPIRATORY - ADULT  Goal: Achieves optimal ventilation and oxygenation  Description: INTERVENTIONS:  - Assess for changes in respiratory status  - Assess for changes in mentation and behavior  - Position to facilitate oxygenation and minimize respiratory effort  - Oxygen supplementation based on oxygen saturation or ABGs  - Provide Smoking Cessation handout, if applicable  - Encourage broncho-pulmonary hygiene including cough, deep breathe, Incentive Spirometry  - Assess the need for suctioning and perform as needed  - Assess and instruct to report SOB or any respiratory difficulty  - Respiratory Therapy support as indicated  - Manage/alleviate anxiety  - Monitor for signs/symptoms of CO2 retention  Outcome: Progressing

## 2022-12-04 NOTE — PROGRESS NOTES
12/04/22 1253   Clinical Encounter Type   Visited With Patient; Health care provider   Routine Visit Follow-up   Continue Visiting No  (unless requested)   Patient's Supportive Strategies/Resources family and hortensia   Taxonomy   Intended Effects Demonstrate caring and concern   Methods Offer emotional support;Collaborate with care team member   Interventions Acknowledge current situation; Active listening; Ask guided questions; Ask guided questions about hortensia;Silent prayer   Provided a listening presence as patient enjoyed talking about his family.  Vassar expressed appreciation for receiving communion today. Patient stated that he usually watches Buddhist mass on TV as he cannot physically get to Orthodoxy at this time in his life. Spiritual Care support can be requested via an Epic consult.

## 2022-12-04 NOTE — PLAN OF CARE
Assumed care of pt at 1630, alert and oriented x4, able to make needs known. Denies any pain or shortness of breath, all needs addressed. Bed in lowest position, call light within reach.

## 2022-12-04 NOTE — PLAN OF CARE
Patient alert and oriented x 4. Up standby with walker. On 2L via NC, CPAP at night. A-V paced on tele. Continent of bowel and bladder. No complaints of pain, shortness of breath or chest pain/discomfort. POC : IV abx, IV steroids. Fall precautions in place. Call light within reach.      Problem: Patient/Family Goals  Goal: Patient/Family Long Term Goal  Description: Patient's Long Term Goal: go home    Interventions:  - follow up appointments  - follow medication regimen  - See additional Care Plan goals for specific interventions  Outcome: Progressing  Goal: Patient/Family Short Term Goal  Description: Patient's Short Term Goal: manage pneumonia    Interventions:   - IV abx  - doctors to see  - monitor vs and tele monitor with continuous pulse ox  - See additional Care Plan goals for specific interventions  Outcome: Progressing     Problem: RESPIRATORY - ADULT  Goal: Achieves optimal ventilation and oxygenation  Description: INTERVENTIONS:  - Assess for changes in respiratory status  - Assess for changes in mentation and behavior  - Position to facilitate oxygenation and minimize respiratory effort  - Oxygen supplementation based on oxygen saturation or ABGs  - Provide Smoking Cessation handout, if applicable  - Encourage broncho-pulmonary hygiene including cough, deep breathe, Incentive Spirometry  - Assess the need for suctioning and perform as needed  - Assess and instruct to report SOB or any respiratory difficulty  - Respiratory Therapy support as indicated  - Manage/alleviate anxiety  - Monitor for signs/symptoms of CO2 retention  Outcome: Progressing

## 2022-12-05 ENCOUNTER — APPOINTMENT (OUTPATIENT)
Dept: CV DIAGNOSTICS | Facility: HOSPITAL | Age: 82
End: 2022-12-05
Attending: INTERNAL MEDICINE
Payer: MEDICARE

## 2022-12-05 LAB
ANION GAP SERPL CALC-SCNC: 8 MMOL/L (ref 0–18)
BUN BLD-MCNC: 44 MG/DL (ref 7–18)
CALCIUM BLD-MCNC: 8.2 MG/DL (ref 8.5–10.1)
CHLORIDE SERPL-SCNC: 108 MMOL/L (ref 98–112)
CO2 SERPL-SCNC: 20 MMOL/L (ref 21–32)
CREAT BLD-MCNC: 1.34 MG/DL
ERYTHROCYTE [DISTWIDTH] IN BLOOD BY AUTOMATED COUNT: 15.2 %
GFR SERPLBLD BASED ON 1.73 SQ M-ARVRAT: 53 ML/MIN/1.73M2 (ref 60–?)
GLUCOSE BLD-MCNC: 224 MG/DL (ref 70–99)
GLUCOSE BLD-MCNC: 242 MG/DL (ref 70–99)
GLUCOSE BLD-MCNC: 287 MG/DL (ref 70–99)
GLUCOSE BLD-MCNC: 349 MG/DL (ref 70–99)
GLUCOSE BLD-MCNC: 372 MG/DL (ref 70–99)
GLUCOSE BLD-MCNC: 381 MG/DL (ref 70–99)
HCT VFR BLD AUTO: 31.7 %
HGB BLD-MCNC: 10.1 G/DL
MAGNESIUM SERPL-MCNC: 2.2 MG/DL (ref 1.6–2.6)
MCH RBC QN AUTO: 27.6 PG (ref 26–34)
MCHC RBC AUTO-ENTMCNC: 31.9 G/DL (ref 31–37)
MCV RBC AUTO: 86.6 FL
OSMOLALITY SERPL CALC.SUM OF ELEC: 300 MOSM/KG (ref 275–295)
PLATELET # BLD AUTO: 204 10(3)UL (ref 150–450)
POTASSIUM SERPL-SCNC: 3.8 MMOL/L (ref 3.5–5.1)
RBC # BLD AUTO: 3.66 X10(6)UL
SODIUM SERPL-SCNC: 136 MMOL/L (ref 136–145)
WBC # BLD AUTO: 10.5 X10(3) UL (ref 4–11)

## 2022-12-05 PROCEDURE — 99232 SBSQ HOSP IP/OBS MODERATE 35: CPT | Performed by: INTERNAL MEDICINE

## 2022-12-05 PROCEDURE — 93306 TTE W/DOPPLER COMPLETE: CPT | Performed by: INTERNAL MEDICINE

## 2022-12-05 RX ORDER — POTASSIUM CHLORIDE 20 MEQ/1
40 TABLET, EXTENDED RELEASE ORAL ONCE
Status: COMPLETED | OUTPATIENT
Start: 2022-12-05 | End: 2022-12-05

## 2022-12-05 RX ORDER — PREDNISONE 20 MG/1
40 TABLET ORAL
Status: DISCONTINUED | OUTPATIENT
Start: 2022-12-06 | End: 2022-12-07

## 2022-12-05 NOTE — PLAN OF CARE
Patient Oneda Asael. On O2 2L /NC. CPAP at night. AV paced on tele. No c/o pain. Remains on IV antibiotics. Continent of bowel and bladder. Call light within reach. Will continue to monitor.   Problem: RESPIRATORY - ADULT  Goal: Achieves optimal ventilation and oxygenation  Description: INTERVENTIONS:  - Assess for changes in respiratory status  - Assess for changes in mentation and behavior  - Position to facilitate oxygenation and minimize respiratory effort  - Oxygen supplementation based on oxygen saturation or ABGs  - Provide Smoking Cessation handout, if applicable  - Encourage broncho-pulmonary hygiene including cough, deep breathe, Incentive Spirometry  - Assess the need for suctioning and perform as needed  - Assess and instruct to report SOB or any respiratory difficulty  - Respiratory Therapy support as indicated  - Manage/alleviate anxiety  - Monitor for signs/symptoms of CO2 retention  Outcome: Progressing

## 2022-12-05 NOTE — PLAN OF CARE
Assumed care of patient at 1. On 2L O2 with adequate O2 saturations, CPAP at night. AV paced on tele. Continent of bowels and bladder. Voiding in urinal and bathroom. No complaints of pain. Needs met at this time. Bed locked and in lowest position, call light within reach. Bed alarm on. HS Blood sugar 382. Dr. Delfino Alfonso notified. Given 15 units per Dr. Luke Faster order.      Plan: scheduled nebs, IV abx, IV steroids, pulm to see    Problem: Patient/Family Goals  Goal: Patient/Family Long Term Goal  Description: Patient's Long Term Goal: go home    Interventions:  - follow up appointments  - follow medication regimen  - See additional Care Plan goals for specific interventions  Outcome: Progressing  Goal: Patient/Family Short Term Goal  Description: Patient's Short Term Goal: manage pneumonia    Interventions:   - IV abx  - doctors to see  - monitor vs and tele monitor with continuous pulse ox  - See additional Care Plan goals for specific interventions  Outcome: Progressing     Problem: RESPIRATORY - ADULT  Goal: Achieves optimal ventilation and oxygenation  Description: INTERVENTIONS:  - Assess for changes in respiratory status  - Assess for changes in mentation and behavior  - Position to facilitate oxygenation and minimize respiratory effort  - Oxygen supplementation based on oxygen saturation or ABGs  - Provide Smoking Cessation handout, if applicable  - Encourage broncho-pulmonary hygiene including cough, deep breathe, Incentive Spirometry  - Assess the need for suctioning and perform as needed  - Assess and instruct to report SOB or any respiratory difficulty  - Respiratory Therapy support as indicated  - Manage/alleviate anxiety  - Monitor for signs/symptoms of CO2 retention  Outcome: Progressing

## 2022-12-05 NOTE — PROGRESS NOTES
12/05/22 1653   Mobility   O2 walk?  Yes   SPO2% on Room Air at Rest 86   SPO2% on Oxygen at Rest 93   At rest oxygen flow (liters per minute) 3   SPO2% Ambulation on Room Air 84   SPO2% Ambulation on Oxygen 94   Ambulation oxygen flow (liters per minute) 6

## 2022-12-06 ENCOUNTER — APPOINTMENT (OUTPATIENT)
Dept: ULTRASOUND IMAGING | Facility: HOSPITAL | Age: 82
End: 2022-12-06
Attending: INTERNAL MEDICINE
Payer: MEDICARE

## 2022-12-06 LAB
ANION GAP SERPL CALC-SCNC: 5 MMOL/L (ref 0–18)
BUN BLD-MCNC: 42 MG/DL (ref 7–18)
CALCIUM BLD-MCNC: 8.4 MG/DL (ref 8.5–10.1)
CHLORIDE SERPL-SCNC: 111 MMOL/L (ref 98–112)
CO2 SERPL-SCNC: 23 MMOL/L (ref 21–32)
CREAT BLD-MCNC: 1.26 MG/DL
GFR SERPLBLD BASED ON 1.73 SQ M-ARVRAT: 57 ML/MIN/1.73M2 (ref 60–?)
GLUCOSE BLD-MCNC: 203 MG/DL (ref 70–99)
GLUCOSE BLD-MCNC: 216 MG/DL (ref 70–99)
GLUCOSE BLD-MCNC: 216 MG/DL (ref 70–99)
GLUCOSE BLD-MCNC: 267 MG/DL (ref 70–99)
GLUCOSE BLD-MCNC: 323 MG/DL (ref 70–99)
OSMOLALITY SERPL CALC.SUM OF ELEC: 304 MOSM/KG (ref 275–295)
POTASSIUM SERPL-SCNC: 4.1 MMOL/L (ref 3.5–5.1)
POTASSIUM SERPL-SCNC: 4.1 MMOL/L (ref 3.5–5.1)
SODIUM SERPL-SCNC: 139 MMOL/L (ref 136–145)

## 2022-12-06 PROCEDURE — 99232 SBSQ HOSP IP/OBS MODERATE 35: CPT | Performed by: INTERNAL MEDICINE

## 2022-12-06 PROCEDURE — 76700 US EXAM ABDOM COMPLETE: CPT | Performed by: INTERNAL MEDICINE

## 2022-12-06 RX ORDER — PREDNISONE 20 MG/1
40 TABLET ORAL DAILY
Qty: 8 TABLET | Refills: 0 | Status: SHIPPED | OUTPATIENT
Start: 2022-12-06 | End: 2022-12-10

## 2022-12-06 RX ORDER — DOXYCYCLINE HYCLATE 100 MG/1
100 CAPSULE ORAL 2 TIMES DAILY
Qty: 14 CAPSULE | Refills: 0 | Status: SHIPPED | OUTPATIENT
Start: 2022-12-06 | End: 2022-12-13

## 2022-12-06 RX ORDER — TORSEMIDE 20 MG/1
20 TABLET ORAL DAILY
Status: DISCONTINUED | OUTPATIENT
Start: 2022-12-06 | End: 2022-12-07

## 2022-12-06 NOTE — PLAN OF CARE
Assumed care of patient at 1. Patient is alert and oriented x4. On 2L O2 with adequate O2 saturations. AV paced on tele. Continent of bowels and bladder. Briefed and voiding in urinal. No complaints of pain. Up with 1 and walker. Needs met at this time. Bed locked and in lowest position, call light within reach. Bed alarm on.     Plan: IV abx, nebs, transition to PO steroids    Problem: Patient/Family Goals  Goal: Patient/Family Long Term Goal  Description: Patient's Long Term Goal: go home    Interventions:  - follow up appointments  - follow medication regimen  - See additional Care Plan goals for specific interventions  Outcome: Progressing  Goal: Patient/Family Short Term Goal  Description: Patient's Short Term Goal: manage pneumonia    Interventions:   - IV abx  - doctors to see  - monitor vs and tele monitor with continuous pulse ox  - See additional Care Plan goals for specific interventions  Outcome: Progressing     Problem: RESPIRATORY - ADULT  Goal: Achieves optimal ventilation and oxygenation  Description: INTERVENTIONS:  - Assess for changes in respiratory status  - Assess for changes in mentation and behavior  - Position to facilitate oxygenation and minimize respiratory effort  - Oxygen supplementation based on oxygen saturation or ABGs  - Provide Smoking Cessation handout, if applicable  - Encourage broncho-pulmonary hygiene including cough, deep breathe, Incentive Spirometry  - Assess the need for suctioning and perform as needed  - Assess and instruct to report SOB or any respiratory difficulty  - Respiratory Therapy support as indicated  - Manage/alleviate anxiety  - Monitor for signs/symptoms of CO2 retention  Outcome: Progressing

## 2022-12-06 NOTE — PLAN OF CARE
Awake,alert   Breathing better 2L O2 /n/c . O2 sats 94%   C/o some right abdominal pain. Dr Shruti Araujo rounding aware  Still on IV antibiotics given, po steroids  O2 concentrator ready for pt. upon discharge   discharge planning tomorrow. Problem: RESPIRATORY - ADULT  Goal: Achieves optimal ventilation and oxygenation  Description: INTERVENTIONS:  - Assess for changes in respiratory status  - Assess for changes in mentation and behavior  - Position to facilitate oxygenation and minimize respiratory effort  - Oxygen supplementation based on oxygen saturation or ABGs  - Provide Smoking Cessation handout, if applicable  - Encourage broncho-pulmonary hygiene including cough, deep breathe, Incentive Spirometry  - Assess the need for suctioning and perform as needed  - Assess and instruct to report SOB or any respiratory difficulty  - Respiratory Therapy support as indicated  - Manage/alleviate anxiety  - Monitor for signs/symptoms of CO2 retention  12/6/2022 1602 by Sandra Hansen RN  Outcome: Progressing  12/6/2022 1602 by Sandra Hansen RN  Outcome: Progressing     Problem: CARDIOVASCULAR - ADULT  Goal: Maintains optimal cardiac output and hemodynamic stability  Description: INTERVENTIONS:  - Monitor vital signs, rhythm, and trends  - Monitor for bleeding, hypotension and signs of decreased cardiac output  - Evaluate effectiveness of vasoactive medications to optimize hemodynamic stability  - Monitor arterial and/or venous puncture sites for bleeding and/or hematoma  - Assess quality of pulses, skin color and temperature  - Assess for signs of decreased coronary artery perfusion - ex.  Angina  - Evaluate fluid balance, assess for edema, trend weights  Outcome: Progressing  Goal: Absence of cardiac arrhythmias or at baseline  Description: INTERVENTIONS:  - Continuous cardiac monitoring, monitor vital signs, obtain 12 lead EKG if indicated  - Evaluate effectiveness of antiarrhythmic and heart rate control medications as ordered  - Initiate emergency measures for life threatening arrhythmias  - Monitor electrolytes and administer replacement therapy as ordered  Outcome: Progressing

## 2022-12-06 NOTE — PLAN OF CARE
Problem: CARDIOVASCULAR - ADULT  Goal: Maintains optimal cardiac output and hemodynamic stability  Description: INTERVENTIONS:  - Monitor vital signs, rhythm, and trends  - Monitor for bleeding, hypotension and signs of decreased cardiac output  - Evaluate effectiveness of vasoactive medications to optimize hemodynamic stability  - Monitor arterial and/or venous puncture sites for bleeding and/or hematoma  - Assess quality of pulses, skin color and temperature  - Assess for signs of decreased coronary artery perfusion - ex. Angina  - Evaluate fluid balance, assess for edema, trend weights  Outcome: Progressing  Goal: Absence of cardiac arrhythmias or at baseline  Description: INTERVENTIONS:  - Continuous cardiac monitoring, monitor vital signs, obtain 12 lead EKG if indicated  - Evaluate effectiveness of antiarrhythmic and heart rate control medications as ordered  - Initiate emergency measures for life threatening arrhythmias  - Monitor electrolytes and administer replacement therapy as ordered  Outcome: Progressing     Problem: RESPIRATORY - ADULT  Goal: Achieves optimal ventilation and oxygenation  Description: INTERVENTIONS:  - Assess for changes in respiratory status  - Assess for changes in mentation and behavior  - Position to facilitate oxygenation and minimize respiratory effort  - Oxygen supplementation based on oxygen saturation or ABGs  - Provide Smoking Cessation handout, if applicable  - Encourage broncho-pulmonary hygiene including cough, deep breathe, Incentive Spirometry  - Assess the need for suctioning and perform as needed  - Assess and instruct to report SOB or any respiratory difficulty  - Respiratory Therapy support as indicated  - Manage/alleviate anxiety  - Monitor for signs/symptoms of CO2 retention  Outcome: Progressing  Received sitting in chair. Support person at bedside. Vss, resps unlabored at rest on 02 at 2l per nc. Pt denies any cp or sob at this time. BLE no edema noted.  Fall precautions and fluid restriction continued. Will continue to monitor. Labs and meds as ordered. Ambulate in hallway qid with assist. Continue fall precautions and fluid restrictions. Po diuretics today as ordered. US abdomen today as ordered (will keep npo as per protocol until US done). Accuchecks qid.

## 2022-12-07 VITALS
SYSTOLIC BLOOD PRESSURE: 136 MMHG | OXYGEN SATURATION: 96 % | HEART RATE: 69 BPM | TEMPERATURE: 99 F | WEIGHT: 203.5 LBS | HEIGHT: 66 IN | RESPIRATION RATE: 16 BRPM | DIASTOLIC BLOOD PRESSURE: 59 MMHG | BODY MASS INDEX: 32.71 KG/M2

## 2022-12-07 LAB
GLUCOSE BLD-MCNC: 194 MG/DL (ref 70–99)
GLUCOSE BLD-MCNC: 195 MG/DL (ref 70–99)
GLUCOSE BLD-MCNC: 227 MG/DL (ref 70–99)

## 2022-12-07 PROCEDURE — 99239 HOSP IP/OBS DSCHRG MGMT >30: CPT | Performed by: HOSPITALIST

## 2022-12-07 NOTE — CM/SW NOTE
OT recommending 3 in 1 commode. Sent referral in aidin. Spoke with Yehuda Hyde from [facility] to notify her of referral. Naomie Leal will remain available.      GERDA Schulte  Discharge Planner  [phone]

## 2022-12-07 NOTE — DISCHARGE INSTRUCTIONS
Sometimes managing your health at home requires assistance. The [place] team has recognized your preference to use [facility]. They can be reached by phone at [phone]. The fax number for your reference is [phone]. A representative from the home health agency will contact you or your family to schedule your first visit.

## 2022-12-07 NOTE — HOME CARE LIAISON
Received referral via [facility] for Home Health services. Spoke w/ patient and provided with list of Bert Molina providers from [facility], choice is Chip Goodwin. Agency reserved in [facility] and contact information placed on AVS.Financial interest disclosure provided.  Notified Deanna Barrera

## 2022-12-07 NOTE — PROGRESS NOTES
Patient alert and oriented x 4. Maintaining O2 saturation above 90%  On 2L via NC, CPAP at night. No complains of pain. IV antibiotics. Voids per urinal. Safety precautions in place, call light within reach. Will continue to monitor.

## 2022-12-07 NOTE — PLAN OF CARE
Patient Lior Lemus. On O2 1L/NC. AV paced on tele. No C/o pain. Continent of bowel and bladder. [facility] Call light within reach. Will continue to monitor. Addendum by Morteza Talamantes RN precepting [facility]:  Ambulated patient in hallway, on 4 liters patient's oxygen saturation was 85%, went up to 91-92% on 6 liters of oxygen. Patient was able to ambulate about 125 feet, tolerated well and now up in chair. 1400: Patient has orders for discharge. Spoke to patient's daughter, Ana Cevallos. Per Ana Cevallos, patient's son will  patient at [phone], then spend the night with patient.     1615: Per Ana Cevallos, the  time will be 1800 as patient's son is in traffic    Problem: Patient/Family Goals  Goal: Patient/Family Long Term Goal  Description: Patient's Long Term Goal: go home    Interventions:  - follow up appointments  - follow medication regimen  - See additional Care Plan goals for specific interventions  Outcome: Progressing  Goal: Patient/Family Short Term Goal  Description: Patient's Short Term Goal: manage pneumonia    Interventions:   - IV abx  - doctors to see  - monitor vs and tele monitor with continuous pulse ox  - See additional Care Plan goals for specific interventions  Outcome: Progressing     Problem: RESPIRATORY - ADULT  Goal: Achieves optimal ventilation and oxygenation  Description: INTERVENTIONS:  - Assess for changes in respiratory status  - Assess for changes in mentation and behavior  - Position to facilitate oxygenation and minimize respiratory effort  - Oxygen supplementation based on oxygen saturation or ABGs  - Provide Smoking Cessation handout, if applicable  - Encourage broncho-pulmonary hygiene including cough, deep breathe, Incentive Spirometry  - Assess the need for suctioning and perform as needed  - Assess and instruct to report SOB or any respiratory difficulty  - Respiratory Therapy support as indicated  - Manage/alleviate anxiety  - Monitor for signs/symptoms of CO2 retention  Outcome: Progressing     Problem: CARDIOVASCULAR - ADULT  Goal: Maintains optimal cardiac output and hemodynamic stability  Description: INTERVENTIONS:  - Monitor vital signs, rhythm, and trends  - Monitor for bleeding, hypotension and signs of decreased cardiac output  - Evaluate effectiveness of vasoactive medications to optimize hemodynamic stability  - Monitor arterial and/or venous puncture sites for bleeding and/or hematoma  - Assess quality of pulses, skin color and temperature  - Assess for signs of decreased coronary artery perfusion - ex.  Angina  - Evaluate fluid balance, assess for edema, trend weights  Outcome: Progressing  Goal: Absence of cardiac arrhythmias or at baseline  Description: INTERVENTIONS:  - Continuous cardiac monitoring, monitor vital signs, obtain 12 lead EKG if indicated  - Evaluate effectiveness of antiarrhythmic and heart rate control medications as ordered  - Initiate emergency measures for life threatening arrhythmias  - Monitor electrolytes and administer replacement therapy as ordered  Outcome: Progressing

## 2022-12-08 ENCOUNTER — TELEPHONE (OUTPATIENT)
Dept: FAMILY MEDICINE CLINIC | Facility: CLINIC | Age: 82
End: 2022-12-08

## 2022-12-08 ENCOUNTER — PATIENT OUTREACH (OUTPATIENT)
Dept: CASE MANAGEMENT | Age: 82
End: 2022-12-08

## 2022-12-08 DIAGNOSIS — I48.91 ATRIAL FIBRILLATION, UNSPECIFIED TYPE (HCC): ICD-10-CM

## 2022-12-08 DIAGNOSIS — I50.9 ACUTE CONGESTIVE HEART FAILURE, UNSPECIFIED HEART FAILURE TYPE (HCC): Primary | ICD-10-CM

## 2022-12-08 DIAGNOSIS — I10 ESSENTIAL HYPERTENSION: ICD-10-CM

## 2022-12-08 DIAGNOSIS — Z02.9 ENCOUNTERS FOR UNSPECIFIED ADMINISTRATIVE PURPOSE: ICD-10-CM

## 2022-12-08 DIAGNOSIS — J44.1 COPD EXACERBATION (HCC): ICD-10-CM

## 2022-12-08 NOTE — PROGRESS NOTES
Patient discharged to [place] [address]. Patient's son picked him up. AVS reviewed with son and patient. All belongings send with patient. Send home portable oxygen tank with patient. All questions answered. Heart failure discharge instructions reviewed, handout and HF Binder given. Addendum by Juliana Rodrigues RN Precepting [facility]: called  in ED, patient can call the phone number on the oxygen tank with any questions. Per patient's son, oxygen concentrator was delivered to patient's residence. Informed patient and patient's son that patient is on 1-2 liters at rest, 6 liters with activity.

## 2022-12-08 NOTE — PAYOR COMM NOTE
--------------  DISCHARGE REVIEW    Annia Mcguire [facility]  Subscriber #:  [id number]  Authorization Number: [id number]    Admit date: 12/5/22  Admit time:  11:34 AM  Discharge Date: 12/7/2022  6:40 PM     Admitting Physician: Raúl Gibbons MD  Attending Physician:  No att. providers found  Primary Care Physician: Galileo Patterson MD

## 2022-12-08 NOTE — TELEPHONE ENCOUNTER
Spoke to the pt today for TCM. The patient was recently hospitalized for rhinovirus/CHF/COPD. The pt does not have a HFU appt scheduled at this time and requested the office follow up with scheduling later today. A TCM/HFU appt is recommended by 12/14/2022 as the pt is a high risk for readmission. Please advise. BOOK BY DATE (last date for TCM): 12/21/2022      Please f/u with the pt and assist with scheduling a TCM/HFU appt. Thank you!

## 2022-12-09 ENCOUNTER — TELEPHONE (OUTPATIENT)
Dept: FAMILY MEDICINE CLINIC | Facility: CLINIC | Age: 82
End: 2022-12-09

## 2022-12-09 NOTE — TELEPHONE ENCOUNTER
Molina Heredia with [facility] is calling she is Letting doctor know opened home health nursing seen by nurse once a week for 5 weeks and on Monday for PT evaluation. Pt does not want OT.  Please call to give verbal.

## 2022-12-12 ENCOUNTER — TELEPHONE (OUTPATIENT)
Dept: FAMILY MEDICINE CLINIC | Facility: CLINIC | Age: 82
End: 2022-12-12

## 2022-12-12 NOTE — TELEPHONE ENCOUNTER
[facility] nurse calling. Pt was evaluated for PT today. He will be seen once a week for the next 4 weeks. No call back needed.

## 2022-12-14 ENCOUNTER — OFFICE VISIT (OUTPATIENT)
Facility: CLINIC | Age: 82
End: 2022-12-14
Payer: MEDICARE

## 2022-12-14 VITALS
BODY MASS INDEX: 29.73 KG/M2 | WEIGHT: 185 LBS | RESPIRATION RATE: 16 BRPM | DIASTOLIC BLOOD PRESSURE: 60 MMHG | SYSTOLIC BLOOD PRESSURE: 90 MMHG | OXYGEN SATURATION: 89 % | HEIGHT: 66 IN | HEART RATE: 60 BPM

## 2022-12-14 DIAGNOSIS — Z72.0 TOBACCO ABUSE: ICD-10-CM

## 2022-12-14 DIAGNOSIS — J96.11 CHRONIC RESPIRATORY FAILURE WITH HYPOXIA (HCC): ICD-10-CM

## 2022-12-14 DIAGNOSIS — J43.9 PULMONARY EMPHYSEMA, UNSPECIFIED EMPHYSEMA TYPE (HCC): Primary | ICD-10-CM

## 2022-12-14 PROCEDURE — 3008F BODY MASS INDEX DOCD: CPT | Performed by: INTERNAL MEDICINE

## 2022-12-14 PROCEDURE — 99204 OFFICE O/P NEW MOD 45 MIN: CPT | Performed by: INTERNAL MEDICINE

## 2022-12-14 PROCEDURE — 3078F DIAST BP <80 MM HG: CPT | Performed by: INTERNAL MEDICINE

## 2022-12-14 PROCEDURE — 3074F SYST BP LT 130 MM HG: CPT | Performed by: INTERNAL MEDICINE

## 2022-12-14 PROCEDURE — 1111F DSCHRG MED/CURRENT MED MERGE: CPT | Performed by: INTERNAL MEDICINE

## 2022-12-14 RX ORDER — NEBULIZER ACCESSORIES
1 KIT MISCELLANEOUS AS DIRECTED
Qty: 1 KIT | Refills: 0 | Status: SHIPPED | OUTPATIENT
Start: 2022-12-14

## 2022-12-14 RX ORDER — IPRATROPIUM BROMIDE AND ALBUTEROL SULFATE 2.5; .5 MG/3ML; MG/3ML
3 SOLUTION RESPIRATORY (INHALATION) EVERY 6 HOURS PRN
Qty: 360 ML | Refills: 3 | Status: SHIPPED | OUTPATIENT
Start: 2022-12-14

## 2022-12-14 RX ORDER — UMECLIDINIUM BROMIDE AND VILANTEROL TRIFENATATE 62.5; 25 UG/1; UG/1
1 POWDER RESPIRATORY (INHALATION) DAILY
Qty: 1 EACH | Refills: 11 | Status: SHIPPED | OUTPATIENT
Start: 2022-12-14

## 2022-12-14 NOTE — PATIENT INSTRUCTIONS
Start anoro inhaler one puff once a day. Use the duoneb nebulizer one vial every 6 hours as needed when short of breath.   Please obtain a breathing test to check your lungs  Please obtain a oxygen walking test called 6 minute walk test

## 2022-12-19 DIAGNOSIS — I10 ESSENTIAL HYPERTENSION: ICD-10-CM

## 2022-12-19 RX ORDER — METOPROLOL TARTRATE 100 MG/1
TABLET ORAL
Qty: 90 TABLET | Refills: 0 | Status: SHIPPED | OUTPATIENT
Start: 2022-12-19

## 2022-12-21 RX ORDER — METOPROLOL TARTRATE 100 MG/1
TABLET ORAL
Qty: 180 TABLET | Refills: 0 | Status: SHIPPED | OUTPATIENT
Start: 2022-12-21

## 2022-12-27 ENCOUNTER — TELEPHONE (OUTPATIENT)
Facility: CLINIC | Age: 82
End: 2022-12-27

## 2022-12-27 RX ORDER — TIOTROPIUM BROMIDE AND OLODATEROL 3.124; 2.736 UG/1; UG/1
2 SPRAY, METERED RESPIRATORY (INHALATION) DAILY
Qty: 1 EACH | Refills: 3 | Status: SHIPPED | OUTPATIENT
Start: 2022-12-27

## 2022-12-27 NOTE — TELEPHONE ENCOUNTER
Per Sary the [employer] inhaler is not on formulary the alternative is Stiolto, [employer], [employer]. Per Dr Gabriel Ambrose ok to change to [facility] so new Rx will be sent to [place] and patient will be notfied.

## 2023-01-09 DIAGNOSIS — E78.2 MIXED HYPERLIPIDEMIA: ICD-10-CM

## 2023-01-10 RX ORDER — ATORVASTATIN CALCIUM 10 MG/1
10 TABLET, FILM COATED ORAL NIGHTLY
Qty: 90 TABLET | Refills: 0 | Status: SHIPPED | OUTPATIENT
Start: 2023-01-10

## 2023-01-12 ENCOUNTER — NURSE ONLY (OUTPATIENT)
Facility: CLINIC | Age: 83
End: 2023-01-12
Payer: MEDICARE

## 2023-01-12 ENCOUNTER — OFFICE VISIT (OUTPATIENT)
Facility: CLINIC | Age: 83
End: 2023-01-12
Payer: MEDICARE

## 2023-01-12 VITALS
OXYGEN SATURATION: 93 % | WEIGHT: 198 LBS | RESPIRATION RATE: 16 BRPM | HEART RATE: 63 BPM | SYSTOLIC BLOOD PRESSURE: 98 MMHG | BODY MASS INDEX: 31.82 KG/M2 | DIASTOLIC BLOOD PRESSURE: 56 MMHG | HEIGHT: 66 IN

## 2023-01-12 DIAGNOSIS — R09.02 HYPOXEMIA: Primary | ICD-10-CM

## 2023-01-12 DIAGNOSIS — J44.9 CHRONIC OBSTRUCTIVE PULMONARY DISEASE, UNSPECIFIED COPD TYPE (HCC): ICD-10-CM

## 2023-01-12 DIAGNOSIS — J96.11 CHRONIC RESPIRATORY FAILURE WITH HYPOXIA (HCC): ICD-10-CM

## 2023-01-12 DIAGNOSIS — J43.9 PULMONARY EMPHYSEMA, UNSPECIFIED EMPHYSEMA TYPE (HCC): Primary | ICD-10-CM

## 2023-01-12 DIAGNOSIS — Z72.0 TOBACCO ABUSE: ICD-10-CM

## 2023-01-12 PROCEDURE — 99214 OFFICE O/P EST MOD 30 MIN: CPT | Performed by: INTERNAL MEDICINE

## 2023-01-12 PROCEDURE — 3078F DIAST BP <80 MM HG: CPT | Performed by: INTERNAL MEDICINE

## 2023-01-12 PROCEDURE — 3008F BODY MASS INDEX DOCD: CPT | Performed by: INTERNAL MEDICINE

## 2023-01-12 PROCEDURE — 3074F SYST BP LT 130 MM HG: CPT | Performed by: INTERNAL MEDICINE

## 2023-01-12 PROCEDURE — 94618 PULMONARY STRESS TESTING: CPT | Performed by: INTERNAL MEDICINE

## 2023-01-12 NOTE — PATIENT INSTRUCTIONS
Consider starting the inhaler - I sent stiolto inhaler 2 puffs once a day. Continue using the oxygen: no oxygen at rest, 2L on exertion (such as walking)  Consider exercising - goal is 30minutes 5 times a week. We will send the portable oxygen concentrator request to E. Call with questions/concerns    Follow up in 4months or earlier if any issues/concerns.

## 2023-01-12 NOTE — PROGRESS NOTES
OXYGEN CLINIC ASSESSMENT    Date: 2023 Physician: DR. Nani Elizondo   Diagnosis: COPD Technician: Tae Major., RT     Patient: Christiana Mcknight MRN: [id number] : 1940     Height:   Weight:   Age: 80year old         BP HR SpO2 RR DANIA DIST  (FT) TIME Reason Stopped   RA         REST 108/60   64   96     16     0     N/A         N/A         N/A           RA       PEAK  EXERCISE 118/68 98 87   24   2   400   4 min   O2 Desaturation           O2 FLOW LPM  BP HR SpO2 RR DANIA DIST  (FT) TIME Reason Stopped   2 LPM   REST     68   99           0                             2 LPM PEAK  EXERCISE   92 94   24   2   600   6 min   Study Ended                      PULSE DOSE WITH PORTABLE OXYGEN CONCENTRATOR  O2 FLOW LPM  BP HR SpO2 RR DANIA DIST  (FT) TIME Reason Stopped   2 LPM REST                                   2 LPM PEAK  EXERCISE       92   93       24       2       500       6 min       Study Ended               FINDINGS  0 LPM required to maintain a saturation of 96 % at rest   2 LPM required to maintain a saturation of 94 % with exertion   2 LPM Pulse dose to maintain a saturation of 93 % with exertion   Pt did the testing using a walker. Pt had to stop and rest a few times due to pt felt tired. Patient will need 0 LPM at rest and 2 LPM with exertion. 2 LPM  pulse dose using a portable oxygen concentrator.

## 2023-02-06 ENCOUNTER — OFFICE VISIT (OUTPATIENT)
Dept: FAMILY MEDICINE CLINIC | Facility: CLINIC | Age: 83
End: 2023-02-06
Payer: MEDICARE

## 2023-02-06 VITALS
HEIGHT: 66 IN | SYSTOLIC BLOOD PRESSURE: 120 MMHG | OXYGEN SATURATION: 92 % | BODY MASS INDEX: 31.34 KG/M2 | DIASTOLIC BLOOD PRESSURE: 70 MMHG | RESPIRATION RATE: 17 BRPM | HEART RATE: 75 BPM | WEIGHT: 195 LBS

## 2023-02-06 DIAGNOSIS — G47.33 OSA (OBSTRUCTIVE SLEEP APNEA): ICD-10-CM

## 2023-02-06 DIAGNOSIS — D69.6 THROMBOCYTOPENIA (HCC): Chronic | ICD-10-CM

## 2023-02-06 DIAGNOSIS — N18.32 TYPE 2 DIABETES MELLITUS WITH STAGE 3B CHRONIC KIDNEY DISEASE, WITHOUT LONG-TERM CURRENT USE OF INSULIN (HCC): ICD-10-CM

## 2023-02-06 DIAGNOSIS — K70.30 ALCOHOLIC CIRRHOSIS OF LIVER WITHOUT ASCITES (HCC): ICD-10-CM

## 2023-02-06 DIAGNOSIS — M48.061 SPINAL STENOSIS OF LUMBAR REGION, UNSPECIFIED WHETHER NEUROGENIC CLAUDICATION PRESENT: ICD-10-CM

## 2023-02-06 DIAGNOSIS — M43.10 ACQUIRED SPONDYLOLISTHESIS: ICD-10-CM

## 2023-02-06 DIAGNOSIS — J43.2 CENTRILOBULAR EMPHYSEMA (HCC): ICD-10-CM

## 2023-02-06 DIAGNOSIS — K52.832 LYMPHOCYTIC COLITIS: ICD-10-CM

## 2023-02-06 DIAGNOSIS — Z85.46 HISTORY OF PROSTATE CANCER: ICD-10-CM

## 2023-02-06 DIAGNOSIS — Z95.0 CARDIAC PACEMAKER IN SITU: ICD-10-CM

## 2023-02-06 DIAGNOSIS — Z00.00 ENCOUNTER FOR ANNUAL HEALTH EXAMINATION: Primary | ICD-10-CM

## 2023-02-06 DIAGNOSIS — I27.0 PRIMARY PULMONARY HYPERTENSION (HCC): ICD-10-CM

## 2023-02-06 DIAGNOSIS — I10 ESSENTIAL HYPERTENSION: ICD-10-CM

## 2023-02-06 DIAGNOSIS — I25.10 ATHEROSCLEROSIS OF NATIVE CORONARY ARTERY OF NATIVE HEART WITHOUT ANGINA PECTORIS: Chronic | ICD-10-CM

## 2023-02-06 DIAGNOSIS — E11.22 TYPE 2 DIABETES MELLITUS WITH STAGE 3B CHRONIC KIDNEY DISEASE, WITHOUT LONG-TERM CURRENT USE OF INSULIN (HCC): ICD-10-CM

## 2023-02-06 DIAGNOSIS — I48.91 ATRIAL FIBRILLATION, UNSPECIFIED TYPE (HCC): ICD-10-CM

## 2023-02-06 DIAGNOSIS — E78.2 MIXED HYPERLIPIDEMIA: ICD-10-CM

## 2023-02-06 DIAGNOSIS — I50.9 ACUTE CONGESTIVE HEART FAILURE, UNSPECIFIED HEART FAILURE TYPE (HCC): ICD-10-CM

## 2023-02-06 DIAGNOSIS — I05.9 MITRAL VALVE DISORDER: ICD-10-CM

## 2023-02-06 PROBLEM — E11.9 TYPE 2 DIABETES MELLITUS WITHOUT COMPLICATION, WITHOUT LONG-TERM CURRENT USE OF INSULIN (HCC): Status: RESOLVED | Noted: 2020-12-14 | Resolved: 2023-02-06

## 2023-02-06 PROBLEM — J44.1 COPD EXACERBATION (HCC): Status: RESOLVED | Noted: 2021-09-23 | Resolved: 2023-02-06

## 2023-02-06 PROBLEM — R06.00 DYSPNEA, UNSPECIFIED TYPE: Status: RESOLVED | Noted: 2022-12-03 | Resolved: 2023-02-06

## 2023-02-06 RX ORDER — METOPROLOL TARTRATE 100 MG/1
100 TABLET ORAL 2 TIMES DAILY
Qty: 180 TABLET | Refills: 3 | Status: SHIPPED | OUTPATIENT
Start: 2023-02-06

## 2023-02-07 ENCOUNTER — LAB REQUISITION (OUTPATIENT)
Dept: LAB | Facility: HOSPITAL | Age: 83
End: 2023-02-07
Payer: MEDICARE

## 2023-02-07 DIAGNOSIS — E78.2 MIXED HYPERLIPIDEMIA: ICD-10-CM

## 2023-02-07 DIAGNOSIS — N18.32 CHRONIC KIDNEY DISEASE, STAGE 3B (HCC): ICD-10-CM

## 2023-02-07 DIAGNOSIS — E11.22 TYPE 2 DIABETES MELLITUS WITH DIABETIC CHRONIC KIDNEY DISEASE (HCC): ICD-10-CM

## 2023-02-07 DIAGNOSIS — Z85.46 PERSONAL HISTORY OF MALIGNANT NEOPLASM OF PROSTATE: ICD-10-CM

## 2023-02-07 DIAGNOSIS — I10 ESSENTIAL (PRIMARY) HYPERTENSION: ICD-10-CM

## 2023-02-07 LAB
ALBUMIN SERPL-MCNC: 3.6 G/DL (ref 3.4–5)
ALBUMIN/GLOB SERPL: 1.2 {RATIO} (ref 1–2)
ALP LIVER SERPL-CCNC: 73 U/L
ALT SERPL-CCNC: 18 U/L
ANION GAP SERPL CALC-SCNC: 7 MMOL/L (ref 0–18)
AST SERPL-CCNC: 20 U/L (ref 15–37)
BASOPHILS # BLD AUTO: 0.05 X10(3) UL (ref 0–0.2)
BASOPHILS NFR BLD AUTO: 0.7 %
BILIRUB SERPL-MCNC: 0.7 MG/DL (ref 0.1–2)
BUN BLD-MCNC: 24 MG/DL (ref 7–18)
CALCIUM BLD-MCNC: 9 MG/DL (ref 8.5–10.1)
CHLORIDE SERPL-SCNC: 109 MMOL/L (ref 98–112)
CHOLEST SERPL-MCNC: 109 MG/DL (ref ?–200)
CO2 SERPL-SCNC: 24 MMOL/L (ref 21–32)
CREAT BLD-MCNC: 1.32 MG/DL
CREAT UR-SCNC: 147 MG/DL
EOSINOPHIL # BLD AUTO: 0.41 X10(3) UL (ref 0–0.7)
EOSINOPHIL NFR BLD AUTO: 6.1 %
ERYTHROCYTE [DISTWIDTH] IN BLOOD BY AUTOMATED COUNT: 15.9 %
EST. AVERAGE GLUCOSE BLD GHB EST-MCNC: 128 MG/DL (ref 68–126)
GFR SERPLBLD BASED ON 1.73 SQ M-ARVRAT: 54 ML/MIN/1.73M2 (ref 60–?)
GLOBULIN PLAS-MCNC: 3.1 G/DL (ref 2.8–4.4)
GLUCOSE BLD-MCNC: 147 MG/DL (ref 70–99)
HBA1C MFR BLD: 6.1 % (ref ?–5.7)
HCT VFR BLD AUTO: 34 %
HDLC SERPL-MCNC: 34 MG/DL (ref 40–59)
HGB BLD-MCNC: 10.7 G/DL
IMM GRANULOCYTES # BLD AUTO: 0.03 X10(3) UL (ref 0–1)
IMM GRANULOCYTES NFR BLD: 0.4 %
LDLC SERPL CALC-MCNC: 54 MG/DL (ref ?–100)
LYMPHOCYTES # BLD AUTO: 1.16 X10(3) UL (ref 1–4)
LYMPHOCYTES NFR BLD AUTO: 17.3 %
MCH RBC QN AUTO: 27.7 PG (ref 26–34)
MCHC RBC AUTO-ENTMCNC: 31.5 G/DL (ref 31–37)
MCV RBC AUTO: 88.1 FL
MICROALBUMIN UR-MCNC: 1.05 MG/DL
MICROALBUMIN/CREAT 24H UR-RTO: 7.1 UG/MG (ref ?–30)
MONOCYTES # BLD AUTO: 0.65 X10(3) UL (ref 0.1–1)
MONOCYTES NFR BLD AUTO: 9.7 %
NEUTROPHILS # BLD AUTO: 4.39 X10 (3) UL (ref 1.5–7.7)
NEUTROPHILS # BLD AUTO: 4.39 X10(3) UL (ref 1.5–7.7)
NEUTROPHILS NFR BLD AUTO: 65.8 %
NONHDLC SERPL-MCNC: 75 MG/DL (ref ?–130)
OSMOLALITY SERPL CALC.SUM OF ELEC: 297 MOSM/KG (ref 275–295)
PLATELET # BLD AUTO: 222 10(3)UL (ref 150–450)
POTASSIUM SERPL-SCNC: 3.8 MMOL/L (ref 3.5–5.1)
PROT SERPL-MCNC: 6.7 G/DL (ref 6.4–8.2)
PSA SERPL-MCNC: 0.34 NG/ML (ref ?–4)
RBC # BLD AUTO: 3.86 X10(6)UL
SODIUM SERPL-SCNC: 140 MMOL/L (ref 136–145)
TRIGL SERPL-MCNC: 112 MG/DL (ref 30–149)
VLDLC SERPL CALC-MCNC: 16 MG/DL (ref 0–30)
WBC # BLD AUTO: 6.7 X10(3) UL (ref 4–11)

## 2023-02-07 PROCEDURE — 84153 ASSAY OF PSA TOTAL: CPT | Performed by: FAMILY MEDICINE

## 2023-02-07 PROCEDURE — 83036 HEMOGLOBIN GLYCOSYLATED A1C: CPT | Performed by: FAMILY MEDICINE

## 2023-02-07 PROCEDURE — 82043 UR ALBUMIN QUANTITATIVE: CPT | Performed by: FAMILY MEDICINE

## 2023-02-07 PROCEDURE — 85025 COMPLETE CBC W/AUTO DIFF WBC: CPT | Performed by: FAMILY MEDICINE

## 2023-02-07 PROCEDURE — 80053 COMPREHEN METABOLIC PANEL: CPT | Performed by: FAMILY MEDICINE

## 2023-02-07 PROCEDURE — 82570 ASSAY OF URINE CREATININE: CPT | Performed by: FAMILY MEDICINE

## 2023-02-07 PROCEDURE — 80061 LIPID PANEL: CPT | Performed by: FAMILY MEDICINE

## 2023-02-13 RX ORDER — TIOTROPIUM BROMIDE AND OLODATEROL 3.124; 2.736 UG/1; UG/1
2 SPRAY, METERED RESPIRATORY (INHALATION) DAILY
Qty: 3 EACH | Refills: 1 | Status: SHIPPED | OUTPATIENT
Start: 2023-02-13

## 2023-02-13 NOTE — TELEPHONE ENCOUNTER
Refill Stiolto. Last seen by Dr. Carmen Hernández on 1-12-23. Next appt scheduled for 5-10-23. Refill pended for Dr. Soham ferreira.

## 2023-02-15 ENCOUNTER — TELEPHONE (OUTPATIENT)
Dept: FAMILY MEDICINE CLINIC | Facility: CLINIC | Age: 83
End: 2023-02-15

## 2023-02-15 NOTE — TELEPHONE ENCOUNTER
Reached patient for medication adherence consult. Patient failed adherence measure for atorvastatin in 2022 per insurance report. Patient states that he has been taking his atorvastatin as prescribed. Patient reports that he is tolerating his medication well. Patient believes that he may have had extra supply of medication that he was using up which is why he wasn't refilling consistently. Patient currently has enough supply of atorvastatin and does not need a refill at this time. Provided education on importance of adherence for optimal benefit. Patient denies any questions or concern with medications.

## 2023-03-03 ENCOUNTER — TELEPHONE (OUTPATIENT)
Dept: FAMILY MEDICINE CLINIC | Facility: CLINIC | Age: 83
End: 2023-03-03

## 2023-03-15 ENCOUNTER — OFFICE VISIT (OUTPATIENT)
Facility: CLINIC | Age: 83
End: 2023-03-15
Payer: MEDICARE

## 2023-03-15 VITALS
HEART RATE: 83 BPM | DIASTOLIC BLOOD PRESSURE: 74 MMHG | RESPIRATION RATE: 14 BRPM | OXYGEN SATURATION: 94 % | WEIGHT: 195 LBS | SYSTOLIC BLOOD PRESSURE: 116 MMHG | HEIGHT: 66 IN | BODY MASS INDEX: 31.34 KG/M2

## 2023-03-15 DIAGNOSIS — G47.33 OSA (OBSTRUCTIVE SLEEP APNEA): Primary | ICD-10-CM

## 2023-03-15 DIAGNOSIS — I27.0 PRIMARY PULMONARY HYPERTENSION (HCC): ICD-10-CM

## 2023-03-15 DIAGNOSIS — J43.2 CENTRILOBULAR EMPHYSEMA (HCC): ICD-10-CM

## 2023-03-15 DIAGNOSIS — G47.33 OSA (OBSTRUCTIVE SLEEP APNEA): ICD-10-CM

## 2023-03-15 DIAGNOSIS — I48.91 ATRIAL FIBRILLATION, UNSPECIFIED TYPE (HCC): ICD-10-CM

## 2023-03-15 DIAGNOSIS — Z95.0 CARDIAC PACEMAKER IN SITU: Primary | ICD-10-CM

## 2023-03-15 PROCEDURE — 3074F SYST BP LT 130 MM HG: CPT | Performed by: OTHER

## 2023-03-15 PROCEDURE — 3078F DIAST BP <80 MM HG: CPT | Performed by: OTHER

## 2023-03-15 PROCEDURE — 3008F BODY MASS INDEX DOCD: CPT | Performed by: OTHER

## 2023-03-15 PROCEDURE — 99214 OFFICE O/P EST MOD 30 MIN: CPT | Performed by: OTHER

## 2023-03-16 ENCOUNTER — TELEPHONE (OUTPATIENT)
Facility: CLINIC | Age: 83
End: 2023-03-16

## 2023-03-16 NOTE — TELEPHONE ENCOUNTER
Notified pt that order sent to [facility] to program his machine to new settings via [employer]. [facility] will mail him the SD card , pt instructed to put it in his cpap to apply changes. Pt acknowledged understanding and agrees with plan.

## 2023-03-30 ENCOUNTER — TELEPHONE (OUTPATIENT)
Dept: FAMILY MEDICINE CLINIC | Facility: CLINIC | Age: 83
End: 2023-03-30

## 2023-03-30 DIAGNOSIS — G47.33 OBSTRUCTIVE SLEEP APNEA: Primary | ICD-10-CM

## 2023-03-30 NOTE — PATIENT INSTRUCTIONS
Lisandro Harley's SCREENING SCHEDULE   Tests on this list are recommended by your physician but may not be covered, or covered at this frequency, by your insurer. Please check with your insurance carrier before scheduling to verify coverage.     PREVENTATIV Anyone with a family history    Colorectal Cancer Screening Covered up to Age 76     Colonoscopy Screen   Covered every 10 years- more often if abnormal Colonoscopy due on 09/25/2018 Update [facility] if applicable    Flex Sigmoidoscopy Screen  Cov covered by Medicare Part B) No orders found for this or any previous visit.  This may be covered with your prescription benefits, but Medicare does not cover unless Medically needed    Zoster (Not covered by Medicare Part B) No orders found for this or any

## 2023-03-30 NOTE — TELEPHONE ENCOUNTER
Referral request [facility]    Fax number: [phone]    Diagnosis: G47.33    Auth per Cecy Arvizu [id number] valid 3/30/23 to 5/28/2023

## 2023-05-02 ENCOUNTER — TELEPHONE (OUTPATIENT)
Dept: FAMILY MEDICINE CLINIC | Facility: CLINIC | Age: 83
End: 2023-05-02

## 2023-05-02 DIAGNOSIS — J43.9 PULMONARY EMPHYSEMA, UNSPECIFIED EMPHYSEMA TYPE (HCC): Primary | ICD-10-CM

## 2023-05-02 DIAGNOSIS — J96.11 CHRONIC HYPOXEMIC RESPIRATORY FAILURE (HCC): ICD-10-CM

## 2023-05-10 ENCOUNTER — OFFICE VISIT (OUTPATIENT)
Facility: CLINIC | Age: 83
End: 2023-05-10
Payer: MEDICARE

## 2023-05-10 VITALS
BODY MASS INDEX: 31 KG/M2 | SYSTOLIC BLOOD PRESSURE: 96 MMHG | DIASTOLIC BLOOD PRESSURE: 56 MMHG | RESPIRATION RATE: 16 BRPM | HEIGHT: 66 IN | OXYGEN SATURATION: 95 %

## 2023-05-10 DIAGNOSIS — I27.20 PULMONARY HYPERTENSION (HCC): ICD-10-CM

## 2023-05-10 DIAGNOSIS — J96.11 CHRONIC RESPIRATORY FAILURE WITH HYPOXIA (HCC): ICD-10-CM

## 2023-05-10 DIAGNOSIS — Z72.0 TOBACCO ABUSE: ICD-10-CM

## 2023-05-10 DIAGNOSIS — J43.9 PULMONARY EMPHYSEMA, UNSPECIFIED EMPHYSEMA TYPE (HCC): Primary | ICD-10-CM

## 2023-05-10 DIAGNOSIS — J43.2 CENTRILOBULAR EMPHYSEMA (HCC): ICD-10-CM

## 2023-05-10 PROCEDURE — 1160F RVW MEDS BY RX/DR IN RCRD: CPT | Performed by: INTERNAL MEDICINE

## 2023-05-10 PROCEDURE — 1159F MED LIST DOCD IN RCRD: CPT | Performed by: INTERNAL MEDICINE

## 2023-05-10 PROCEDURE — 3074F SYST BP LT 130 MM HG: CPT | Performed by: INTERNAL MEDICINE

## 2023-05-10 PROCEDURE — 99214 OFFICE O/P EST MOD 30 MIN: CPT | Performed by: INTERNAL MEDICINE

## 2023-05-10 PROCEDURE — 3078F DIAST BP <80 MM HG: CPT | Performed by: INTERNAL MEDICINE

## 2023-05-10 NOTE — PATIENT INSTRUCTIONS
Please obtain a breathing test to check your lung function. Please monitor your oxygen numbers and be sure your oxygen numbers are 90% or greater. Continue to use stiolto 2 puffs once a day  Continue to use albuterol 2 puffs every 6 hours as needed for your breathing or cough.    I will review with the cardiologist about the pulmonary hypertension - I would think they will contact you to schedule an appointment  Consider pulmonary rehab - see the handout

## 2023-06-07 DIAGNOSIS — E78.2 MIXED HYPERLIPIDEMIA: ICD-10-CM

## 2023-06-07 RX ORDER — ATORVASTATIN CALCIUM 10 MG/1
TABLET, FILM COATED ORAL
Qty: 90 TABLET | Refills: 0 | Status: SHIPPED | OUTPATIENT
Start: 2023-06-07

## 2023-06-14 ENCOUNTER — RT VISIT (OUTPATIENT)
Dept: RESPIRATORY THERAPY | Facility: HOSPITAL | Age: 83
End: 2023-06-14
Attending: INTERNAL MEDICINE
Payer: MEDICARE

## 2023-06-14 DIAGNOSIS — J43.9 PULMONARY EMPHYSEMA, UNSPECIFIED EMPHYSEMA TYPE (HCC): ICD-10-CM

## 2023-06-14 PROCEDURE — 94726 PLETHYSMOGRAPHY LUNG VOLUMES: CPT | Performed by: INTERNAL MEDICINE

## 2023-06-14 PROCEDURE — 94729 DIFFUSING CAPACITY: CPT | Performed by: INTERNAL MEDICINE

## 2023-06-14 PROCEDURE — 94060 EVALUATION OF WHEEZING: CPT | Performed by: INTERNAL MEDICINE

## 2023-06-14 NOTE — PROCEDURES
Findings:  Postbronchodilator FEV1 is 2.37L, 97% predicted. Postbronchodilator FVC is 3.31L, 100% predicted. FEV1/ FVC ratio is 0.72. There is no significant bronchodilator response after administration of albuterol. The flow-volume loop demonstrates a normal pattern. The TLC is 5.93L, 94% predicted. The residual volume 2.49L, 89% predicted. The diffusion capacity is 30% predicted and 35% predicted when corrected for alveolar volume. Impression:  There is no airway obstruction on spirometry and visualized on flow-volume loop. There is no significant bronchodilator response, but this does not preclude treatment with bronchodilators. Lung volumes are normal.  Diffusion capacity is severely reduced with DLCO of 30%. The isolated decrease in diffusion capacity can be seen in emphysema, interstitial lung disease, pulmonary vascular disease (such as pulmonary hypertension) and anemia. If not already performed, would suggest further evaluation as determined clinically. Additionally, given the severity of the reduced diffusion capacity, would recommend evaluation for hypoxia and supplemental oxygenation if not already performed. There are no previous pulmonary function tests available for comparison.

## 2023-06-19 ENCOUNTER — OFFICE VISIT (OUTPATIENT)
Facility: CLINIC | Age: 83
End: 2023-06-19
Payer: MEDICARE

## 2023-06-19 VITALS
HEART RATE: 70 BPM | OXYGEN SATURATION: 90 % | SYSTOLIC BLOOD PRESSURE: 126 MMHG | DIASTOLIC BLOOD PRESSURE: 62 MMHG | RESPIRATION RATE: 14 BRPM | HEIGHT: 66 IN | WEIGHT: 198 LBS | BODY MASS INDEX: 31.82 KG/M2

## 2023-06-19 DIAGNOSIS — I27.0 PRIMARY PULMONARY HYPERTENSION (HCC): ICD-10-CM

## 2023-06-19 DIAGNOSIS — J43.9 PULMONARY EMPHYSEMA, UNSPECIFIED EMPHYSEMA TYPE (HCC): ICD-10-CM

## 2023-06-19 DIAGNOSIS — G47.33 OSA (OBSTRUCTIVE SLEEP APNEA): ICD-10-CM

## 2023-06-19 DIAGNOSIS — I48.91 ATRIAL FIBRILLATION, UNSPECIFIED TYPE (HCC): Primary | ICD-10-CM

## 2023-06-19 PROCEDURE — 99214 OFFICE O/P EST MOD 30 MIN: CPT | Performed by: OTHER

## 2023-06-19 PROCEDURE — 3008F BODY MASS INDEX DOCD: CPT | Performed by: OTHER

## 2023-06-19 PROCEDURE — 1159F MED LIST DOCD IN RCRD: CPT | Performed by: OTHER

## 2023-06-19 PROCEDURE — 3078F DIAST BP <80 MM HG: CPT | Performed by: OTHER

## 2023-06-19 PROCEDURE — 3074F SYST BP LT 130 MM HG: CPT | Performed by: OTHER

## 2023-06-19 PROCEDURE — 1160F RVW MEDS BY RX/DR IN RCRD: CPT | Performed by: OTHER

## 2023-06-29 ENCOUNTER — LAB REQUISITION (OUTPATIENT)
Dept: LAB | Facility: HOSPITAL | Age: 83
End: 2023-06-29
Payer: MEDICARE

## 2023-06-29 DIAGNOSIS — E78.5 HYPERLIPIDEMIA, UNSPECIFIED: ICD-10-CM

## 2023-06-29 DIAGNOSIS — I10 ESSENTIAL (PRIMARY) HYPERTENSION: ICD-10-CM

## 2023-06-29 LAB
ALBUMIN SERPL-MCNC: 3.5 G/DL (ref 3.4–5)
ALBUMIN/GLOB SERPL: 1.1 {RATIO} (ref 1–2)
ALP LIVER SERPL-CCNC: 75 U/L
ALT SERPL-CCNC: 16 U/L
ANION GAP SERPL CALC-SCNC: 5 MMOL/L (ref 0–18)
AST SERPL-CCNC: 14 U/L (ref 15–37)
BILIRUB SERPL-MCNC: 0.7 MG/DL (ref 0.1–2)
BUN BLD-MCNC: 26 MG/DL (ref 7–18)
CALCIUM BLD-MCNC: 8.4 MG/DL (ref 8.5–10.1)
CHLORIDE SERPL-SCNC: 114 MMOL/L (ref 98–112)
CHOLEST SERPL-MCNC: 112 MG/DL (ref ?–200)
CO2 SERPL-SCNC: 21 MMOL/L (ref 21–32)
CREAT BLD-MCNC: 1.22 MG/DL
FASTING PATIENT LIPID ANSWER: YES
FASTING STATUS PATIENT QL REPORTED: YES
GFR SERPLBLD BASED ON 1.73 SQ M-ARVRAT: 59 ML/MIN/1.73M2 (ref 60–?)
GLOBULIN PLAS-MCNC: 3.1 G/DL (ref 2.8–4.4)
GLUCOSE BLD-MCNC: 116 MG/DL (ref 70–99)
HDLC SERPL-MCNC: 37 MG/DL (ref 40–59)
LDLC SERPL CALC-MCNC: 61 MG/DL (ref ?–100)
NONHDLC SERPL-MCNC: 75 MG/DL (ref ?–130)
OSMOLALITY SERPL CALC.SUM OF ELEC: 296 MOSM/KG (ref 275–295)
POTASSIUM SERPL-SCNC: 3.9 MMOL/L (ref 3.5–5.1)
PROT SERPL-MCNC: 6.6 G/DL (ref 6.4–8.2)
SODIUM SERPL-SCNC: 140 MMOL/L (ref 136–145)
TRIGL SERPL-MCNC: 66 MG/DL (ref 30–149)
VLDLC SERPL CALC-MCNC: 10 MG/DL (ref 0–30)

## 2023-06-29 PROCEDURE — 80061 LIPID PANEL: CPT | Performed by: INTERNAL MEDICINE

## 2023-06-29 PROCEDURE — 80053 COMPREHEN METABOLIC PANEL: CPT | Performed by: INTERNAL MEDICINE

## 2023-07-12 NOTE — PROGRESS NOTES
Patient presents with:  Rash: itchy rash on both legs present 1 month. Was on arms but that is resolved  Leg Swelling     HPI:   Milan Rajan is a [de-identified]year old male who presents to the office for eval of rash on legs. Present a month now.   He sent message head trauma

## 2023-08-07 ENCOUNTER — OFFICE VISIT (OUTPATIENT)
Dept: FAMILY MEDICINE CLINIC | Facility: CLINIC | Age: 83
End: 2023-08-07
Payer: MEDICARE

## 2023-08-07 VITALS
OXYGEN SATURATION: 93 % | HEIGHT: 66 IN | HEART RATE: 79 BPM | WEIGHT: 190 LBS | SYSTOLIC BLOOD PRESSURE: 110 MMHG | BODY MASS INDEX: 30.53 KG/M2 | DIASTOLIC BLOOD PRESSURE: 60 MMHG

## 2023-08-07 DIAGNOSIS — I10 ESSENTIAL HYPERTENSION: ICD-10-CM

## 2023-08-07 DIAGNOSIS — J43.9 PULMONARY EMPHYSEMA, UNSPECIFIED EMPHYSEMA TYPE (HCC): ICD-10-CM

## 2023-08-07 DIAGNOSIS — E11.22 TYPE 2 DIABETES MELLITUS WITH STAGE 3B CHRONIC KIDNEY DISEASE, WITHOUT LONG-TERM CURRENT USE OF INSULIN (HCC): Primary | ICD-10-CM

## 2023-08-07 DIAGNOSIS — N18.32 TYPE 2 DIABETES MELLITUS WITH STAGE 3B CHRONIC KIDNEY DISEASE, WITHOUT LONG-TERM CURRENT USE OF INSULIN (HCC): Primary | ICD-10-CM

## 2023-08-07 LAB
CARTRIDGE LOT#: ABNORMAL NUMERIC
HEMOGLOBIN A1C: 6.2 % (ref 4.3–5.6)

## 2023-08-07 RX ORDER — AMOXICILLIN 500 MG/1
CAPSULE ORAL AS DIRECTED
COMMUNITY
Start: 2023-06-19

## 2023-09-13 RX ORDER — TORSEMIDE 20 MG/1
20 TABLET ORAL DAILY
Qty: 90 TABLET | Refills: 0 | Status: SHIPPED | OUTPATIENT
Start: 2023-09-13

## 2023-09-28 ENCOUNTER — TELEPHONE (OUTPATIENT)
Dept: FAMILY MEDICINE CLINIC | Facility: CLINIC | Age: 83
End: 2023-09-28

## 2023-09-28 NOTE — TELEPHONE ENCOUNTER
Received a form to be completed for [address] to be completed for pt    Pls fax to [phone] when done    Placed in triage in forms bin

## 2023-10-19 ENCOUNTER — OFFICE VISIT (OUTPATIENT)
Facility: CLINIC | Age: 83
End: 2023-10-19
Payer: MEDICARE

## 2023-10-19 VITALS
SYSTOLIC BLOOD PRESSURE: 110 MMHG | HEART RATE: 76 BPM | BODY MASS INDEX: 31.5 KG/M2 | HEIGHT: 66 IN | OXYGEN SATURATION: 89 % | DIASTOLIC BLOOD PRESSURE: 52 MMHG | WEIGHT: 196 LBS | RESPIRATION RATE: 16 BRPM

## 2023-10-19 DIAGNOSIS — I27.20 PULMONARY HYPERTENSION (HCC): ICD-10-CM

## 2023-10-19 DIAGNOSIS — J96.11 CHRONIC RESPIRATORY FAILURE WITH HYPOXIA (HCC): Primary | ICD-10-CM

## 2023-10-19 DIAGNOSIS — J43.2 CENTRILOBULAR EMPHYSEMA (HCC): ICD-10-CM

## 2023-10-19 PROCEDURE — 3008F BODY MASS INDEX DOCD: CPT | Performed by: INTERNAL MEDICINE

## 2023-10-19 PROCEDURE — 3078F DIAST BP <80 MM HG: CPT | Performed by: INTERNAL MEDICINE

## 2023-10-19 PROCEDURE — 1159F MED LIST DOCD IN RCRD: CPT | Performed by: INTERNAL MEDICINE

## 2023-10-19 PROCEDURE — 1160F RVW MEDS BY RX/DR IN RCRD: CPT | Performed by: INTERNAL MEDICINE

## 2023-10-19 PROCEDURE — 99213 OFFICE O/P EST LOW 20 MIN: CPT | Performed by: INTERNAL MEDICINE

## 2023-10-19 PROCEDURE — 3074F SYST BP LT 130 MM HG: CPT | Performed by: INTERNAL MEDICINE

## 2023-10-19 NOTE — PATIENT INSTRUCTIONS
Continue to use the oxygen whenever you exert yourself  Let me know if your breathing changes.   Resume stiolto if you notice you have shortness of breath  Use albuterol 2 puffs every 6 hours as needed for your breathing or cough

## 2023-11-01 DIAGNOSIS — E78.2 MIXED HYPERLIPIDEMIA: ICD-10-CM

## 2023-11-01 RX ORDER — ATORVASTATIN CALCIUM 10 MG/1
TABLET, FILM COATED ORAL
Qty: 90 TABLET | Refills: 1 | Status: SHIPPED | OUTPATIENT
Start: 2023-11-01

## 2023-11-01 NOTE — TELEPHONE ENCOUNTER
Requested Prescriptions     Pending Prescriptions Disp Refills    ATORVASTATIN 10 MG Oral Tab [Pharmacy Med Name: ATORVASTATIN CALCIUM 10 MG Tablet] 90 tablet 10     Sig: TAKE 1 TABLET EVERY NIGHT     LOV 8/7/2023     Patient was asked to follow-up in: 6 months    Appointment scheduled: 2/5/2024 Lady Jerry MD     Medication refilled per protocol.

## 2023-11-28 NOTE — TELEPHONE ENCOUNTER
Requested Prescriptions     Pending Prescriptions Disp Refills    METFORMIN 500 MG Oral Tab [Pharmacy Med Name: METFORMIN HYDROCHLORIDE 500 MG Tablet] 180 tablet 3     Sig: TAKE 1 TABLET TWICE DAILY WITH MEALS     LOV 8/7/2023     Patient was asked to follow-up in: 6 months    Appointment scheduled: 2/5/2024 Charity Shabazz MD     Medication refilled per protocol.

## 2023-12-06 DIAGNOSIS — I10 ESSENTIAL HYPERTENSION: ICD-10-CM

## 2023-12-07 RX ORDER — METOPROLOL TARTRATE 100 MG/1
100 TABLET ORAL 2 TIMES DAILY
Qty: 180 TABLET | Refills: 3 | Status: SHIPPED | OUTPATIENT
Start: 2023-12-07

## 2024-01-31 RX ORDER — TAMSULOSIN HYDROCHLORIDE 0.4 MG/1
0.4 CAPSULE ORAL DAILY
Qty: 90 CAPSULE | Refills: 3 | Status: SHIPPED | OUTPATIENT
Start: 2024-01-31

## 2024-02-05 ENCOUNTER — TELEPHONE (OUTPATIENT)
Dept: FAMILY MEDICINE CLINIC | Facility: CLINIC | Age: 84
End: 2024-02-05

## 2024-02-05 DIAGNOSIS — N18.32 TYPE 2 DIABETES MELLITUS WITH STAGE 3B CHRONIC KIDNEY DISEASE, WITHOUT LONG-TERM CURRENT USE OF INSULIN (HCC): ICD-10-CM

## 2024-02-05 DIAGNOSIS — I10 ESSENTIAL HYPERTENSION: Primary | ICD-10-CM

## 2024-02-05 DIAGNOSIS — D69.6 THROMBOCYTOPENIA (HCC): Chronic | ICD-10-CM

## 2024-02-05 DIAGNOSIS — E11.22 TYPE 2 DIABETES MELLITUS WITH STAGE 3B CHRONIC KIDNEY DISEASE, WITHOUT LONG-TERM CURRENT USE OF INSULIN (HCC): ICD-10-CM

## 2024-02-05 DIAGNOSIS — E78.2 MIXED HYPERLIPIDEMIA: ICD-10-CM

## 2024-02-05 DIAGNOSIS — Z85.46 HISTORY OF PROSTATE CANCER: ICD-10-CM

## 2024-02-05 NOTE — TELEPHONE ENCOUNTER
Please enter lab orders for the patient's upcoming physical appointment.     Physical scheduled:   Your appointments       Date & Time Appointment Department ([place])    Feb 05, 2024 10:00 AM DAWN [facility] Supervisit with Braydon Barrett MD [facility] ([facility])              [facility], later [facility]  [address] [place] [address] [place] [id number]  [phone]           Preferred lab: [facility] ([facility])     The patient has been notified to complete fasting labs prior to their physical appointment.

## 2024-02-12 RX ORDER — TORSEMIDE 20 MG/1
20 TABLET ORAL DAILY
Qty: 90 TABLET | Refills: 3 | OUTPATIENT
Start: 2024-02-12

## 2024-02-12 RX ORDER — TORSEMIDE 20 MG/1
20 TABLET ORAL DAILY
Qty: 90 TABLET | Refills: 1 | Status: SHIPPED | OUTPATIENT
Start: 2024-02-12

## 2024-02-12 NOTE — TELEPHONE ENCOUNTER
Requested Prescriptions     Pending Prescriptions Disp Refills    METFORMIN 500 MG Oral Tab [Pharmacy Med Name: METFORMIN HYDROCHLORIDE 500 MG Tablet] 180 tablet 3     Sig: TAKE 1 TABLET TWICE DAILY WITH MEALS     LOV 8/7/2023     Patient was asked to follow-up in: 6 months    Appointment scheduled: 2/20/2024 Braydon Barrett MD     Medication refilled per protocol.

## 2024-02-20 ENCOUNTER — OFFICE VISIT (OUTPATIENT)
Dept: FAMILY MEDICINE CLINIC | Facility: CLINIC | Age: 84
End: 2024-02-20
Payer: MEDICARE

## 2024-02-20 VITALS — HEART RATE: 60 BPM | SYSTOLIC BLOOD PRESSURE: 114 MMHG | DIASTOLIC BLOOD PRESSURE: 46 MMHG

## 2024-02-20 DIAGNOSIS — E11.22 TYPE 2 DIABETES MELLITUS WITH CHRONIC KIDNEY DISEASE, WITHOUT LONG-TERM CURRENT USE OF INSULIN, UNSPECIFIED CKD STAGE (HCC): ICD-10-CM

## 2024-02-20 DIAGNOSIS — Z00.00 ENCOUNTER FOR ANNUAL HEALTH EXAMINATION: Primary | ICD-10-CM

## 2024-02-20 DIAGNOSIS — Z95.0 CARDIAC PACEMAKER IN SITU: ICD-10-CM

## 2024-02-20 DIAGNOSIS — I10 ESSENTIAL HYPERTENSION: ICD-10-CM

## 2024-02-20 DIAGNOSIS — I25.10 ATHEROSCLEROSIS OF NATIVE CORONARY ARTERY OF NATIVE HEART WITHOUT ANGINA PECTORIS: Chronic | ICD-10-CM

## 2024-02-20 DIAGNOSIS — M43.10 ACQUIRED SPONDYLOLISTHESIS: ICD-10-CM

## 2024-02-20 DIAGNOSIS — J43.9 PULMONARY EMPHYSEMA, UNSPECIFIED EMPHYSEMA TYPE (HCC): ICD-10-CM

## 2024-02-20 DIAGNOSIS — Z85.46 HISTORY OF PROSTATE CANCER: ICD-10-CM

## 2024-02-20 DIAGNOSIS — G47.33 OSA (OBSTRUCTIVE SLEEP APNEA): ICD-10-CM

## 2024-02-20 DIAGNOSIS — K70.30 ALCOHOLIC CIRRHOSIS OF LIVER WITHOUT ASCITES (HCC): ICD-10-CM

## 2024-02-20 DIAGNOSIS — I48.91 ATRIAL FIBRILLATION, UNSPECIFIED TYPE (HCC): ICD-10-CM

## 2024-02-20 DIAGNOSIS — D69.6 THROMBOCYTOPENIA (HCC): Chronic | ICD-10-CM

## 2024-02-20 DIAGNOSIS — I05.9 MITRAL VALVE DISORDER: ICD-10-CM

## 2024-02-20 DIAGNOSIS — K52.832 LYMPHOCYTIC COLITIS: ICD-10-CM

## 2024-02-20 DIAGNOSIS — I27.0 PRIMARY PULMONARY HYPERTENSION (HCC): ICD-10-CM

## 2024-02-20 DIAGNOSIS — M48.061 SPINAL STENOSIS OF LUMBAR REGION, UNSPECIFIED WHETHER NEUROGENIC CLAUDICATION PRESENT: ICD-10-CM

## 2024-02-20 DIAGNOSIS — E78.2 MIXED HYPERLIPIDEMIA: ICD-10-CM

## 2024-02-20 PROCEDURE — 3078F DIAST BP <80 MM HG: CPT | Performed by: FAMILY MEDICINE

## 2024-02-20 PROCEDURE — 3074F SYST BP LT 130 MM HG: CPT | Performed by: FAMILY MEDICINE

## 2024-02-20 PROCEDURE — 1125F AMNT PAIN NOTED PAIN PRSNT: CPT | Performed by: FAMILY MEDICINE

## 2024-02-20 PROCEDURE — 1159F MED LIST DOCD IN RCRD: CPT | Performed by: FAMILY MEDICINE

## 2024-02-20 PROCEDURE — G0439 PPPS, SUBSEQ VISIT: HCPCS | Performed by: FAMILY MEDICINE

## 2024-02-20 PROCEDURE — 1160F RVW MEDS BY RX/DR IN RCRD: CPT | Performed by: FAMILY MEDICINE

## 2024-02-20 PROCEDURE — 1170F FXNL STATUS ASSESSED: CPT | Performed by: FAMILY MEDICINE

## 2024-02-20 PROCEDURE — 96160 PT-FOCUSED HLTH RISK ASSMT: CPT | Performed by: FAMILY MEDICINE

## 2024-02-20 PROCEDURE — 99499 UNLISTED E&M SERVICE: CPT | Performed by: FAMILY MEDICINE

## 2024-02-20 NOTE — PROGRESS NOTES
Subjective:   Lisandro Harley is a 83 year old male who presents for a MA (Medicare Advantage) Supervisit (Once per calendar year) and scheduled follow up of multiple significant but stable problems.   Preventative Screening  Colonoscopy - no indication.   Prostate - h/o prostate CA s/p radiation.  PSA order placed.   Immunizations - PNA UTD x 2.  Flu UTD.        Heart - SSS, CAD, CHF, s/p pacemaker.  HTN, HLD.  On torsemide, metoprolol, Eliquis, diltiazam, atorvastatin.  Cardiology team pretty happy so far.  Cardio manages the Eliquis.      DM - on metformin as sole DM med.  Last A1C 6.2 in August.  Traditionally well controlled.     Emphysema - on stiolto.  Breathing is good still.  Uses oxygen tank when he is at home, and sleeps with oxygen.  Also has CPAP at night.  Sees Dr. Harman.      History/Other:   Fall Risk Assessment:   He has been screened for Falls and is High Risk. Fall Prevention information provided to patient in After Visit Summary.    Do you feel unsteady when standing or walking?: Yes  Do you worry about falling?: Yes  Have you fallen in the past year?: No     Cognitive Assessment:   He had a completely normal cognitive assessment - see flowsheet entries     Functional Ability/Status:   Lisandro Harley has some abnormal functions as listed below:  He has Driving difficulties based on screening of functional status. He has Meal Preparation difficulties based on screening of functional status.He has difficulties Shopping for Groceries based on screening of functional status. He has Walking problems based on screening of functional status.       Depression Screening (PHQ-2/PHQ-9): PHQ-2 SCORE: 0  , done 2/20/2024             Advanced Directives:   He does NOT have a Living Will. [Do you have a living will?: No]  He does have a POA but we do NOT have it on file in Epic.    Discussed Advance Care Planning with patient (and family/surrogate if present). Standard forms made available to patient in After  Visit Summary.      Patient Active Problem List   Diagnosis    Essential hypertension    Lymphocytic colitis    Coronary atherosclerosis    NII (obstructive sleep apnea)    Spinal stenosis of lumbar region    Atrial fibrillation (HCC)    Mitral valve disorder    Cardiac pacemaker in situ    Hyperlipidemia    Acquired spondylolisthesis    Thrombocytopenia (HCC)    Acute congestive heart failure (HCC)    Alcoholic cirrhosis of liver without ascites (HCC)    h/o prostate cancer s/p radiation treatment    Primary pulmonary hypertension (HCC)    Type 2 diabetes mellitus with diabetic chronic kidney disease (HCC)    Pulmonary emphysema (HCC)     Allergies:  He is allergic to hydrocodone-acetaminophen and tussigon [hycodan].    Current Medications:  No outpatient medications have been marked as taking for the 2/20/24 encounter (Office Visit) with Braydon Barrett MD.       Medical History:  He  has a past medical history of Arrhythmia, Arthritis, Back problem, Cancer (HCC) (2010), Coronary atherosclerosis of unspecified type of vessel, native or graft, Diabetes mellitus (HCC), Diarrhea, unspecified, Easy bruising, Exposure to unspecified radiation (2010), Frequent urination, High blood pressure, High cholesterol, Irregular bowel habits, Leaking of urine, Leg swelling, Osteoarthritis, Pacemaker, Renal disorder, Rheumatoid arthritis (HCC), Sleep apnea, Stented coronary artery, Type II or unspecified type diabetes mellitus without mention of complication, not stated as uncontrolled, Visual impairment, and Wears glasses.  Surgical History:  He  has a past surgical history that includes skin surgery; total hip replacement (Right); total knee replacement (Right); laminectomy; Cardiac pacemaker placement (2010); colonoscopy (N/A, 9/25/2015); hip replacement surgery (Left, 06/26/2018); hip replacement surgery (Right); and knee replacement surgery (Left).   Family History:  His family history is not on file.  Social History:  He   reports that he quit smoking about 19 years ago. His smoking use included cigarettes. He has a 60 pack-year smoking history. He has never used smokeless tobacco. He reports current alcohol use. He reports that he does not use drugs.    Tobacco:  He smoked tobacco in the past but quit greater than 12 months ago.  Social History    Tobacco Use      Smoking status: Former        Packs/day: 1.50        Years: 40.00        Additional pack years: 0.00        Total pack years: 60.00        Types: Cigarettes        Quit date: 2005        Years since quittin.1      Smokeless tobacco: Never       CAGE Alcohol Screen:   CAGE screening score of 0 on 2024, showing low risk of alcohol abuse.      Patient Care Team:  Braydon Barrett MD as PCP - General (Family Medicine)  Jamey Narayan (Cardiovascular Diseases)  Georgia Sena APRN (Nurse Practitioner)  Shayla Clemente MD (OPHTHALMOLOGY)    Review of Systems  Constitutional: negative. Feeling well overall.   Eyes: negative  Ears, nose, mouth, throat, and face: negative  Respiratory: negative  Cardiovascular: negative  Gastrointestinal: negative  Genitourinary: negative  Neurological: negative      Objective:   Physical Exam  General Appearance:  Alert, cooperative, no distress, appears stated age.  Using 4-wheel walker to help gait.    Head:  Normocephalic, without obvious abnormality, atraumatic   Eyes:  PERRL, conjunctiva/corneas clear, EOM's intact   Neck: Supple, symmetrical, trachea midline, no adenopathy   Back:   Symmetric, no curvature, ROM normal, no CVA tenderness   Lungs:   Clear to auscultation bilaterally, respirations unlabored   Chest Wall:  No tenderness or deformity   Heart:  Regular rate and rhythm, S1, S2 normal, no murmur, rub or gallop   Abdomen:   Soft, non-tender, bowel sounds active all four quadrants,  no masses, no organomegaly   Extremities: Bilateral barefoot skin diabetic exam is normal, visualized feet and the appearance is  normal.  Bilateral monofilament/sensation of both feet is normal.  Pulsation pedal pulse exam of both lower legs/feet is normal as well.   Pulses: 2+ and symmetric   Skin: Skin color, texture, turgor normal, no rashes or lesions   Neurologic: Normal      /46   Pulse 60  Estimated body mass index is 31.64 kg/m² as calculated from the following:    Height as of 10/19/23: 5' 6\" (1.676 m).    Weight as of 10/19/23: 196 lb (88.9 kg).    Medicare Hearing Assessment:   Hearing Screening    Time taken: 2/20/2024  2:59 PM  Screening Method: Finger Rub  Finger Rub Result: Fail               Visual Acuity:   Right Eye Visual Acuity: Uncorrected Right Eye Chart Acuity: 20/40   Left Eye Visual Acuity: Uncorrected Left Eye Chart Acuity: 20/40         Able To Tolerate Visual Acuity: Yes        Assessment & Plan:     Lisandro Harley was seen in the office today:  had concerns including Wellness Visit ([facility]).    1. Encounter for annual health examination  Overall well  All chronic medical conditions seem to be at baseline  Stable meds   Labs overdue - orders placed  Immunizations reviewed.     2. Type 2 diabetes mellitus with chronic kidney disease, without long-term current use of insulin, unspecified CKD stage (HCC)  Sugars remain under good control traditionally.  Labs due.   On metformin  Stable. Continue.  - Ophthalmology Referral - In Network    3. Atherosclerosis of native coronary artery of native heart without angina pectoris  4. Atrial fibrillation, unspecified type (HCC)  5. Cardiac pacemaker in situ  6. Mitral valve disorder  Known heart disease, CAD, a-fib on pacemaker  On AC - managed by cardiology team  Heart is stable, not limiting him  Continue routine cardio visits.     7. Essential hypertension  BP controlled today  Stable metoprolol, torsemide.     8. Mixed hyperlipidemia  On statin  Lipids due  LDL goial is elss than 70 given CAD and DM.     9. Pulmonary emphysema, unspecified emphysema type (HCC)  10.  Primary pulmonary hypertension (HCC)  11. NII (obstructive sleep apnea)  Managed by pulm team  Taking stiolto.  No pulmonary limitations.  Continue routine visits and meds.     12. h/o prostate cancer s/p radiation treatment  Will check PSA to ensure it remains at baseline.  Denies any symptoms.   Lab ordered    13. Thrombocytopenia (HCC)  Check CBC  On AC  No bleeding or bruising.     14. Lymphocytic colitis  Noted on past scopes  Stable symptoms currently  No GI issues.  Continue to eat healthy diet.     15. Alcoholic cirrhosis of liver without ascites (HCC)  Noted in past  Will check liver enzymes  Follows healthy diet currently.     16. Acquired spondylolisthesis  17. Spinal stenosis of lumbar region, unspecified whether neurogenic claudication present  Known back issues  Not limited  Uses a 4-wheel walker primarily to help keep balance.   Continue this.             The patient indicates understanding of these issues and agrees to the plan.  Reinforced healthy diet, lifestyle, and exercise.      No follow-ups on file.     Braydon Barrett MD, 2/20/2024     Supplementary Documentation:   General Health:  In the past six months, have you lost more than 10 pounds without trying?: 2 - No  Has your appetite been poor?: No  Type of Diet: Balanced  How does the patient maintain a good energy level?: Daily Walks  How would you describe your daily physical activity?: Light  How would you describe your current health state?: Fair  How do you maintain positive mental well-being?: Social Interaction;Visiting Friends;Visiting Family  On a scale of 0 to 10, with 0 being no pain and 10 being severe pain, what is your pain level?: 2 - (Mild)  In the past six months, have you experienced urine leakage?: 1-Yes  At any time do you feel concerned for the safety/well-being of yourself and/or your children, in your home or elsewhere?: No  Have you had any immunizations at another office such as Influenza, Hepatitis B, Tetanus, or  Pneumococcal?: Yes        Lisandro Harley's SCREENING SCHEDULE   Tests on this list are recommended by your physician but may not be covered, or covered at this frequency, by your insurer.   Please check with your insurance carrier before scheduling to verify coverage.   PREVENTATIVE SERVICES FREQUENCY &  COVERAGE DETAILS LAST COMPLETION DATE   Diabetes Screening    Fasting Blood Sugar / Glucose    One screening every 12 months if never tested or if previously tested but not diagnosed with pre-diabetes   One screening every 6 months if diagnosed with pre-diabetes Lab Results   Component Value Date    GLUCOSE 154 (H) 06/08/2010     (H) 06/29/2023        Cardiovascular Disease Screening    Lipid Panel  Cholesterol  Lipoprotein (HDL)  Triglycerides Covered every 5 years for all Medicare beneficiaries without apparent signs or symptoms of cardiovascular disease Lab Results   Component Value Date    CHOLEST 112 06/29/2023    HDL 37 (L) 06/29/2023    LDL 61 06/29/2023    TRIG 66 06/29/2023         Electrocardiogram (EKG)   Covered if needed at Welcome to Medicare, and non-screening if indicated for medical reasons 12/03/2022      Ultrasound Screening for Abdominal Aortic Aneurysm (AAA) Covered once in a lifetime for one of the following risk factors    Men who are 65-75 years old and have ever smoked    Anyone with a family history -     Colorectal Cancer Screening  Covered for ages 50-85; only need ONE of the following:    Colonoscopy   Covered every 10 years    Covered every 2 years if patient is at high risk or previous colonoscopy was abnormal 09/25/2015    Health Maintenance   Topic Date Due    Colorectal Cancer Screening  09/25/2018       Flexible Sigmoidoscopy   Covered every 4 years -    Fecal Occult Blood Test Covered annually -   Prostate Cancer Screening    Prostate-Specific Antigen (PSA) Annually Lab Results   Component Value Date    PSA 0.34 02/07/2023     Health Maintenance   Topic Date Due    PSA   02/07/2024      Immunizations    Influenza Covered once per flu season  Please get every year 11/21/2023  No recommendations at this time    Pneumococcal Each vaccine ([id number] & [id number]) covered once after 65 Prevnar 13: 08/02/2016    [id number]: 03/30/2015     No recommendations at this time    Hepatitis B One screening covered for patients with certain risk factors   -  No recommendations at this time    Tetanus Toxoid Not covered by Medicare Part B unless medically necessary (cut with metal); may be covered with your pharmacy prescription benefits -    Tetanus, Diptheria and Pertusis TD and TDaP Not covered by Medicare Part B -  No recommendations at this time    Zoster Not covered by Medicare Part B; may be covered with your pharmacy  prescription benefits -  Zoster Vaccines(1 of 2) Never done     Diabetes      Hemoglobin A1C Annually; if last result is elevated, may be asked to retest more frequently.    Medicare covers every 3 months Lab Results   Component Value Date     (H) 02/07/2023    A1C 6.2 (A) 08/07/2023       Hemoglobin A1C Test due on 02/07/2024    Creat/alb ratio Annually Lab Results   Component Value Date    MICROALBCREA 7.1 02/07/2023       LDL Annually Lab Results   Component Value Date    LDL 61 06/29/2023       Dilated Eye Exam Annually Last Diabetic Eye Exam:  No data recorded  No data recorded       Annual Monitoring of Persistent Medications (ACE/ARB, digoxin diuretics, anticonvulsants)    Potassium Annually Lab Results   Component Value Date    K 3.9 06/29/2023         Creatinine   Annually Lab Results   Component Value Date    CREATSERUM 1.22 06/29/2023         BUN Annually Lab Results   Component Value Date    BUN 26 (H) 06/29/2023       Drug Serum Conc Annually Lab Results   Component Value Date    DIG 0.27 (L) 05/03/2018              Chronic Obstructive Pulmonary Disease (COPD)    Spirometry Annually Spirometry date: 06/14/2023

## 2024-02-20 NOTE — PATIENT INSTRUCTIONS
Lisandro Harley's SCREENING SCHEDULE   Tests on this list are recommended by your physician but may not be covered, or covered at this frequency, by your insurer.   Please check with your insurance carrier before scheduling to verify coverage.   PREVENTATIVE SERVICES FREQUENCY &  COVERAGE DETAILS LAST COMPLETION DATE   Diabetes Screening    Fasting Blood Sugar / Glucose    One screening every 12 months if never tested or if previously tested but not diagnosed with pre-diabetes   One screening every 6 months if diagnosed with pre-diabetes Lab Results   Component Value Date    GLUCOSE 154 (H) 06/08/2010     (H) 06/29/2023        Cardiovascular Disease Screening    Lipid Panel  Cholesterol  Lipoprotein (HDL)  Triglycerides Covered every 5 years for all Medicare beneficiaries without apparent signs or symptoms of cardiovascular disease Lab Results   Component Value Date    CHOLEST 112 06/29/2023    HDL 37 (L) 06/29/2023    LDL 61 06/29/2023    TRIG 66 06/29/2023         Electrocardiogram (EKG)   Covered if needed at Welcome to Medicare, and non-screening if indicated for medical reasons 12/03/2022      Ultrasound Screening for Abdominal Aortic Aneurysm (AAA) Covered once in a lifetime for one of the following risk factors   • Men who are 65-75 years old and have ever smoked   • Anyone with a family history -     Colorectal Cancer Screening  Covered for ages 50-85; only need ONE of the following:    Colonoscopy   Covered every 10 years    Covered every 2 years if patient is at high risk or previous colonoscopy was abnormal 09/25/2015    Health Maintenance   Topic Date Due   • Colorectal Cancer Screening  09/25/2018       Flexible Sigmoidoscopy   Covered every 4 years -    Fecal Occult Blood Test Covered annually -   Prostate Cancer Screening    Prostate-Specific Antigen (PSA) Annually Lab Results   Component Value Date    PSA 0.34 02/07/2023     Health Maintenance   Topic Date Due   • PSA  02/07/2024       Immunizations    Influenza Covered once per flu season  Please get every year 11/21/2023  No recommendations at this time    Pneumococcal Each vaccine (Zhdifwc95 & Misodqgec23) covered once after 65 Prevnar 13: 08/02/2016    Rpnfzkltq55: 03/30/2015     No recommendations at this time    Hepatitis B One screening covered for patients with certain risk factors   -  No recommendations at this time    Tetanus Toxoid Not covered by Medicare Part B unless medically necessary (cut with metal); may be covered with your pharmacy prescription benefits -    Tetanus, Diptheria and Pertusis TD and TDaP Not covered by Medicare Part B -  No recommendations at this time    Zoster Not covered by Medicare Part B; may be covered with your pharmacy  prescription benefits -  Zoster Vaccines(1 of 2) Never done     Diabetes      Hemoglobin A1C Annually; if last result is elevated, may be asked to retest more frequently.    Medicare covers every 3 months Lab Results   Component Value Date     (H) 02/07/2023    A1C 6.2 (A) 08/07/2023       Hemoglobin A1C Test due on 02/07/2024    Creat/alb ratio Annually Lab Results   Component Value Date    MICROALBCREA 7.1 02/07/2023       LDL Annually Lab Results   Component Value Date    LDL 61 06/29/2023       Dilated Eye Exam Annually [unfilled]     Annual Monitoring of Persistent Medications (ACE/ARB, digoxin diuretics, anticonvulsants)    Potassium Annually Lab Results   Component Value Date    K 3.9 06/29/2023         Creatinine   Annually Lab Results   Component Value Date    CREATSERUM 1.22 06/29/2023         BUN Annually Lab Results   Component Value Date    BUN 26 (H) 06/29/2023       Drug Serum Conc Annually Lab Results   Component Value Date    DIG 0.27 (L) 05/03/2018            Chronic Obstructive Pulmonary Disease (COPD)    Spirometry Annually Spirometry date: 06/14/2023

## 2024-02-22 ENCOUNTER — LAB ENCOUNTER (OUTPATIENT)
Dept: LAB | Age: 84
End: 2024-02-22
Attending: FAMILY MEDICINE
Payer: MEDICARE

## 2024-02-23 DIAGNOSIS — R79.89 ELEVATED SERUM CREATININE: Primary | ICD-10-CM

## 2024-03-13 ENCOUNTER — TELEPHONE (OUTPATIENT)
Dept: FAMILY MEDICINE CLINIC | Facility: CLINIC | Age: 84
End: 2024-03-13

## 2024-03-13 NOTE — TELEPHONE ENCOUNTER
Spoke with Scar regarding his labs done 2/22/24 giving him 's comments and recommendations, discussing that  would like him to drink more water and repeat a BMP in the next few days. Scar verbalized understanding. He lives at [facility] so the order was faxed to [phone].

## 2024-03-13 NOTE — TELEPHONE ENCOUNTER
Patient was calling that he hasn't heard anything back from Dr. Villalta ON HIS BLOOD WORK THAT HE DID LAST MONTH. jUst wants someone to go over it with him and if he needs to do anything else

## 2024-03-19 ENCOUNTER — LAB REQUISITION (OUTPATIENT)
Dept: LAB | Facility: HOSPITAL | Age: 84
End: 2024-03-19
Payer: MEDICARE

## 2024-03-19 ENCOUNTER — TELEPHONE (OUTPATIENT)
Dept: FAMILY MEDICINE CLINIC | Facility: CLINIC | Age: 84
End: 2024-03-19

## 2024-03-19 DIAGNOSIS — R79.89 OTHER SPECIFIED ABNORMAL FINDINGS OF BLOOD CHEMISTRY: ICD-10-CM

## 2024-03-19 LAB
ANION GAP SERPL CALC-SCNC: 6 MMOL/L (ref 0–18)
BUN BLD-MCNC: 35 MG/DL (ref 9–23)
CALCIUM BLD-MCNC: 9 MG/DL (ref 8.5–10.1)
CHLORIDE SERPL-SCNC: 108 MMOL/L (ref 98–112)
CO2 SERPL-SCNC: 24 MMOL/L (ref 21–32)
CREAT BLD-MCNC: 1.52 MG/DL
EGFRCR SERPLBLD CKD-EPI 2021: 45 ML/MIN/1.73M2 (ref 60–?)
FASTING STATUS PATIENT QL REPORTED: YES
GLUCOSE BLD-MCNC: 142 MG/DL (ref 70–99)
OSMOLALITY SERPL CALC.SUM OF ELEC: 296 MOSM/KG (ref 275–295)
POTASSIUM SERPL-SCNC: 4.2 MMOL/L (ref 3.5–5.1)
SODIUM SERPL-SCNC: 138 MMOL/L (ref 136–145)

## 2024-03-19 PROCEDURE — 80048 BASIC METABOLIC PNL TOTAL CA: CPT | Performed by: FAMILY MEDICINE

## 2024-03-19 NOTE — TELEPHONE ENCOUNTER
Patient got labs done today and he would like someone to call him to go over results.   Please advise    CB# [phone]

## 2024-03-27 DIAGNOSIS — E78.2 MIXED HYPERLIPIDEMIA: ICD-10-CM

## 2024-03-27 RX ORDER — ATORVASTATIN CALCIUM 10 MG/1
TABLET, FILM COATED ORAL
Qty: 90 TABLET | Refills: 3 | Status: SHIPPED | OUTPATIENT
Start: 2024-03-27

## 2024-03-27 NOTE — TELEPHONE ENCOUNTER
Requested Prescriptions     Pending Prescriptions Disp Refills    ATORVASTATIN 10 MG Oral Tab [Pharmacy Med Name: ATORVASTATIN CALCIUM 10 MG Tablet] 90 tablet 3     Sig: TAKE 1 TABLET EVERY NIGHT     LOV 2/20/2024     Patient was asked to follow-up in: 6 months    Appointment scheduled: 8/19/2024 Braydon Barrett MD     Medication refilled per protocol.

## 2024-04-04 ENCOUNTER — TELEPHONE (OUTPATIENT)
Dept: FAMILY MEDICINE CLINIC | Facility: CLINIC | Age: 84
End: 2024-04-04

## 2024-04-04 NOTE — TELEPHONE ENCOUNTER
Called and talked to patient his feet and lower legs swelling has been happening for last 3-4 days denies pain. Swelling goes down at night but comes back when he is up and about. He is taking torsemide for swelling.

## 2024-04-04 NOTE — TELEPHONE ENCOUNTER
Interesting  Keep the legs elevated when possible, but yes I think a visit is the next step to check this out.

## 2024-04-04 NOTE — TELEPHONE ENCOUNTER
Called [facility] to call back he needs to elevate his legs when ever possible and make an appointment to be seen next week with Dr mesa

## 2024-04-04 NOTE — TELEPHONE ENCOUNTER
Pt is calling he is having both feet and ankle and up the leg swelling no pain. Just concerned with swelling for 2-3 days now.

## 2024-04-05 NOTE — TELEPHONE ENCOUNTER
Future Appointments   Date Time Provider Department Center   4/10/2024 10:30 AM Braydon Barrett MD [facility] [place]   6/24/2024 10:15 AM Luma Rubio DO [facility] [place]   8/19/2024  9:30 AM Braydon Barrett MD [facility] [place]   10/23/2024 10:00 AM Heladio Harman MD  [facility] [place]

## 2024-04-10 ENCOUNTER — LAB ENCOUNTER (OUTPATIENT)
Dept: LAB | Age: 84
End: 2024-04-10
Attending: FAMILY MEDICINE
Payer: MEDICARE

## 2024-04-10 ENCOUNTER — OFFICE VISIT (OUTPATIENT)
Dept: FAMILY MEDICINE CLINIC | Facility: CLINIC | Age: 84
End: 2024-04-10
Payer: MEDICARE

## 2024-04-10 VITALS
BODY MASS INDEX: 32.14 KG/M2 | DIASTOLIC BLOOD PRESSURE: 60 MMHG | HEART RATE: 66 BPM | SYSTOLIC BLOOD PRESSURE: 108 MMHG | WEIGHT: 200 LBS | RESPIRATION RATE: 14 BRPM | TEMPERATURE: 98 F | HEIGHT: 66 IN | OXYGEN SATURATION: 90 %

## 2024-04-10 DIAGNOSIS — R60.0 BILATERAL LOWER EXTREMITY EDEMA: Primary | ICD-10-CM

## 2024-04-10 DIAGNOSIS — E11.22 TYPE 2 DIABETES MELLITUS WITH STAGE 3B CHRONIC KIDNEY DISEASE, WITHOUT LONG-TERM CURRENT USE OF INSULIN (HCC): ICD-10-CM

## 2024-04-10 DIAGNOSIS — R53.83 FATIGUE, UNSPECIFIED TYPE: ICD-10-CM

## 2024-04-10 DIAGNOSIS — N18.32 TYPE 2 DIABETES MELLITUS WITH STAGE 3B CHRONIC KIDNEY DISEASE, WITHOUT LONG-TERM CURRENT USE OF INSULIN (HCC): ICD-10-CM

## 2024-04-10 DIAGNOSIS — R60.0 BILATERAL LOWER EXTREMITY EDEMA: ICD-10-CM

## 2024-04-10 DIAGNOSIS — J43.9 PULMONARY EMPHYSEMA, UNSPECIFIED EMPHYSEMA TYPE (HCC): ICD-10-CM

## 2024-04-10 DIAGNOSIS — R79.89 ELEVATED SERUM CREATININE: ICD-10-CM

## 2024-04-10 DIAGNOSIS — I27.0 PRIMARY PULMONARY HYPERTENSION (HCC): ICD-10-CM

## 2024-04-10 LAB
ALBUMIN SERPL-MCNC: 3.5 G/DL (ref 3.4–5)
ALBUMIN/GLOB SERPL: 1.1 {RATIO} (ref 1–2)
ALP LIVER SERPL-CCNC: 112 U/L
ALT SERPL-CCNC: 35 U/L
ANION GAP SERPL CALC-SCNC: 6 MMOL/L (ref 0–18)
AST SERPL-CCNC: 27 U/L (ref 15–37)
BILIRUB SERPL-MCNC: 0.7 MG/DL (ref 0.1–2)
BUN BLD-MCNC: 38 MG/DL (ref 9–23)
CALCIUM BLD-MCNC: 8.6 MG/DL (ref 8.5–10.1)
CHLORIDE SERPL-SCNC: 110 MMOL/L (ref 98–112)
CO2 SERPL-SCNC: 25 MMOL/L (ref 21–32)
CREAT BLD-MCNC: 1.99 MG/DL
CREAT UR-SCNC: 102 MG/DL
EGFRCR SERPLBLD CKD-EPI 2021: 33 ML/MIN/1.73M2 (ref 60–?)
ERYTHROCYTE [DISTWIDTH] IN BLOOD BY AUTOMATED COUNT: 16.5 %
FASTING STATUS PATIENT QL REPORTED: NO
GLOBULIN PLAS-MCNC: 3.1 G/DL (ref 2.8–4.4)
GLUCOSE BLD-MCNC: 190 MG/DL (ref 70–99)
HCT VFR BLD AUTO: 32.6 %
HGB BLD-MCNC: 10.1 G/DL
MCH RBC QN AUTO: 27.4 PG (ref 26–34)
MCHC RBC AUTO-ENTMCNC: 31 G/DL (ref 31–37)
MCV RBC AUTO: 88.6 FL
MICROALBUMIN UR-MCNC: 8.78 MG/DL
MICROALBUMIN/CREAT 24H UR-RTO: 86.1 UG/MG (ref ?–30)
NT-PROBNP SERPL-MCNC: 1721 PG/ML (ref ?–450)
OSMOLALITY SERPL CALC.SUM OF ELEC: 306 MOSM/KG (ref 275–295)
PLATELET # BLD AUTO: 202 10(3)UL (ref 150–450)
POTASSIUM SERPL-SCNC: 4.5 MMOL/L (ref 3.5–5.1)
PROT SERPL-MCNC: 6.6 G/DL (ref 6.4–8.2)
RBC # BLD AUTO: 3.68 X10(6)UL
SODIUM SERPL-SCNC: 141 MMOL/L (ref 136–145)
TSI SER-ACNC: 6.75 MIU/ML (ref 0.36–3.74)
WBC # BLD AUTO: 5.9 X10(3) UL (ref 4–11)

## 2024-04-10 PROCEDURE — 85027 COMPLETE CBC AUTOMATED: CPT

## 2024-04-10 PROCEDURE — 84443 ASSAY THYROID STIM HORMONE: CPT

## 2024-04-10 PROCEDURE — 82570 ASSAY OF URINE CREATININE: CPT

## 2024-04-10 PROCEDURE — 84439 ASSAY OF FREE THYROXINE: CPT

## 2024-04-10 PROCEDURE — 83880 ASSAY OF NATRIURETIC PEPTIDE: CPT

## 2024-04-10 PROCEDURE — 82043 UR ALBUMIN QUANTITATIVE: CPT

## 2024-04-10 PROCEDURE — 36415 COLL VENOUS BLD VENIPUNCTURE: CPT

## 2024-04-10 PROCEDURE — 99214 OFFICE O/P EST MOD 30 MIN: CPT | Performed by: FAMILY MEDICINE

## 2024-04-10 PROCEDURE — 80053 COMPREHEN METABOLIC PANEL: CPT

## 2024-04-10 NOTE — PROGRESS NOTES
"    Caller: Neeta Harrell \"JENNIFER\"    Relationship: Self    Best call back number: [phone]    Requested Prescriptions:        FREESTYLE NARCISO 2     Pharmacy where request should be sent: [id number] - [place], [place] - [address] - [phone] [address] [facility] [phone]      Last office visit with prescribing clinician: 7/27/2023     Next office visit with prescribing clinician: 10/24/2023     Additional details provided by patient: PATIENT STATES SHE SAW DR POLLARD IN THE OFFICE TODAY, AND TOLD HIM SHE WOULD CALL WHEN SHE FOUND A PHARMACY THAT WOULD ACCEPT HER INSURANCE TO COVER THE FREESTYLE NARCISO 2.  PLEASE CALL RX INTO [facility] IN [place] - [phone].  THANK YOU.    Does the patient have less than a 3 day supply:  [] Yes  [] No    Would you like a call back once the refill request has been completed: [x] Yes [] No    If the office needs to give you a call back, can they leave a voicemail: [x] Yes [] No    Wellington Ordonez Rep   07/27/23 13:00 EDT       " Chief Complaint   Patient presents with    Leg Swelling     Pt c/o both lower extremity swollen       HPI:   Lisandro Harley is a 83 year old male with PMH CAD and SSS s/p pacemaker, DM2, h/o prostate CA s/p radiation who presents to the office for eval of leg swelling. Noticed this about 10 days ago.  The swelling decreased at night, but is worse when he is up and about.      No changes he can think of.  No changes to diet.      Taking torsemide daily.  No changes to this med.     COPD - baseline oxygen is 90%.  This is where he is today.  He wears oxygen when he is idle around the apartment.  He uses this most of the time when he is sitting.  He chooses not to carry it around with him when up and about - does not feel he needs it.     He denies any SOB, chest pain.  Still able to get up and around.      REVIEW OF SYSTEMS:   Pertinent items are noted in HPI.  EXAM:   /60 (BP Location: Left arm, Patient Position: Sitting, Cuff Size: large)   Pulse 66   Temp 98.1 °F (36.7 °C) (Oral)   Resp 14   Ht 5' 6\" (1.676 m)   Wt 200 lb (90.7 kg)   BMI 32.28 kg/m²     General appearance - alert, well appearing, and in no distress.  With daughter.  Talking in full sentences, sitting comfortably.  Has a 4-wheel walker to assist walking.   Chest - clear to auscultation, no wheezes, rales or rhonchi, symmetric air entry  Heart - normal rate, regular rhythm, normal S1, S2, no murmurs, rubs, clicks or gallops  Abdomen - soft, nontender, nondistended, no masses or organomegaly  Extremities - bilateral pitting edema below the knees.  L>R.  Affecting feet as well.    ASSESSMENT AND PLAN:     Lisandro Harley was seen in the office today:  had concerns including Leg Swelling (Pt c/o both lower extremity swollen /Noticed one week ago ).    1. Bilateral lower extremity edema  Will check BNP and creatinine.  If Cr stable will raise the torsemide to BID  Calling cardiology office he follows with to expedite an appt.  May need echo,  last done 12/2022.  No clear trigger for these symptoms - meds stable, no changes in salt or activity.   He denies orthopnea, or chest pains.   - Pro Beta Natriuretic Peptide [E]; Future  - Comp Metabolic Panel (14); Future    2. Fatigue, unspecified type  Will check additional labs.   - Comp Metabolic Panel (14); Future  - CBC, Platelet; No Differential; Future  - TSH W Reflex To Free T4; Future    3. Primary pulmonary hypertension (HCC)  Noted in past, severely elevated  May be contributing to the symptoms and edema  Cardio referral    4. Pulmonary emphysema, unspecified emphysema type (HCC)  Known COPD  Use oxygen when able to at rest.       Braydon Barrett M.D.   [facility]  04/10/24

## 2024-04-10 NOTE — PROGRESS NOTES
Spoke with [facility] and they will see Pt on 4/15/24 at 11:00am.   Daughter notified and okay with appointment

## 2024-04-11 LAB — T4 FREE SERPL-MCNC: 0.9 NG/DL (ref 0.8–1.7)

## 2024-04-12 ENCOUNTER — TELEPHONE (OUTPATIENT)
Dept: FAMILY MEDICINE CLINIC | Facility: CLINIC | Age: 84
End: 2024-04-12

## 2024-04-12 DIAGNOSIS — I25.10 ATHEROSCLEROSIS OF NATIVE CORONARY ARTERY OF NATIVE HEART WITHOUT ANGINA PECTORIS: Primary | ICD-10-CM

## 2024-04-12 DIAGNOSIS — I48.91 ATRIAL FIBRILLATION, UNSPECIFIED TYPE (HCC): ICD-10-CM

## 2024-04-12 DIAGNOSIS — I05.9 MITRAL VALVE DISORDER: ICD-10-CM

## 2024-04-12 DIAGNOSIS — Z95.0 CARDIAC PACEMAKER IN SITU: ICD-10-CM

## 2024-04-12 NOTE — TELEPHONE ENCOUNTER
Pt left phone message on the machine with only the phone number but I called and got the details.  He needs a referral to [facility] for Monday 4/15/24.

## 2024-04-16 ENCOUNTER — LAB REQUISITION (OUTPATIENT)
Dept: LAB | Facility: HOSPITAL | Age: 84
End: 2024-04-16
Payer: MEDICARE

## 2024-04-16 DIAGNOSIS — R06.00 DYSPNEA, UNSPECIFIED: ICD-10-CM

## 2024-04-16 DIAGNOSIS — I10 ESSENTIAL (PRIMARY) HYPERTENSION: ICD-10-CM

## 2024-04-16 DIAGNOSIS — E78.5 HYPERLIPIDEMIA, UNSPECIFIED: ICD-10-CM

## 2024-04-16 LAB
ANION GAP SERPL CALC-SCNC: 6 MMOL/L (ref 0–18)
BUN BLD-MCNC: 34 MG/DL (ref 9–23)
CALCIUM BLD-MCNC: 9 MG/DL (ref 8.5–10.1)
CHLORIDE SERPL-SCNC: 111 MMOL/L (ref 98–112)
CO2 SERPL-SCNC: 26 MMOL/L (ref 21–32)
CREAT BLD-MCNC: 1.88 MG/DL
EGFRCR SERPLBLD CKD-EPI 2021: 35 ML/MIN/1.73M2 (ref 60–?)
FASTING STATUS PATIENT QL REPORTED: YES
GLUCOSE BLD-MCNC: 127 MG/DL (ref 70–99)
NT-PROBNP SERPL-MCNC: 2531 PG/ML (ref ?–450)
OSMOLALITY SERPL CALC.SUM OF ELEC: 305 MOSM/KG (ref 275–295)
POTASSIUM SERPL-SCNC: 3.8 MMOL/L (ref 3.5–5.1)
SODIUM SERPL-SCNC: 143 MMOL/L (ref 136–145)

## 2024-04-16 PROCEDURE — 83880 ASSAY OF NATRIURETIC PEPTIDE: CPT | Performed by: PHYSICIAN ASSISTANT

## 2024-04-16 PROCEDURE — 80048 BASIC METABOLIC PNL TOTAL CA: CPT | Performed by: PHYSICIAN ASSISTANT

## 2024-04-19 ENCOUNTER — HOSPITAL ENCOUNTER (OUTPATIENT)
Dept: CV DIAGNOSTICS | Facility: HOSPITAL | Age: 84
End: 2024-04-19
Attending: PHYSICIAN ASSISTANT
Payer: MEDICARE

## 2024-04-19 ENCOUNTER — HOSPITAL ENCOUNTER (OUTPATIENT)
Dept: CV DIAGNOSTICS | Age: 84
Discharge: HOME | End: 2024-04-19
Attending: PHYSICIAN ASSISTANT
Payer: MEDICARE

## 2024-04-19 DIAGNOSIS — I34.0 MR (MITRAL REGURGITATION): ICD-10-CM

## 2024-04-19 DIAGNOSIS — I48.0 PAF (PAROXYSMAL ATRIAL FIBRILLATION) (HCC): ICD-10-CM

## 2024-04-19 DIAGNOSIS — I27.20 PULMONARY HTN (HCC): ICD-10-CM

## 2024-04-19 DIAGNOSIS — I50.30 (HFPEF) HEART FAILURE WITH PRESERVED EJECTION FRACTION (HCC): ICD-10-CM

## 2024-04-19 PROCEDURE — 93306 TTE W/DOPPLER COMPLETE: CPT | Performed by: PHYSICIAN ASSISTANT

## 2024-04-29 ENCOUNTER — HOSPITAL ENCOUNTER (OUTPATIENT)
Dept: GENERAL RADIOLOGY | Facility: HOSPITAL | Age: 84
Discharge: HOME | End: 2024-04-29
Attending: INTERNAL MEDICINE
Payer: MEDICARE

## 2024-04-29 DIAGNOSIS — I50.30 (HFPEF) HEART FAILURE WITH PRESERVED EJECTION FRACTION (HCC): ICD-10-CM

## 2024-04-29 PROCEDURE — 71046 X-RAY EXAM CHEST 2 VIEWS: CPT | Performed by: INTERNAL MEDICINE

## 2024-05-02 ENCOUNTER — LAB REQUISITION (OUTPATIENT)
Dept: LAB | Facility: HOSPITAL | Age: 84
End: 2024-05-02
Payer: MEDICARE

## 2024-05-02 DIAGNOSIS — I50.30 UNSPECIFIED DIASTOLIC (CONGESTIVE) HEART FAILURE (HCC): ICD-10-CM

## 2024-05-02 LAB
ANION GAP SERPL CALC-SCNC: 5 MMOL/L (ref 0–18)
BUN BLD-MCNC: 34 MG/DL (ref 9–23)
CALCIUM BLD-MCNC: 9 MG/DL (ref 8.5–10.1)
CHLORIDE SERPL-SCNC: 108 MMOL/L (ref 98–112)
CO2 SERPL-SCNC: 27 MMOL/L (ref 21–32)
CREAT BLD-MCNC: 2.01 MG/DL
EGFRCR SERPLBLD CKD-EPI 2021: 32 ML/MIN/1.73M2 (ref 60–?)
FASTING STATUS PATIENT QL REPORTED: NO
GLUCOSE BLD-MCNC: 151 MG/DL (ref 70–99)
OSMOLALITY SERPL CALC.SUM OF ELEC: 301 MOSM/KG (ref 275–295)
POTASSIUM SERPL-SCNC: 4 MMOL/L (ref 3.5–5.1)
SODIUM SERPL-SCNC: 140 MMOL/L (ref 136–145)

## 2024-05-02 PROCEDURE — 80048 BASIC METABOLIC PNL TOTAL CA: CPT | Performed by: INTERNAL MEDICINE

## 2024-05-16 ENCOUNTER — LAB REQUISITION (OUTPATIENT)
Dept: LAB | Facility: HOSPITAL | Age: 84
End: 2024-05-16

## 2024-05-16 DIAGNOSIS — I50.30 UNSPECIFIED DIASTOLIC (CONGESTIVE) HEART FAILURE (HCC): ICD-10-CM

## 2024-05-16 LAB
ALBUMIN SERPL-MCNC: 3.6 G/DL (ref 3.4–5)
ALBUMIN/GLOB SERPL: 1.1 {RATIO} (ref 1–2)
ALP LIVER SERPL-CCNC: 118 U/L
ALT SERPL-CCNC: 25 U/L
ANION GAP SERPL CALC-SCNC: 6 MMOL/L (ref 0–18)
AST SERPL-CCNC: 18 U/L (ref 15–37)
BILIRUB SERPL-MCNC: 0.8 MG/DL (ref 0.1–2)
BUN BLD-MCNC: 43 MG/DL (ref 9–23)
CALCIUM BLD-MCNC: 9 MG/DL (ref 8.5–10.1)
CHLORIDE SERPL-SCNC: 104 MMOL/L (ref 98–112)
CO2 SERPL-SCNC: 30 MMOL/L (ref 21–32)
CREAT BLD-MCNC: 1.98 MG/DL
EGFRCR SERPLBLD CKD-EPI 2021: 33 ML/MIN/1.73M2 (ref 60–?)
FASTING STATUS PATIENT QL REPORTED: YES
GLOBULIN PLAS-MCNC: 3.4 G/DL (ref 2.8–4.4)
GLUCOSE BLD-MCNC: 155 MG/DL (ref 70–99)
OSMOLALITY SERPL CALC.SUM OF ELEC: 304 MOSM/KG (ref 275–295)
POTASSIUM SERPL-SCNC: 3.5 MMOL/L (ref 3.5–5.1)
PROT SERPL-MCNC: 7 G/DL (ref 6.4–8.2)
SODIUM SERPL-SCNC: 140 MMOL/L (ref 136–145)

## 2024-05-16 PROCEDURE — 80053 COMPREHEN METABOLIC PANEL: CPT | Performed by: INTERNAL MEDICINE

## 2024-05-20 ENCOUNTER — PATIENT MESSAGE (OUTPATIENT)
Dept: FAMILY MEDICINE CLINIC | Facility: CLINIC | Age: 84
End: 2024-05-20

## 2024-05-20 DIAGNOSIS — E11.22 TYPE 2 DIABETES MELLITUS WITH CHRONIC KIDNEY DISEASE, WITHOUT LONG-TERM CURRENT USE OF INSULIN, UNSPECIFIED CKD STAGE (HCC): Primary | ICD-10-CM

## 2024-05-20 NOTE — TELEPHONE ENCOUNTER
Daughter is requesting an A1C as patient has had several labs with elevated glucose levels recently.    Routed to Dr Barrett.

## 2024-05-20 NOTE — TELEPHONE ENCOUNTER
From: Lisandro Harley  To: Braydon Barrett  Sent: 5/20/2024 4:21 PM CDT  Subject: Glucose    Dr. Barrett,  This is Lisandro Garcíaley's daughter. My dad is concerned that his glucose is elevated some. He has had several blood tests recently and his glucose numbers are higher than have been. He is having another blood test next week for the cardiologist. Should we add in an test for A1C? He is taking Jardiance now.  Raquel

## 2024-05-28 ENCOUNTER — LAB REQUISITION (OUTPATIENT)
Dept: LAB | Facility: HOSPITAL | Age: 84
End: 2024-05-28
Payer: MEDICARE

## 2024-05-28 DIAGNOSIS — I10 ESSENTIAL (PRIMARY) HYPERTENSION: ICD-10-CM

## 2024-05-28 LAB
ANION GAP SERPL CALC-SCNC: 8 MMOL/L (ref 0–18)
BUN BLD-MCNC: 39 MG/DL (ref 9–23)
CALCIUM BLD-MCNC: 8.7 MG/DL (ref 8.5–10.1)
CHLORIDE SERPL-SCNC: 103 MMOL/L (ref 98–112)
CO2 SERPL-SCNC: 29 MMOL/L (ref 21–32)
CREAT BLD-MCNC: 2.15 MG/DL
EGFRCR SERPLBLD CKD-EPI 2021: 30 ML/MIN/1.73M2 (ref 60–?)
FASTING STATUS PATIENT QL REPORTED: YES
GLUCOSE BLD-MCNC: 149 MG/DL (ref 70–99)
OSMOLALITY SERPL CALC.SUM OF ELEC: 302 MOSM/KG (ref 275–295)
POTASSIUM SERPL-SCNC: 3.9 MMOL/L (ref 3.5–5.1)
SODIUM SERPL-SCNC: 140 MMOL/L (ref 136–145)

## 2024-05-28 PROCEDURE — 80048 BASIC METABOLIC PNL TOTAL CA: CPT | Performed by: INTERNAL MEDICINE

## 2024-05-28 RX ORDER — DILTIAZEM HYDROCHLORIDE 240 MG/1
240 CAPSULE, EXTENDED RELEASE ORAL DAILY
COMMUNITY
Start: 2024-05-01

## 2024-05-28 RX ORDER — TAMSULOSIN HYDROCHLORIDE 0.4 MG/1
0.4 CAPSULE ORAL DAILY
Qty: 90 CAPSULE | Refills: 3 | Status: SHIPPED | OUTPATIENT
Start: 2024-05-28

## 2024-05-28 RX ORDER — EMPAGLIFLOZIN 10 MG/1
TABLET, FILM COATED ORAL
COMMUNITY
Start: 2024-04-15

## 2024-05-28 NOTE — TELEPHONE ENCOUNTER
Patient call requesting medication refill    Potassium Chloride ER 10 MEQ Oral Tab CR     tamsulosin 0.4 MG Oral Cap     [id number]  [place] [address] AT [facility] [address], [phone], [phone] [address] [place] [address]   Phone: [phone] Fax: [phone]   Hours: Not open 24 hours

## 2024-06-24 ENCOUNTER — NURSE ONLY (OUTPATIENT)
Facility: CLINIC | Age: 84
End: 2024-06-24

## 2024-06-24 PROCEDURE — 94660 CPAP INITIATION&MGMT: CPT | Performed by: OTHER

## 2024-06-24 NOTE — PROGRESS NOTES
Usage 05/25/2024 - 06/23/2024  Usage days 30/30 days (100%)  >= 4 hours 30 days (100%)  < 4 hours 0 days (0%)  Usage hours 235 hours 9 minutes  Average usage (total days) 7 hours 50 minutes  Average usage (days used) 7 hours 50 minutes  Median usage (days used) 7 hours 48 minutes  AirSense 10 AutoSet  Serial number [id number]  Mode AutoSet  Min Pressure 9 cmH2O  Max Pressure 13 cmH2O  EPR Fulltime  EPR level 3  Response Standard  Therapy  Pressure - cmH2O Median: 9.9 95th percentile: 11.2 Maximum: 11.8  Leaks - L/min Median: 4.3 95th percentile: 18.1 Maximum: 119.6  Events per hour AI: 1.1 HI: 0.9 AHI: 2.0  Apnea Index Central: 0.3 Obstructive: 0.1 Unknown: 0.7  RERA Index 0.1  Cheyne-Bhardwaj respiration (average duration per night) 0 minutes (0%)    Pt here for machine troubleshooting.   Pt c/o water level  in chamber not going down as much as it used to.  Reviewed humidity settings with pt. , he is set at humidity of 4.    Pre heating mode tested and the humidifier plate was warming up.  Informed pt that ambient humidity has been high which can affect the machines humidity settings , therefore may use less water during the summer months.   Pt did not have any complaints of mouth dryness.   Informed him to continue with current humidity settings, may increase as needed based on his comfort.

## 2024-07-07 ENCOUNTER — HOSPITAL ENCOUNTER (OUTPATIENT)
Age: 84
Discharge: HOME | End: 2024-07-07
Payer: MEDICARE

## 2024-07-07 VITALS
DIASTOLIC BLOOD PRESSURE: 60 MMHG | HEART RATE: 70 BPM | OXYGEN SATURATION: 95 % | SYSTOLIC BLOOD PRESSURE: 115 MMHG | TEMPERATURE: 97 F | RESPIRATION RATE: 18 BRPM

## 2024-07-07 DIAGNOSIS — R21 RASH: ICD-10-CM

## 2024-07-07 DIAGNOSIS — L03.311 CELLULITIS OF ABDOMINAL WALL: Primary | ICD-10-CM

## 2024-07-07 LAB
BASOPHILS # BLD AUTO: 0.03 X10(3) UL (ref 0–0.2)
BASOPHILS NFR BLD AUTO: 0.3 %
BUN BLD-MCNC: 39 MG/DL (ref 7–18)
CHLORIDE BLD-SCNC: 99 MMOL/L (ref 98–112)
CO2 BLD-SCNC: 24 MMOL/L (ref 21–32)
CREAT BLD-MCNC: 2.5 MG/DL
EGFRCR SERPLBLD CKD-EPI 2021: 25 ML/MIN/1.73M2 (ref 60–?)
EOSINOPHIL # BLD AUTO: 0.33 X10(3) UL (ref 0–0.7)
EOSINOPHIL NFR BLD AUTO: 3.7 %
ERYTHROCYTE [DISTWIDTH] IN BLOOD BY AUTOMATED COUNT: 16.2 %
GLUCOSE BLD-MCNC: 170 MG/DL (ref 70–99)
HCT VFR BLD AUTO: 30.9 %
HCT VFR BLD AUTO: 32.2 %
HCT VFR BLD CALC: 32 %
HGB BLD-MCNC: 10.1 G/DL
HGB BLD-MCNC: 10.3 G/DL
IMM GRANULOCYTES # BLD AUTO: 0.05 X10(3) UL (ref 0–1)
IMM GRANULOCYTES NFR BLD: 0.6 %
ISTAT IONIZED CALCIUM FOR CHEM 8: 1.08 MMOL/L (ref 1.12–1.32)
LYMPHOCYTES # BLD AUTO: 0.64 X10(3) UL (ref 1–4)
LYMPHOCYTES NFR BLD AUTO: 7.1 %
MCH RBC QN AUTO: 26.6 PG (ref 26–34)
MCH RBC QN AUTO: 27.5 PG (ref 26–34)
MCHC RBC AUTO-ENTMCNC: 31.4 G/DL (ref 31–37)
MCHC RBC AUTO-ENTMCNC: 33.3 G/DL (ref 31–37)
MCV RBC AUTO: 82.4 FL
MCV RBC AUTO: 85 FL (ref 80–100)
MONOCYTES # BLD AUTO: 0.98 X10(3) UL (ref 0.1–1)
MONOCYTES NFR BLD AUTO: 10.9 %
NEUTROPHILS # BLD AUTO: 7 X10 (3) UL (ref 1.5–7.7)
NEUTROPHILS # BLD AUTO: 7 X10(3) UL (ref 1.5–7.7)
NEUTROPHILS NFR BLD AUTO: 77.4 %
PLATELET # BLD AUTO: 188 10(3)UL (ref 150–450)
PLATELET # BLD AUTO: 199 X10ˆ3/UL (ref 150–450)
POTASSIUM BLD-SCNC: 3.3 MMOL/L (ref 3.6–5.1)
RBC # BLD AUTO: 3.75 X10(6)UL
RBC # BLD AUTO: 3.79 X10ˆ6/UL
SODIUM BLD-SCNC: 139 MMOL/L (ref 136–145)
WBC # BLD AUTO: 9 X10(3) UL (ref 4–11)
WBC # BLD AUTO: 9 X10ˆ3/UL (ref 4–11)

## 2024-07-07 RX ORDER — CEFADROXIL 500 MG/1
500 CAPSULE ORAL 2 TIMES DAILY
Qty: 14 CAPSULE | Refills: 0 | Status: SHIPPED | OUTPATIENT
Start: 2024-07-07 | End: 2024-07-14

## 2024-07-07 NOTE — ED PROVIDER NOTES
Patient Seen in: [facility]      History     Chief Complaint   Patient presents with    Rash Skin Problem     Stated Complaint: Possible shingles    Subjective:   HPI    Scar is a pleasant 84-year-old male with known history of coronary artery disease, diabetes, diarrhea, hypertension, hyperlipidemia who comes in today with his son complaining of rash that is present present for least couple of days.  Patient has rash located to the right inner arm and the right chest and also to the left inner arm and left abdomen.  Patient denies fever chills otherwise feels well.  Patient denies itching but states its painful and irritated.    Objective:   Past Medical History:    Arrhythmia    Arthritis    Back problem    spinal stenosis    Cancer (HCC)    prostate/radiation treatments only    Coronary atherosclerosis of unspecified type of vessel, native or graft    s/p 1 cardiac stent    Diabetes mellitus (HCC)    Diarrhea, unspecified    Easy bruising    Exposure to unspecified radiation    prostate    Frequent urination    High blood pressure    High cholesterol    Irregular bowel habits    Leaking of urine    Leg swelling    Osteoarthritis    Pacemaker    Renal disorder    Stage III-moderate    Rheumatoid arthritis (HCC)    Sleep apnea    cpap    Stented coronary artery    Type II or unspecified type diabetes mellitus without mention of complication, not stated as uncontrolled    Visual impairment    Wears glasses              Past Surgical History:   Procedure Laterality Date    Cardiac pacemaker placement  2010    medtronic    Colonoscopy N/A 9/25/2015    Procedure: COLONOSCOPY;  Surgeon: Yumiko Martin DO;  Location:  ENDOSCOPY    Hip replacement surgery Left 06/26/2018    Hip replacement surgery Right     2000    Knee replacement surgery Left     after 2000    Laminectomy      no hardware    Skin surgery      Total hip replacement Right     Total knee replacement Right                 Social  History     Socioeconomic History    Marital status:    Tobacco Use    Smoking status: Former     Current packs/day: 0.00     Average packs/day: 1.5 packs/day for 40.0 years (60.0 ttl pk-yrs)     Types: Cigarettes     Start date: 1965     Quit date: 2005     Years since quittin.5    Smokeless tobacco: Never   Vaping Use    Vaping status: Never Used   Substance and Sexual Activity    Alcohol use: Yes     Comment: rarely    Drug use: No   Other Topics Concern    Caffeine Concern Yes     Comment: decafe coffee 2 a day    Exercise No    Seat Belt Yes     Service No    Sleep Concern No     Social Determinants of Health     Financial Resource Strain: Low Risk  (2021)    [facility]    Overall Financial Resource Strain (CARDIA)     Difficulty of Paying Living Expenses: Not very hard   Transportation Needs: Unmet Transportation Needs (2021)    [facility]    PRAPARE - Transportation     Lack of Transportation (Medical): Yes     Lack of Transportation (Non-Medical): No    [facility] from [facility], [facility]    Social Connections    Received from [facility], [facility]    Housing Stability              Review of Systems    Positive for stated Chief Complaint: Rash Skin Problem    Other systems are as noted in HPI.  Constitutional and vital signs reviewed.      All other systems reviewed and negative except as noted above.    Physical Exam     ED Triage Vitals [24 0820]   /65   Pulse 80   Resp 18   Temp 97 °F (36.1 °C)   Temp src Temporal   SpO2 94 %   O2 Device None (Room air)       Current Vitals:   Vital Signs  BP: 119/65  Pulse: 80  Resp: 18  Temp: 97 °F (36.1 °C)  Temp src: Temporal    Oxygen Therapy  SpO2: 94 %  O2 Device: None (Room air)            Physical Exam  Vitals and nursing note reviewed.    Constitutional:       General: He is not in acute distress.     Appearance: Normal appearance. He is well-developed. He is not diaphoretic.   HENT:      Head: Normocephalic and atraumatic.   Cardiovascular:      Rate and Rhythm: Normal rate and regular rhythm.   Pulmonary:      Effort: Pulmonary effort is normal. No respiratory distress.      Breath sounds: Normal breath sounds.   Musculoskeletal:      Cervical back: Normal range of motion.   Skin:     General: Skin is warm and dry.      Coloration: Skin is not pale.      Findings: Erythema and rash present.      Comments: See pictures of raised erythematous and warm rash that is located on the left chest wall and right abdomen and also right inner arms   Neurological:      Mental Status: He is alert and oriented to person, place, and time.      Motor: No abnormal muscle tone.      Coordination: Coordination normal.      Deep Tendon Reflexes: Reflexes are normal and symmetric.   Psychiatric:         Behavior: Behavior normal.         Thought Content: Thought content normal.         Judgment: Judgment normal.                               ED Course     Labs Reviewed   POCT CBC - Abnormal; Notable for the following components:       Result Value    RBC IC 3.79 (*)     HGB IC 10.1 (*)     HCT IC 32.2 (*)     All other components within normal limits   POCT ISTAT CHEM8 CARTRIDGE - Abnormal; Notable for the following components:    ISTAT BUN 39 (*)     ISTAT Potassium 3.3 (*)     ISTAT Ionized Calcium 1.08 (*)     ISTAT Hematocrit 32 (*)     ISTAT Glucose 170 (*)     ISTAT Creatinine 2.50 (*)     eGFR-Cr 25 (*)     All other components within normal limits   CBC W AUTO DIFF          MDM               Medical Decision Making  85yo M with painful rash     Amount and/or Complexity of Data Reviewed  Independent Historian: caregiver     Details: son  External Data Reviewed: labs.     Details: Reviewed patient's previous laboratory work  Labs: ordered. Decision-making  details documented in ED Course.     Details: CBC no elevation white blood cell  ount hemoglobin 10.1 as consistent with patient's previous labs  BMP shows creatinine of 2.5 BUN of 39 patient's previous creatinine in May was 2.15, patient is mildly hypokalemic with a potassium of 3.3  ECG/medicine tests: ordered and independent interpretation performed. Decision-making details documented in ED Course.     Details: IV Ancef  Discussion of management or test interpretation with external provider(s): Discussed with Dr. woodard who agrees with my assessment and plan.    Risk  OTC drugs.  Prescription drug management.  Risk Details: Clinical Impression: Cellulitis abdominal wall and chest wall from likely friction rash      The differential diagnosis before testing included cellulitis, rash, sepsis, which is a medical condition that poses a threat to life/function.     Discussion was had with the patient take antibiotics as directed if any worsening symptoms be reevaluated.  Wound check in the next 3 to 4 days        Disposition and Plan     Clinical Impression:  1. Cellulitis of abdominal wall    2. Rash         Disposition:  There is no disposition on file for this visit.  There is no disposition time on file for this visit.    Follow-up:  No follow-up provider specified.        Medications Prescribed:  Current Discharge Medication List        START taking these medications    Details   cefadroxil 500 MG Oral Cap Take 1 capsule (500 mg total) by mouth 2 (two) times daily for 7 days.  Qty: 14 capsule, Refills: 0

## 2024-07-07 NOTE — DISCHARGE INSTRUCTIONS
Start oral antibiotics tonight follow-up with your PCP this week for recheck if worsening redness fevers chills be reevaluated in the emergency room    Aquaphor to the area keep area clean and dry    While taking antibiotics it is recommended that you also take an over the counter probiotics.  If you'd like, you can have 2 servings of yogurt/Kefir daily instead.  Probiotics increase the good bacteria in your gut while the antibiotic fights the bad bacteria that is causing your infection.  The good bacteria in your gut act as part of your immune system.  Taking a probiotic while on antibiotics will decrease the chances of upset stomach and diarrhea, which are common side effects of antibiotics.

## 2024-08-13 ENCOUNTER — TELEPHONE (OUTPATIENT)
Facility: CLINIC | Age: 84
End: 2024-08-13

## 2024-08-13 DIAGNOSIS — I10 ESSENTIAL HYPERTENSION: ICD-10-CM

## 2024-08-13 DIAGNOSIS — G47.33 OSA (OBSTRUCTIVE SLEEP APNEA): Primary | ICD-10-CM

## 2024-08-13 RX ORDER — METOPROLOL TARTRATE 100 MG/1
100 TABLET ORAL 2 TIMES DAILY
Qty: 180 TABLET | Refills: 0 | Status: SHIPPED | OUTPATIENT
Start: 2024-08-13

## 2024-08-13 NOTE — TELEPHONE ENCOUNTER
Terra Nieto,    Your patient is coming back to see the sleep specialists of [facility] at [facility] regarding their sleep apnea.     Can you please enter a new O referral for their follow-up appointment please?    Thank you!    ~Delisa   PSR Lead/ for the Sleep Team  Dr. Luma Obrien

## 2024-08-13 NOTE — TELEPHONE ENCOUNTER
Requested Prescriptions     Pending Prescriptions Disp Refills    METOPROLOL TARTRATE 100 MG Oral Tab [Pharmacy Med Name: METOPROLOL TARTRATE 100MG TABLETS] 90 tablet 0     Sig: TAKE 1 TABLET(100 MG) BY MOUTH TWICE DAILY     LOV 4/10/2024     Patient was asked to follow-up in: Follow-up not documented in note    Appointment scheduled: 8/19/2024 Braydon Barrett MD     Medication refilled per protocol.

## 2024-08-19 ENCOUNTER — OFFICE VISIT (OUTPATIENT)
Dept: FAMILY MEDICINE CLINIC | Facility: CLINIC | Age: 84
End: 2024-08-19
Payer: MEDICARE

## 2024-08-19 VITALS
RESPIRATION RATE: 16 BRPM | DIASTOLIC BLOOD PRESSURE: 58 MMHG | HEIGHT: 66 IN | BODY MASS INDEX: 31.34 KG/M2 | HEART RATE: 75 BPM | SYSTOLIC BLOOD PRESSURE: 118 MMHG | WEIGHT: 195 LBS

## 2024-08-19 DIAGNOSIS — J43.9 PULMONARY EMPHYSEMA, UNSPECIFIED EMPHYSEMA TYPE (HCC): ICD-10-CM

## 2024-08-19 DIAGNOSIS — I48.91 ATRIAL FIBRILLATION, UNSPECIFIED TYPE (HCC): ICD-10-CM

## 2024-08-19 DIAGNOSIS — Z95.0 CARDIAC PACEMAKER IN SITU: ICD-10-CM

## 2024-08-19 DIAGNOSIS — E11.22 TYPE 2 DIABETES MELLITUS WITH CHRONIC KIDNEY DISEASE, WITHOUT LONG-TERM CURRENT USE OF INSULIN, UNSPECIFIED CKD STAGE (HCC): Primary | ICD-10-CM

## 2024-08-19 DIAGNOSIS — I10 ESSENTIAL HYPERTENSION: ICD-10-CM

## 2024-08-19 LAB — HEMOGLOBIN A1C: 7.5 % (ref 4.3–5.6)

## 2024-08-19 RX ORDER — APIXABAN 2.5 MG/1
2.5 TABLET, FILM COATED ORAL 2 TIMES DAILY
COMMUNITY
Start: 2024-07-09

## 2024-08-19 RX ORDER — DILTIAZEM HYDROCHLORIDE 120 MG/1
120 CAPSULE, EXTENDED RELEASE ORAL DAILY
COMMUNITY
Start: 2024-07-16

## 2024-08-19 NOTE — PROGRESS NOTES
Chief Complaint   Patient presents with    Diabetes     Patient here for diabetes follow up      HPI:   Lisandro Harley is a 84 year old male who presents for a diabetic visit.  A1C increased from 6.5 in Feb to 7.5 now,    Typical blood sugar levels are not checked  Current diabetic medications are: Jardiance, metformin  Diet: too many sweets of late.    Exercise: limited.  Less than he should.   Last Eye exam: due  Last Foot exam: done previously.      Heart - no symptoms.  Denies any chest pain.  A-fib, s/p pacemaker.   BP controlled.      Lungs - emphysema.  Some regular meds.  Controlled.      Relevant Labs:   Chemistry Labs:   HEMOGLOBIN A1C (%)   Date Value   2024 7.5 (A)   2023 6.2 (A)   2019 6.4 (A)   2008 6.8 (H)     HgbA1C (%)   Date Value   2024 6.5 (H)   2023 6.1 (H)   2022 6.4 (H)      Lab Results   Component Value Date/Time    LDL 58 2024 08:06 AM    HDL 38 (L) 2024 08:06 AM    AST 18 2024 07:30 AM    ALT 25 2024 07:30 AM      Lab Results   Component Value Date/Time    CREATSERUM 2.15 (H) 2024 07:49 AM        Urine Studies:   Micro Albumen/Creatinine:    Lab Results   Component Value Date    MICROALBCREA 86.1 (H) 04/10/2024    MICROALBCREA 7.1 2023    MICROALBCREA 12.4 2022       Social History     Tobacco Use    Smoking status: Former     Current packs/day: 0.00     Average packs/day: 1.5 packs/day for 40.0 years (60.0 ttl pk-yrs)     Types: Cigarettes     Start date: 1965     Quit date: 2005     Years since quittin.6    Smokeless tobacco: Never   Substance Use Topics    Alcohol use: Yes     Comment: rarely       REVIEW OF SYSTEMS:     Constitutional: negative  Eyes: negative  Ears, nose, mouth, throat, and face: negative  Respiratory: negative  Cardiovascular: negative  Gastrointestinal: negative  Genitourinary: negative  Neurological: negative    EXAM:     /58   Pulse 75   Resp 16   Ht 5' 6\"  (1.676 m)   Wt 195 lb (88.5 kg)   BMI 31.47 kg/m²   Wt Readings from Last 6 Encounters:   08/19/24 195 lb (88.5 kg)   04/10/24 200 lb (90.7 kg)   10/19/23 196 lb (88.9 kg)   08/07/23 190 lb (86.2 kg)   06/19/23 198 lb (89.8 kg)   03/15/23 195 lb (88.5 kg)       General: alert, well appearing, and in no distress  HEENT: pupils equal and reactive, extraocular eye movements intact  CV: regular rate and rhythm, no murmurs  Resp: clear to auscultation, no wheezes, rales or rhonchi, symmetric air entry  Abdomen: soft, nontender, nondistended, no masses or organomegaly  Neuro: alert, oriented, normal speech, no focal findings or movement disorder noted  Ext/Foot: no edema.     ASSESSMENT AND PLAN:     Lisandro Harley was seen in the office today:  had concerns including Diabetes (Patient here for diabetes follow up).    1. Type 2 diabetes mellitus with chronic kidney disease, without long-term current use of insulin, unspecified CKD stage (HCC)  A1C increased, but still controlled  Watch the sweets.  Stay active  No changes to the meds.  Recheck 6mo  - POC Hgb A1C    2. Atrial fibrillation, unspecified type (HCC)  3. Cardiac pacemaker in situ  RRR today  Pacemaker in place    4. Essential hypertension  BP controlled  Stable meds.     5. Pulmonary emphysema, unspecified emphysema type (HCC)  No active issues  Encouraged to be a little more active.  Participate in exercise classes when able.       Return in about 6 months (around 2/19/2025) for Medicare Wellness visit.    Braydon Barrett M.D.   EMG 3  08/19/24

## 2024-08-20 ENCOUNTER — OFFICE VISIT (OUTPATIENT)
Facility: CLINIC | Age: 84
End: 2024-08-20
Payer: MEDICARE

## 2024-08-20 VITALS
SYSTOLIC BLOOD PRESSURE: 112 MMHG | HEART RATE: 74 BPM | HEIGHT: 66 IN | OXYGEN SATURATION: 89 % | DIASTOLIC BLOOD PRESSURE: 60 MMHG | RESPIRATION RATE: 18 BRPM | WEIGHT: 195 LBS | BODY MASS INDEX: 31.34 KG/M2

## 2024-08-20 DIAGNOSIS — G47.36 HYPOXEMIA ASSOCIATED WITH SLEEP: Primary | ICD-10-CM

## 2024-08-20 PROCEDURE — 3078F DIAST BP <80 MM HG: CPT | Performed by: OTHER

## 2024-08-20 PROCEDURE — 99214 OFFICE O/P EST MOD 30 MIN: CPT | Performed by: OTHER

## 2024-08-20 PROCEDURE — 1160F RVW MEDS BY RX/DR IN RCRD: CPT | Performed by: OTHER

## 2024-08-20 PROCEDURE — 3074F SYST BP LT 130 MM HG: CPT | Performed by: OTHER

## 2024-08-20 PROCEDURE — 3008F BODY MASS INDEX DOCD: CPT | Performed by: OTHER

## 2024-08-20 PROCEDURE — 1159F MED LIST DOCD IN RCRD: CPT | Performed by: OTHER

## 2024-08-20 NOTE — PROGRESS NOTES
[facility]  SLEEP PROGRESS NOTE        HPI:   This is a 84 year old male coming in for   Chief Complaint   Patient presents with    Obstructive Sleep Apnea (NII)     Dme:  Mask:         HPI:     Sleep hours 11-7, wakes a couple of times a night, 4-5 RR      ROD TAFOYA  2024 - 2024  : 1940  Age: 84 years  [place]  [address] [facility], [address]  Phone: [phone]  Email: [email or web address]  Compliance Report  Usage 2024 - 2024  Usage days 90/90 days (100%)  >= 4 hours 90 days (100%)  < 4 hours 0 days (0%)  Usage hours 696 hours 50 minutes  Average usage (total days) 7 hours 45 minutes  Average usage (days used) 7 hours 45 minutes  Median usage (days used) 7 hours 44 minutes  AirSense 10 AutoSet  Serial number [id number]  Mode AutoSet  Min Pressure 9 cmH2O  Max Pressure 13 cmH2O  EPR Fulltime  EPR level 3  Response Standard  Therapy  Pressure - cmH2O Median: 10.5 95th percentile: 12.1 Maximum: 12.5  Leaks - L/min Median: 1.6 95th percentile: 9.4 Maximum: 119.8  Events per hour AI: 1.1 HI: 1.8 AHI: 2.9  Apnea Index Central: 0.3 Obstructive: 0.2 Unknown: 0.6  RERA Index 0.1  Cheyne-Bhardwaj respiration (average duration per night) 1 minutes (0%)    Patient: Sleep review of systems today: see form.      Pt  PCP:  MD Arnold Huddleston Jonathan, MD  [address]  [facility] [address]  [place] [address]          2022    11:04 AM   Sleep Assessment   AHI Down Load 17.5   Compliant YES   % Usage from download 30 DAYS -  73%;   >4 HOURS  - 70%   LEAK 17 L/M   Sleep Duration 6 HRS 59 MIN   Pressure Download MED 9.0;   95% 12.9;  MAX 14.2   CPAP MASK TYPE AIRFIT F20 MEDIUM   CPAP Start Date 2022   Type of CPAP machine RESMED AIRSENSE 10 AUTOSET   APAP 5 - 15   DME Provider [facility]           Past Medical History:    Arrhythmia    Arthritis    Back problem    spinal stenosis    Cancer (HCC)    prostate/radiation treatments only    Coronary  atherosclerosis of unspecified type of vessel, native or graft    s/p 1 cardiac stent    Diabetes mellitus (HCC)    Diarrhea, unspecified    Easy bruising    Exposure to unspecified radiation    prostate    Frequent urination    High blood pressure    High cholesterol    Irregular bowel habits    Leaking of urine    Leg swelling    Osteoarthritis    Pacemaker    Renal disorder    Stage III-moderate    Rheumatoid arthritis (HCC)    Sleep apnea    cpap    Stented coronary artery    Type II or unspecified type diabetes mellitus without mention of complication, not stated as uncontrolled    Visual impairment    Wears glasses     Past Surgical History:   Procedure Laterality Date    Cardiac pacemaker placement      medtronic    Colonoscopy N/A 2015    Procedure: COLONOSCOPY;  Surgeon: Yumiko Martin DO;  Location:  ENDOSCOPY    Hip replacement surgery Left 2018    Hip replacement surgery Right     2000    Knee replacement surgery Left     after     Laminectomy      no hardware    Skin surgery      Total hip replacement Right     Total knee replacement Right      Social History:  Social History     Social History Narrative    Not on file     Social History     Socioeconomic History    Marital status:    Tobacco Use    Smoking status: Former     Current packs/day: 0.00     Average packs/day: 1.5 packs/day for 40.0 years (60.0 ttl pk-yrs)     Types: Cigarettes     Start date: 1965     Quit date: 2005     Years since quittin.6    Smokeless tobacco: Never   Vaping Use    Vaping status: Never Used   Substance and Sexual Activity    Alcohol use: Yes     Comment: rarely    Drug use: No   Other Topics Concern    Caffeine Concern Yes     Comment: decafe coffee 2 a day    Exercise No    Seat Belt Yes     Service No    Sleep Concern No     Social Determinants of Health     Financial Resource Strain: Low Risk  (2021)    [facility]     Overall Financial Resource Strain (CARDIA)     Difficulty of Paying Living Expenses: Not very hard   Transportation Needs: Unmet Transportation Needs (9/27/2021)    [facility]    PRAPARE - Transportation     Lack of Transportation (Medical): Yes     Lack of Transportation (Non-Medical): No    [facility]    Social Connections    Received [facility]    Housing Stability     Family History:  Family History   Problem Relation Age of Onset    Diabetes Neg     Heart Disorder Neg     Hypertension Neg      Allergies:  Allergies   Allergen Reactions    Hydrocodone-Acetaminophen NAUSEA ONLY    Tussigon [Hycodan] NAUSEA ONLY     Current Meds:  Current Outpatient Medications   Medication Sig Dispense Refill    dilTIAZem HCl ER Beads 120 MG Oral Capsule SR 24 Hr Take 1 capsule (120 mg total) by mouth daily.      ELIQUIS 2.5 MG Oral Tab Take 1 tablet (2.5 mg total) by mouth 2 (two) times daily.      metoprolol tartrate 100 MG Oral Tab TAKE 1 TABLET(100 MG) BY MOUTH TWICE DAILY 180 tablet 0    JARDIANCE 10 MG Oral Tab Jardiance 10 mg tablet, [RxNorm: 6232473]      tamsulosin 0.4 MG Oral Cap Take 1 capsule (0.4 mg total) by mouth daily. 90 capsule 3    ATORVASTATIN 10 MG Oral Tab TAKE 1 TABLET EVERY NIGHT 90 tablet 3    metFORMIN 500 MG Oral Tab Take 1 tablet (500 mg total) by mouth 2 (two) times daily with meals. 180 tablet 1    torsemide 20 MG Oral Tab Take 1 tablet (20 mg total) by mouth daily. 90 tablet 1    Tiotropium Bromide-Olodaterol (STIOLTO RESPIMAT) 2.5-2.5 MCG/ACT Inhalation Aero Soln Inhale 2 puffs into the lungs daily. 3 each 1    Tiotropium Bromide-Olodaterol 2.5-2.5 MCG/ACT Inhalation Aero Soln Inhale 2 puffs into the lungs daily. 1 each 11    Respiratory Therapy Supplies (NEBULIZER/TUBING/MOUTHPIECE) Does not apply Kit 1 kit As Directed. 1 kit 0     Emollient (EUCERIN SKIN CALMING) External Cream Apply 1 Application topically 3 (three) times daily as needed. 400 g 1    Potassium Chloride ER 10 MEQ Oral Tab CR Take 1 tablet (10 mEq total) by mouth daily.        Counseling given: Not Answered         Problem List:  Patient Active Problem List   Diagnosis    Essential hypertension    Lymphocytic colitis    Coronary atherosclerosis    NII (obstructive sleep apnea)    Spinal stenosis of lumbar region    Atrial fibrillation (HCC)    Mitral valve disorder    Cardiac pacemaker in situ    Hyperlipidemia    Acquired spondylolisthesis    Thrombocytopenia (HCC)    Alcoholic cirrhosis of liver without ascites (HCC)    h/o prostate cancer s/p radiation treatment    Primary pulmonary hypertension (HCC)    Type 2 diabetes mellitus with diabetic chronic kidney disease (HCC)    Pulmonary emphysema (HCC)       REVIEW OF SYSTEMS:   Review of Systems    EXAM:   /60 (BP Location: Right arm, Patient Position: Sitting, Cuff Size: adult)   Pulse 74   Resp 18   Ht 5' 6\" (1.676 m)   Wt 195 lb (88.5 kg)   SpO2 (!) 89%   BMI 31.47 kg/m²  Estimated body mass index is 31.47 kg/m² as calculated from the following:    Height as of this encounter: 5' 6\" (1.676 m).    Weight as of this encounter: 195 lb (88.5 kg).   Neck in inches:      Wt Readings from Last 6 Encounters:   08/20/24 195 lb (88.5 kg)   08/19/24 195 lb (88.5 kg)   04/10/24 200 lb (90.7 kg)   10/19/23 196 lb (88.9 kg)   08/07/23 190 lb (86.2 kg)   06/19/23 198 lb (89.8 kg)     BP Readings from Last 3 Encounters:   08/20/24 112/60   08/19/24 118/58   07/07/24 115/60     Pulse Readings from Last 3 Encounters:   08/20/24 74   08/19/24 75   07/07/24 70     SpO2 Readings from Last 3 Encounters:   08/20/24 (!) 89%   07/07/24 95%   04/10/24 90%      Ideal body weight: 63.8 kg (140 lb 10.5 oz)  Adjusted ideal body weight: 74.2 kg (163 lb 9.5 oz)    Vital signs reviewed.  Physical Exam    ASSESSMENT AND PLAN:   1. Atrial  fibrillation, unspecified type (HCC)    2. Primary pulmonary hypertension (HCC)    3. Pulmonary emphysema, unspecified emphysema type (HCC)    4. NII (obstructive sleep apnea)/Hypoxemia  Pressure adjustment was effective, AHI is 2.9 on 9-13  He also use 3 liters O2  Patient is using and benefiting from regular cpap use.  Patient was instructed to clean equipment on a weekly basis.  Patient was instructed to keep up to date with supplies.  Patient was informed to avoid drowsy driving, or using heavy machinery.  Patient was instructed to followup on a annual basis.  There are no Patient Instructions on file for this visit.    Independent interpretation of Sleep Download as defined above.  Continue with Rx management of Sleep apnea with PAP therapy.    COMPLIANCE is required by insurance for 4 hours a night most nights of the week.    Advised if still with sleep apnea and not using CPAP has a 7 fold increase in risk of heart attack, stroke, abnormal heart rhythm  and death,  increased risk of driving accidents.     Advised to refrain from driving when sleepy.      Recommend weight loss, and maintain and optimal BMI with Exercise 30 minutes most days to target heart rate .     Advised patient to change filters,masks,hoses  and tubes and equiptment on a  regular schedule.    Filters and seals shall be changed every 1 month,  Hoses every 3 months,   CPAP mask and humidifier chamber changed every 6 month  with the durable medical equipment provider.         Meds & Refills for this Visit:  Requested Prescriptions      No prescriptions requested or ordered in this encounter       Outcome: Parent verbalizes understanding. Parent is notified to call with any questions, complications, allergies, or worsening or changing symptoms.  Parent is to call with any side effects or complications from the treatments as a result of today.     \" This note was created utilizing Dragon speech recognition software.  Please excuse any  grammatical errors. Call my office if you have any questions regarding this note. \"     Luma Rubio DO  6/19/2023  10:52 AM

## 2024-08-20 NOTE — PROGRESS NOTES
TAFOYAROD  2024 - 2024  : 1940  Age: 84 years  [place]  [address] [facility] [address] [facility] [address]  Phone: [phone]  Email: [email or web address]  Compliance Report  Usage 2024 - 2024  Usage days 90/90 days (100%)  >= 4 hours 90 days (100%)  < 4 hours 0 days (0%)  Usage hours 696 hours 50 minutes  Average usage (total days) 7 hours 45 minutes  Average usage (days used) 7 hours 45 minutes  Median usage (days used) 7 hours 44 minutes  AirSense 10 AutoSet  Serial number [id number]  Mode AutoSet  Min Pressure 9 cmH2O  Max Pressure 13 cmH2O  EPR Fulltime  EPR level 3  Response Standard  Therapy  Pressure - cmH2O Median: 10.5 95th percentile: 12.1 Maximum: 12.5  Leaks - L/min Median: 1.6 95th percentile: 9.4 Maximum: 119.8  Events per hour AI: 1.1 HI: 1.8 AHI: 2.9  Apnea Index Central: 0.3 Obstructive: 0.2 Unknown: 0.6  RERA Index 0.1  Cheyne-Bhardwaj respiration (average duration per night) 1 minutes (0%)    .

## 2024-08-22 ENCOUNTER — TELEPHONE (OUTPATIENT)
Facility: CLINIC | Age: 84
End: 2024-08-22

## 2024-08-22 NOTE — TELEPHONE ENCOUNTER
Luma Rubio DO  P [facility] Pulmonary Sleep Staff  Pls send orders for supplies        Notified patient through [facility] stating supply order was sent to [facility].

## 2024-08-31 ENCOUNTER — TELEPHONE (OUTPATIENT)
Dept: FAMILY MEDICINE CLINIC | Facility: CLINIC | Age: 84
End: 2024-08-31

## 2024-08-31 DIAGNOSIS — I10 ESSENTIAL HYPERTENSION: ICD-10-CM

## 2024-09-03 RX ORDER — METOPROLOL TARTRATE 100 MG
100 TABLET ORAL 2 TIMES DAILY
Qty: 180 TABLET | Refills: 0 | OUTPATIENT
Start: 2024-09-03

## 2024-09-03 RX ORDER — METOPROLOL TARTRATE 100 MG
100 TABLET ORAL 2 TIMES DAILY
Qty: 180 TABLET | Refills: 3 | OUTPATIENT
Start: 2024-09-03

## 2024-09-03 NOTE — TELEPHONE ENCOUNTER
Called [facility] and he did  the 8/13/24 script so I added 2 refills to it and then called and told the patient he said he does not remember picking this up but he will look for it and if he cannot find it he will call the pharmacy.

## 2024-09-17 ENCOUNTER — MED REC SCAN ONLY (OUTPATIENT)
Facility: CLINIC | Age: 84
End: 2024-09-17

## 2024-09-20 DIAGNOSIS — E78.2 MIXED HYPERLIPIDEMIA: ICD-10-CM

## 2024-09-20 RX ORDER — ATORVASTATIN CALCIUM 10 MG/1
10 TABLET, FILM COATED ORAL NIGHTLY
Qty: 30 TABLET | Refills: 0 | Status: SHIPPED | OUTPATIENT
Start: 2024-09-20

## 2024-09-20 NOTE — TELEPHONE ENCOUNTER
Requested Prescriptions     Pending Prescriptions Disp Refills    ATORVASTATIN 10 MG Oral Tab [Pharmacy Med Name: ATORVASTATIN 10MG TABLETS] 30 tablet 0     Sig: TAKE 1 TABLET(10 MG) BY MOUTH EVERY NIGHT.     LOV 8/19/2024     Patient was asked to follow-up in: 6 months    Appointment scheduled: 2/19/2025 Braydon Barrett MD     Medication refilled per protocol.

## 2024-10-08 ENCOUNTER — TELEPHONE (OUTPATIENT)
Dept: FAMILY MEDICINE CLINIC | Facility: CLINIC | Age: 84
End: 2024-10-08

## 2024-10-08 NOTE — TELEPHONE ENCOUNTER
Called and talked to patient he feels the diarrhea is getting better he will do Clear liquids for the next 24 hours then start the BRAT diet and if not better he will call back

## 2024-10-08 NOTE — TELEPHONE ENCOUNTER
Pt is calling he has non stop diarrhea and it is just water now.  He said it is the worst diarrhea he has ever had. Please call with suggestions.

## 2024-10-11 ENCOUNTER — TELEPHONE (OUTPATIENT)
Dept: FAMILY MEDICINE CLINIC | Facility: CLINIC | Age: 84
End: 2024-10-11

## 2024-10-11 DIAGNOSIS — E78.2 MIXED HYPERLIPIDEMIA: ICD-10-CM

## 2024-10-11 RX ORDER — ATORVASTATIN CALCIUM 10 MG/1
10 TABLET, FILM COATED ORAL NIGHTLY
Qty: 90 TABLET | Refills: 3 | Status: SHIPPED | OUTPATIENT
Start: 2024-10-11

## 2024-10-11 NOTE — TELEPHONE ENCOUNTER
Patient requesting medication refill on   ATORVASTATIN 10 MG Oral Tab     Patient stated that his 2 days of meds     Send to   [id number] [place] - [address] AT [facility] [address],

## 2024-10-12 RX ORDER — ATORVASTATIN CALCIUM 10 MG/1
10 TABLET, FILM COATED ORAL NIGHTLY
Qty: 30 TABLET | Refills: 0 | OUTPATIENT
Start: 2024-10-12

## 2024-10-16 ENCOUNTER — OFFICE VISIT (OUTPATIENT)
Age: 84
End: 2024-10-16
Payer: MEDICARE

## 2024-10-16 ENCOUNTER — TELEPHONE (OUTPATIENT)
Facility: CLINIC | Age: 84
End: 2024-10-16

## 2024-10-16 VITALS
HEART RATE: 70 BPM | HEIGHT: 66 IN | DIASTOLIC BLOOD PRESSURE: 70 MMHG | SYSTOLIC BLOOD PRESSURE: 104 MMHG | BODY MASS INDEX: 31 KG/M2 | OXYGEN SATURATION: 94 %

## 2024-10-16 DIAGNOSIS — J96.11 CHRONIC RESPIRATORY FAILURE WITH HYPOXIA (HCC): Primary | ICD-10-CM

## 2024-10-16 DIAGNOSIS — Z72.0 TOBACCO ABUSE: ICD-10-CM

## 2024-10-16 DIAGNOSIS — J43.2 CENTRILOBULAR EMPHYSEMA (HCC): ICD-10-CM

## 2024-10-16 PROCEDURE — 3008F BODY MASS INDEX DOCD: CPT | Performed by: INTERNAL MEDICINE

## 2024-10-16 PROCEDURE — 1160F RVW MEDS BY RX/DR IN RCRD: CPT | Performed by: INTERNAL MEDICINE

## 2024-10-16 PROCEDURE — 3078F DIAST BP <80 MM HG: CPT | Performed by: INTERNAL MEDICINE

## 2024-10-16 PROCEDURE — 3074F SYST BP LT 130 MM HG: CPT | Performed by: INTERNAL MEDICINE

## 2024-10-16 PROCEDURE — 99213 OFFICE O/P EST LOW 20 MIN: CPT | Performed by: INTERNAL MEDICINE

## 2024-10-16 PROCEDURE — 1159F MED LIST DOCD IN RCRD: CPT | Performed by: INTERNAL MEDICINE

## 2024-10-16 NOTE — PROGRESS NOTES
[facility] General Pulmonary Progress Note    History of Present Illness:  Lisandro Harley is a 84 year old male former smoker (quit 2005, 60 pack years) with significant PMH of HFpEF, pulmonary HTN, NII on cpap, COPD who presents today for follow up of COPD. Since last visit he reports feeling well. No cough, dyspnea or pain. He has stopped using stiolto since he feels well. Has not used/needed albuterol either  Just using o2 when in chair/resting. Never received POC    Oct 2023 previously  He denies new concerns today. Denies any dyspnea at rest or exertion.  Using his o2 on exertion. No cough, phlegm or pain. No fevers.   Not using stiolto or albuterol - denies any change in how he feels with or without the inhalers.     May 2023 previously  He denies new concerns today, continued dyspnea on exertion. Using o2 up to 4-6L at times, monitors his home o2 and usually in mid 90s.  Did have syncopal episode last week, was walking without his o2.     Previously  The patient was recently hospitalized at [facility] earlier this month with CHF and COPD exacerbations, noted viral panel with +RSV. He was treated with diuresis, steroids, abx and nebulizers with improvement. Chest imaging with basilar opacities/ edema which improved with diuresis the following day. He was noted hypoxic at rest and exertion - 2L at rest, 6L on exertion and set up on home oxygen. He feels better since discharge, approaching baseline. Still with occasional dry cough. No phlegm. No pain. +SANDERS. No fevers    Set up with HME for home o2- 2L at rest, 6L     Past Medical History:   Past Medical History:    Arrhythmia    Arthritis    Back problem    spinal stenosis    Cancer (HCC)    prostate/radiation treatments only    Coronary atherosclerosis of unspecified type of vessel, native or graft    s/p 1 cardiac stent    Diabetes mellitus (HCC)    Diarrhea, unspecified    Easy bruising    Exposure to unspecified radiation    prostate    Frequent urination    High blood  pressure    High cholesterol    Irregular bowel habits    Leaking of urine    Leg swelling    Osteoarthritis    Pacemaker    Renal disorder    Stage III-moderate    Rheumatoid arthritis (HCC)    Sleep apnea    cpap    Stented coronary artery    Type II or unspecified type diabetes mellitus without mention of complication, not stated as uncontrolled    Visual impairment    Wears glasses        Past Surgical History:   Past Surgical History:   Procedure Laterality Date    Cardiac pacemaker placement      medtronic    Colonoscopy N/A 2015    Procedure: COLONOSCOPY;  Surgeon: Yumiko Martin DO;  Location:  ENDOSCOPY    Hip replacement surgery Left 2018    Hip replacement surgery Right     2000    Knee replacement surgery Left     after     Laminectomy      no hardware    Skin surgery      Total hip replacement Right     Total knee replacement Right        Family Medical History:   Family History   Problem Relation Age of Onset    Diabetes Neg     Heart Disorder Neg     Hypertension Neg         Social History:   Social History     Socioeconomic History    Marital status:      Spouse name: Not on file    Number of children: Not on file    Years of education: Not on file    Highest education level: Not on file   Occupational History    Not on file   Tobacco Use    Smoking status: Former     Current packs/day: 0.00     Average packs/day: 1.5 packs/day for 40.0 years (60.0 ttl pk-yrs)     Types: Cigarettes     Start date: 1965     Quit date: 2005     Years since quittin.8    Smokeless tobacco: Never   Vaping Use    Vaping status: Never Used   Substance and Sexual Activity    Alcohol use: Yes     Comment: rarely    Drug use: No    Sexual activity: Not on file   Other Topics Concern    Caffeine Concern Yes     Comment: decafe coffee 2 a day    Exercise No    Seat Belt Yes    Special Diet Not Asked    Stress Concern Not Asked    Weight Concern Not Asked     Service No    Blood  Transfusions Not Asked    Occupational Exposure Not Asked    Hobby Hazards Not Asked    Sleep Concern No    Back Care Not Asked    Bike Helmet Not Asked    Self-Exams Not Asked   Social History Narrative    Not on file     Social Drivers of Health     Financial Resource Strain: Low Risk  (9/27/2021)    [facility]    Overall Financial Resource Strain (CARDIA)     Difficulty of Paying Living Expenses: Not very hard   Food Insecurity: Not on file   Transportation Needs: Unmet Transportation Needs (9/27/2021)    [facility]    PRAPARE - Transportation     Lack of Transportation (Medical): Yes     Lack of Transportation (Non-Medical): No   Physical Activity: Not on file   Stress: Not on file   Social Connections: Unknown (3/8/2021)    [facility]    Social Connections     Conversations with friends/family/neighbors per week: Not on file   Housing Stability: Low Risk  (7/7/2021)    [facility]    Housing Stability     Mortgage Payment Concerns?: Not on file     Number of Places Lived in the Last Year: Not on file     Unstable Housing?: Not on file        Medications:   Current Outpatient Medications   Medication Sig Dispense Refill    atorvastatin 10 MG Oral Tab Take 1 tablet (10 mg total) by mouth nightly. 90 tablet 3    dilTIAZem HCl ER Beads 120 MG Oral Capsule SR 24 Hr Take 1 capsule (120 mg total) by mouth daily.      ELIQUIS 2.5 MG Oral Tab Take 1 tablet (2.5 mg total) by mouth 2 (two) times daily.      metoprolol tartrate 100 MG Oral Tab TAKE 1 TABLET(100 MG) BY MOUTH TWICE DAILY 180 tablet 0    JARDIANCE 10 MG Oral Tab Jardiance 10 mg tablet, [RxNorm: 4650371]      tamsulosin 0.4 MG Oral Cap Take 1 capsule (0.4 mg total) by mouth daily. 90 capsule 3    torsemide 20 MG Oral Tab Take 1 tablet (20 mg  total) by mouth daily. 90 tablet 1    Respiratory Therapy Supplies (NEBULIZER/TUBING/MOUTHPIECE) Does not apply Kit 1 kit As Directed. 1 kit 0    Emollient (EUCERIN SKIN CALMING) External Cream Apply 1 Application topically 3 (three) times daily as needed. 400 g 1    Potassium Chloride ER 10 MEQ Oral Tab CR Take 1 tablet (10 mEq total) by mouth daily.      metFORMIN 500 MG Oral Tab Take 1 tablet (500 mg total) by mouth 2 (two) times daily with meals. (Patient not taking: Reported on 10/16/2024) 180 tablet 1    Tiotropium Bromide-Olodaterol (STIOLTO RESPIMAT) 2.5-2.5 MCG/ACT Inhalation Aero Soln Inhale 2 puffs into the lungs daily. (Patient not taking: Reported on 10/16/2024) 3 each 1    Tiotropium Bromide-Olodaterol 2.5-2.5 MCG/ACT Inhalation Aero Soln Inhale 2 puffs into the lungs daily. (Patient not taking: Reported on 10/16/2024) 1 each 11     Review of Systems: Review of Systems   Constitutional: Negative.    HENT: Negative.     Respiratory: Negative.  Negative for cough, shortness of breath, wheezing and stridor.    Cardiovascular: Negative.    Gastrointestinal: Negative.    All other systems reviewed and are negative.    Physical Exam:  /70 (BP Location: Right arm, Patient Position: Sitting, Cuff Size: adult)   Pulse 70   Ht 5' 6\" (1.676 m)   SpO2 94%   BMI 31.47 kg/m²      Constitutional: alert, cooperative. No acute distress.  HEENT: Head NC/AT.    Cardio: Regular rate and rhythm. Normal S1 and S2. +S3 or 4 noted  Respiratory: Thorax symmetrical with no labored breathing. Clear to ausculation bilaterally with symmetrical breath sounds. No wheezing, rhonchi, rales, or crackles.   GI: NABS. Abd soft, non-tender.  Extremities: No clubbing or cyanosis. No LE edema.    Neurologic: A&Ox3. No gross motor deficits.  Skin: Warm, dry  Psych: Calm, cooperative. Pleasant affect.    Results:  Personally reviewed    WBC: 9, done on 7/7/2024.  HGB: 10.3, done on 7/7/2024.  PLT: 188, done on 7/7/2024.     Glucose:  149, done on 5/28/2024.  Cr: 2.15, done on 5/28/2024.  GFR(AA): 50, done on 5/10/2022.  GFR (non-AA): 43, done on 5/10/2022.  CA: 8.7, done on 5/28/2024.  Na: 140, done on 5/28/2024.  K: 3.9, done on 5/28/2024.  Cl: 103, done on 5/28/2024.  CO2: 29, done on 5/28/2024.  Last ALB was 3.6% done on 5/16/2024.     No results found.   Assessment/Plan:  #1. COPD  6/2023 PFTs with no obstruction, no restriction. Severely reduced DLCO  Last exacerbation related to RSV infection and CHF exacerbation, hospitalized 12/3/22-12/7/22  He had been managed in hospital on duonebs but discharged home on no meds  Previously on stiolto but felt well last visit so stopped and feels well today  Okay to remain off of inhalers given asymptomatic status  If sx return, then resume inhaler and can consider pulm or cardiac rehab in future  Encouraged to exercise with goal 30 minutes/ 5 x/week    #2. Chronic hypoxic respiratory failure  Due to CHF, pulm HTN, NII, COPD  Cardiac echo with severe pulm HTN; following with [facility] cardiology- Dr. Vick and Dr. Cavazos  From [facility] walk: 2L at rest, 6L on exertion  1/12/23 repeat 6MWT: RA at rest, 2L on exertion; given his debility, he continues to use and benefit from POC; [employer] = [employer]  Advised to obtain 6MWT and POC assessment    #3. Pulmonary hypertension  Noted on 12/2022 echo with PASP >60  Following with [facility] as above    #4. Tobacco abuse  60 pack years, quit 2005  He does not qualify for lung cancer screening due to age and quit smoking 2005    Heladio Harman MD

## 2024-10-16 NOTE — PATIENT INSTRUCTIONS
Continue to use the oxygen whenever you exert yourself  Let me know if your breathing changes.  Resume stiolto if you notice you have shortness of breath  Use albuterol 2 puffs every 6 hours as needed for your breathing or cough  Follow up in one year or earlier if needed

## 2024-10-29 ENCOUNTER — HOSPITAL ENCOUNTER (OUTPATIENT)
Facility: HOSPITAL | Age: 84
Setting detail: OBSERVATION
Discharge: HOME HEALTH | End: 2024-10-30
Attending: EMERGENCY MEDICINE | Admitting: STUDENT IN AN ORGANIZED HEALTH CARE EDUCATION/TRAINING PROGRAM
Payer: MEDICARE

## 2024-10-29 ENCOUNTER — APPOINTMENT (OUTPATIENT)
Dept: NUCLEAR MEDICINE | Facility: HOSPITAL | Age: 84
End: 2024-10-29
Payer: MEDICARE

## 2024-10-29 ENCOUNTER — APPOINTMENT (OUTPATIENT)
Dept: CT IMAGING | Facility: HOSPITAL | Age: 84
End: 2024-10-29
Attending: EMERGENCY MEDICINE
Payer: MEDICARE

## 2024-10-29 ENCOUNTER — APPOINTMENT (OUTPATIENT)
Dept: ULTRASOUND IMAGING | Facility: HOSPITAL | Age: 84
End: 2024-10-29
Attending: EMERGENCY MEDICINE
Payer: MEDICARE

## 2024-10-29 ENCOUNTER — HOSPITAL ENCOUNTER (OUTPATIENT)
Facility: HOSPITAL | Age: 84
Setting detail: OBSERVATION
Discharge: HOME | End: 2024-10-30
Attending: EMERGENCY MEDICINE | Admitting: STUDENT IN AN ORGANIZED HEALTH CARE EDUCATION/TRAINING PROGRAM
Payer: MEDICARE

## 2024-10-29 DIAGNOSIS — R17 ELEVATED BILIRUBIN: ICD-10-CM

## 2024-10-29 DIAGNOSIS — R11.2 NAUSEA VOMITING AND DIARRHEA: Primary | ICD-10-CM

## 2024-10-29 DIAGNOSIS — R19.7 NAUSEA VOMITING AND DIARRHEA: Primary | ICD-10-CM

## 2024-10-29 LAB
ADENOVIRUS F 40/41 PCR: NEGATIVE
ALBUMIN SERPL-MCNC: 4.5 G/DL (ref 3.2–4.8)
ALBUMIN/GLOB SERPL: 1.4 {RATIO} (ref 1–2)
ALP LIVER SERPL-CCNC: 143 U/L
ALT SERPL-CCNC: 15 U/L
ANION GAP SERPL CALC-SCNC: 13 MMOL/L (ref 0–18)
AST SERPL-CCNC: 21 U/L (ref ?–34)
ASTROVIRUS PCR: NEGATIVE
BASOPHILS # BLD AUTO: 0.03 X10(3) UL (ref 0–0.2)
BASOPHILS NFR BLD AUTO: 0.2 %
BILIRUB SERPL-MCNC: 1.3 MG/DL (ref 0.2–1.1)
BUN BLD-MCNC: 35 MG/DL (ref 9–23)
C CAYETANENSIS DNA SPEC QL NAA+PROBE: NEGATIVE
C DIFF TOX B STL QL: NEGATIVE
CALCIUM BLD-MCNC: 10 MG/DL (ref 8.7–10.4)
CAMPY SP DNA.DIARRHEA STL QL NAA+PROBE: NEGATIVE
CHLORIDE SERPL-SCNC: 101 MMOL/L (ref 98–112)
CO2 SERPL-SCNC: 25 MMOL/L (ref 21–32)
CREAT BLD-MCNC: 2.24 MG/DL
CRYPTOSP DNA SPEC QL NAA+PROBE: NEGATIVE
EAEC PAA PLAS AGGR+AATA ST NAA+NON-PRB: NEGATIVE
EC STX1+STX2 + H7 FLIC SPEC NAA+PROBE: NEGATIVE
EGFRCR SERPLBLD CKD-EPI 2021: 28 ML/MIN/1.73M2 (ref 60–?)
ENTAMOEBA HISTOLYTICA PCR: NEGATIVE
EOSINOPHIL # BLD AUTO: 0.38 X10(3) UL (ref 0–0.7)
EOSINOPHIL NFR BLD AUTO: 3 %
EPEC EAE GENE STL QL NAA+NON-PROBE: NEGATIVE
ERYTHROCYTE [DISTWIDTH] IN BLOOD BY AUTOMATED COUNT: 17.5 %
ETEC LTA+ST1A+ST1B TOX ST NAA+NON-PROBE: NEGATIVE
FLUAV + FLUBV RNA SPEC NAA+PROBE: NEGATIVE
FLUAV + FLUBV RNA SPEC NAA+PROBE: NEGATIVE
GIARDIA LAMBLIA PCR: NEGATIVE
GLOBULIN PLAS-MCNC: 3.2 G/DL (ref 2–3.5)
GLUCOSE BLD-MCNC: 158 MG/DL (ref 70–99)
GLUCOSE BLD-MCNC: 209 MG/DL (ref 70–99)
GLUCOSE BLD-MCNC: 209 MG/DL (ref 70–99)
GLUCOSE BLD-MCNC: 267 MG/DL (ref 70–99)
GLUCOSE BLD-MCNC: 299 MG/DL (ref 70–99)
HCT VFR BLD AUTO: 36.2 %
HGB BLD-MCNC: 11.8 G/DL
IMM GRANULOCYTES # BLD AUTO: 0.06 X10(3) UL (ref 0–1)
IMM GRANULOCYTES NFR BLD: 0.5 %
LACTATE SERPL-SCNC: 1.8 MMOL/L (ref 0.5–2)
LIPASE SERPL-CCNC: 44 U/L (ref 12–53)
LYMPHOCYTES # BLD AUTO: 0.25 X10(3) UL (ref 1–4)
LYMPHOCYTES NFR BLD AUTO: 1.9 %
MAGNESIUM SERPL-MCNC: 2.2 MG/DL (ref 1.6–2.6)
MCH RBC QN AUTO: 26.8 PG (ref 26–34)
MCHC RBC AUTO-ENTMCNC: 32.6 G/DL (ref 31–37)
MCV RBC AUTO: 82.1 FL
MONOCYTES # BLD AUTO: 1.11 X10(3) UL (ref 0.1–1)
MONOCYTES NFR BLD AUTO: 8.7 %
NEUTROPHILS # BLD AUTO: 11 X10 (3) UL (ref 1.5–7.7)
NEUTROPHILS # BLD AUTO: 11 X10(3) UL (ref 1.5–7.7)
NEUTROPHILS NFR BLD AUTO: 85.7 %
NOROVIRUS GI/GII PCR: NEGATIVE
OSMOLALITY SERPL CALC.SUM OF ELEC: 307 MOSM/KG (ref 275–295)
P SHIGELLOIDES DNA STL QL NAA+PROBE: NEGATIVE
PLATELET # BLD AUTO: 207 10(3)UL (ref 150–450)
POTASSIUM SERPL-SCNC: 3.3 MMOL/L (ref 3.5–5.1)
POTASSIUM SERPL-SCNC: 3.4 MMOL/L (ref 3.5–5.1)
PROT SERPL-MCNC: 7.7 G/DL (ref 5.7–8.2)
RBC # BLD AUTO: 4.41 X10(6)UL
ROTAVIRUS A PCR: NEGATIVE
RSV RNA SPEC NAA+PROBE: NEGATIVE
SALMONELLA DNA SPEC QL NAA+PROBE: NEGATIVE
SAPOVIRUS PCR: NEGATIVE
SARS-COV-2 RNA RESP QL NAA+PROBE: NOT DETECTED
SHIGELLA SP+EIEC IPAH ST NAA+NON-PROBE: NEGATIVE
SODIUM SERPL-SCNC: 139 MMOL/L (ref 136–145)
V CHOLERAE DNA SPEC QL NAA+PROBE: NEGATIVE
VIBRIO DNA SPEC NAA+PROBE: NEGATIVE
WBC # BLD AUTO: 12.8 X10(3) UL (ref 4–11)
YERSINIA DNA SPEC NAA+PROBE: NEGATIVE

## 2024-10-29 PROCEDURE — 99223 1ST HOSP IP/OBS HIGH 75: CPT | Performed by: SURGERY

## 2024-10-29 PROCEDURE — 74176 CT ABD & PELVIS W/O CONTRAST: CPT | Performed by: EMERGENCY MEDICINE

## 2024-10-29 PROCEDURE — 78227 HEPATOBIL SYST IMAGE W/DRUG: CPT

## 2024-10-29 PROCEDURE — 76705 ECHO EXAM OF ABDOMEN: CPT | Performed by: EMERGENCY MEDICINE

## 2024-10-29 PROCEDURE — 99223 1ST HOSP IP/OBS HIGH 75: CPT | Performed by: STUDENT IN AN ORGANIZED HEALTH CARE EDUCATION/TRAINING PROGRAM

## 2024-10-29 RX ORDER — MORPHINE SULFATE 4 MG/ML
3.5 INJECTION, SOLUTION INTRAMUSCULAR; INTRAVENOUS ONCE
Status: DISCONTINUED | OUTPATIENT
Start: 2024-10-29 | End: 2024-10-29

## 2024-10-29 RX ORDER — METOCLOPRAMIDE HYDROCHLORIDE 5 MG/ML
5 INJECTION INTRAMUSCULAR; INTRAVENOUS EVERY 8 HOURS PRN
Status: DISCONTINUED | OUTPATIENT
Start: 2024-10-29 | End: 2024-10-30

## 2024-10-29 RX ORDER — MORPHINE SULFATE 4 MG/ML
3.5 INJECTION, SOLUTION INTRAMUSCULAR; INTRAVENOUS ONCE
Status: COMPLETED | OUTPATIENT
Start: 2024-10-29 | End: 2024-10-29

## 2024-10-29 RX ORDER — ENOXAPARIN SODIUM 100 MG/ML
30 INJECTION SUBCUTANEOUS DAILY
Status: DISCONTINUED | OUTPATIENT
Start: 2024-10-29 | End: 2024-10-30

## 2024-10-29 RX ORDER — DEXTROSE MONOHYDRATE 25 G/50ML
50 INJECTION, SOLUTION INTRAVENOUS
Status: DISCONTINUED | OUTPATIENT
Start: 2024-10-29 | End: 2024-10-30

## 2024-10-29 RX ORDER — NICOTINE POLACRILEX 4 MG
15 LOZENGE BUCCAL
Status: DISCONTINUED | OUTPATIENT
Start: 2024-10-29 | End: 2024-10-30

## 2024-10-29 RX ORDER — ONDANSETRON 2 MG/ML
4 INJECTION INTRAMUSCULAR; INTRAVENOUS ONCE
Status: COMPLETED | OUTPATIENT
Start: 2024-10-29 | End: 2024-10-29

## 2024-10-29 RX ORDER — SODIUM CHLORIDE 9 MG/ML
INJECTION, SOLUTION INTRAVENOUS CONTINUOUS
Status: DISCONTINUED | OUTPATIENT
Start: 2024-10-29 | End: 2024-10-29

## 2024-10-29 RX ORDER — ACETAMINOPHEN 500 MG
500 TABLET ORAL EVERY 4 HOURS PRN
Status: DISCONTINUED | OUTPATIENT
Start: 2024-10-29 | End: 2024-10-30

## 2024-10-29 RX ORDER — BENZONATATE 200 MG/1
200 CAPSULE ORAL 3 TIMES DAILY PRN
Status: DISCONTINUED | OUTPATIENT
Start: 2024-10-29 | End: 2024-10-30

## 2024-10-29 RX ORDER — METOPROLOL TARTRATE 1 MG/ML
5 INJECTION, SOLUTION INTRAVENOUS EVERY 6 HOURS
Status: DISCONTINUED | OUTPATIENT
Start: 2024-10-29 | End: 2024-10-30

## 2024-10-29 RX ORDER — ECHINACEA PURPUREA EXTRACT 125 MG
1 TABLET ORAL
Status: DISCONTINUED | OUTPATIENT
Start: 2024-10-29 | End: 2024-10-30

## 2024-10-29 RX ORDER — NICOTINE POLACRILEX 4 MG
30 LOZENGE BUCCAL
Status: DISCONTINUED | OUTPATIENT
Start: 2024-10-29 | End: 2024-10-30

## 2024-10-29 RX ORDER — ONDANSETRON 2 MG/ML
4 INJECTION INTRAMUSCULAR; INTRAVENOUS EVERY 6 HOURS PRN
Status: DISCONTINUED | OUTPATIENT
Start: 2024-10-29 | End: 2024-10-30

## 2024-10-29 RX ORDER — DEXTROSE MONOHYDRATE AND SODIUM CHLORIDE 5; .9 G/100ML; G/100ML
INJECTION, SOLUTION INTRAVENOUS CONTINUOUS
Status: DISCONTINUED | OUTPATIENT
Start: 2024-10-29 | End: 2024-10-30

## 2024-10-29 NOTE — ED PROVIDER NOTES
Patient Seen in: [facility] Emergency Department      History     Chief Complaint   Patient presents with   • Nausea/Vomiting/Diarrhea     Stated Complaint: diarrhea x 4 hours    Subjective:   HPI      84-year-old male present emerged part for diarrhea.  Patient states he was awakened feeling nauseous and having some diarrhea watery loose stools as well as vomiting that was nonbloody that was nonbloody nonbilious more retching.  He denies abdominal pain prompting his visit here he denies any sort of fevers but he has chills he denies urinary complaints he denies exacerbating relieving factors or associated symptoms.    Objective:     Past Medical History:   • Arrhythmia   • Arthritis   • Back problem    spinal stenosis   • Cancer (HCC)    prostate/radiation treatments only   • Coronary atherosclerosis of unspecified type of vessel, native or graft    s/p 1 cardiac stent   • Diabetes mellitus (HCC)   • Diarrhea, unspecified   • Easy bruising   • Exposure to unspecified radiation    prostate   • Frequent urination   • High blood pressure   • High cholesterol   • Irregular bowel habits   • Leaking of urine   • Leg swelling   • Nausea vomiting and diarrhea   • Osteoarthritis   • Pacemaker   • Renal disorder    Stage III-moderate   • Rheumatoid arthritis (HCC)   • Sleep apnea    cpap   • Stented coronary artery   • Type II or unspecified type diabetes mellitus without mention of complication, not stated as uncontrolled   • Visual impairment   • Wears glasses              Past Surgical History:   Procedure Laterality Date   • Cardiac pacemaker placement  2010    medtronic   • Colonoscopy N/A 9/25/2015    Procedure: COLONOSCOPY;  Surgeon: Yumiko Martin DO;  Location:  ENDOSCOPY   • Hip replacement surgery Left 06/26/2018   • Hip replacement surgery Right     2000   • Knee replacement surgery Left     after 2000   • Laminectomy      no hardware   • Skin surgery     • Total hip replacement Right    • Total knee  replacement Right                 Social History     Socioeconomic History   • Marital status:    Tobacco Use   • Smoking status: Former     Current packs/day: 0.00     Average packs/day: 1.5 packs/day for 40.0 years (60.0 ttl pk-yrs)     Types: Cigarettes     Start date: 1965     Quit date: 2005     Years since quittin.8   • Smokeless tobacco: Never   Vaping Use   • Vaping status: Never Used   Substance and Sexual Activity   • Alcohol use: Not Currently     Comment: rarely   • Drug use: No   Other Topics Concern   • Caffeine Concern Yes     Comment: decafe coffee 2 a day   • Exercise No   • Seat Belt Yes   •  Service No   • Sleep Concern No     Social Drivers of Health     Financial Resource Strain: Low Risk  (2021)    [facility], [facility]    Overall Financial Resource Strain (CARDIA)    • Difficulty of Paying Living Expenses: Not very hard   Transportation Needs: Unmet Transportation Needs (2021)    Received from [facility], [facility]    PRAPARE - Transportation    • Lack of Transportation (Medical): Yes    • Lack of Transportation (Non-Medical): No    Received from [facility], [facility]    Social Connections    Received from [facility], [facility]    Housing Stability                  Physical Exam     ED Triage Vitals [10/29/24 0308]   /58   Pulse 74   Resp 23   Temp 97.3 °F (36.3 °C)   Temp src Temporal   SpO2 92 %   O2 Device None (Room air)       Current Vitals:   Vital Signs  BP: 129/64  Pulse: 70  Resp: 22  Temp: 97.3 °F (36.3 °C)  Temp src: Temporal  MAP (mmHg): 84    Oxygen Therapy  SpO2: 97 %  O2 Device: Nasal cannula  O2 Flow Rate (L/min): 4 L/min        Physical Exam  Awake alert patient appears in no distress HEENT exam is normal lungs are clear cardiovascular exam regular rhythm abdomen soft minimally tender left lower  quadrant extremities no COVID cyanosis or edema no rash    ED Course     Labs Reviewed   COMP METABOLIC PANEL (14) - Abnormal; Notable for the following components:       Result Value    Glucose 299 (*)     Potassium 3.3 (*)     BUN 35 (*)     Creatinine 2.24 (*)     Calculated Osmolality 307 (*)     eGFR-Cr 28 (*)     Alkaline Phosphatase 143 (*)     Bilirubin, Total 1.3 (*)     All other components within normal limits   CBC WITH DIFFERENTIAL WITH PLATELET - Abnormal; Notable for the following components:    WBC 12.8 (*)     HGB 11.8 (*)     HCT 36.2 (*)     Neutrophil Absolute Prelim 11.00 (*)     Neutrophil Absolute 11.00 (*)     Lymphocyte Absolute 0.25 (*)     Monocyte Absolute 1.11 (*)     All other components within normal limits   LIPASE - Normal   LACTIC ACID, PLASMA - Normal   URINALYSIS WITH CULTURE REFLEX   RAINBOW DRAW LAVENDER   RAINBOW DRAW LIGHT GREEN   RAINBOW DRAW BLUE   RAINBOW DRAW GOLD   SARS-COV-2/FLU A AND B/RSV BY PCR (GENEXPERT)   BLOOD CULTURE   BLOOD CULTURE            Differential diagnosis includes acute diverticulitis perforation       MDM          Admission disposition: 10/29/2024  4:22 AM           Medical Decision Making  84-year-old male presenting with vomiting diarrhea and abdominal pain IV established cardiac monitor shows sinus rhythm pulse ox shows no signs of hypoxia CBC shows an elevated white cell count metabolic panel shows stable creatinine which is elevated bilirubin level slightly elevated.  Independent interpretation by ED physician CT scan shows signs of enteritis.  He also shows moderately distended gallbladder.  Patient will be admitted for further evaluation    Problems Addressed:  Elevated bilirubin: acute illness or injury  Nausea vomiting and diarrhea: acute illness or injury    Amount and/or Complexity of Data Reviewed  Labs: ordered. Decision-making details documented in ED Course.  Radiology: ordered and independent interpretation performed. Decision-making  details documented in ED Course.  ECG/medicine tests: ordered and independent interpretation performed. Decision-making details documented in ED Course.        Disposition and Plan     Clinical Impression:  1. Nausea vomiting and diarrhea    2. Elevated bilirubin         Disposition:  Admit  10/29/2024  4:22 am    Follow-up:  No follow-up provider specified.        Medications Prescribed:  Current Discharge Medication List              Supplementary Documentation:         Hospital Problems       Present on Admission  Date Reviewed: 10/29/2024            ICD-10-CM Noted POA    * (Principal) Nausea vomiting and diarrhea R11.2, R19.7 10/29/2024 Unknown

## 2024-10-29 NOTE — PLAN OF CARE
NURSING ADMISSION NOTE      Patient admitted via Cart  Oriented to room.  Safety precautions initiated.  Bed in low position.  Call light in reach.  Adm navigator completed, IVF running, general surgery consult, iso cart ordered, r/o cdiff

## 2024-10-29 NOTE — CONSULTS
[facility]  Report of  Surgical Consultation with History and Physical Exam    Lisandro Harley Patient Status:  Observation    1940 MRN [id number]   Location [facility] 0SW-A Attending Sharon Underwood, DO   Hosp Day # 0 PCP Braydon Barrett MD     Reason for Surgical Consultation:  I am seeing this patient at the request of Dr. Underwood for concern of acute cholecystitis.     Chief complaint:  Diarrhea    History of Present Illness:  Lisandro Harley is a a(n) 84 year old male who presented to the ER who presented to the emergency department with approximately 4 hours of diarrhea.    The patient states he began having diarrhea early this morning.  He states he had extreme fecal urgency and episodes of incontinence due to the frequency.  His diarrhea was associated with some nausea and dry heaves.  He denies blood in his stool.  He denies black/tarry stool.    The patient denies abdominal pain associated with his symptoms.  He denies fevers or chills.    The patient does recall 1 similar episode of diarrhea approximately 3 weeks ago.  This resolved and he has been fine since.    He denies a history of postprandial abdominal pain.    Upon presentation to the emergency department, CT scan of the abdomen and pelvis revealed a mildly dilated gallbladder, gallstones, some stranding of the right upper quadrant fat around the gallbladder specifically near the gallbladder neck.  Prominent common bile duct.  All other significant findings may be seen in the radiologist report.    Follow-up abdominal ultrasound revealed, mild gallbladder dilation, gallstones and sludge present, gallbladder wall thickening to 5 mm.  No pericholecystic fluid.  Common bile duct 8 mm, normal for age.  No Giraldo sign.  All other significant findings may be seen in the radiologist report.    The patient's vital signs are stable.  Leukocytosis (12.8) with left shift.  Hemoglobin 11.8.  Platelets 207,000.  Blood cultures in process.  Lactic  acid 1.8.  Hypokalemic (3.3).  Alkaline phosphatase and total bilirubin elevated at 143 and 1.3 respectively.  AST and ALT are within normal limits.  BUN and creatinine elevated at 35 and 2.24 respectively.    The patient has a past medical history significant for hypertension, CAD, A-fib for which she has a pacemaker and takes Eliquis, hyperlipidemia alcoholic cirrhosis of the liver, history of prostate cancer, pulmonary hypertension, emphysema and type 2 diabetes mellitus.    The patient has no significant past abdominal surgical history.          History:  Past Medical History:    Arrhythmia    Arthritis    Back problem    spinal stenosis    Cancer (HCC)    prostate/radiation treatments only    COPD (chronic obstructive pulmonary disease) (HCC)    Coronary atherosclerosis of unspecified type of vessel, native or graft    s/p 1 cardiac stent    Diabetes mellitus (HCC)    Diarrhea, unspecified    Disorder of liver    Easy bruising    Exposure to unspecified radiation    prostate    Frequent urination    Glaucoma    High blood pressure    High cholesterol    Irregular bowel habits    Leaking of urine    Leg swelling    Nausea vomiting and diarrhea    Osteoarthritis    Pacemaker    Renal disorder    Stage III-moderate    Rheumatoid arthritis (HCC)    Sleep apnea    cpap    Stented coronary artery    Type II or unspecified type diabetes mellitus without mention of complication, not stated as uncontrolled    Visual impairment    Wears glasses     Past Surgical History:   Procedure Laterality Date    Cardiac pacemaker placement  2010    medtronic    Colonoscopy N/A 09/25/2015    Procedure: COLONOSCOPY;  Surgeon: Yumiko Martin DO;  Location:  ENDOSCOPY    Hip replacement surgery Left 06/26/2018    Hip replacement surgery Right     2000    Knee replacement surgery Right     after 2000    Laminectomy      no hardware    Skin surgery      Total hip replacement Right     Total knee replacement Right      Family History    Problem Relation Age of Onset    Diabetes Neg     Heart Disorder Neg     Hypertension Neg       reports that he quit smoking about 19 years ago. His smoking use included cigarettes. He started smoking about 59 years ago. He has a 60 pack-year smoking history. He has never used smokeless tobacco. He reports that he does not currently use alcohol. He reports that he does not use drugs.    Allergies:  Allergies[1]    Medications:    Current Facility-Administered Medications:     enoxaparin (Lovenox) 30 MG/0.3ML SUBQ injection 30 mg, 30 mg, Subcutaneous, Daily    acetaminophen (Tylenol Extra Strength) tab 500 mg, 500 mg, Oral, Q4H PRN    melatonin tab 3 mg, 3 mg, Oral, Nightly PRN    ondansetron (Zofran) 4 MG/2ML injection 4 mg, 4 mg, Intravenous, Q6H PRN    metoclopramide (Reglan) 5 mg/mL injection 5 mg, 5 mg, Intravenous, Q8H PRN    benzonatate (Tessalon) cap 200 mg, 200 mg, Oral, TID PRN    glycerin-hypromellose- (Artificial Tears) 0.2-0.2-1 % ophthalmic solution 1 drop, 1 drop, Both Eyes, QID PRN    sodium chloride (Saline Mist) 0.65 % nasal solution 1 spray, 1 spray, Each Nare, Q3H PRN    cefTRIAXone (Rocephin) 2 g in sodium chloride 0.9% 100 mL IVPB-ADDV, 2 g, Intravenous, Q24H    glucose (Dex4) 15 GM/59ML oral liquid 15 g, 15 g, Oral, Q15 Min PRN **OR** glucose (Glutose) 40% oral gel 15 g, 15 g, Oral, Q15 Min PRN **OR** glucose-vitamin C (Dex-4) chewable tab 4 tablet, 4 tablet, Oral, Q15 Min PRN **OR** dextrose 50% injection 50 mL, 50 mL, Intravenous, Q15 Min PRN **OR** glucose (Dex4) 15 GM/59ML oral liquid 30 g, 30 g, Oral, Q15 Min PRN **OR** glucose (Glutose) 40% oral gel 30 g, 30 g, Oral, Q15 Min PRN **OR** glucose-vitamin C (Dex-4) chewable tab 8 tablet, 8 tablet, Oral, Q15 Min PRN    insulin aspart (NovoLOG) 100 Units/mL FlexPen 2-10 Units, 2-10 Units, Subcutaneous, TID AC and HS    potassium chloride 40 mEq in 250mL sodium chloride 0.9% IVPB premix, 40 mEq, Intravenous, Once    metoprolol  (Lopressor) 5 mg/5mL injection 5 mg, 5 mg, Intravenous, Q6H    dextrose 5%-sodium chloride 0.9% infusion, , Intravenous, Continuous    Review of Systems:  Pertinent items are noted in HPI.  Allergic/Immuno:  Review of patient's allergies performed.  Cardiovascular:  Negative for cool extremity and irregular heartbeat/palpitations  Constitutional:  Negative for decreased activity, fever, irritability and lethargy  Endocrine:  Negative for abnormal sleep patterns, increased activity, polydipsia and polyphagia  ENMT:  Negative for ear drainage, hearing loss and nasal drainage  Eyes:  Negative for eye discharge and vision loss  Gastrointestinal: Positive for diarrhea   Genitourinary:  Negative for dysuria and hematuria  Hema/Lymph:  Negative for easy bleeding and easy bruising  Integumentary:  Negative for pruritus and rash  Musculoskeletal:  Negative for bone/joint symptoms  Neurological:  Negative for gait disturbance  Psychiatric:  Negative for inappropriate interaction and psychiatric symptoms  Respiratory:  Negative for cough, dyspnea and wheezing      Physical Exam:  Blood pressure 124/63, pulse 81, temperature 97.9 °F (36.6 °C), temperature source Oral, resp. rate 16, height 66\", weight 195 lb (88.5 kg), SpO2 95%.    General: Alert, orientated x3.  Cooperative.  No apparent distress.    HEENT: Exam is unremarkable.  Without scleral icterus.  Mucous membranes are moist. EOM are WNL.    Neck: No tenderness to palpitation.  Full range of motion to flexion and extension, lateral rotation and lateral flexion of cervical spine.  No JVD. Supple.     Lungs: No secondary use of accessory respiratory musculature.    Abdomen: Soft, nondistended without tympany to percussion.  Discomfort to deep palpation in the right upper quadrant.  He is otherwise nontender throughout the abdomen without rebound or guarding.  No peritoneal signs.    Extremities:  No lower extremity edema noted.  Without clubbing or cyanosis.      Skin:  Normal texture and turgor.      Laboratory Data and Relevant X-rays:  Recent Labs   Lab 10/29/24  0316   RBC 4.41   HGB 11.8*   HCT 36.2*   MCV 82.1   MCH 26.8   MCHC 32.6   RDW 17.5   NEPRELIM 11.00*   WBC 12.8*   .0       Recent Labs   Lab 10/29/24  0316   *   BUN 35*   CREATSERUM 2.24*   CA 10.0   ALB 4.5      K 3.3*      CO2 25.0   ALKPHO 143*   AST 21   ALT 15   BILT 1.3*   TP 7.7         No results for input(s): \"PTP\", \"INR\", \"PTT\" in the last 168 hours.    Imaging    Narrative   PROCEDURE:  CT ABDOMEN+PELVIS (CPT=74176)     COMPARISON:  [facility] , CT, CT ABDOMEN+PELVIS(CONTRAST ONLY)(CPT=74177), 9/25/2021, 8:14 AM.     INDICATIONS:  diarrhea x 4 hours     TECHNIQUE:  Unenhanced multislice CT scanning was performed from the dome of the diaphragm to the pubic symphysis.  Dose reduction techniques were used. Dose information is transmitted to the ACR (American College of Radiology) NRDR (National Radiology  Data Registry) which includes the Dose Index Registry.     PATIENT STATED HISTORY: (As transcribed by Technologist)  nausea, vomiting and diarrhea         FINDINGS:    Preliminary reading provided by radiologist from [facility].  This scan performed without IV or oral contrast, with limitations thereof.           LIVER:  Cirrhotic appearance.     BILIARY:  Gallstone present in the lumen of the gallbladder image 38 measuring 1.3 cm.  Mild gallbladder enlargement and there is pericholecystic stranding especially around the neck of the gallbladder.  Mildly prominent common bile duct suspected.       PANCREAS:  Unremarkable.     SPLEEN:  Granuloma, no enlargement of spleen.     KIDNEYS:  No acute abnormality.  Cyst left kidney 5.1 cm, along its lateral aspect, anterior left renal cyst 3.7 cm, smaller cysts within the right kidney, no hydronephrosis either kidney.     ADRENALS:  Unremarkable.     AORTA/VASCULAR:  No aortic aneurysm. There are atherosclerotic changes including  calcification involving the aorta, but no evidence for aneurysm involving the aorta.     RETROPERITONEUM:  Unremarkable.     BOWEL/MESENTERY:  Diffuse fluid throughout the small bowel and colon without sign of obstruction.  No sign of colitis, or diverticulitis.  No ascites or free air.     ABDOMINAL WALL:  Unremarkable.     URINARY BLADDER:  Severe beam hardening artifact from bilateral hip arthroplasty limits assessment of the pelvis including the bladder, no definite bladder abnormality seen.        LYMPH NODES PELVIS:  Unremarkable.     PELVIC ORGANS:  Severe beam hardening artifact from bilateral hip arthroplasty limits assessment of the pelvis.  No definite abnormality seen.     LUNG BASES:  Small bilateral pleural effusion and lower lobe atelectasis.  Pericardial effusion present maximum thickness 24 mm.     BONES:  No acute abnormality. Note is made of degenerative changes present in the spine.  Bilateral hip arthroplasty.                         Impression   CONCLUSION:       1. This scan performed without IV or oral contrast, with limitations thereof.     2. Gallstone, mildly dilated gallbladder, and some stranding of the right upper quadrant fat around the gallbladder specially near the gallbladder neck.  Prominent common bile duct.  Correlate for any signs or symptoms of cholecystitis and consider  gallbladder ultrasound or HIDA scan if needed.     3. Enteritis/diarrhea pattern with diffuse fluid throughout the small bowel and colon without sign of obstruction, or colitis.     4. Partially seen pleural effusions, atelectasis and pericardial effusion.     5. Cirrhosis, without splenomegaly or ascites.  Other findings as above.          LOCATION:  [facility]        Dictated by (CST): Antonio Franco MD on 10/29/2024 at 6:48 AM      Finalized by (CST): Antonio Franco MD on 10/29/2024 at 6:54 AM       Narrative   PROCEDURE:  US GALLBLADDER (CPT=76705)     COMPARISON:  None.     INDICATIONS:  diarrhea x 4  hours     TECHNIQUE:  Real time gray-scale ultrasound was used to evaluate the gallbladder.       PATIENT STATED HISTORY: (As transcribed by Technologist)                         Impression   CONCLUSION:  Preliminary reading provided by radiologist from [facility].  Common bile duct 8 mm, normal for age.  Mild gallbladder dilation.  Gallstones and sludge present, gallbladder wall thickened 5 mm.  Gallstone present.  No pericholecystic  fluid.  There is no Giraldo sign reported, but the patient is medicated.  Correlate for any signs or symptoms of cholecystitis, and if there is clinical concern consider HIDA scan for further assessment.     LOCATION:  [facility]           Dictated by (CST): Antonio Franco MD on 10/29/2024 at 7:07 AM      Finalized by (CST): nAtonio Franco MD on 10/29/2024 at 7:08 AM       Osiris Lindsay PA-C  10/29/2024  3:13 PM    Is this a shared or split note between Advanced Practice Provider and Physician? Yes   Impression:  Patient Active Problem List   Diagnosis    Essential hypertension    Lymphocytic colitis    Coronary atherosclerosis    NII (obstructive sleep apnea)    Spinal stenosis of lumbar region    Atrial fibrillation (HCC)    Mitral valve disorder    Cardiac pacemaker in situ    Hyperlipidemia    Acquired spondylolisthesis    Thrombocytopenia (HCC)    Alcoholic cirrhosis of liver without ascites (HCC)    h/o prostate cancer s/p radiation treatment    Primary pulmonary hypertension (HCC)    Type 2 diabetes mellitus with diabetic chronic kidney disease (HCC)    Pulmonary emphysema (HCC)    Hypoxemia associated with sleep    Nausea vomiting and diarrhea    Elevated bilirubin       84-year-old gentleman who presents to the emergency department with complaints of abdominal pain and diarrhea.  The patient does endorse mild right upper quadrant pain or tenderness however he denies any antecedent postprandial symptoms.  Workup in the ED revealed mild leukocytosis of 12.8,  hemoglobin 11.8, platelet count within normal limits.  Lactic acid 1.8.  Alkaline phosphatase mildly elevated 143 and total bilirubin 1.3.  BUN and creatinine were elevated as well 35 and 2.2  CT scan of the abdomen and pelvis revealed a dilated gallbladder with cholelithiasis and pericholecystic stranding consistent with cholecystitis.  CBD was noted to be prominent.  Follow-up ultrasound confirmed cholelithiasis and gallbladder sludge with gallbladder wall thickening and dilatation consistent with cholecystitis.  CBD was noted to be 8 mm-normal for age.  On physical examination the abdomen is soft, nondistended, mildly tender to deep palpation in the right upper quadrant.  No evidence of guarding, rebound or percussion tenderness.  Past medical-surgical history-    A-fib for which the patient takes Eliquis-last dose was Monday at 10 PM, history of EtOH abuse with alcoholic cirrhosis confirmed on CT scan.  History of prostate cancer, pulmonary hypertension, emphysema, diabetes.    Treatment options were reviewed in detail with the patient.  Will begin workup with HIDA scan today for possible acute cholecystitis.  Should that require surgery, we did discuss the increased incidence of intraoperative and perioperative risks due to his advanced age, anticoagulation status, history of alcoholic cirrhosis and other multiple comorbidities.  Scar does not have any further questions at this time and will proceed as discussed    B. Leroy ALVARES, FACS    Please note that this report has been produced using speech recognition software and may contain errors related to that system including but not limited to errors in grammar, punctuation and spelling as well as words and phrases that possibly may have been recognized inappropriately.  If there are any questions or concerns please contact the dictating provider for clarification.  The 21st Century Cures Act makes medical notes like these available to patients in the interest of  trans parency. Please be advised this is a medical document. Medical documents are intended to carry relevant information, facts as evident, and the clinical opinion of the practitioner. The medical note is intended as peer to peer communication and may appear blunt or direct. It is written in medical language and may contain abbreviations or verbiage that are unfamiliar.  If there are any questions or concerns please contact the dictating provider for clarification.           [1]   Allergies  Allergen Reactions    Tussigon [Hycodan] NAUSEA ONLY

## 2024-10-29 NOTE — ED QUICK NOTES
Orders for admission, patient is aware of plan and ready to go upstairs. Any questions, please call ED RN Lorraine DONALDSON at extension [phone].     Patient Covid vaccination status: Fully vaccinated     COVID Test Ordered in ED: SARS-CoV-2/Flu A and B/RSV by PCR ([facility])    COVID Suspicion at Admission: N/A    Running Infusions:    sodium chloride 125 mL/hr at 10/29/24 0430        Mental Status/LOC at time of transport: A/O x4, uses walker , from assisted living, + fall risk    Other pertinent information:   CIWA score: N/A   NIH score:  N/A

## 2024-10-29 NOTE — H&P
[facility]     Lisandro Harley Patient Status:  Emergency    1940 MRN [id number]   Location [facility] EMERGENCY DEPARTMENT Attending Pete Martínez MD   Hosp Day # 0 PCP Braydon Barrett MD     Chief Complaint: diarrhea    Subjective:    History of Present Illness:     Lisandro Harley is a 84 year old male with PMHx CAD/ HTN/ NII/ lymphocytic colitis/ A-fib/ HLD alcoholic cirrhosis/ DM who presented to the hospital for abdominal pain and diarrhea. His symptoms began over the last 24 hours wit profuse watery diarrhea and associated lower quadrant abdominal pain which was cramping and rated 8/10. He denied any alleviating or exacerbating factors. He notes pain in his RUQ with palpation as well. He denied any fever, chills, bloody stools, nausea, emesis.    History/Other:    Past Medical History:  Past Medical History:    Arrhythmia    Arthritis    Back problem    spinal stenosis    Cancer (HCC)    prostate/radiation treatments only    Coronary atherosclerosis of unspecified type of vessel, native or graft    s/p 1 cardiac stent    Diabetes mellitus (HCC)    Diarrhea, unspecified    Easy bruising    Exposure to unspecified radiation    prostate    Frequent urination    High blood pressure    High cholesterol    Irregular bowel habits    Leaking of urine    Leg swelling    Nausea vomiting and diarrhea    Osteoarthritis    Pacemaker    Renal disorder    Stage III-moderate    Rheumatoid arthritis (HCC)    Sleep apnea    cpap    Stented coronary artery    Type II or unspecified type diabetes mellitus without mention of complication, not stated as uncontrolled    Visual impairment    Wears glasses     Past Surgical History:   Past Surgical History:   Procedure Laterality Date    Cardiac pacemaker placement      medtronic    Colonoscopy N/A 2015    Procedure: COLONOSCOPY;  Surgeon: Yumiko Martin DO;  Location:  ENDOSCOPY    Hip replacement surgery Left 2018     Hip replacement surgery Right     2000    Knee replacement surgery Left     after 2000    Laminectomy      no hardware    Skin surgery      Total hip replacement Right     Total knee replacement Right       Family History:   Family History   Problem Relation Age of Onset    Diabetes Neg     Heart Disorder Neg     Hypertension Neg      Social History:    reports that he quit smoking about 19 years ago. His smoking use included cigarettes. He started smoking about 59 years ago. He has a 60 pack-year smoking history. He has never used smokeless tobacco. He reports that he does not currently use alcohol. He reports that he does not use drugs.     Allergies: Allergies[1]    Medications:  Medications Ordered Prior to Encounter[2]    Review of Systems:   A comprehensive review of systems was completed.    Pertinent positives and negatives noted in the HPI.    Objective:   Physical Exam:    /64   Pulse 70   Temp 97.3 °F (36.3 °C) (Temporal)   Resp 22   Ht 5' 6\" (1.676 m)   Wt 195 lb (88.5 kg)   SpO2 97%   BMI 31.47 kg/m²   General: No acute distress, Alert  Respiratory: No rhonchi, no wheezes  Cardiovascular: S1, S2. Regular rate and rhythm  Abdomen: Soft, RUQ pain to palpation, non-distended, positive bowel sounds  Neuro: No new focal deficits  Extremities: No edema    Results:    Labs:      Labs Last 24 Hours:    Recent Labs   Lab 10/29/24  0316   RBC 4.41   HGB 11.8*   HCT 36.2*   MCV 82.1   MCH 26.8   MCHC 32.6   RDW 17.5   NEPRELIM 11.00*   WBC 12.8*   .0       Recent Labs   Lab 10/29/24  0316   *   BUN 35*   CREATSERUM 2.24*   EGFRCR 28*   CA 10.0   ALB 4.5      K 3.3*      CO2 25.0   ALKPHO 143*   AST 21   ALT 15   BILT 1.3*   TP 7.7       Lab Results   Component Value Date    INR 2.10 (H) 09/26/2021    INR 1.83 (H) 09/25/2021    INR 2.2 (A) 09/25/2021       No results for input(s): \"TROP\", \"TROPHS\", \"CK\" in the last 168 hours.    No results for input(s): \"TROP\", \"PBNP\" in the  last 168 hours.    No results for input(s): \"PCT\" in the last 168 hours.    Imaging: Imaging data reviewed in Epic.    Assessment & Plan:      #Enteritis, possible cholecystitis  #hyperbilirubinemia  #leukocytosis  -alk phos 143, Tbili 1.3  -WBC 12.8  -CT showing moderately distended gallbladder with pericholecystic fluid and stranding, diffuse fluid within small and large bowel, partially visualized moderate pericardial and small bl pleural effusions  -RUQ US pending  -for now cover with Rocephin pending US results  -cont to trend LFT's, CBC  -GI panel  -zofran prn    #hypokalemia  -K 3.3  -replete  -cont to monitor BMP  -check Mg level    #CKD 3b  -Cr at baseline    #HLD  -cont home meds    #A-fib  -cont home meds    #DM  -SSI, QID checks, hypoglycemia protocol    #HTN  -cont home meds    #COPD  -cont home meds    Plan of care discussed with ED physician    Sharon Underwood DO    Supplementary Documentation:     The 21st Century Cures Act makes medical notes like these available to patients in the interest of transparency. Please be advised this is a medical document. Medical documents are intended to carry relevant information, facts as evident, and the clinical opinion of the practitioner. The medical note is intended as peer to peer communication and may appear blunt or direct. It is written in medical language and may contain abbreviations or verbiage that are unfamiliar.                                       [1]   Allergies  Allergen Reactions    Hydrocodone-Acetaminophen NAUSEA ONLY    Tussigon [Hycodan] NAUSEA ONLY   [2]   No current facility-administered medications on file prior to encounter.     Current Outpatient Medications on File Prior to Encounter   Medication Sig Dispense Refill    atorvastatin 10 MG Oral Tab Take 1 tablet (10 mg total) by mouth nightly. 90 tablet 3    dilTIAZem HCl ER Beads 120 MG Oral Capsule SR 24 Hr Take 1 capsule (120 mg total) by mouth daily.      ELIQUIS 2.5 MG Oral Tab Take  1 tablet (2.5 mg total) by mouth 2 (two) times daily.      metoprolol tartrate 100 MG Oral Tab TAKE 1 TABLET(100 MG) BY MOUTH TWICE DAILY 180 tablet 0    JARDIANCE 10 MG Oral Tab Jardiance 10 mg tablet, [RxNorm: 6452089]      tamsulosin 0.4 MG Oral Cap Take 1 capsule (0.4 mg total) by mouth daily. 90 capsule 3    metFORMIN 500 MG Oral Tab Take 1 tablet (500 mg total) by mouth 2 (two) times daily with meals. (Patient not taking: Reported on 10/16/2024) 180 tablet 1    torsemide 20 MG Oral Tab Take 1 tablet (20 mg total) by mouth daily. 90 tablet 1    Tiotropium Bromide-Olodaterol (STIOLTO RESPIMAT) 2.5-2.5 MCG/ACT Inhalation Aero Soln Inhale 2 puffs into the lungs daily. (Patient not taking: Reported on 10/16/2024) 3 each 1    Tiotropium Bromide-Olodaterol 2.5-2.5 MCG/ACT Inhalation Aero Soln Inhale 2 puffs into the lungs daily. (Patient not taking: Reported on 10/16/2024) 1 each 11    Respiratory Therapy Supplies (NEBULIZER/TUBING/MOUTHPIECE) Does not apply Kit 1 kit As Directed. 1 kit 0    Emollient (EUCERIN SKIN CALMING) External Cream Apply 1 Application topically 3 (three) times daily as needed. 400 g 1    Potassium Chloride ER 10 MEQ Oral Tab CR Take 1 tablet (10 mEq total) by mouth daily.

## 2024-10-30 VITALS
WEIGHT: 195 LBS | DIASTOLIC BLOOD PRESSURE: 69 MMHG | HEIGHT: 66 IN | TEMPERATURE: 98 F | SYSTOLIC BLOOD PRESSURE: 125 MMHG | RESPIRATION RATE: 18 BRPM | OXYGEN SATURATION: 95 % | HEART RATE: 62 BPM | BODY MASS INDEX: 31.34 KG/M2

## 2024-10-30 PROBLEM — I27.20 PULMONARY HYPERTENSION (HCC): Status: ACTIVE | Noted: 2024-10-30

## 2024-10-30 PROBLEM — K80.00 CALCULUS OF GALLBLADDER WITH ACUTE CHOLECYSTITIS WITHOUT OBSTRUCTION: Status: ACTIVE | Noted: 2024-10-30

## 2024-10-30 PROBLEM — E11.9 DIABETES MELLITUS TYPE 2 IN NONOBESE (HCC): Status: ACTIVE | Noted: 2024-01-01

## 2024-10-30 PROBLEM — K80.00 CALCULUS OF GALLBLADDER WITH ACUTE CHOLECYSTITIS WITHOUT OBSTRUCTION: Status: ACTIVE | Noted: 2024-01-01

## 2024-10-30 PROBLEM — I27.20 PULMONARY HYPERTENSION (HCC): Status: ACTIVE | Noted: 2024-01-01

## 2024-10-30 PROBLEM — E11.9 DIABETES MELLITUS TYPE 2 IN NONOBESE (HCC): Status: ACTIVE | Noted: 2024-10-30

## 2024-10-30 LAB
ALBUMIN SERPL-MCNC: 3.9 G/DL (ref 3.2–4.8)
ALP LIVER SERPL-CCNC: 85 U/L
ALT SERPL-CCNC: 10 U/L
ANION GAP SERPL CALC-SCNC: 7 MMOL/L (ref 0–18)
AST SERPL-CCNC: 15 U/L (ref ?–34)
BASOPHILS # BLD AUTO: 0.02 X10(3) UL (ref 0–0.2)
BASOPHILS NFR BLD AUTO: 0.4 %
BILIRUB DIRECT SERPL-MCNC: 0.3 MG/DL (ref ?–0.3)
BILIRUB SERPL-MCNC: 0.7 MG/DL (ref 0.2–1.1)
BUN BLD-MCNC: 26 MG/DL (ref 9–23)
CALCIUM BLD-MCNC: 9 MG/DL (ref 8.7–10.4)
CHLORIDE SERPL-SCNC: 110 MMOL/L (ref 98–112)
CO2 SERPL-SCNC: 23 MMOL/L (ref 21–32)
CREAT BLD-MCNC: 1.87 MG/DL
EGFRCR SERPLBLD CKD-EPI 2021: 35 ML/MIN/1.73M2 (ref 60–?)
EOSINOPHIL # BLD AUTO: 0.34 X10(3) UL (ref 0–0.7)
EOSINOPHIL NFR BLD AUTO: 7.2 %
ERYTHROCYTE [DISTWIDTH] IN BLOOD BY AUTOMATED COUNT: 17.4 %
GLUCOSE BLD-MCNC: 153 MG/DL (ref 70–99)
GLUCOSE BLD-MCNC: 182 MG/DL (ref 70–99)
GLUCOSE BLD-MCNC: 185 MG/DL (ref 70–99)
HCT VFR BLD AUTO: 33.4 %
HGB BLD-MCNC: 10.4 G/DL
IMM GRANULOCYTES # BLD AUTO: 0.02 X10(3) UL (ref 0–1)
IMM GRANULOCYTES NFR BLD: 0.4 %
LYMPHOCYTES # BLD AUTO: 0.65 X10(3) UL (ref 1–4)
LYMPHOCYTES NFR BLD AUTO: 13.8 %
MAGNESIUM SERPL-MCNC: 2.1 MG/DL (ref 1.6–2.6)
MCH RBC QN AUTO: 26.7 PG (ref 26–34)
MCHC RBC AUTO-ENTMCNC: 31.1 G/DL (ref 31–37)
MCV RBC AUTO: 85.9 FL
MONOCYTES # BLD AUTO: 0.65 X10(3) UL (ref 0.1–1)
MONOCYTES NFR BLD AUTO: 13.8 %
NEUTROPHILS # BLD AUTO: 3.02 X10 (3) UL (ref 1.5–7.7)
NEUTROPHILS # BLD AUTO: 3.02 X10(3) UL (ref 1.5–7.7)
NEUTROPHILS NFR BLD AUTO: 64.4 %
OSMOLALITY SERPL CALC.SUM OF ELEC: 299 MOSM/KG (ref 275–295)
PLATELET # BLD AUTO: 163 10(3)UL (ref 150–450)
POTASSIUM SERPL-SCNC: 3.4 MMOL/L (ref 3.5–5.1)
POTASSIUM SERPL-SCNC: 3.4 MMOL/L (ref 3.5–5.1)
PROT SERPL-MCNC: 6.4 G/DL (ref 5.7–8.2)
RBC # BLD AUTO: 3.89 X10(6)UL
SODIUM SERPL-SCNC: 140 MMOL/L (ref 136–145)
WBC # BLD AUTO: 4.7 X10(3) UL (ref 4–11)

## 2024-10-30 PROCEDURE — 99232 SBSQ HOSP IP/OBS MODERATE 35: CPT | Performed by: SURGERY

## 2024-10-30 PROCEDURE — 99239 HOSP IP/OBS DSCHRG MGMT >30: CPT | Performed by: INTERNAL MEDICINE

## 2024-10-30 NOTE — PROGRESS NOTES
Discharge education provided and all questions answered. Tele removed. Piv removed. Updated on plan for follow up outpatient with gen surg and verbalized understanding. All needs met prior to discharge. Taken by wheelchair off unit.

## 2024-10-30 NOTE — PLAN OF CARE
Assumed care at 1930  AOX4.Denies pain.  IVF.  Got confused when he woke at night.Pulled IV, removing tele leads.Easily re-oriented.  Up in the bathroom with walker.  Vital signs stable.  Problem: Diabetes/Glucose Control  Goal: Glucose maintained within prescribed range  Description: INTERVENTIONS:  - Monitor Blood Glucose as ordered  - Assess for signs and symptoms of hyperglycemia and hypoglycemia  - Administer ordered medications to maintain glucose within target range  - Assess barriers to adequate nutritional intake and initiate nutrition consult as needed  - Instruct patient on self management of diabetes  Outcome: Progressing     Problem: Patient/Family Goals  Goal: Patient/Family Long Term Goal  Description: Patient's Long Term Goal: back to [facility]    Interventions:  - Surgery consult  -pain controlled  -IVF  -Antibiotics  - See additional Care Plan goals for specific interventions  Outcome: Progressing  Goal: Patient/Family Short Term Goal  Description: Patient's Short Term Goal: be comfortable    Interventions:   - pain medicine as needed  -needs attended  - See additional Care Plan goals for specific interventions  Outcome: Progressing     Problem: CARDIOVASCULAR - ADULT  Goal: Maintains optimal cardiac output and hemodynamic stability  Description: INTERVENTIONS:  - Monitor vital signs, rhythm, and trends  - Monitor for bleeding, hypotension and signs of decreased cardiac output  - Evaluate effectiveness of vasoactive medications to optimize hemodynamic stability  - Monitor arterial and/or venous puncture sites for bleeding and/or hematoma  - Assess quality of pulses, skin color and temperature  - Assess for signs of decreased coronary artery perfusion - ex. Angina  - Evaluate fluid balance, assess for edema, trend weights  Outcome: Progressing  Goal: Absence of cardiac arrhythmias or at baseline  Description: INTERVENTIONS:  - Continuous cardiac monitoring, monitor vital signs, obtain 12  lead EKG if indicated  - Evaluate effectiveness of antiarrhythmic and heart rate control medications as ordered  - Initiate emergency measures for life threatening arrhythmias  - Monitor electrolytes and administer replacement therapy as ordered  Outcome: Progressing     Problem: RESPIRATORY - ADULT  Goal: Achieves optimal ventilation and oxygenation  Description: INTERVENTIONS:  - Assess for changes in respiratory status  - Assess for changes in mentation and behavior  - Position to facilitate oxygenation and minimize respiratory effort  - Oxygen supplementation based on oxygen saturation or ABGs  - Provide Smoking Cessation handout, if applicable  - Encourage broncho-pulmonary hygiene including cough, deep breathe, Incentive Spirometry  - Assess the need for suctioning and perform as needed  - Assess and instruct to report SOB or any respiratory difficulty  - Respiratory Therapy support as indicated  - Manage/alleviate anxiety  - Monitor for signs/symptoms of CO2 retention  Outcome: Progressing     Problem: GASTROINTESTINAL - ADULT  Goal: Minimal or absence of nausea and vomiting  Description: INTERVENTIONS:  - Maintain adequate hydration with IV or PO as ordered and tolerated  - Nasogastric tube to low intermittent suction as ordered  - Evaluate effectiveness of ordered antiemetic medications  - Provide nonpharmacologic comfort measures as appropriate  - Advance diet as tolerated, if ordered  - Obtain nutritional consult as needed  - Evaluate fluid balance  Outcome: Progressing  Goal: Maintains or returns to baseline bowel function  Description: INTERVENTIONS:  - Assess bowel function  - Maintain adequate hydration with IV or PO as ordered and tolerated  - Evaluate effectiveness of GI medications  - Encourage mobilization and activity  - Obtain nutritional consult as needed  - Establish a toileting routine/schedule  - Consider collaborating with pharmacy to review patient's medication profile  Outcome:  Progressing     Problem: METABOLIC/FLUID AND ELECTROLYTES - ADULT  Goal: Glucose maintained within prescribed range  Description: INTERVENTIONS:  - Monitor Blood Glucose as ordered  - Assess for signs and symptoms of hyperglycemia and hypoglycemia  - Administer ordered medications to maintain glucose within target range  - Assess barriers to adequate nutritional intake and initiate nutrition consult as needed  - Instruct patient on self management of diabetes  Outcome: Progressing  Goal: Electrolytes maintained within normal limits  Description: INTERVENTIONS:  - Monitor labs and rhythm and assess patient for signs and symptoms of electrolyte imbalances  - Administer electrolyte replacement as ordered  - Monitor response to electrolyte replacements, including rhythm and repeat lab results as appropriate  - Fluid restriction as ordered  - Instruct patient on fluid and nutrition restrictions as appropriate  Outcome: Progressing     Problem: SAFETY ADULT - FALL  Goal: Free from fall injury  Description: INTERVENTIONS:  - Assess pt frequently for physical needs  - Identify cognitive and physical deficits and behaviors that affect risk of falls.  - [place] fall precautions as indicated by assessment.  - Educate pt/family on patient safety including physical limitations  - Instruct pt to call for assistance with activity based on assessment  - Modify environment to reduce risk of injury  - Provide assistive devices as appropriate  - Consider OT/PT consult to assist with strengthening/mobility  - Encourage toileting schedule  Outcome: Progressing

## 2024-10-30 NOTE — PROGRESS NOTES
[facility]  Progress Note    Lisandro Harley Patient Status:  Observation    1940 MRN [id number]   Location [facility] 0SW-A Attending Barney Alcaraz MD   Hosp Day # 0 PCP Braydon Barrett MD     Subjective:  He is seen and examined resting in bed. HIDA completed yesterday positive, concerning for acute cholecystitis. He denies nausea or vomiting this morning. He reports he would not like to proceed with surgical intervention.     WBC 12.8 --> 4.7 today . Total bilirubin 1.3 --> 0.3. Alkaline phosphatase 143 --> 85.   Objective/Physical Exam:  /67 (BP Location: Left arm)   Pulse 64   Temp 98.2 °F (36.8 °C) (Oral)   Resp 18   Ht 66\"   Wt 195 lb (88.5 kg)   SpO2 96%   BMI 31.47 kg/m²     Intake/Output Summary (Last 24 hours) at 10/30/2024 1057  Last data filed at 10/30/2024 0538  Gross per 24 hour   Intake 2942 ml   Output --   Net 2942 ml         General: Awake, Alert,   Cooperative.  No apparent distress.  HEENT: Normocephalic, atraumatic. No scleral icterus   Pulm: no respiratory distress, no increased work of breathing. Nasal cannula in place.   Abdomen:  Soft, non-distended,mildly tender to palpation in right upper quadrant, with no rebound or guarding.  No peritoneal signs.  Skin: warm, dry. No jaundice.     Labs:  Lab Results   Component Value Date    WBC 4.7 10/30/2024    HGB 10.4 10/30/2024    HCT 33.4 10/30/2024    .0 10/30/2024      Lab Results   Component Value Date    INR 2.10 (H) 2021    INR 1.83 (H) 2021    INR 2.2 (A) 2021     Lab Results   Component Value Date     10/30/2024    K 3.4 10/30/2024    K 3.4 10/30/2024     10/30/2024    CO2 23.0 10/30/2024    BUN 26 10/30/2024    CREATSERUM 1.87 10/30/2024     10/30/2024    CA 9.0 10/30/2024    ALKPHO 85 10/30/2024    ALT 10 10/30/2024    AST 15 10/30/2024    BILT 0.7 10/30/2024    ALB 3.9 10/30/2024    TP 6.4 10/30/2024            Assessment:  Patient Active Problem List   Diagnosis     Essential hypertension    Lymphocytic colitis    Coronary atherosclerosis    NII (obstructive sleep apnea)    Spinal stenosis of lumbar region    Atrial fibrillation (HCC)    Mitral valve disorder    Cardiac pacemaker in situ    Hyperlipidemia    Acquired spondylolisthesis    Thrombocytopenia (HCC)    Alcoholic cirrhosis of liver without ascites (HCC)    h/o prostate cancer s/p radiation treatment    Primary pulmonary hypertension (HCC)    Type 2 diabetes mellitus with diabetic chronic kidney disease (HCC)    Pulmonary emphysema (HCC)    Hypoxemia associated with sleep    Nausea vomiting and diarrhea    Elevated bilirubin       Acute cholecystitis     Plan:  Patient declining surgical intervention despite HIDA findings concerning for Acute cholecystitis  Will trial diet- carb controlled regular diet. Patient educated on gallbladder triggers including fatty and greasy foods.   If patient tolerates diet without worsening abdominal pain, bloating, nausea or vomiting, then he will be stable for discharge home from a general surgery standpoint.   Continue IV antibiotics- Rocephin   Medical management per primary   DVT prophylaxis with lovenox. Continue to hold Eliquis for now if deemed acceptable by primary.   GI prophylaxis- add protonix     Mounika Velez PA-C  10/30/2024  10:57 AM     Patient reports no abdominal pain this morning.  He is tolerating a clear liquid diet.  We did discuss that Scar was to speak with his family to discuss possible laparoscopic cholecystectomy for acute calculus cholecystitis.  HIDA scan performed yesterday was positive with nonfilling of the gallbladder consistent with acute cholecystitis.  The patient states that after conversation with his family, he has decided to decline surgical intervention at this time.  Therefore, we will proceed with a trial diet today.  If patient has recurrent symptoms, he reports that he may consider surgery.  Continue to hold Eliquis until food trial  completed in case patient does decide to proceed with surgery during this admission.  Scar has no further questions at this time and is comfortable with this treatment plan    I agree with the note above and attest to its accuracy with the following changes below after my interview and examination of the patient    The patient was seen and examined.  Available labs and radiology is noted.    Oliva Harper MD FACS    Please note that this report has been produced using speech recognition software and may contain errors related to that system including but not limited to errors in grammar, punctuation and spelling as well as words and phrases that possibly may have been recognized inappropriately.  If there are any questions or concerns please contact the dictating provider for clarification.    The 21st Century Cures Act makes medical notes like these available to patients in the interest of trans parency. Please be advised this is a medical document. Medical documents are intended to carry relevant information, facts as evident, and the clinical opinion of the practitioner. The medical note is intended as peer to peer communication and may appear blunt or direct. It is written in medical language and may contain abbreviations or verbiage that are unfamiliar.   Again if there are any questions or concerns please contact the dictating provider for clarification.

## 2024-10-30 NOTE — DISCHARGE SUMMARY
[facility]     Lisandro Harley Patient Status:  Observation    1940 MRN [id number]   Location [facility] 0SW-A Attending Barney Alcaraz MD   Hosp Day # 0 PCP Braydon Barrett MD     Date of Admission: 10/29/2024  Date of Discharge:   10/30/2024      Discharge Disposition: Home or Self Care    Discharge Diagnosis:  Acute Cholecystitis   Dyslipidemia  AFib  DM  Ess HTN    History of Present Illness:   Lisandro Harley is a 84 year old male with PMHx CAD/ HTN/ NII/ lymphocytic colitis/ A-fib/ HLD alcoholic cirrhosis/ DM who presented to the hospital for abdominal pain and diarrhea. His symptoms began over the last 24 hours wit profuse watery diarrhea and associated lower quadrant abdominal pain which was cramping and rated 8/10. He denied any alleviating or exacerbating factors. He notes pain in his RUQ with palpation as well. He denied any fever, chills, bloody stools, nausea, emesis.       Brief Synopsis: Pt was admitted and given pain control and IVF. He was seen by Surgery and offered lap choley however given improvement in his symptoms he did not want any surgery. Pt was tolerating diet prior to DC.     Lace+ Score: 72  59-90 High Risk  29-58 Medium Risk  0-28   Low Risk       TCM Follow-Up Recommendation:  LACE > 58: High Risk of readmission after discharge from the hospital.      Procedures during hospitalization:   none    Incidental or significant findings and recommendations (brief descriptions):  none    Lab/Test results pending at Discharge:   none    Consultants:  Surgery    Discharge Medication List:     Discharge Medications        CONTINUE taking these medications        Instructions Prescription details   atorvastatin 10 MG Tabs  Commonly known as: Lipitor      Take 1 tablet (10 mg total) by mouth nightly.   Quantity: 90 tablet  Refills: 3     dilTIAZem HCl ER Beads 120 MG Cp24  Commonly known as: TIAZAC      Take 1 capsule (120 mg total) by mouth daily.   Refills:  0     Eliquis 2.5 MG Tabs  Generic drug: apixaban      Take 1 tablet (2.5 mg total) by mouth 2 (two) times daily.   Refills: 0     Jardiance 10 MG Tabs  Generic drug: empagliflozin      Take 1 tablet (10 mg total) by mouth daily.   Refills: 0     metoprolol tartrate 100 MG Tabs  Commonly known as: Lopressor      TAKE 1 TABLET(100 MG) BY MOUTH TWICE DAILY   Quantity: 180 tablet  Refills: 0     Potassium Chloride ER 10 MEQ Tbcr      Take 1 tablet (10 mEq total) by mouth daily.   Refills: 0     tamsulosin 0.4 MG Caps  Commonly known as: Flomax      Take 1 capsule (0.4 mg total) by mouth daily.   Quantity: 90 capsule  Refills: 3     torsemide 20 MG Tabs  Commonly known as: Demadex      Take 1 tablet (20 mg total) by mouth daily.   Quantity: 90 tablet  Refills: 1              [place]    Follow-up appointment:   No follow-up provider specified.  Appointments for Next 30 Days 10/30/2024 - 2024      None            Vital signs:  Temp:  [97.8 °F (36.6 °C)-98.3 °F (36.8 °C)] 97.8 °F (36.6 °C)  Pulse:  [59-81] 62  Resp:  [16-18] 18  BP: (121-144)/(53-82) 125/69  SpO2:  [86 %-99 %] 95 %    Physical Exam:    General: No acute distress   Lungs: clear to auscultation  Cardiovascular: S1, S2  Abdomen: Soft      -----------------------------------------------------------------------------------------------  PATIENT DISCHARGE INSTRUCTIONS: See electronic chart    Barney Alcaraz MD    Total time spent on discharge plannin minutes     The  Cures Act makes medical notes like these available to patients in the interest of transparency. Please be advised this is a medical document. Medical documents are intended to carry relevant information, facts as evident, and the clinical opinion of the practitioner. The medical note is intended as peer to peer communication and may appear blunt or direct. It is written in medical language and may contain abbreviations or verbiage that are unfamiliar.

## 2024-10-31 NOTE — PAYOR COMM NOTE
Discharge Notification    Patient Name: HALIE TAFOYA  Payor: SAKINA CAMPUZANO [facility]  Subscriber #: [id number]  Authorization Number: [id number]  Admit Date/Time: 10/29/2024 3:00 AM  Discharge Date/Time: 10/30/2024 4:43 PM

## 2024-11-01 ENCOUNTER — PATIENT OUTREACH (OUTPATIENT)
Dept: CASE MANAGEMENT | Age: 84
End: 2024-11-01

## 2024-11-01 NOTE — PROGRESS NOTES
Attempted to reach the patient to complete a Transitional Care Management-Hospital follow up call. Left a message for the patient to call the nurse care manager back at, [phone].

## 2024-11-07 ENCOUNTER — NURSE ONLY (OUTPATIENT)
Facility: CLINIC | Age: 84
End: 2024-11-07
Payer: MEDICARE

## 2024-11-07 VITALS — HEIGHT: 66 IN | BODY MASS INDEX: 31.34 KG/M2 | WEIGHT: 195 LBS

## 2024-11-07 DIAGNOSIS — R09.02 HYPOXEMIA: Primary | ICD-10-CM

## 2024-11-07 PROCEDURE — 3008F BODY MASS INDEX DOCD: CPT | Performed by: INTERNAL MEDICINE

## 2024-11-07 PROCEDURE — 94618 PULMONARY STRESS TESTING: CPT | Performed by: INTERNAL MEDICINE

## 2024-11-07 NOTE — PROGRESS NOTES
OXYGEN CLINIC ASSESSMENT    Date: 2024 Physician: Heladio Harman MD   Diagnosis: dyspnea Technician: RT Masood     Patient: Lisandro Harley MRN: [id number] : 1940     Height: 5' 6\" (1.676 m) Weight: 195 lb Age: 84 year old         BP HR SpO2 RR DANIA DIST  (FT) TIME Reason Stopped   RA         REST 110/58   78   95     16     0     N/A         N/A         N/A           RA       PEAK  EXERCISE 120/70 90 91   20   1   600   6 min   Study Ended                FINDINGS  0 LPM required to maintain a saturation of 95 % at rest   0 LPM required to maintain a saturation of 91 % with exertion    LPM Pulse dose to maintain a saturation of % with exertion     Patient does not need supplemental oxygen at rest or for exertion.

## 2024-11-12 ENCOUNTER — TELEPHONE (OUTPATIENT)
Dept: FAMILY MEDICINE CLINIC | Facility: CLINIC | Age: 84
End: 2024-11-12

## 2024-11-12 DIAGNOSIS — K81.9 CHOLECYSTITIS: Primary | ICD-10-CM

## 2024-11-13 ENCOUNTER — TELEPHONE (OUTPATIENT)
Facility: CLINIC | Age: 84
End: 2024-11-13

## 2024-11-13 NOTE — TELEPHONE ENCOUNTER
[facility] called asking to send prescription for CPAP supply for Scar Cedillo 1940

## 2024-11-14 NOTE — PROGRESS NOTES
Multiple attempts to reach the pt and messages left with no returned phone call. Past TCM timeframe,closing encounter.

## 2024-11-26 ENCOUNTER — MED REC SCAN ONLY (OUTPATIENT)
Facility: CLINIC | Age: 84
End: 2024-11-26

## 2024-12-24 ENCOUNTER — APPOINTMENT (OUTPATIENT)
Dept: GENERAL RADIOLOGY | Facility: HOSPITAL | Age: 84
End: 2024-12-24
Attending: EMERGENCY MEDICINE
Payer: MEDICARE

## 2024-12-24 ENCOUNTER — HOSPITAL ENCOUNTER (INPATIENT)
Facility: HOSPITAL | Age: 84
LOS: 3 days | Discharge: HOME HEALTH | End: 2024-12-29
Attending: EMERGENCY MEDICINE | Admitting: EMERGENCY MEDICINE
Payer: MEDICARE

## 2024-12-24 DIAGNOSIS — N18.9 CHRONIC KIDNEY DISEASE, UNSPECIFIED CKD STAGE: ICD-10-CM

## 2024-12-24 DIAGNOSIS — I50.9 ACUTE ON CHRONIC CONGESTIVE HEART FAILURE, UNSPECIFIED HEART FAILURE TYPE (HCC): ICD-10-CM

## 2024-12-24 DIAGNOSIS — J43.8 OTHER EMPHYSEMA (HCC): Primary | ICD-10-CM

## 2024-12-24 DIAGNOSIS — J15.9 PNEUMONIA, BACTERIAL: ICD-10-CM

## 2024-12-24 LAB
ALBUMIN SERPL-MCNC: 4.1 G/DL (ref 3.2–4.8)
ALBUMIN/GLOB SERPL: 1.4 {RATIO} (ref 1–2)
ALP LIVER SERPL-CCNC: 106 U/L
ALT SERPL-CCNC: 13 U/L
ANION GAP SERPL CALC-SCNC: 8 MMOL/L (ref 0–18)
AST SERPL-CCNC: 16 U/L (ref ?–34)
BASOPHILS # BLD AUTO: 0.02 X10(3) UL (ref 0–0.2)
BASOPHILS NFR BLD AUTO: 0.2 %
BILIRUB SERPL-MCNC: 1.1 MG/DL (ref 0.2–1.1)
BUN BLD-MCNC: 35 MG/DL (ref 9–23)
CALCIUM BLD-MCNC: 8.5 MG/DL (ref 8.7–10.4)
CHLORIDE SERPL-SCNC: 104 MMOL/L (ref 98–112)
CO2 SERPL-SCNC: 25 MMOL/L (ref 21–32)
CREAT BLD-MCNC: 1.93 MG/DL
EGFRCR SERPLBLD CKD-EPI 2021: 34 ML/MIN/1.73M2 (ref 60–?)
EOSINOPHIL # BLD AUTO: 0.06 X10(3) UL (ref 0–0.7)
EOSINOPHIL NFR BLD AUTO: 0.6 %
ERYTHROCYTE [DISTWIDTH] IN BLOOD BY AUTOMATED COUNT: 16.6 %
EST. AVERAGE GLUCOSE BLD GHB EST-MCNC: 174 MG/DL (ref 68–126)
FLUAV + FLUBV RNA SPEC NAA+PROBE: NEGATIVE
FLUAV + FLUBV RNA SPEC NAA+PROBE: NEGATIVE
GLOBULIN PLAS-MCNC: 3 G/DL (ref 2–3.5)
GLUCOSE BLD-MCNC: 215 MG/DL (ref 70–99)
GLUCOSE BLD-MCNC: 314 MG/DL (ref 70–99)
HBA1C MFR BLD: 7.7 % (ref ?–5.7)
HCT VFR BLD AUTO: 36.2 %
HGB BLD-MCNC: 11.5 G/DL
IMM GRANULOCYTES # BLD AUTO: 0.05 X10(3) UL (ref 0–1)
IMM GRANULOCYTES NFR BLD: 0.5 %
LYMPHOCYTES # BLD AUTO: 0.45 X10(3) UL (ref 1–4)
LYMPHOCYTES NFR BLD AUTO: 4.6 %
MCH RBC QN AUTO: 26.6 PG (ref 26–34)
MCHC RBC AUTO-ENTMCNC: 31.8 G/DL (ref 31–37)
MCV RBC AUTO: 83.6 FL
MONOCYTES # BLD AUTO: 1.06 X10(3) UL (ref 0.1–1)
MONOCYTES NFR BLD AUTO: 10.9 %
NEUTROPHILS # BLD AUTO: 8.07 X10 (3) UL (ref 1.5–7.7)
NEUTROPHILS # BLD AUTO: 8.07 X10(3) UL (ref 1.5–7.7)
NEUTROPHILS NFR BLD AUTO: 83.2 %
NT-PROBNP SERPL-MCNC: 2901 PG/ML (ref ?–450)
OSMOLALITY SERPL CALC.SUM OF ELEC: 304 MOSM/KG (ref 275–295)
PLATELET # BLD AUTO: 234 10(3)UL (ref 150–450)
POTASSIUM SERPL-SCNC: 3.4 MMOL/L (ref 3.5–5.1)
PROT SERPL-MCNC: 7.1 G/DL (ref 5.7–8.2)
RBC # BLD AUTO: 4.33 X10(6)UL
RSV RNA SPEC NAA+PROBE: NEGATIVE
SARS-COV-2 RNA RESP QL NAA+PROBE: NOT DETECTED
SODIUM SERPL-SCNC: 137 MMOL/L (ref 136–145)
TROPONIN I SERPL HS-MCNC: 10 NG/L
WBC # BLD AUTO: 9.7 X10(3) UL (ref 4–11)

## 2024-12-24 PROCEDURE — 99223 1ST HOSP IP/OBS HIGH 75: CPT | Performed by: HOSPITALIST

## 2024-12-24 PROCEDURE — 71045 X-RAY EXAM CHEST 1 VIEW: CPT | Performed by: EMERGENCY MEDICINE

## 2024-12-24 RX ORDER — TORSEMIDE 20 MG/1
40 TABLET ORAL 2 TIMES DAILY
COMMUNITY

## 2024-12-24 RX ORDER — ACETAMINOPHEN 500 MG
1000 TABLET ORAL EVERY 4 HOURS PRN
Status: DISCONTINUED | OUTPATIENT
Start: 2024-12-24 | End: 2024-12-29

## 2024-12-24 RX ORDER — TAMSULOSIN HYDROCHLORIDE 0.4 MG/1
0.4 CAPSULE ORAL DAILY
Status: DISCONTINUED | OUTPATIENT
Start: 2024-12-25 | End: 2024-12-29

## 2024-12-24 RX ORDER — FUROSEMIDE 10 MG/ML
40 INJECTION INTRAMUSCULAR; INTRAVENOUS
Status: DISCONTINUED | OUTPATIENT
Start: 2024-12-24 | End: 2024-12-26

## 2024-12-24 RX ORDER — METOPROLOL TARTRATE 100 MG/1
100 TABLET ORAL 2 TIMES DAILY
Status: DISCONTINUED | OUTPATIENT
Start: 2024-12-24 | End: 2024-12-29

## 2024-12-24 RX ORDER — NICOTINE POLACRILEX 4 MG
15 LOZENGE BUCCAL
Status: DISCONTINUED | OUTPATIENT
Start: 2024-12-24 | End: 2024-12-29

## 2024-12-24 RX ORDER — METOPROLOL TARTRATE 100 MG/1
100 TABLET ORAL
Status: DISCONTINUED | OUTPATIENT
Start: 2024-12-24 | End: 2024-12-24

## 2024-12-24 RX ORDER — ATORVASTATIN CALCIUM 10 MG/1
10 TABLET, FILM COATED ORAL NIGHTLY
Status: DISCONTINUED | OUTPATIENT
Start: 2024-12-25 | End: 2024-12-29

## 2024-12-24 RX ORDER — POLYETHYLENE GLYCOL 3350 17 G/17G
17 POWDER, FOR SOLUTION ORAL DAILY PRN
Status: DISCONTINUED | OUTPATIENT
Start: 2024-12-24 | End: 2024-12-29

## 2024-12-24 RX ORDER — ONDANSETRON 2 MG/ML
4 INJECTION INTRAMUSCULAR; INTRAVENOUS EVERY 6 HOURS PRN
Status: DISCONTINUED | OUTPATIENT
Start: 2024-12-24 | End: 2024-12-29

## 2024-12-24 RX ORDER — SENNOSIDES 8.6 MG
17.2 TABLET ORAL NIGHTLY PRN
Status: DISCONTINUED | OUTPATIENT
Start: 2024-12-24 | End: 2024-12-29

## 2024-12-24 RX ORDER — POTASSIUM CHLORIDE 1500 MG/1
40 TABLET, EXTENDED RELEASE ORAL ONCE
Status: COMPLETED | OUTPATIENT
Start: 2024-12-24 | End: 2024-12-24

## 2024-12-24 RX ORDER — NICOTINE POLACRILEX 4 MG
30 LOZENGE BUCCAL
Status: DISCONTINUED | OUTPATIENT
Start: 2024-12-24 | End: 2024-12-29

## 2024-12-24 RX ORDER — IPRATROPIUM BROMIDE AND ALBUTEROL SULFATE 2.5; .5 MG/3ML; MG/3ML
3 SOLUTION RESPIRATORY (INHALATION) EVERY 4 HOURS PRN
Status: DISCONTINUED | OUTPATIENT
Start: 2024-12-24 | End: 2024-12-26

## 2024-12-24 RX ORDER — ECHINACEA PURPUREA EXTRACT 125 MG
1 TABLET ORAL
Status: DISCONTINUED | OUTPATIENT
Start: 2024-12-24 | End: 2024-12-29

## 2024-12-24 RX ORDER — FUROSEMIDE 10 MG/ML
40 INJECTION INTRAMUSCULAR; INTRAVENOUS ONCE
Status: COMPLETED | OUTPATIENT
Start: 2024-12-24 | End: 2024-12-24

## 2024-12-24 RX ORDER — BISACODYL 10 MG
10 SUPPOSITORY, RECTAL RECTAL
Status: DISCONTINUED | OUTPATIENT
Start: 2024-12-24 | End: 2024-12-29

## 2024-12-24 RX ORDER — DEXTROSE MONOHYDRATE 25 G/50ML
50 INJECTION, SOLUTION INTRAVENOUS
Status: DISCONTINUED | OUTPATIENT
Start: 2024-12-24 | End: 2024-12-29

## 2024-12-24 RX ORDER — METOCLOPRAMIDE HYDROCHLORIDE 5 MG/ML
5 INJECTION INTRAMUSCULAR; INTRAVENOUS EVERY 8 HOURS PRN
Status: DISCONTINUED | OUTPATIENT
Start: 2024-12-24 | End: 2024-12-29

## 2024-12-24 NOTE — ED INITIAL ASSESSMENT (HPI)
Patient arrives by EMS from home for SOB and right sided flank pain x 2 days. Normally on 2L O2, now on 4L. Denies cough, fever.

## 2024-12-24 NOTE — CONSULTS
Cardiology Consultation    Lisandro Harley Patient Status:  Observation    1940 MRN [id number]   Location [facility] 2NE-A Attending Cipriano Nieves MD   Hosp Day # 0 PCP Braydon Barrett MD     Reason for Consultation:  dyspnea       History of Present Illness:  Lisandro Harley is a a(n) 84 year old male. Elderly lien follows with Dr. Villa.  H/o O2 requiring COPD, chronic afib/PPM, HFpEF, moderate pulmonary HTN, CRI, and diabetes.  He presents with increasing O2 needs and progressive dyspnea over the past several days.  He has had a near constant right sided chest pain and some more edema.  In the ER, pBNP 2,00, troponin negative, Cr 1.9 -bat baseline.  Cxr suggests mild CHF and possible RLL pneumonia to my eyes.    History:  Past Medical History:    Arrhythmia    Arthritis    Back problem    spinal stenosis    Cancer (HCC)    prostate/radiation treatments only    COPD (chronic obstructive pulmonary disease) (HCC)    Coronary atherosclerosis of unspecified type of vessel, native or graft    s/p 1 cardiac stent    Diabetes mellitus (HCC)    Diarrhea, unspecified    Disorder of liver    Easy bruising    Exposure to unspecified radiation    prostate    Frequent urination    Glaucoma    High blood pressure    High cholesterol    Irregular bowel habits    Leaking of urine    Leg swelling    Nausea vomiting and diarrhea    Osteoarthritis    Pacemaker    Renal disorder    Stage III-moderate    Rheumatoid arthritis (HCC)    Sleep apnea    cpap    Stented coronary artery    Type II or unspecified type diabetes mellitus without mention of complication, not stated as uncontrolled    Visual impairment    Wears glasses     Past Surgical History:   Procedure Laterality Date    Cardiac pacemaker placement      medtronic    Colonoscopy N/A 2015    Procedure: COLONOSCOPY;  Surgeon: Yumiko Martin DO;  Location:  ENDOSCOPY    Hip replacement surgery Left 2018    Hip replacement surgery Right          Knee replacement surgery Right     after 2000    Laminectomy      no hardware    Skin surgery      Total hip replacement Right     Total knee replacement Right      Family History   Problem Relation Age of Onset    Diabetes Neg     Heart Disorder Neg     Hypertension Neg          Allergies:  Allergies[1]    Medications:      Continuous Infusions:      Social History:   reports that he quit smoking about 19 years ago. His smoking use included cigarettes. He started smoking about 60 years ago. He has a 60 pack-year smoking history. He has never used smokeless tobacco. He reports that he does not currently use alcohol. He reports that he does not use drugs.    Review of Systems:  All systems were reviewed and are negative except as described above in HPI.    Physical Exam:      Temp:  [96.6 °F (35.9 °C)] 96.6 °F (35.9 °C)  Pulse:  [59-67] 62  Resp:  [26-32] 26  BP: (120-135)/(55-65) 135/55  SpO2:  [94 %-97 %] 94 %    Last 3 Weights   12/24/24 1423 200 lb (90.7 kg)   11/07/24 1009 195 lb (88.5 kg)   10/29/24 0308 195 lb (88.5 kg)           General: No apparent distress  HEENT: No focal deficits.  Neck: supple. JVP normal  Cardiac: Regular rhythm, S1, S2 normal,   No rub or gallop.  No murmur.  Lungs: CTA  Abdomen: Soft, non-tender.   Extremities: No edema  Neurologic: no focal deficits  Skin: Warm and dry.          Telemetry: paced    Laboratories and Data:  Diagnostics:    EKG, 12/24/2024:  paced    CXR, 12/24/2024:  reviewed    Labs:   HEM:  Recent Labs   Lab 12/24/24  1427   WBC 9.7   HGB 11.5*   .0       Chem:  Recent Labs   Lab 12/24/24  1427      K 3.4*      CO2 25.0   BUN 35*   CREATSERUM 1.93*   CA 8.5*   *       Recent Labs   Lab 12/24/24  1427   ALT 13   AST 16   ALB 4.1       No results for input(s): \"PTP\", \"INR\" in the last 168 hours.    No results for input(s): \"TROP\", \"CK\" in the last 168 hours.      Impression:   Acute on chronic hypoxic resp failure - due to COPD/CHF.  COPD  with exacerbation  Acute on chronic diastolic CHF  CRI, at baseline  Diabetes       Plan:  Lasix 40 mg IVP BID, titrate as indicated.  Daily BMP's  Continue eliquis, decrease dose to 2.5 mg BID.  Continue lopressor  Hold diltiazem for now.    Willard Cramer MD  12/24/2024  5:04 PM  C5         [1]   Allergies  Allergen Reactions    Tussigon [Hycodan] NAUSEA ONLY

## 2024-12-24 NOTE — ED PROVIDER NOTES
Patient Seen in: [facility] Emergency Department      History     Chief Complaint   Patient presents with    Difficulty Breathing     Stated Complaint:     Subjective:   HPI      84-year-old male with history including hypertension, diabetes, atrial fibrillation for which she is on Eliquis, CAD, COPD.  He is on continuous home oxygen, he states typically at 2 L although he often needs more than that.  He has been feeling more short of breath last few days and especially worse in the last 24 hours.  He states even at rest he feels quite short of breath and even worse with exertion.  Has some discomfort in the right mid and lower chest has been there for about a day as well.  No productive cough and no fevers.  Family has noticed that his baseline leg swelling is worse over the last 2 days.    Objective:     Past Medical History:    Arrhythmia    Arthritis    Back problem    spinal stenosis    Cancer (HCC)    prostate/radiation treatments only    COPD (chronic obstructive pulmonary disease) (HCC)    Coronary atherosclerosis of unspecified type of vessel, native or graft    s/p 1 cardiac stent    Diabetes mellitus (HCC)    Diarrhea, unspecified    Disorder of liver    Easy bruising    Exposure to unspecified radiation    prostate    Frequent urination    Glaucoma    High blood pressure    High cholesterol    Irregular bowel habits    Leaking of urine    Leg swelling    Nausea vomiting and diarrhea    Osteoarthritis    Pacemaker    Renal disorder    Stage III-moderate    Rheumatoid arthritis (HCC)    Sleep apnea    cpap    Stented coronary artery    Type II or unspecified type diabetes mellitus without mention of complication, not stated as uncontrolled    Visual impairment    Wears glasses              Past Surgical History:   Procedure Laterality Date    Cardiac pacemaker placement  2010    medtronic    Colonoscopy N/A 09/25/2015    Procedure: COLONOSCOPY;  Surgeon: Yumiko Martin DO;  Location:  ENDOSCOPY     Hip replacement surgery Left 2018    Hip replacement surgery Right         Knee replacement surgery Right     after     Laminectomy      no hardware    Skin surgery      Total hip replacement Right     Total knee replacement Right                 Social History     Socioeconomic History    Marital status:    Tobacco Use    Smoking status: Former     Current packs/day: 0.00     Average packs/day: 1.5 packs/day for 40.0 years (60.0 ttl pk-yrs)     Types: Cigarettes     Start date: 1965     Quit date: 2005     Years since quittin.9    Smokeless tobacco: Never   Vaping Use    Vaping status: Never Used   Substance and Sexual Activity    Alcohol use: Not Currently     Comment: rarely, 1 glass wine/month    Drug use: No   Other Topics Concern    Caffeine Concern Yes     Comment: decafe coffee 2 a day    Exercise No    Seat Belt Yes     Service No    Sleep Concern No     Social Drivers of Health     Financial Resource Strain: Low Risk  (2021)    [facility]    Overall Financial Resource Strain (CARDIA)     Difficulty of Paying Living Expenses: Not very hard   Food Insecurity: No Food Insecurity (10/29/2024)    Food Insecurity     Food Insecurity: Never true   Transportation Needs: No Transportation Needs (10/29/2024)    Transportation Needs     Lack of Transportation: No    [facility], [facility]    Social Connections   Housing Stability: Low Risk  (10/29/2024)    Housing Stability     Housing Instability: No                  Physical Exam     ED Triage Vitals [24 1427]   /65   Pulse 67   Resp 26   Temp 96.6 °F (35.9 °C)   Temp src Temporal   SpO2 97 %   O2 Device None (Room air)       Current Vitals:   Vital Signs  BP: 123/57  Pulse: 59  Resp: (!) 32  Temp: 96.6 °F (35.9 °C)  Temp src: Temporal  MAP (mmHg): 76    Oxygen Therapy  SpO2: 94 %  O2 Device: Nasal  cannula  O2 Flow Rate (L/min): 4 L/min        Physical Exam  Vitals and nursing note reviewed.   Constitutional:       Appearance: He is well-developed.   HENT:      Head: Normocephalic and atraumatic.   Eyes:      Conjunctiva/sclera: Conjunctivae normal.      Pupils: Pupils are equal, round, and reactive to light.   Cardiovascular:      Rate and Rhythm: Normal rate and regular rhythm.      Heart sounds: Normal heart sounds.   Pulmonary:      Effort: Pulmonary effort is normal.      Breath sounds: Normal breath sounds.      Comments: Mild conversational dyspnea with tachypnea.  Diffusely diminished breath sounds bilaterally, more so in the bases.  No wheezing.  Abdominal:      General: Bowel sounds are normal.      Palpations: Abdomen is soft.   Musculoskeletal:         General: Normal range of motion.      Cervical back: Normal range of motion and neck supple.      Comments: 1-2+ edema bilateral lower extremities.  Left is larger than right which per family is baseline.   Skin:     General: Skin is warm and dry.   Neurological:      Mental Status: He is alert and oriented to person, place, and time.             ED Course     Labs Reviewed   CBC WITH DIFFERENTIAL WITH PLATELET - Abnormal; Notable for the following components:       Result Value    HGB 11.5 (*)     HCT 36.2 (*)     Neutrophil Absolute Prelim 8.07 (*)     Neutrophil Absolute 8.07 (*)     Lymphocyte Absolute 0.45 (*)     Monocyte Absolute 1.06 (*)     All other components within normal limits   COMP METABOLIC PANEL (14) - Abnormal; Notable for the following components:    Glucose 314 (*)     Potassium 3.4 (*)     BUN 35 (*)     Creatinine 1.93 (*)     Calcium, Total 8.5 (*)     Calculated Osmolality 304 (*)     eGFR-Cr 34 (*)     All other components within normal limits   PRO BETA NATRIURETIC PEPTIDE - Abnormal; Notable for the following components:    Pro-Beta Natriuretic Peptide 2,901 (*)     All other components within normal limits   TROPONIN I  HIGH SENSITIVITY - Normal   SARS-COV-2/FLU A AND B/RSV BY PCR (GENEXPERT) - Normal    Narrative:     This test is intended for the qualitative detection and differentiation of SARS-CoV-2, influenza A, influenza B, and respiratory syncytial virus (RSV) viral RNA in nasopharyngeal or nares swabs from individuals suspected of respiratory viral infection consistent with COVID-19 by their healthcare provider. Signs and symptoms of respiratory viral infection due to SARS-CoV-2, influenza, and RSV can be similar.    Test performed using the Xpert Xpress SARS-CoV-2/FLU/RSV (real time RT-PCR)  assay on the GeneXpert instrument, [employer], [place] [address].   This test is being used under the Food and Drug Administration's Emergency Use Authorization.    The authorized Fact Sheet for Healthcare Providers for this assay is available upon request from the laboratory.   URINALYSIS WITH CULTURE REFLEX   RAINBOW DRAW LAVENDER   RAINBOW DRAW LIGHT GREEN   RAINBOW DRAW BLUE     EKG    Rate, intervals and axes as noted on EKG Report.  Rate: 64  Rhythm: Paced rhythm  Reading: Dual paced rhythm.  1 PVC.  No ST segment or T wave changes.                XR CHEST AP PORTABLE  (CPT=71045)    Result Date: 12/24/2024  PROCEDURE:  XR CHEST AP PORTABLE  (CPT=71045)  TECHNIQUE:  AP chest radiograph was obtained.  COMPARISON:  [facility] , XR, XR CHEST PA + LAT CHEST (CPT=71046), 4/29/2024, 1:49 PM.  INDICATIONS:  sob  PATIENT STATED HISTORY: (As transcribed by Technologist)  Patient has chest pain on the right side of his chest.    FINDINGS:  Small bilateral pleural effusions.  Cardiomegaly with prominence of the central pulmonary vascularity, subtle increased interstitial markings lungs, and patchy ground-glass opacity in the lower lungs is concerning for congestive failure with mild pulmonary edema.  Clinical correlation recommended.  Calcified plaque in the thoracic aorta.  Degenerative changes the spine and shoulders.  Left chest pacemaker.             CONCLUSION:  Small bilateral pleural effusions.  Cardiomegaly with prominence of the central pulmonary vascularity, subtle increased interstitial markings lungs, and patchy ground-glass opacity in the lower lungs is concerning for congestive failure with  mild pulmonary edema.  Clinical correlation recommended.    LOCATION:  [facility]      Dictated by (CST): Sanya Lagos MD on 12/24/2024 at 2:59 PM     Finalized by (CST): Sanya Lagos MD on 12/24/2024 at 3:00 PM             MDM      84-year-old male with fairly extensive medical history as detailed above presenting for evaluation of increasing shortness of breath.  Has been going on for couple days but worse last 24 hours.  Typical oxygen requirement is 2 L but states he has been using more at home, up to 4.  Even at that here he is not hypoxic but he is conversational dyspnea with mild respiratory distress.  No wheezing, more diffusely diminished especially at the bases.  Family has noted increasing bilateral leg edema as well.  Differential includes volume overload, COPD exacerbation, flu, COVID.    Admission disposition: 12/24/2024  3:22 PM       Update at 3:20 PM.  Workup consistent with CHF exacerbation/volume overload.  Congestion on chest x-ray with bilateral effusions.  Elevated BNP.  Creatinine 1.9 is minimally elevated from his baseline.  Troponin is negative.  Lasix ordered.  Discussed results with the patient.  I think with his comorbidities and shortness of breath with increased oxygen requirement he should be admitted for further diuresis.        Past Medical History-COPD, CHF, CAD, pacemaker, diabetes, hypertension    Differential diagnosis before testing included volume overload, pneumonia, flu, COVID    Co-morbidities that add to the complexity of management include: Oxygen dependent COPD    Testing ordered during this visit included labs, chest x-ray, EKG    Radiographic images  I personally reviewed the radiographs and my individual  interpretation shows vascular congestion, bilateral effusions  I also reviewed the official reports that showed vascular congestion, bilateral effusions    External chart review showed reviewed prior outpatient lab results    History obtained by an independent source included from family at bedside     Discussion of management withhospitalist, cardiology        Medications Provided: Lasix            Disposition:    Admission  I have discussed with the patient the results of test, differential diagnosis, and treatment plan. They expressed clear understanding of these instructions and agrees to the plan provided.           Medical Decision Making      Disposition and Plan     Clinical Impression:  1. Acute on chronic congestive heart failure, unspecified heart failure type (HCC)    2. Chronic kidney disease, unspecified CKD stage         Disposition:  Admit  12/24/2024  3:22 pm    Follow-up:  No follow-up provider specified.        Medications Prescribed:  Current Discharge Medication List              Supplementary Documentation:         Hospital Problems       Present on Admission  Date Reviewed: 10/29/2024            ICD-10-CM Noted POA    * (Principal) Acute on chronic congestive heart failure, unspecified heart failure type (HCC) I50.9 12/24/2024 Unknown

## 2024-12-24 NOTE — ED QUICK NOTES
Pt has had increased SOB since yesterday. Today pt turned up his home O2 to 8LNC when his family arrived they felt he did not look well and called EMS. Pt also c/o right flank pain x2d. Pt told EMS he was feeling much better prior to hospital arrival. Pt is a/o x4.

## 2024-12-24 NOTE — PROGRESS NOTES
Orders for admission, patient is aware of plan and ready to go upstairs. Any questions, please call ED RN anny at extension [phone].     Patient Covid vaccination status: Fully vaccinated     COVID Test Ordered in ED: SARS-CoV-2/Flu A and B/RSV by PCR (GeneXpert)    COVID Suspicion at Admission: N/A    Running Infusions:  None    Mental Status/LOC at time of transport: a/o x4    Other pertinent information:   CIWA score: N/A   NIH score:  N/A

## 2024-12-24 NOTE — HISTORICAL OFFICE NOTE
Continuity of Care Document  12/10/2024  Lisandro Harley - 84 y.o. Male; born [birth year]Summary of episode note, generated on Dec. 24, 2024December 24, 2024   CHIEF COMPLAINT    CHIEF COMPLAINT  Reason for Visit/Chief Complaint   Discuss gen change   83-year-old male with a Medtronic dual-chamber permanent pacemaker initially placed in 2010 with GEN change in 2019 here for new patient evaluation for pacemaker clinic follow-up. The patient has a history of atrial fibrillation this paroxysmal hypertension diabetes. His original device was put in for sinus node dysfunction and paroxysmal A-fib. He is in normal sinus rhythm 99.7% of the timeHe has significant lung disease and walks with a walkerNo swelling tenderness or signs of infection at the device changeVisit April 22, 2024  - No significant change in his symptoms. He does still have some shortness of breath  - Device check showed possibly atrial standstill with no significant a capture or fine A-fib.Visit December 10 , 2024  Patient is doing well. Stable cardiovascular system. Device is at 4 months. On Eliquis     PROBLEMS  Reconcile with Patient's ChartPROBLEMS  Problem Effective Dates Date resolved Problem Status   PAF (paroxysmal atrial fibrillation), [SNOMED-CT: 273148298] 4/15/2024 - Active   MR (mitral regurgitation), [SNOMED-CT: 81519662] 4/15/2024 - Active   Pulmonary HTN, [SNOMED-CT: 60437206] 4/15/2024 - Active   (HFpEF) heart failure with preserved ejection fraction, [SNOMED-CT: 557002501] 4/15/2024 - Active   Abnormal myocardial perfusion study, [SNOMED-CT: 725157432] 1/5/2022 - Active   CAD native (coronary artery disease), [SNOMED-CT: 80252922] 1/5/2022 - Active   Essential hypertension, [SNOMED-CT: 92612709] 6/26/2018 - Active   Prostate cancer, [SNOMED-CT: 833756700] 9/20/2010 - Active   Lymphocytic colitis, [SNOMED-CT: 58273372] 10/23/2017 - Active   Coronary atherosclerosis, [SNOMED-CT: 894802261] 6/26/2018 - Active   Spinal stenosis of  lumbar region, [SNOMED-CT: 01207733] 7/26/2018 - Active   Atrial fibrillation, [SNOMED-CT: 59580293] 2/17/2021 - Active   Cardiac pacemaker in situ, [SNOMED-CT: 728852124] 7/26/2018 - Active   Hyperlipidemia, [SNOMED-CT: 38601307] 7/26/2018 - Active   Acquired spondylolisthesis, [SNOMED-CT: 952902570] 7/26/2018 - Active   Thrombocytopenia, [SNOMED-CT: 247678951] 12/14/2020 - Active   Severe obesity (BMI 35.0-39.9) with comorbidity, [SNOMED-CT: 46379040139118] 12/14/2020 - Active   Dizziness, [SNOMED-CT: 750648021] 6/29/2021 - Active   Acute on chronic congestive heart failure, [SNOMED-CT: 261351209] 9/23/2021 - Active   Elevated troponin, [SNOMED-CT: 008410033] 9/23/2021 - Active   Dyspnea, unspecified type, [SNOMED-CT: 209743231] 9/23/2021 - Active   COPD exacerbation, [SNOMED-CT: 107110003] 9/23/2021 - Active   Sepsis due to Escherichia coli, [SNOMED-CT: 203901010] 9/24/2021 - Active     ENCOUNTER    ENCOUNTER  Problem Effective Dates Date resolved Problem Status   PAF (paroxysmal atrial fibrillation), [SNOMED-CT: 969974737] 4/15/2024 - Active   MR (mitral regurgitation), [SNOMED-CT: 87357376] 4/15/2024 - Active   Pulmonary HTN, [SNOMED-CT: 31565751] 4/15/2024 - Active   (HFpEF) heart failure with preserved ejection fraction, [SNOMED-CT: 034108369] 4/15/2024 - Active   Abnormal myocardial perfusion study, [SNOMED-CT: 150481616] 1/5/2022 - Active   CAD native (coronary artery disease), [SNOMED-CT: 73958561] 1/5/2022 - Active   Essential hypertension, [SNOMED-CT: 98914458] 6/26/2018 - Active   Prostate cancer, [SNOMED-CT: 201073216] 9/20/2010 - Active   Lymphocytic colitis, [SNOMED-CT: 13352125] 10/23/2017 - Active   Coronary atherosclerosis, [SNOMED-CT: 274304198] 6/26/2018 - Active   Spinal stenosis of lumbar region, [SNOMED-CT: 41442615] 7/26/2018 - Active   Atrial fibrillation, [SNOMED-CT: 47696829] 2/17/2021 - Active   Cardiac pacemaker in situ, [SNOMED-CT: 780833790] 7/26/2018 - Active   Hyperlipidemia,  [SNOMED-CT: 34498712] 7/26/2018 - Active   Acquired spondylolisthesis, [SNOMED-CT: 416284706] 7/26/2018 - Active   Thrombocytopenia, [SNOMED-CT: 993921786] 12/14/2020 - Active   Severe obesity (BMI 35.0-39.9) with comorbidity, [SNOMED-CT: 56075576697910] 12/14/2020 - Active   Dizziness, [SNOMED-CT: 286912707] 6/29/2021 - Active   Acute on chronic congestive heart failure, [SNOMED-CT: 358980898] 9/23/2021 - Active   Elevated troponin, [SNOMED-CT: 540026263] 9/23/2021 - Active   Dyspnea, unspecified type, [SNOMED-CT: 727459295] 9/23/2021 - Active   COPD exacerbation, [SNOMED-CT: 764548358] 9/23/2021 - Active   Sepsis due to Escherichia coli, [SNOMED-CT: 607517750] 9/24/2021 - Active     VITAL SIGNS    VITAL SIGNS  Date / Time: 12/10/2024   BP Systolic 114 mmHg   BP Diastolic 60 mmHg   Height 66 inches   Weight 200 lbs   Pulse Rate 69 bpm   BSA (Body Surface Area) 2.1 cc/m2   BMI (Body Mass Index) 32.3 cc/m2   Blood Pressure 114 / 60 mmHg     PHYSICAL EXAMINATION    PHYSICAL EXAMINATION  Header Details   Vitals Right Arm Sitting  / 60 mmHg, Pulse rate 69 bpm, Height in 5' 6\", BMI: 32.3, Weight in 200 lbs (or) 90.72 kgs, BSA : 2.09 cc/m²   General Appearance No Acute Distress, Well groomed, Normal Body habitus   Cardiovascular      ALLERGIES, ADVERSE REACTIONS, ALERTS    ALLERGIES, ADVERSE REACTIONS, ALERTS  Type Substance Reaction Severity Status   Allergies Hycodan, [RxNorm: 299265]   Mild Active   Allergies Hydrocodone-Acetaminophen, [RxNorm: 433965]   Mild Active     MEDICATIONS ADMINISTERED DURING VISIT    No data available    MEDICATIONS  Reconcile with Patient's ChartMEDICATIONS  Medication Start Date Route/Frequency Status   atorvastatin 10 MG Oral Tab, [RxNorm: 724407] 10/28/2021 Take 10 mg by mouth nightly. Active   DILT- mg capsule, extended release, [RxNorm: 173200] 7/15/2024 Take 1 capsule orally once a day. Active   Eliquis 2.5 mg tablet, [RxNorm: 0801813] 7/3/2024 Take 1 tablet orally 2 times  a day. Active   Jardiance 10 mg tablet, [RxNorm: 4211020] 7/15/2024 Take 1 tablet orally once a day. Active   Metoprolol Tartrate 100 MG Oral Tab, [RxNorm: 668353] 10/28/2021 Take 1 tablet (100 mg total) by mouth 2 (two) times daily. Active   potassium chloride ER 10 mEq tablet,extended release, [RxNorm: 264028] 7/15/2024 Take 1 tablet orally once a day. Active   tamsulosin (FLOMAX) cap, [RxNorm: 158664] 10/28/2021 TAKE 1 CAPSULE EVERY DAY Active   torsemide 20 mg tablet, [RxNorm: 132356] 7/15/2024 Take 2 tablets orally 2 times a day. Active     ASSESSMENT    ===============================  Cardiac Testing Personally ReviewedECG Reviewed -EKG in the office today shows AV pacedEcho Results echocardiogram performed at an outside institution showed a normal EF 55%Stress Test Results PET scan December 2021 showed mostly scar minimal ischemiaMonitor Results device check normal. A-fib burden 0.3%EP Procedures: []  ==============================Problem List:#Dual-chamber Medtronic permanent pacemaker  - Initial indication sinus node dysfunction with possible intermittent AV block  - Device has 4 months of battery life  - Will schedule generator change. Patient will need to hold Eliquis for 48 hours before the procedure. # Possibly now in permanent atrial fibrillation. Not able to determine on his device checks  - Continue Eliquis  -The atrial lead does not capture. I believe this is likely due to very fine atrial fibrillation. The fact that it does not capture at highest output suggest that it is in A-fib. #Essential hypertension #Chronic respiratory failure COPD #Pulmonary hypertension  Continue current meds except as outlined above.  =====================================Check out Items:  Follow-up 3 months after the procedure     FAMILY HISTORY    No data available    GENERAL STATUS    No data available    PAST MEDICAL HISTORY    PAST MEDICAL HISTORY  Problem Date diagonsed Date resolved Status   PAF (paroxysmal atrial  fibrillation), [SNOMED-CT: 971227702] 4/15/2024 - Active   MR (mitral regurgitation), [SNOMED-CT: 72808396] 4/15/2024 - Active   Pulmonary HTN, [SNOMED-CT: 53051802] 4/15/2024 - Active   (HFpEF) heart failure with preserved ejection fraction, [SNOMED-CT: 287599009] 4/15/2024 - Active   Abnormal myocardial perfusion study, [SNOMED-CT: 624832143] 1/5/2022 - Active   CAD native (coronary artery disease), [SNOMED-CT: 21820564] 1/5/2022 - Active   Essential hypertension, [SNOMED-CT: 69782805] 6/26/2018 - Active   Prostate cancer, [SNOMED-CT: 694612414] 9/20/2010 - Active   Lymphocytic colitis, [SNOMED-CT: 05543157] 10/23/2017 - Active   Coronary atherosclerosis, [SNOMED-CT: 162885173] 6/26/2018 - Active   Spinal stenosis of lumbar region, [SNOMED-CT: 87406518] 7/26/2018 - Active   Atrial fibrillation, [SNOMED-CT: 06280457] 2/17/2021 - Active   Cardiac pacemaker in situ, [SNOMED-CT: 177565724] 7/26/2018 - Active   Hyperlipidemia, [SNOMED-CT: 62845527] 7/26/2018 - Active   Acquired spondylolisthesis, [SNOMED-CT: 666081491] 7/26/2018 - Active   Thrombocytopenia, [SNOMED-CT: 547521499] 12/14/2020 - Active   Severe obesity (BMI 35.0-39.9) with comorbidity, [SNOMED-CT: 06128891732811] 12/14/2020 - Active   Dizziness, [SNOMED-CT: 244999412] 6/29/2021 - Active   Acute on chronic congestive heart failure, [SNOMED-CT: 605131384] 9/23/2021 - Active   Elevated troponin, [SNOMED-CT: 829532870] 9/23/2021 - Active   Dyspnea, unspecified type, [SNOMED-CT: 356547901] 9/23/2021 - Active   COPD exacerbation, [SNOMED-CT: 158761565] 9/23/2021 - Active   Sepsis due to Escherichia coli, [SNOMED-CT: 702184637] 9/24/2021 - Active     HISTORY OF PRESENT ILLNESS    83-year-old male with a Medtronic dual-chamber permanent pacemaker initially placed in 2010 with GEN change in 2019 here for new patient evaluation for pacemaker clinic follow-up. The patient has a history of atrial fibrillation this paroxysmal hypertension diabetes. His original device  was put in for sinus node dysfunction and paroxysmal A-fib. He is in normal sinus rhythm 99.7% of the timeHe has significant lung disease and walks with a walkerNo swelling tenderness or signs of infection at the device changeVisit April 22, 2024  - No significant change in his symptoms. He does still have some shortness of breath  - Device check showed possibly atrial standstill with no significant a capture or fine A-fib.Visit December 10 , 2024  Patient is doing well. Stable cardiovascular system. Device is at 4 months. On Eliquis     IMMUNIZATIONS    No data available    PLAN OF CARE    PLAN OF CARE  Planned Care Date   EKG (electrocardiogram) 1/1/1900   BMP (Basic Metabolic Panel) 1/1/1900   CBC (Complete Blood Count) 1/1/1900     PROCEDURES    No data available    RESULTS    RESULTS  Name Result Date Location - Ordered By   GLUCOSE [LOINC: 2339-0] 149 mg/dL [High] 05/28/2024 07:49:00 AM [facility] ([facility])  Address: [address] [place] [address]  tel:   SODIUM [LOINC: 2951-2] 140 mmol/L 05/28/2024 07:49:00 AM [facility] ([facility])  Address: [address] [place] [address]  [address]  tel:   POTASSIUM [LOINC: 2823-3] 3.9 mmol/L 05/28/2024 07:49:00 AM [facility] ([facility])  Address: [address] [place] [address]  [address]  tel:   CHLORIDE [LOINC: 2075-0] 103 mmol/L 05/28/2024 07:49:00 AM [facility] ([facility])  Address: [address] [place] [address]  [address]  tel:   CO2 [LOINC: 2028-9] 29.0 mmol/L 05/28/2024 07:49:00 AM [facility] ([facility])  Address: [address] [place] [address]  [address]  tel:   ANION GAP [LOINC: 33037-3] 8 mmol/L 05/28/2024 07:49:00 AM [facility] ([facility])  Address: [address] [place] [address]  [address]  tel:   BUN [LOINC: 6299-2] 39 mg/dL [High] 05/28/2024 07:49:00 AM [facility] ([facility]  [facility])  Address: [address] [place] [address]  [address]  tel:   CREATININE [LOINC: 27284-0] 2.15 mg/dL [High] 05/28/2024 07:49:00 AM [facility] ([place] [facility])  Address: [address] [place] [address]  tel:   CALCIUM [LOINC: 55009-2] 8.7 mg/dL 05/28/2024 07:49:00 AM [facility] ([facility])  Address: [address] [place] [address]  tel:   OSMOLALITY CALCULATED [LOINC: 63740-5] 302 mOsm/kg [High] 05/28/2024 07:49:00 AM [facility] ([facility])  Address: [address] [place] [address]  tel:   E GFR CR [LOINC: 41398-3] 30 mL/min/1.73m2 [Low] 05/28/2024 07:49:00 AM [facility] ([facility])  Address: [address] [place] [address]  tel:   FASTING PATIENT BMP ANSWER [LOINC: 76805-4] Yes 05/28/2024 07:49:00 AM [facility] ([facility])  Address: [address] [place] [address]  tel:   GLUCOSE [LOINC: 2339-0] 155 mg/dL [High] 05/16/2024 07:30:00 AM [facility] ([facility])  Address: [address] [place] [address]  tel:   SODIUM [LOINC: 2951-2] 140 mmol/L 05/16/2024 07:30:00 AM [facility] ([facility])  Address: [address] [place] [address]  tel:   POTASSIUM [LOINC: 2823-3] 3.5 mmol/L 05/16/2024 07:30:00 AM [facility] ([facility])  Address: [address] [place] [address]  tel:   CHLORIDE [LOINC: 2075-0] 104 mmol/L 05/16/2024 07:30:00 AM [facility] ([facility])  Address: [address] [place] [address]  [address]  tel:   CO2 [LOINC: 2028-9] 30.0 mmol/L 05/16/2024 07:30:00 AM [facility] ([facility])  Address: [address] [place] [address]  [address]  tel:   ANION GAP [LOINC: 33037-3] 6 mmol/L 05/16/2024 07:30:00 AM [facility] ([facility])  Address: [address] [place] [address]  [facility]  [address]  tel:   BUN [LOINC: 6299-2] 43 mg/dL [High] 05/16/2024 07:30:00 AM [facility] ([facility])  Address: [address] [place] [address]  [address]  tel:   CREATININE [LOINC: 22277-0] 1.98 mg/dL [High] 05/16/2024 07:30:00 AM [facility] ([facility])  Address: [address] [place] [address]  [address]  tel:   CALCIUM [LOINC: 01450-2] 9.0 mg/dL 05/16/2024 07:30:00 AM [facility] ([facility])  Address: [address] [place] [address]  tel:   OSMOLALITY CALCULATED [LOINC: 70673-3] 304 mOsm/kg [High] 05/16/2024 07:30:00 AM [facility] ([facility])  Address: [address] [place] [address]  tel:   E GFR CR [LOINC: 43854-9] 33 mL/min/1.73m2 [Low] 05/16/2024 07:30:00 AM [facility] ([facility])  Address: [address] [place] [address]  tel:   AST [LOINC: 1920-8] 18 U/L 05/16/2024 07:30:00 AM [facility] ([facility])  Address: [address] [place] [address]  [address]  tel:   ALT [LOINC: 1742-6] 25 U/L 05/16/2024 07:30:00 AM [facility] ([facility])  Address: [address] [place] [address]  [address]  tel:   ALKALINE PHOSPHATASE [LOINC: 1779-8] 118 U/L [High] 05/16/2024 07:30:00 AM [facility] ([facility])  Address: [address] [place] [address]  tel:   BILIRUBIN, TOTAL [LOINC: 1975-2] 0.8 mg/dL 05/16/2024 07:30:00 AM [facility] ([facility])  Address: [address] [place] [address]  tel:   TOTAL PROTEIN [LOINC: 2885-2] 7.0 g/dL 05/16/2024 07:30:00 AM [facility] ([facility])  Address: [address] [place] [address]  tel:   ALBUMIN [LOINC: 1751-7] 3.6 g/dL 05/16/2024 07:30:00 AM [facility] ([facility])  Address: [address] [place] [address]  tel:   GLOBULIN [LOINC: 16542-9]  3.4 g/dL 05/16/2024 07:30:00 AM [facility] ([facility])  Address: [address] [place] [address]  tel:   A/G RATIO [LOINC: 1759-0] 1.1 05/16/2024 07:30:00 AM [facility] ([facility])  Address: [address] [place] [address]  tel:   FASTING PATIENT CMP ANSWER [LOINC: 45222-1] Yes 05/16/2024 07:30:00 AM [facility] ([facility])  Address: [address] [place] [address]  tel:   PRO-BETA NATRIURETIC PEPTIDE [LOINC: 81839-5] 2531 pg/mL [High] 04/16/2024 07:38:00 AM [facility] ([facility])  Address: [address] [place] [address]  tel:   BMP (Basic Metabolic Panel) [LOINC: 63036-1] Pending Schedule next available     CBC (Complete Blood Count) [LOINC: 34055-5] Pending Schedule next available     Nuclear PET 1.Stress EKG is non-diagnostic. 2.The heart rate recovery is normal. 1.This is an abnormal perfusion study. Study is consistent with mostly scar tissue with minimal ischemia. 2.This scan is suggestive of moderate risk for future cardiovascular events. 3.Medium partially reversible perfusion abnormality of moderate intensity in the anterior region. 4.The left ventricular cavity is noted to be normal on the stress studies. The stress left ventricular ejection fraction was calculated to be 66% and left ventricular global function is normal. The rest left ventricular cavity is noted to be normal. The rest left ventricular ejection fraction was calculated to be 64% and rest left ventricular global function is normal. 5.When compared to the resting ejection fraction (64%), the stress ejection fraction (66%) has increased. 6.Transient ischemic dilatation is present and has been described as a marker for high risk coronary artery disease. It is most likely due to sub-endocardial ischemia, it has also been described in microvascular disease as well as cardiac deconditioning. 7.The study  quality is good. 12/14/2021 3:30:00 PM Samson Hyde     REVIEW OF SYSTEMS    REVIEW OF SYSTEMS  Header Details   Cardiovascular No history of Chest pain, SANDERS, Palpitations, Syncope, PND, Orthopnea, Edema, Claudication     SOCIAL HISTORY    SOCIAL HISTORY  Social History Element Description Effective Dates   Smoking status Former smoker -     FUNCTIONAL STATUS    No data available    MEDICAL EQUIPMENT    No data available    Goals Sections    No data available    REASON FOR REFERRAL           Health Concerns Section    No data available    COGNITIVE/MENTAL STATUS    No data available    Patient Demographics    Patient Demographics  Patient Address Patient Name Communication   [address], [address]  [place] [address] Lisandro Harley [phone] (Home)     Patient Demographics  Language Race / Ethnicity Marital Status   English - Spoken (Preferred) Unknown / Unknown      Document Information    Primary Care Provider Other Service Providers Document Coverage Dates   Dirk Murry  NPI: [id number]  [phone] (Work)  [address] [place] [id number]  [place] [address]  Interpreting Physicians  [facility]  [phone] (Work)  [address] [place] [id number] Paige Perez  NPI: [id number]  [phone] (Work)  [address] [place] [id number]  [place] [address]  Nurses Dec. 10, 2024December 10, 2024      Organization   [facility]  [phone] (Work)  [address] [place] [address]  [place] [address]     Encounter Providers Encounter Date    Dec. 10, 2024December 10, 2024       Legal Authenticator    Dirk Murry  NPI: [id number]  [phone] (Work)  [address] [place] [id number]  [place] [address]          ______________________________________________________      Continuity of Care  Document  7/15/2024  Lisandro Harley - 84 y.o. Male; born [birth year]Summary of episode note, generated on Aug. 19, 2024August 19, 2024   CHIEF COMPLAINT    CHIEF COMPLAINT  Reason for Visit/Chief Complaint   6 week follow up   84 M w/ PMH of COPD, HTN, Afib, HFpEF, DLP, PPMHospitalized in late 2022 for respiratory failure, multifactorial secondary to pneumonia, COPD, HFpEF. Improved with diuresis and oxygen/inhaler treatment for COPD.Today reports no cardiac symptoms. He has persistent left leg edema. He is not using compression socks as discussed last visit.     PROBLEMS  Reconcile with Patient's ChartPROBLEMS  Problem Effective Dates Date resolved Problem Status   PAF (paroxysmal atrial fibrillation), [SNOMED-CT: 673662648] 4/15/2024 - Active   MR (mitral regurgitation), [SNOMED-CT: 00172224] 4/15/2024 - Active   Pulmonary HTN, [SNOMED-CT: 49876046] 4/15/2024 - Active   (HFpEF) heart failure with preserved ejection fraction, [SNOMED-CT: 337450323] 4/15/2024 - Active   Abnormal myocardial perfusion study, [SNOMED-CT: 587667344] 1/5/2022 - Active   CAD native (coronary artery disease), [SNOMED-CT: 39387413] 1/5/2022 - Active   Essential hypertension, [SNOMED-CT: 93400223] 6/26/2018 - Active   Prostate cancer, [SNOMED-CT: 237488766] 9/20/2010 - Active   Lymphocytic colitis, [SNOMED-CT: 03476088] 10/23/2017 - Active   Coronary atherosclerosis, [SNOMED-CT: 650497170] 6/26/2018 - Active   Spinal stenosis of lumbar region, [SNOMED-CT: 93660814] 7/26/2018 - Active   Atrial fibrillation, [SNOMED-CT: 52896209] 2/17/2021 - Active   Cardiac pacemaker in situ, [SNOMED-CT: 691686379] 7/26/2018 - Active   Hyperlipidemia, [SNOMED-CT: 50100768] 7/26/2018 - Active   Acquired spondylolisthesis, [SNOMED-CT: 940796542] 7/26/2018 - Active   Thrombocytopenia, [SNOMED-CT: 387921325] 12/14/2020 - Active   Severe obesity (BMI 35.0-39.9) with comorbidity, [SNOMED-CT: 51008308644983] 12/14/2020 - Active   Dizziness, [SNOMED-CT:  843998055] 6/29/2021 - Active   Acute on chronic congestive heart failure, [SNOMED-CT: 200510829] 9/23/2021 - Active   Elevated troponin, [SNOMED-CT: 546100178] 9/23/2021 - Active   Dyspnea, unspecified type, [SNOMED-CT: 051337389] 9/23/2021 - Active   COPD exacerbation, [SNOMED-CT: 602394981] 9/23/2021 - Active   Sepsis due to Escherichia coli, [SNOMED-CT: 935328472] 9/24/2021 - Active     ENCOUNTER DIAGNOSIS    ENCOUNTER DIAGNOSIS  Problem Effective Dates Date resolved Problem Status   Abnormal myocardial perfusion study, [SNOMED-CT: 080837037] 1/5/2022 - Active   CAD native (coronary artery disease), [SNOMED-CT: 79970281] 1/5/2022 - Active   Essential hypertension, [SNOMED-CT: 15525877] 6/26/2018 - Active   Atrial fibrillation, [SNOMED-CT: 92417512] 2/17/2021 - Active   Cardiac pacemaker in situ, [SNOMED-CT: 089316763] 7/26/2018 - Active   Acute on chronic congestive heart failure, [SNOMED-CT: 140545267] 9/23/2021 - Active   Elevated troponin, [SNOMED-CT: 763941571] 9/23/2021 - Active     VITAL SIGNS    VITAL SIGNS  Date / Time: 7/15/2024   BP Systolic 98 mmHg   BP Diastolic 52 mmHg   Height 66 inches   Weight 197 lbs   Pulse Rate 67 bpm   BSA (Body Surface Area) 2.1 cc/m2   BMI (Body Mass Index) 31.8 cc/m2   Blood Pressure 98 / 52 mmHg     PHYSICAL EXAMINATION    PHYSICAL EXAMINATION  Header Details   Vitals Right Arm Sitting BP 98 / 52 mmHg, Pulse rate 67 bpm, Height in 5' 6\", BMI: 31.8, Weight in 197.01 lbs (or) 89.36 kgs, BSA : 2.07 cc/m²   General Appearance Well groomed   Cardiovascular      ALLERGIES, ADVERSE REACTIONS, ALERTS    ALLERGIES, ADVERSE REACTIONS, ALERTS  Type Substance Reaction Severity Status   Allergies Hycodan, [RxNorm: 583627]   Mild Active   Allergies Hydrocodone-Acetaminophen, [RxNorm: 248396]   Mild Active     MEDICATIONS ADMINISTERED DURING VISIT    No data available    MEDICATIONS  Reconcile with Patient's ChartMEDICATIONS  Medication Start Date Route/Frequency Status   atorvastatin  10 MG Oral Tab, [RxNorm: 721371] 10/28/2021 Take 10 mg by mouth nightly. Active   dilTIAZem ER (XR/XT) 240 mg capsule,extended release 24 hr, controlled, [RxNorm: 386840] 4/29/2024 Take 1 capsule orally once a day. Active   Eliquis 2.5 mg tablet, [RxNorm: 3548582] 7/3/2024 Take 1 tablet orally 2 times a day. Active   Jardiance 10 mg tablet, [RxNorm: 6442688] 4/15/2024 Take 1 tablet orally once a day. Active   Metoprolol Tartrate 100 MG Oral Tab, [RxNorm: 297729] 10/28/2021 Take 1 tablet (100 mg total) by mouth 2 (two) times daily. Active   potassium chloride ER 10 mEq tablet,extended release, [RxNorm: 864397] 6/3/2024 Take 1 tablet orally once a day. Active   tamsulosin (FLOMAX) cap, [RxNorm: 804580] 10/28/2021 TAKE 1 CAPSULE EVERY DAY Active   torsemide 20 mg tablet, [RxNorm: 237657] 6/5/2024 Take 2 tablets orally 2 times a day. Active   dilTIAZem ER (XR/XT) 180 mg capsule,extended release 24 hr, controlled, [RxNorm: 442312] 7/15/2024 Take 1 capsule orally once a day. Active   Jardiance 10 mg tablet, [RxNorm: 7037501] 7/15/2024 Take 1 tablet orally once a day. Active   potassium chloride ER 10 mEq tablet,extended release, [RxNorm: 602854] 7/15/2024 Take 1 tablet orally once a day. Active   torsemide 20 mg tablet, [RxNorm: 771338] 7/15/2024 Take 2 tablets orally 2 times a day. Active   DILT- mg capsule, extended release, [RxNorm: 239775] 7/15/2024 Take 1 capsule orally once a day. Active     ASSESSMENT    # HFpEF  # Venous insufficiency  # PHTN - likely Core Pulmonale +/- HFpEF  # Afib  # HTN  # COPD  # DLP  # SmokingMDM/Diagnostics:  -ECG (Dec 2022) AV paced rhythm  -TTE (De 2022) LVEF 55-60%, moderate TR, PASP more than 60Plan:  -Euvolemic. Continue torsemide 40 mg p.o. twice daily.  -Left leg with persistent edema secondary to venous insufficiency. Advised use of compression socks.  -Continue metoprolol, atorvastatin  -A-fib management per EP. Decrease Cardizem to 120 mg p.o. daily given low BP.  -Follow-up  in 6 months     FAMILY HISTORY    No data available    GENERAL STATUS    No data available    PAST MEDICAL HISTORY    PAST MEDICAL HISTORY  Problem Date diagonsed Date resolved Status   PAF (paroxysmal atrial fibrillation), [SNOMED-CT: 325434547] 4/15/2024 - Active   MR (mitral regurgitation), [SNOMED-CT: 42701105] 4/15/2024 - Active   Pulmonary HTN, [SNOMED-CT: 35396008] 4/15/2024 - Active   (HFpEF) heart failure with preserved ejection fraction, [SNOMED-CT: 680226855] 4/15/2024 - Active   Abnormal myocardial perfusion study, [SNOMED-CT: 113123288] 1/5/2022 - Active   CAD native (coronary artery disease), [SNOMED-CT: 10192486] 1/5/2022 - Active   Essential hypertension, [SNOMED-CT: 44803525] 6/26/2018 - Active   Prostate cancer, [SNOMED-CT: 539839755] 9/20/2010 - Active   Lymphocytic colitis, [SNOMED-CT: 11949717] 10/23/2017 - Active   Coronary atherosclerosis, [SNOMED-CT: 776360522] 6/26/2018 - Active   Spinal stenosis of lumbar region, [SNOMED-CT: 68105568] 7/26/2018 - Active   Atrial fibrillation, [SNOMED-CT: 66633267] 2/17/2021 - Active   Cardiac pacemaker in situ, [SNOMED-CT: 030319332] 7/26/2018 - Active   Hyperlipidemia, [SNOMED-CT: 82932413] 7/26/2018 - Active   Acquired spondylolisthesis, [SNOMED-CT: 059180939] 7/26/2018 - Active   Thrombocytopenia, [SNOMED-CT: 030352349] 12/14/2020 - Active   Severe obesity (BMI 35.0-39.9) with comorbidity, [SNOMED-CT: 11564808709335] 12/14/2020 - Active   Dizziness, [SNOMED-CT: 397898536] 6/29/2021 - Active   Acute on chronic congestive heart failure, [SNOMED-CT: 405957732] 9/23/2021 - Active   Elevated troponin, [SNOMED-CT: 774554316] 9/23/2021 - Active   Dyspnea, unspecified type, [SNOMED-CT: 185086408] 9/23/2021 - Active   COPD exacerbation, [SNOMED-CT: 940151812] 9/23/2021 - Active   Sepsis due to Escherichia coli, [SNOMED-CT: 914390634] 9/24/2021 - Active     HISTORY OF PRESENT ILLNESS    84 M w/ PMH of COPD, HTN, Afib, HFpEF, DLP, PPMHospitalized in late 2022 for  respiratory failure, multifactorial secondary to pneumonia, COPD, HFpEF. Improved with diuresis and oxygen/inhaler treatment for COPD.Today reports no cardiac symptoms. He has persistent left leg edema. He is not using compression socks as discussed last visit.     IMMUNIZATIONS    No data available    PLAN OF CARE    PLAN OF CARE  Planned Care Date   Take 1 tablet orally once a day.-Jardiance 10 mg tablet 7/15/2024   Take 1 tablet orally once a day.-potassium chloride ER 10 mEq tablet,extended release 7/15/2024   Take 2 tablets orally 2 times a day.-torsemide 20 mg tablet 7/15/2024   Take 1 capsule orally once a day.-DILT- mg capsule, extended release 7/15/2024   Referral Visit - Braydon Barrett [email or web address]) : 1/1/1900   Follow up visit - Lenin Villa MD 1/1/1900     PROCEDURES    No data available    RESULTS    RESULTS  Name Result Date Location - Ordered By   GLUCOSE [LOINC: 2339-0] 149 mg/dL [High] 05/28/2024 07:49:00 AM [facility] ([facility])  Address: [address] [place] [address]  tel:   SODIUM [LOINC: 2951-2] 140 mmol/L 05/28/2024 07:49:00 AM [facility] ([facility])  Address: [address] [place] [address]  [address]  tel:   POTASSIUM [LOINC: 2823-3] 3.9 mmol/L 05/28/2024 07:49:00 AM [facility] ([facility])  Address: [address] [place] [address]  [address]  tel:   CHLORIDE [LOINC: 2075-0] 103 mmol/L 05/28/2024 07:49:00 AM [facility] ([facility])  Address: [address] [place] [address]  [address]  tel:   CO2 [LOINC: 2028-9] 29.0 mmol/L 05/28/2024 07:49:00 AM [facility] ([facility])  Address: [address] [place] [address]  tel:   ANION GAP [LOINC: 33037-3] 8 mmol/L 05/28/2024 07:49:00 AM [facility] ([facility])  Address: [address] [place] [address]  [address]  tel:   BUN [LOINC: 6299-2] 39 mg/dL [High]  05/28/2024 07:49:00 AM [facility] ([facility])  Address: [address] [place] [address]  [address]  tel:   CREATININE [LOINC: 65773-1] 2.15 mg/dL [High] 05/28/2024 07:49:00 AM [facility] ([facility])  Address: [address] [place] [address]  [address]  tel:   CALCIUM [LOINC: 69165-9] 8.7 mg/dL 05/28/2024 07:49:00 AM [facility] ([facility])  Address: [address] [place] [address]  [address]  tel:   OSMOLALITY CALCULATED [LOINC: 58923-8] 302 mOsm/kg [High] 05/28/2024 07:49:00 AM [facility] ([facility])  Address: [address] [place] [address]  tel:   E GFR CR [LOINC: 90892-6] 30 mL/min/1.73m2 [Low] 05/28/2024 07:49:00 AM [facility] ([facility])  Address: [address] [place] [address]  [address]  tel:   FASTING PATIENT BMP ANSWER [LOINC: 68130-2] Yes 05/28/2024 07:49:00 AM [facility] ([facility])  Address: [address] [place] [address]  [address]  tel:   GLUCOSE [LOINC: 2339-0] 155 mg/dL [High] 05/16/2024 07:30:00 AM [facility] ([facility])  Address: [address] [place] [address]  [address]  tel:   SODIUM [LOINC: 2951-2] 140 mmol/L 05/16/2024 07:30:00 AM [facility] ([facility])  Address: [address] [place] [address]  [address]  tel:   POTASSIUM [LOINC: 2823-3] 3.5 mmol/L 05/16/2024 07:30:00 AM [facility] ([facility])  Address: [address] [place] [address]  [address]  tel:   CHLORIDE [LOINC: 2075-0] 104 mmol/L 05/16/2024 07:30:00 AM [facility] ([facility])  Address: [address] [place] [address]  [address]  tel:   CO2 [LOINC: 2028-9] 30.0 mmol/L 05/16/2024 07:30:00 AM [facility] ([facility])  Address: [address] [place] [address]  [address]  tel:   ANION GAP [LOINC: 33037-3] 6 mmol/L 05/16/2024 07:30:00 AM [facility]  [facility])  Address: [address] [place] [address]  [address]  tel:   BUN [LOINC: 6299-2] 43 mg/dL [High] 05/16/2024 07:30:00 AM [facility] ([facility])  Address: [address] [place] [address]  [address]  tel:   CREATININE [LOINC: 21256-2] 1.98 mg/dL [High] 05/16/2024 07:30:00 AM [facility] ([facility])  Address: [address] [place] [address]  [address]  tel:   CALCIUM [LOINC: 27790-0] 9.0 mg/dL 05/16/2024 07:30:00 AM [facility] ([facility])  Address: [address] [place] [address]  [address]  tel:   OSMOLALITY CALCULATED [LOINC: 21801-4] 304 mOsm/kg [High] 05/16/2024 07:30:00 AM [facility] ([facility])  Address: [address] [place] [address]  [address]  tel:   E GFR CR [LOINC: 58972-7] 33 mL/min/1.73m2 [Low] 05/16/2024 07:30:00 AM [facility] ([facility])  Address: [address] [place] [address]  [address]  tel:   AST [LOINC: 1920-8] 18 U/L 05/16/2024 07:30:00 AM [facility] ([facility])  Address: [address] [place] [address]  [address]  tel:   ALT [LOINC: 1742-6] 25 U/L 05/16/2024 07:30:00 AM [facility] ([facility])  Address: [address] [place] [address]  [address]  tel:   ALKALINE PHOSPHATASE [LOINC: 1779-8] 118 U/L [High] 05/16/2024 07:30:00 AM [facility] ([facility])  Address: [address] [place] [address]  [address]  tel:   BILIRUBIN, TOTAL [LOINC: 1975-2] 0.8 mg/dL 05/16/2024 07:30:00 AM [facility] ([facility])  Address: [address] [place] [address]  [address]  tel:   TOTAL PROTEIN [LOINC: 2885-2] 7.0 g/dL 05/16/2024 07:30:00 AM [facility] ([facility])  Address: [address] [place] [address]  [address]  tel:   ALBUMIN [LOINC: 1751-7] 3.6 g/dL 05/16/2024 07:30:00 AM [facility] ([facility])  Address: [address] [place] [address]  [place] [address]  tel:   GLOBULIN [LOINC: 29237-1] 3.4 g/dL 05/16/2024 07:30:00 AM [facility] ([facility])  Address: [address] [place] [address]  tel:   A/G RATIO [LOINC: 1759-0] 1.1 05/16/2024 07:30:00 AM [facility] ([facility])  Address: [address] [place] [address]  tel:   FASTING PATIENT CMP ANSWER [LOINC: 58048-5] Yes 05/16/2024 07:30:00 AM [facility] ([facility])  Address: [address] [place] [address]  tel:   PRO-BETA NATRIURETIC PEPTIDE [LOINC: 06613-7] 2531 pg/mL [High] 04/16/2024 07:38:00 AM [facility] ([facility])  Address: [address] [place] [address]  tel:   Nuclear PET 1.Stress EKG is non-diagnostic. 2.The heart rate recovery is normal. 1.This is an abnormal perfusion study. Study is consistent with mostly scar tissue with minimal ischemia. 2.This scan is suggestive of moderate risk for future cardiovascular events. 3.Medium partially reversible perfusion abnormality of moderate intensity in the anterior region. 4.The left ventricular cavity is noted to be normal on the stress studies. The stress left ventricular ejection fraction was calculated to be 66% and left ventricular global function is normal. The rest left ventricular cavity is noted to be normal. The rest left ventricular ejection fraction was calculated to be 64% and rest left ventricular global function is normal. 5.When compared to the resting ejection fraction (64%), the stress ejection fraction (66%) has increased. 6.Transient ischemic dilatation is present and has been described as a marker for high risk coronary artery disease. It is most likely due to sub-endocardial ischemia, it has also been described in microvascular disease as well as cardiac deconditioning. 7.The study quality is good. 12/14/2021 3:30:00 PM Samson Hyde     REVIEW OF SYSTEMS    REVIEW OF  SYSTEMS  Header Details   Cardiovascular Edema  No history of Chest pain, SANDERS, Palpitations, Syncope, PND, Orthopnea, Claudication     SOCIAL HISTORY    SOCIAL HISTORY  Social History Element Description Effective Dates   Smoking status Former smoker -     FUNCTIONAL STATUS    No data available    MEDICAL EQUIPMENT    No data available    Goals Sections    No data available    REASON FOR REFERRAL               Health Concerns Section    No data available    COGNITIVE/MENTAL STATUS    No data available    Patient Demographics    Patient Demographics  Patient Address Patient Name Communication   [address], [address]  [place] [address] Lisandro Harley [phone] (Home)     Patient Demographics  Language Race / Ethnicity Marital Status   English - Spoken (Preferred) Unknown / Unknown      Document Information    Primary Care Provider Other Service Providers Document Coverage Dates   Lenin Villa  NPI: [id number]  [phone] (Work)  [address] [place] [address]  [place] [address]  Interpreting Physicians  [facility]  [phone] (Work)  [address] [place] [address] Jojo Watson  NPI: [id number]  [phone] (Work)  [address] [place] [address]  [place] [address]  Nurses     June Ayers  NPI: [id number]  [phone] (Work)  [address] [place] [address]  [place] [address]  Nurses Jul. 15, 2024July 15, 2024      Organization   [facility]  [phone] (Work)  [address] [place] [address] [id number] [place] [address]     Encounter Providers Encounter Date    Jul. 15, 2024July 15, 2024     Legal Authenticator    Lenin Villa  NPI: [id number]  [phone] (Work)  [address] [place] [address]  [place] [address]

## 2024-12-25 LAB
ANION GAP SERPL CALC-SCNC: 9 MMOL/L (ref 0–18)
BASOPHILS # BLD AUTO: 0.03 X10(3) UL (ref 0–0.2)
BASOPHILS NFR BLD AUTO: 0.4 %
BILIRUB UR QL STRIP.AUTO: NEGATIVE
BUN BLD-MCNC: 31 MG/DL (ref 9–23)
CALCIUM BLD-MCNC: 8.6 MG/DL (ref 8.7–10.4)
CHLORIDE SERPL-SCNC: 106 MMOL/L (ref 98–112)
CLARITY UR REFRACT.AUTO: CLEAR
CO2 SERPL-SCNC: 25 MMOL/L (ref 21–32)
CREAT BLD-MCNC: 1.91 MG/DL
DEPRECATED HBV CORE AB SER IA-ACNC: 92 NG/ML
EGFRCR SERPLBLD CKD-EPI 2021: 34 ML/MIN/1.73M2 (ref 60–?)
EOSINOPHIL # BLD AUTO: 0.15 X10(3) UL (ref 0–0.7)
EOSINOPHIL NFR BLD AUTO: 2.1 %
ERYTHROCYTE [DISTWIDTH] IN BLOOD BY AUTOMATED COUNT: 16.7 %
FOLATE SERPL-MCNC: 15.1 NG/ML (ref 5.4–?)
GLUCOSE BLD-MCNC: 162 MG/DL (ref 70–99)
GLUCOSE BLD-MCNC: 165 MG/DL (ref 70–99)
GLUCOSE BLD-MCNC: 171 MG/DL (ref 70–99)
GLUCOSE BLD-MCNC: 191 MG/DL (ref 70–99)
GLUCOSE BLD-MCNC: 243 MG/DL (ref 70–99)
GLUCOSE UR STRIP.AUTO-MCNC: >1000 MG/DL
HCT VFR BLD AUTO: 34.4 %
HGB BLD-MCNC: 10.5 G/DL
HGB RETIC QN AUTO: 27.7 PG (ref 28.2–36.6)
IMM GRANULOCYTES # BLD AUTO: 0.03 X10(3) UL (ref 0–1)
IMM GRANULOCYTES NFR BLD: 0.4 %
IMM RETICS NFR: 0.12 RATIO (ref 0.1–0.3)
IRON SATN MFR SERPL: 6 %
IRON SERPL-MCNC: 16 UG/DL
KETONES UR STRIP.AUTO-MCNC: NEGATIVE MG/DL
LEUKOCYTE ESTERASE UR QL STRIP.AUTO: 25
LYMPHOCYTES # BLD AUTO: 0.56 X10(3) UL (ref 1–4)
LYMPHOCYTES NFR BLD AUTO: 7.7 %
MAGNESIUM SERPL-MCNC: 2 MG/DL (ref 1.6–2.6)
MCH RBC QN AUTO: 26.1 PG (ref 26–34)
MCHC RBC AUTO-ENTMCNC: 30.5 G/DL (ref 31–37)
MCV RBC AUTO: 85.6 FL
MONOCYTES # BLD AUTO: 0.96 X10(3) UL (ref 0.1–1)
MONOCYTES NFR BLD AUTO: 13.2 %
NEUTROPHILS # BLD AUTO: 5.56 X10 (3) UL (ref 1.5–7.7)
NEUTROPHILS # BLD AUTO: 5.56 X10(3) UL (ref 1.5–7.7)
NEUTROPHILS NFR BLD AUTO: 76.2 %
NITRITE UR QL STRIP.AUTO: NEGATIVE
OSMOLALITY SERPL CALC.SUM OF ELEC: 305 MOSM/KG (ref 275–295)
PH UR STRIP.AUTO: 6 [PH] (ref 5–8)
PLATELET # BLD AUTO: 189 10(3)UL (ref 150–450)
POTASSIUM SERPL-SCNC: 3.4 MMOL/L (ref 3.5–5.1)
PROT UR STRIP.AUTO-MCNC: NEGATIVE MG/DL
RBC # BLD AUTO: 4.02 X10(6)UL
RBC UR QL AUTO: NEGATIVE
RETICS # AUTO: 66.4 X10(3) UL (ref 22.5–147.5)
RETICS/RBC NFR AUTO: 1.7 %
SODIUM SERPL-SCNC: 140 MMOL/L (ref 136–145)
SP GR UR STRIP.AUTO: 1.01 (ref 1–1.03)
TOTAL IRON BINDING CAPACITY: 259 UG/DL (ref 250–425)
TRANSFERRIN SERPL-MCNC: 197 MG/DL (ref 215–365)
UROBILINOGEN UR STRIP.AUTO-MCNC: NORMAL MG/DL
VIT B12 SERPL-MCNC: 269 PG/ML (ref 211–911)
WBC # BLD AUTO: 7.3 X10(3) UL (ref 4–11)

## 2024-12-25 PROCEDURE — 99232 SBSQ HOSP IP/OBS MODERATE 35: CPT | Performed by: HOSPITALIST

## 2024-12-25 RX ORDER — INSULIN DEGLUDEC 100 U/ML
5 INJECTION, SOLUTION SUBCUTANEOUS DAILY
Status: DISCONTINUED | OUTPATIENT
Start: 2024-12-25 | End: 2024-12-28

## 2024-12-25 RX ORDER — POTASSIUM CHLORIDE 1500 MG/1
40 TABLET, EXTENDED RELEASE ORAL ONCE
Status: COMPLETED | OUTPATIENT
Start: 2024-12-25 | End: 2024-12-25

## 2024-12-25 NOTE — PLAN OF CARE
Assumed care of patient at change of shift, resting in bed. AO x4. Avpaced on tele monitor. O2 sat adequate on 6L nasal cannula. Plan of care updated. Bed locked, in lowest position, with alarm on. Call light and personal items in reach. All needs met.    Problem: CARDIOVASCULAR - ADULT  Goal: Maintains optimal cardiac output and hemodynamic stability  Description: INTERVENTIONS:  - Monitor vital signs, rhythm, and trends  - Monitor for bleeding, hypotension and signs of decreased cardiac output  - Evaluate effectiveness of vasoactive medications to optimize hemodynamic stability  - Monitor arterial and/or venous puncture sites for bleeding and/or hematoma  - Assess quality of pulses, skin color and temperature  - Assess for signs of decreased coronary artery perfusion - ex. Angina  - Evaluate fluid balance, assess for edema, trend weights  Outcome: Progressing  Goal: Absence of cardiac arrhythmias or at baseline  Description: INTERVENTIONS:  - Continuous cardiac monitoring, monitor vital signs, obtain 12 lead EKG if indicated  - Evaluate effectiveness of antiarrhythmic and heart rate control medications as ordered  - Initiate emergency measures for life threatening arrhythmias  - Monitor electrolytes and administer replacement therapy as ordered  Outcome: Progressing     Problem: RESPIRATORY - ADULT  Goal: Achieves optimal ventilation and oxygenation  Description: INTERVENTIONS:  - Assess for changes in respiratory status  - Assess for changes in mentation and behavior  - Position to facilitate oxygenation and minimize respiratory effort  - Oxygen supplementation based on oxygen saturation or ABGs  - Provide Smoking Cessation handout, if applicable  - Encourage broncho-pulmonary hygiene including cough, deep breathe, Incentive Spirometry  - Assess the need for suctioning and perform as needed  - Assess and instruct to report SOB or any respiratory difficulty  - Respiratory Therapy support as indicated  -  Manage/alleviate anxiety  - Monitor for signs/symptoms of CO2 retention  Outcome: Progressing     Problem: Patient/Family Goals  Goal: Patient/Family Long Term Goal  Description: Patient's Long Term Goal: stay healthy at home  Interventions:  -take medications as prescribed  -attend follow up appointments as recommended  -diet and exercise as advised  -report new or worsening symptoms to physician  - See additional Care Plan goals for specific interventions  Outcome: Progressing  Goal: Patient/Family Short Term Goal  Description: Patient's Short Term Goal: improvement in shortness of breath  Interventions:   -IV lasix  -wean O2  -daily weights/strict intake and ouput  - See additional Care Plan goals for specific interventions  Outcome: Progressing

## 2024-12-25 NOTE — PROGRESS NOTES
Cardiology Progress Note        Lisandro Harley Patient Status:  Observation    1940 MRN [id number]   [facility] 2NE-A Attending Cipriano Nieves MD   Hosp Day # 0 PCP Braydon Barrett MD     Subjective:  Remains on 5-6 L NC.    Medications:   potassium chloride  40 mEq Oral Once    furosemide  40 mg Intravenous BID (Diuretic)    atorvastatin  10 mg Oral Nightly    apixaban  2.5 mg Oral BID    metoprolol tartrate  100 mg Oral BID    tamsulosin  0.4 mg Oral Daily    insulin aspart  1-10 Units Subcutaneous TID AC and HS    umeclidinium bromide  1 puff Inhalation Daily       Continuous Infusions:        Allergies:  Allergies[1]      Intake/Output:    Intake/Output Summary (Last 24 hours) at 2024 0811  Last data filed at 2024 0505  Gross per 24 hour   Intake 600 ml   Output 701 ml   Net -101 ml           Last 3 Weights   24 0505 205 lb 7.5 oz (93.2 kg)   24 1809 198 lb 6.6 oz (90 kg)   24 1715 198 lb 6.6 oz (90 kg)   24 1423 200 lb (90.7 kg)   24 1009 195 lb (88.5 kg)   10/29/24 0308 195 lb (88.5 kg)            Physical Exam:    Temp:  [96.6 °F (35.9 °C)-98.5 °F (36.9 °C)] 98.1 °F (36.7 °C)  Pulse:  [59-67] 63  Resp:  [20-32] 20  BP: (106-135)/(55-68) 113/55  SpO2:  [88 %-97 %] 90 %    General: No apparent distress  HEENT: No focal deficits.  Neck: supple. JVP normal  Cardiac: Regular rhythm, S1, S2 normal,  rub or gallop.  No murmur.  Lungs: Coarse  Abdomen: Soft, non-tender.   Extremities: No edema  Neurologic: no focal deficits  Skin: Warm and dry.     Telemetry: paced    EKG:      Echo:      Cardiac Cath:      Labs:  HEM:  Recent Labs   Lab 24  1427 24  0638   WBC 9.7 7.3   HGB 11.5* 10.5*   .0 189.0       Chem:  Recent Labs   Lab 24  1427 24  0638    140   K 3.4* 3.4*    106   CO2 25.0 25.0   BUN 35* 31*   CREATSERUM 1.93* 1.91*   CA 8.5* 8.6*   MG  --  2.0   * 243*       Recent Labs   Lab  12/24/24  1427   ALT 13   AST 16   ALB 4.1       No results for input(s): \"TROP\", \"CK\" in the last 168 hours.    No results for input(s): \"PTP\", \"INR\" in the last 168 hours.              Impression:  Acute on chronic hypoxic resp failure - due to COPD/CHF.  COPD with exacerbation  Acute on chronic diastolic CHF  CRI, at baseline  Diabetes           Plan:  Weight in neutral, add dose of diuril this morning.  Daily BMP's.        Willard Cramer MD  12/25/2024  8:11 AM  L3         [1]   Allergies  Allergen Reactions    Tussigon [Hycodan] NAUSEA ONLY

## 2024-12-25 NOTE — PROGRESS NOTES
Notified by RN pt had an asymptomatic run of NSVT. Potassium was replaced earlier in the day. Will monitor for now. Labs for AM ordered. Will order magnesium level and order additional potassium replacement if NSVT increases in frequency or duration of episodes.

## 2024-12-25 NOTE — PLAN OF CARE
Patient AOX4. O2 sat > 90% on 5-6L NC (wears 2L @ baseline). AV paced on tele. 12 beat NSVT tonight , [facility] APN made aware, no new orders. VSS. Denies pain. Dyspnea on exertion. IV Lasix BID as ordered. PT/OT to see. Small superficial ulcer to R buttock, site cleansed and dressed. Patient updated on plan of care.     Problem: CARDIOVASCULAR - ADULT  Goal: Maintains optimal cardiac output and hemodynamic stability  Description: INTERVENTIONS:  - Monitor vital signs, rhythm, and trends  - Monitor for bleeding, hypotension and signs of decreased cardiac output  - Evaluate effectiveness of vasoactive medications to optimize hemodynamic stability  - Monitor arterial and/or venous puncture sites for bleeding and/or hematoma  - Assess quality of pulses, skin color and temperature  - Assess for signs of decreased coronary artery perfusion - ex. Angina  - Evaluate fluid balance, assess for edema, trend weights  Outcome: Progressing  Goal: Absence of cardiac arrhythmias or at baseline  Description: INTERVENTIONS:  - Continuous cardiac monitoring, monitor vital signs, obtain 12 lead EKG if indicated  - Evaluate effectiveness of antiarrhythmic and heart rate control medications as ordered  - Initiate emergency measures for life threatening arrhythmias  - Monitor electrolytes and administer replacement therapy as ordered  Outcome: Progressing     Problem: RESPIRATORY - ADULT  Goal: Achieves optimal ventilation and oxygenation  Description: INTERVENTIONS:  - Assess for changes in respiratory status  - Assess for changes in mentation and behavior  - Position to facilitate oxygenation and minimize respiratory effort  - Oxygen supplementation based on oxygen saturation or ABGs  - Provide Smoking Cessation handout, if applicable  - Encourage broncho-pulmonary hygiene including cough, deep breathe, Incentive Spirometry  - Assess the need for suctioning and perform as needed  - Assess and instruct to report SOB or any respiratory  difficulty  - Respiratory Therapy support as indicated  - Manage/alleviate anxiety  - Monitor for signs/symptoms of CO2 retention  Outcome: Progressing     Problem: Patient/Family Goals  Goal: Patient/Family Long Term Goal  Description: Patient's Long Term Goal: stay healthy at home    Interventions:  -take medications as prescribed  -attend follow up appointments as recommended  -diet and exercise as advised  -report new or worsening symptoms to physician      - See additional Care Plan goals for specific interventions  Outcome: Progressing  Goal: Patient/Family Short Term Goal  Description: Patient's Short Term Goal: improvement in shortness of breath    Interventions:   -IV lasix  -wean O2  -daily weights/strict intake and ouput  - See additional Care Plan goals for specific interventions  Outcome: Progressing

## 2024-12-25 NOTE — PROGRESS NOTES
[facility]   part of [facility]     Hospitalist Progress Note     Lisandro Harley Patient Status:  Observation    1940 MRN [id number]   [facility] 2NE-A Attending Cipriano Nieves MD   Hosp Day # 0 PCP Braydon Barrett MD     Chief Complaint: dysnea     Subjective:     Patient feels better. Denies dyspnea    Objective:    Review of Systems:   ROS completed; pertinent positive and negatives stated in subjective.    Vital signs:  Temp:  [96.6 °F (35.9 °C)-98.5 °F (36.9 °C)] 98.2 °F (36.8 °C)  Pulse:  [59-67] 63  Resp:  [18-32] 18  BP: ()/(55-68) 91/63  SpO2:  [88 %-97 %] 96 %    Physical Exam:    General: No acute distress  Respiratory: dec AE,  no wheezes  Cardiovascular: S1, S2.  Abdomen: Soft  Neuro: No new focal deficits      Diagnostic Data:    Labs:  Recent Labs   Lab 24  1427 24  0638   WBC 9.7 7.3   HGB 11.5* 10.5*   MCV 83.6 85.6   .0 189.0       Recent Labs   Lab 24  1427 24  0638   * 243*   BUN 35* 31*   CREATSERUM 1.93* 1.91*   CA 8.5* 8.6*   ALB 4.1  --     140   K 3.4* 3.4*    106   CO2 25.0 25.0   ALKPHO 106  --    AST 16  --    ALT 13  --    BILT 1.1  --    TP 7.1  --        Estimated Glomerular Filtration Rate: 34.1 mL/min/1.73m2 (A) (by CKD-EPI based on SCr of 1.91 mg/dL (H)).     Recent Labs   Lab 24  1427   TROPHS 10       No results for input(s): \"PTP\", \"INR\" in the last 168 hours.           Imaging: Imaging data reviewed in Epic.    Medications:    furosemide  40 mg Intravenous BID (Diuretic)    atorvastatin  10 mg Oral Nightly    apixaban  2.5 mg Oral BID    metoprolol tartrate  100 mg Oral BID    tamsulosin  0.4 mg Oral Daily    insulin aspart  1-10 Units Subcutaneous TID AC and HS    umeclidinium bromide  1 puff Inhalation Daily       Assessment & Plan:     #Acute on chronic hypoxic resp failure  Cont. O2 and wean as able     #Acute HFpEF w/ moderate pHTN  Cont diuresis.  Diuril today    #NSVT  Cont.  BB  Replace lytes and monitor     #COPD  Had sig dec DLCO on last PFT  Sees [facility]  Cont.  Inhalers/nebs     #CKD IV  Monitor with diuresis     #A. Fib s/p PPM  Cont. Rate control  Cont. OAC     #HTN     #DM2  Insulin    #Anemia  Fe studies    Dispo: as above.  Pt feels well.  Diuril today.  Suspect he may be close to his baseline resp status.  No wheezes and denies dyspnea, does not appear to have AECOPD.  Diurese as tolerated.     Cipriano Nieves MD    Supplementary Documentation:     Quality:    DVT Mechanical Prophylaxis:     Early ambuation  DVT Pharmacologic Prophylaxis   Medication    apixaban (Eliquis) tab 2.5 mg                Code Status: Full Code  St: External urinary catheter in place  St Duration (in days):   Central line:    XIAO: 12/28/2024      Discharge is dependent on: clinical stability  At this point Mr. Harley is expected to be discharge to: home    The 21st Century Cures Act makes medical notes like these available to patients in the interest of transparency. Please be advised this is a medical document. Medical documents are intended to carry relevant information, facts as evident, and the clinical opinion of the practitioner. The medical note is intended as peer to peer communication and may appear blunt or direct. It is written in medical language and may contain abbreviations or verbiage that are unfamiliar.

## 2024-12-25 NOTE — PROGRESS NOTES
12/24/24 1951   BiPAP   $ RT Standby Charge (per 15 min) 1  (pt is not sure if he wants to wear the cpap, he states he does not wear it all the time. will check on him tomorrow)

## 2024-12-25 NOTE — H&P
[facility]     Lisandro Harley Patient Status:  Observation    1940 MRN [id number]   Location [facility] 2NE-A Attending Cipriano Nieves MD   Hosp Day # 0 PCP Braydon Barrett MD     Chief Complaint:   Chief Complaint   Patient presents with    Difficulty Breathing       Subjective:    History of Present Illness:     Lisandro Harley is a 84 year old male with a past medical history of CHF, CAD, hypertension, diabetes, COPD hyperlipidemia.  The patient states that he is normally on about 3 to 4 L of oxygen at home.  Over the past couple days he has had increasing dyspnea and has increased his oxygen to about 8 L.  He states that he has felt more lower extremity edema and short of breath.    History/Other:    Past Medical History:  Past Medical History:    Arrhythmia    Arthritis    Back problem    spinal stenosis    Cancer (HCC)    prostate/radiation treatments only    COPD (chronic obstructive pulmonary disease) (HCC)    Coronary atherosclerosis of unspecified type of vessel, native or graft    s/p 1 cardiac stent    Diabetes mellitus (HCC)    Diarrhea, unspecified    Disorder of liver    Easy bruising    Exposure to unspecified radiation    prostate    Frequent urination    Glaucoma    High blood pressure    High cholesterol    Irregular bowel habits    Leaking of urine    Leg swelling    Nausea vomiting and diarrhea    Osteoarthritis    Pacemaker    Renal disorder    Stage III-moderate    Rheumatoid arthritis (HCC)    Sleep apnea    cpap    Stented coronary artery    Type II or unspecified type diabetes mellitus without mention of complication, not stated as uncontrolled    Visual impairment    Wears glasses     Past Surgical History:   Past Surgical History:   Procedure Laterality Date    Cardiac pacemaker placement      medtronic    Colonoscopy N/A 2015    Procedure: COLONOSCOPY;  Surgeon: Yumiko Martin DO;  Location:  ENDOSCOPY    Hip replacement surgery  Left 06/26/2018    Hip replacement surgery Right     2000    Knee replacement surgery Right     after 2000    Laminectomy      no hardware    Skin surgery      Total hip replacement Right     Total knee replacement Right       Family History:   Family History   Problem Relation Age of Onset    Diabetes Neg     Heart Disorder Neg     Hypertension Neg      Social History:    reports that he quit smoking about 19 years ago. His smoking use included cigarettes. He started smoking about 60 years ago. He has a 60 pack-year smoking history. He has never used smokeless tobacco. He reports that he does not currently use alcohol. He reports that he does not use drugs.     Allergies: Allergies[1]    Medications:  Medications Ordered Prior to Encounter[2]    Review of Systems:   A comprehensive review of systems was completed.    Pertinent positives and negatives noted in the HPI.    Objective:   Physical Exam:    /68 (BP Location: Right arm)   Pulse 67   Temp 98.4 °F (36.9 °C) (Oral)   Resp 22   Ht 5' 6\" (1.676 m)   Wt 198 lb 6.6 oz (90 kg)   SpO2 (!) 88%   BMI 32.02 kg/m²   General: No acute distress, Alert  Respiratory: basilar rhonchi  Cardiovascular: S1, S2.   Abdomen: Soft, Non-tender, Non-distended, Positive bowel sounds  Neuro: No new focal deficits  Extremities: + LE edema      Results:    Labs:      Labs Last 24 Hours:  Recent Labs   Lab 12/24/24  1427   WBC 9.7   HGB 11.5*   MCV 83.6   .0       Recent Labs   Lab 12/24/24  1427   *   BUN 35*   CREATSERUM 1.93*   CA 8.5*   ALB 4.1      K 3.4*      CO2 25.0   ALKPHO 106   AST 16   ALT 13   BILT 1.1   TP 7.1       Estimated Creatinine Clearance: 25.7 mL/min (A) (based on SCr of 1.93 mg/dL (H)).    Recent Labs   Lab 12/24/24  1427   TROPHS 10       No results for input(s): \"PTP\", \"INR\" in the last 168 hours.    No results for input(s): \"TROP\", \"CK\" in the last 168 hours.      Imaging: Imaging data reviewed in Epic.    Assessment &  Plan:      #Acute on chronic hypoxic resp failure  Cont. O2 and wean as able    #Acute HFpEF w/ moderate pHTN  Diurese    #COPD  Had sig dec DLCO on last PFT  Sees [facility]  Add inhaler  PRN nebs  Steroids if not improving    #CKD IV  Monitor with diuresis    #A. Fib s/p PPM  Cont. Rate control  Cont. OAC    #HTN    #DM2  Insulin    Dispo: diurese.  Add inhalers.  PRN nebs. If not improving can add steroids though seems more CHF than COPD      All diagnosis' and recommendations discussed with patient and/or family in detail.      Plan of care discussed with ED physician      Cipriano Nieves MD    Supplementary Documentation:     The 21st Century Cures Act makes medical notes like these available to patients in the interest of transparency. Please be advised this is a medical document. Medical documents are intended to carry relevant information, facts as evident, and the clinical opinion of the practitioner. The medical note is intended as peer to peer communication and may appear blunt or direct. It is written in medical language and may contain abbreviations or verbiage that are unfamiliar.                                         [1]   Allergies  Allergen Reactions    Tussigon [Hycodan] NAUSEA ONLY   [2]   No current facility-administered medications on file prior to encounter.     Current Outpatient Medications on File Prior to Encounter   Medication Sig Dispense Refill    torsemide 20 MG Oral Tab Take 2 tablets (40 mg total) by mouth 2 (two) times daily.      atorvastatin 10 MG Oral Tab Take 1 tablet (10 mg total) by mouth nightly. 90 tablet 3    dilTIAZem HCl ER Beads 120 MG Oral Capsule SR 24 Hr Take 1 capsule (120 mg total) by mouth daily.      ELIQUIS 2.5 MG Oral Tab Take 1 tablet (2.5 mg total) by mouth 2 (two) times daily.      metoprolol tartrate 100 MG Oral Tab TAKE 1 TABLET(100 MG) BY MOUTH TWICE DAILY 180 tablet 0    JARDIANCE 10 MG Oral Tab Take 1 tablet (10 mg total) by mouth daily.      tamsulosin  0.4 MG Oral Cap Take 1 capsule (0.4 mg total) by mouth daily. 90 capsule 3    Potassium Chloride ER 10 MEQ Oral Tab CR Take 1 tablet (10 mEq total) by mouth daily.

## 2024-12-26 ENCOUNTER — APPOINTMENT (OUTPATIENT)
Dept: GENERAL RADIOLOGY | Facility: HOSPITAL | Age: 84
End: 2024-12-26
Attending: HOSPITALIST
Payer: MEDICARE

## 2024-12-26 LAB
ANION GAP SERPL CALC-SCNC: 9 MMOL/L (ref 0–18)
ATRIAL RATE: 70 BPM
BUN BLD-MCNC: 40 MG/DL (ref 9–23)
CALCIUM BLD-MCNC: 8.7 MG/DL (ref 8.7–10.4)
CHLORIDE SERPL-SCNC: 104 MMOL/L (ref 98–112)
CO2 SERPL-SCNC: 26 MMOL/L (ref 21–32)
CREAT BLD-MCNC: 1.87 MG/DL
EGFRCR SERPLBLD CKD-EPI 2021: 35 ML/MIN/1.73M2 (ref 60–?)
GLUCOSE BLD-MCNC: 148 MG/DL (ref 70–99)
GLUCOSE BLD-MCNC: 164 MG/DL (ref 70–99)
GLUCOSE BLD-MCNC: 198 MG/DL (ref 70–99)
GLUCOSE BLD-MCNC: 279 MG/DL (ref 70–99)
GLUCOSE BLD-MCNC: 365 MG/DL (ref 70–99)
OSMOLALITY SERPL CALC.SUM OF ELEC: 301 MOSM/KG (ref 275–295)
P AXIS: 94 DEGREES
P-R INTERVAL: 178 MS
POTASSIUM SERPL-SCNC: 3.3 MMOL/L (ref 3.5–5.1)
POTASSIUM SERPL-SCNC: 3.3 MMOL/L (ref 3.5–5.1)
PROCALCITONIN SERPL-MCNC: 0.16 NG/ML (ref ?–0.05)
Q-T INTERVAL: 496 MS
QRS DURATION: 186 MS
QTC CALCULATION (BEZET): 511 MS
R AXIS: -65 DEGREES
SODIUM SERPL-SCNC: 139 MMOL/L (ref 136–145)
T AXIS: 115 DEGREES
VENTRICULAR RATE: 64 BPM

## 2024-12-26 PROCEDURE — 99233 SBSQ HOSP IP/OBS HIGH 50: CPT | Performed by: HOSPITALIST

## 2024-12-26 PROCEDURE — 99223 1ST HOSP IP/OBS HIGH 75: CPT | Performed by: INTERNAL MEDICINE

## 2024-12-26 PROCEDURE — 71045 X-RAY EXAM CHEST 1 VIEW: CPT | Performed by: HOSPITALIST

## 2024-12-26 RX ORDER — IPRATROPIUM BROMIDE AND ALBUTEROL SULFATE 2.5; .5 MG/3ML; MG/3ML
3 SOLUTION RESPIRATORY (INHALATION)
Status: DISCONTINUED | OUTPATIENT
Start: 2024-12-26 | End: 2024-12-28

## 2024-12-26 RX ORDER — METHYLPREDNISOLONE SODIUM SUCCINATE 40 MG/ML
40 INJECTION INTRAMUSCULAR; INTRAVENOUS EVERY 12 HOURS
Status: COMPLETED | OUTPATIENT
Start: 2024-12-26 | End: 2024-12-28

## 2024-12-26 RX ORDER — POTASSIUM CHLORIDE 1500 MG/1
40 TABLET, EXTENDED RELEASE ORAL ONCE
Status: COMPLETED | OUTPATIENT
Start: 2024-12-26 | End: 2024-12-26

## 2024-12-26 RX ORDER — FUROSEMIDE 10 MG/ML
80 INJECTION INTRAMUSCULAR; INTRAVENOUS
Status: DISCONTINUED | OUTPATIENT
Start: 2024-12-26 | End: 2024-12-28

## 2024-12-26 RX ORDER — PREDNISONE 20 MG/1
40 TABLET ORAL
Status: DISCONTINUED | OUTPATIENT
Start: 2024-12-26 | End: 2024-12-26

## 2024-12-26 NOTE — OCCUPATIONAL THERAPY NOTE
Attempted to see patient for OT evaluation, however patient declining session d/t \"too much effort\".  Educated Pt on risks of immobility and prolonged bedrest, and strongly encouraged Pt to attempt transferring to chair or at least sitting EOB for few minutes, however Pt continued to refuse. RN notified. Will re-attempt next day as schedule permits.

## 2024-12-26 NOTE — PROGRESS NOTES
[facility]   part of [facility]    Advanced Heart Failure Progress Note    Lisandro Harley Patient Status:  Observation    1940 MRN [id number]   Location [facility] 2NE-A Attending Cipriano Nieves MD   Hosp Day # 0 PCP Braydon Barrett MD     Subjective:  This AM, patient feels well. States his SOB has improved compared to admission. Denies CP, syncope.     Objective:  /57 (BP Location: Right arm)   Pulse 64   Temp 98.6 °F (37 °C) (Oral)   Resp 20   Ht 5' 6\" (1.676 m)   Wt 201 lb 1 oz (91.2 kg)   SpO2 94%   BMI 32.45 kg/m²     Temp (24hrs), Av.4 °F (36.9 °C), Min:98 °F (36.7 °C), Max:98.6 °F (37 °C)      Intake/Output:    Intake/Output Summary (Last 24 hours) at 2024 0856  Last data filed at 2024 0835  Gross per 24 hour   Intake 820 ml   Output 2800 ml   Net -1980 ml       Wt Readings from Last 3 Encounters:   24 201 lb 1 oz (91.2 kg)   24 195 lb (88.5 kg)   10/29/24 195 lb (88.5 kg)       Allergies:  Allergies[1]    Physical Exam:  Blood pressure 113/57, pulse 64, temperature 98.6 °F (37 °C), temperature source Oral, resp. rate 20, height 5' 6\" (1.676 m), weight 201 lb 1 oz (91.2 kg), SpO2 94%.    General: Alert and oriented in no apparent distress. Chronic oxygen therapy.   HEENT: No focal deficits.  Neck: normal ROM, mid neck JVD present.   Cardiac: Regular rate and rhythm, S1, S2 normal, no  rub or gallop.  Lungs: normal effort, no audible wheezing present, coarse breath sounds.   Abdomen: Soft, non-tender.   Extremities: 1+ pitting edema present.  Peripheral pulses are 2+.  Neurologic: Alert and oriented, normal affect.  Skin: Warm and dry.     Laboratory/Data:      Labs:     Lab Results   Component Value Date    INR 2.10 (H) 2021    INR 1.83 (H) 2021    INR 2.2 (A) 2021     No results for input(s): \"BNPML\" in the last 168 hours.  BUN (mg/dL)   Date Value   2024 40 (H)   2024 31 (H)   2024 35 (H)     Blood Urea  Nitrogen (mg/dL)   Date Value   06/08/2010 19   05/14/2008 18   11/27/2007 16     Creatinine, Serum (mg/dL)   Date Value   06/08/2010 1.25   05/14/2008 1.10   11/27/2007 1.1     Creatinine (mg/dL)   Date Value   12/26/2024 1.87 (H)   12/25/2024 1.91 (H)   12/24/2024 1.93 (H)       Medications:  Current Facility-Administered Medications   Medication Dose Route Frequency    potassium chloride (Klor-Con M20) tab 40 mEq  40 mEq Oral Once    furosemide (Lasix) 10 mg/mL injection 80 mg  80 mg Intravenous BID (Diuretic)    insulin degludec (Tresiba) 100 units/mL flextouch 5 Units  5 Units Subcutaneous Daily    sodium ferric gluconate (Ferrlecit) 125 mg in sodium chloride 0.9% 100mL IVPB premix  125 mg Intravenous Daily    atorvastatin (Lipitor) tab 10 mg  10 mg Oral Nightly    apixaban (Eliquis) tab 2.5 mg  2.5 mg Oral BID    metoprolol tartrate (Lopressor) tab 100 mg  100 mg Oral BID    tamsulosin (Flomax) cap 0.4 mg  0.4 mg Oral Daily    acetaminophen (Tylenol Extra Strength) tab 1,000 mg  1,000 mg Oral Q4H PRN    melatonin tab 3 mg  3 mg Oral Nightly PRN    glycerin-hypromellose- (Artificial Tears) 0.2-0.2-1 % ophthalmic solution 1 drop  1 drop Both Eyes QID PRN    sodium chloride (Saline Mist) 0.65 % nasal solution 1 spray  1 spray Each Nare Q3H PRN    ondansetron (Zofran) 4 MG/2ML injection 4 mg  4 mg Intravenous Q6H PRN    metoclopramide (Reglan) 5 mg/mL injection 5 mg  5 mg Intravenous Q8H PRN    polyethylene glycol (PEG 3350) (Miralax) 17 g oral packet 17 g  17 g Oral Daily PRN    sennosides (Senokot) tab 17.2 mg  17.2 mg Oral Nightly PRN    bisacodyl (Dulcolax) 10 MG rectal suppository 10 mg  10 mg Rectal Daily PRN    glucose (Dex4) 15 GM/59ML oral liquid 15 g  15 g Oral Q15 Min PRN    Or    glucose (Glutose) 40% oral gel 15 g  15 g Oral Q15 Min PRN    Or    glucose-vitamin C (Dex-4) chewable tab 4 tablet  4 tablet Oral Q15 Min PRN    Or    dextrose 50% injection 50 mL  50 mL Intravenous Q15 Min PRN    Or     glucose (Dex4) 15 GM/59ML oral liquid 30 g  30 g Oral Q15 Min PRN    Or    glucose (Glutose) 40% oral gel 30 g  30 g Oral Q15 Min PRN    Or    glucose-vitamin C (Dex-4) chewable tab 8 tablet  8 tablet Oral Q15 Min PRN    insulin aspart (NovoLOG) 100 Units/mL FlexPen 1-10 Units  1-10 Units Subcutaneous TID AC and HS    ipratropium-albuterol (Duoneb) 0.5-2.5 (3) MG/3ML inhalation solution 3 mL  3 mL Nebulization Q4H PRN    umeclidinium bromide (Incruse Ellipta) 62.5 MCG/ACT inhaler 1 puff  1 puff Inhalation Daily       Assessment and Plan:  Patient Active Problem List   Diagnosis    Essential hypertension    Lymphocytic colitis    Coronary atherosclerosis    NII (obstructive sleep apnea)    Spinal stenosis of lumbar region    Atrial fibrillation (HCC)    Mitral valve disorder    Cardiac pacemaker in situ    Hyperlipidemia    Acquired spondylolisthesis    Thrombocytopenia (HCC)    Alcoholic cirrhosis of liver without ascites (HCC)    h/o prostate cancer s/p radiation treatment    Primary pulmonary hypertension (HCC)    Type 2 diabetes mellitus with diabetic chronic kidney disease (HCC)    Pulmonary emphysema (HCC)    Hypoxemia associated with sleep    Nausea vomiting and diarrhea    Elevated bilirubin    Calculus of gallbladder with acute cholecystitis without obstruction    Pulmonary hypertension (HCC)    Diabetes mellitus type 2 in nonobese (HCC)    Acute on chronic congestive heart failure, unspecified heart failure type (HCC)    Chronic kidney disease, unspecified CKD stage     Acute on Chronic HFpEF   - per history, improving with IV diuresis  - continue IV diuresis today, hopeful for transition to oral diuretics tomorrow  - would ideally optimize HFpEF management with MRA and SGLT 2 ideally but limited by CKD   - trend labs, monitor I's / O's, daily weights.     Acute on chronic hypoxic respiratory failure   - secondary to HFpEF as above with mild COPD from possible viral URI   - wean to baseline oxygen  therapy, treat underlying disease.     COPD   - longstanding history, follows pulmonary   - appreciate pulmonary management.     CKD4   - per history, diuresis, trend labs daily.     T2DM   - per history, primary management.     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 Appreciate pulmonary input  - oxygen therapy.     Reinaldo Narayan MD   Advanced Heart Failure and Transplant Cardiology   [facility] ([facility])     L3       [1]   Allergies  Allergen Reactions    Tussigon [Hycodan] NAUSEA ONLY

## 2024-12-26 NOTE — PAYOR COMM NOTE
INPATIENT 12/26    ADMISSION REVIEW     Payor: SAKINA CAMPUZANO O  Subscriber #:  [id number]  Authorization Number: [id number]    Admit date: 12/26/24  Admit time: 11:15 AM       REVIEW DOCUMENTATION:     ED Provider Notes        ED Provider Notes signed by Leonid Velez MD at 12/24/2024  3:22 PM       Author: Leonid Velez MD Service: -- Author Type: Physician    Filed: 12/24/2024  3:22 PM Date of Service: 12/24/2024  2:50 PM Status: Signed    : Leonid Velez MD (Physician)           Patient Seen in: [facility]      History     Chief Complaint   Patient presents with    Difficulty Breathing     Stated Complaint:     Subjective:   HPI      84-year-old male with history including hypertension, diabetes, atrial fibrillation for which she is on Eliquis, CAD, COPD.  He is on continuous home oxygen, he states typically at 2 L although he often needs more than that.  He has been feeling more short of breath last few days and especially worse in the last 24 hours.  He states even at rest he feels quite short of breath and even worse with exertion.  Has some discomfort in the right mid and lower chest has been there for about a day as well.  No productive cough and no fevers.  Family has noticed that his baseline leg swelling is worse over the last 2 days.    Objective:     Past Medical History:    Arrhythmia    Arthritis    Back problem    spinal stenosis    Cancer (HCC)    prostate/radiation treatments only    COPD (chronic obstructive pulmonary disease) (HCC)    Coronary atherosclerosis of unspecified type of vessel, native or graft    s/p 1 cardiac stent    Diabetes mellitus (HCC)    Diarrhea, unspecified    Disorder of liver    Easy bruising    Exposure to unspecified radiation    prostate    Frequent urination    Glaucoma    High blood pressure    High cholesterol    Irregular bowel habits    Leaking of urine    Leg swelling    Nausea vomiting and diarrhea    Osteoarthritis    Pacemaker     Renal disorder    Stage III-moderate    Rheumatoid arthritis (HCC)    Sleep apnea    cpap    Stented coronary artery    Type II or unspecified type diabetes mellitus without mention of complication, not stated as uncontrolled    Visual impairment    Wears glasses              Past Surgical History:   Procedure Laterality Date    Cardiac pacemaker placement      medtronic    Colonoscopy N/A 2015    Procedure: COLONOSCOPY;  Surgeon: Yumiko Martin DO;  Location:  ENDOSCOPY    Hip replacement surgery Left 2018    Hip replacement surgery Right     2000    Knee replacement surgery Right     after     Laminectomy      no hardware    Skin surgery      Total hip replacement Right     Total knee replacement Right                 Social History     Socioeconomic History    Marital status:    Tobacco Use    Smoking status: Former     Current packs/day: 0.00     Average packs/day: 1.5 packs/day for 40.0 years (60.0 ttl pk-yrs)     Types: Cigarettes     Start date: 1965     Quit date: 2005     Years since quittin.9    Smokeless tobacco: Never   Vaping Use    Vaping status: Never Used   Substance and Sexual Activity    Alcohol use: Not Currently     Comment: rarely, 1 glass wine/month    Drug use: No   Other Topics Concern    Caffeine Concern Yes     Comment: decafe coffee 2 a day    Exercise No    Seat Belt Yes     Service No    Sleep Concern No     Social Drivers of Health     Financial Resource Strain: Low Risk  (2021)    [facility]    Overall Financial Resource Strain (CARDIA)     Difficulty of Paying Living Expenses: Not very hard   Food Insecurity: No Food Insecurity (10/29/2024)    Food Insecurity     Food Insecurity: Never true   Transportation Needs: No Transportation Needs (10/29/2024)    Transportation Needs     Lack of Transportation: No    Received from [facility], [facility]  [place]    Social Connections   Housing Stability: Low Risk  (10/29/2024)    Housing Stability     Housing Instability: No                  Physical Exam     ED Triage Vitals [12/24/24 1427]   /65   Pulse 67   Resp 26   Temp 96.6 °F (35.9 °C)   Temp src Temporal   SpO2 97 %   O2 Device None (Room air)       Current Vitals:   Vital Signs  BP: 123/57  Pulse: 59  Resp: (!) 32  Temp: 96.6 °F (35.9 °C)  Temp src: Temporal  MAP (mmHg): 76    Oxygen Therapy  SpO2: 94 %  O2 Device: Nasal cannula  O2 Flow Rate (L/min): 4 L/min        Physical Exam  Vitals and nursing note reviewed.   Constitutional:       Appearance: He is well-developed.   HENT:      Head: Normocephalic and atraumatic.   Eyes:      Conjunctiva/sclera: Conjunctivae normal.      Pupils: Pupils are equal, round, and reactive to light.   Cardiovascular:      Rate and Rhythm: Normal rate and regular rhythm.      Heart sounds: Normal heart sounds.   Pulmonary:      Effort: Pulmonary effort is normal.      Breath sounds: Normal breath sounds.      Comments: Mild conversational dyspnea with tachypnea.  Diffusely diminished breath sounds bilaterally, more so in the bases.  No wheezing.  Abdominal:      General: Bowel sounds are normal.      Palpations: Abdomen is soft.   Musculoskeletal:         General: Normal range of motion.      Cervical back: Normal range of motion and neck supple.      Comments: 1-2+ edema bilateral lower extremities.  Left is larger than right which per family is baseline.   Skin:     General: Skin is warm and dry.   Neurological:      Mental Status: He is alert and oriented to person, place, and time.             ED Course     Labs Reviewed   CBC WITH DIFFERENTIAL WITH PLATELET - Abnormal; Notable for the following components:       Result Value    HGB 11.5 (*)     HCT 36.2 (*)     Neutrophil Absolute Prelim 8.07 (*)     Neutrophil Absolute 8.07 (*)     Lymphocyte Absolute 0.45 (*)     Monocyte Absolute 1.06 (*)     All other components  within normal limits   COMP METABOLIC PANEL (14) - Abnormal; Notable for the following components:    Glucose 314 (*)     Potassium 3.4 (*)     BUN 35 (*)     Creatinine 1.93 (*)     Calcium, Total 8.5 (*)     Calculated Osmolality 304 (*)     eGFR-Cr 34 (*)     All other components within normal limits   PRO BETA NATRIURETIC PEPTIDE - Abnormal; Notable for the following components:    Pro-Beta Natriuretic Peptide 2,901 (*)     All other components within normal limits   TROPONIN I HIGH SENSITIVITY - Normal   SARS-COV-2/FLU A AND B/RSV BY PCR (GENEXPERT) - Normal    Narrative:     This test is intended for the qualitative detection and differentiation of SARS-CoV-2, influenza A, influenza B, and respiratory syncytial virus (RSV) viral RNA in nasopharyngeal or nares swabs from individuals suspected of respiratory viral infection consistent with COVID-19 by their healthcare provider. Signs and symptoms of respiratory viral infection due to SARS-CoV-2, influenza, and RSV can be similar.    Test performed using the Xpert Xpress SARS-CoV-2/FLU/RSV (real time RT-PCR)  assay on the [employer] instrument, [facility], [place] [address].   This test is being used under the Food and Drug Administration's Emergency Use Authorization.    The authorized Fact Sheet for Healthcare Providers for this assay is available upon request from the laboratory.   URINALYSIS WITH CULTURE REFLEX   RAINBOW DRAW LAVENDER   RAINBOW DRAW LIGHT GREEN   RAINBOW DRAW BLUE     EKG    Rate, intervals and axes as noted on EKG Report.  Rate: 64  Rhythm: Paced rhythm  Reading: Dual paced rhythm.  1 PVC.  No ST segment or T wave changes.                XR CHEST AP PORTABLE  (CPT=71045)    Result Date: 12/24/2024  PROCEDURE:  XR CHEST AP PORTABLE  (CPT=71045)  TECHNIQUE:  AP chest radiograph was obtained.  COMPARISON:  [facility] , XR, XR CHEST PA + LAT CHEST (CPT=71046), 4/29/2024, 1:49 PM.  INDICATIONS:  sob  PATIENT STATED HISTORY: (As transcribed by  Technologist)  Patient has chest pain on the right side of his chest.    FINDINGS:  Small bilateral pleural effusions.  Cardiomegaly with prominence of the central pulmonary vascularity, subtle increased interstitial markings lungs, and patchy ground-glass opacity in the lower lungs is concerning for congestive failure with mild pulmonary edema.  Clinical correlation recommended.  Calcified plaque in the thoracic aorta.  Degenerative changes the spine and shoulders.  Left chest pacemaker.            CONCLUSION:  Small bilateral pleural effusions.  Cardiomegaly with prominence of the central pulmonary vascularity, subtle increased interstitial markings lungs, and patchy ground-glass opacity in the lower lungs is concerning for congestive failure with  mild pulmonary edema.  Clinical correlation recommended.    LOCATION:  [facility]      Dictated by (CST): Sanya Lagos MD on 12/24/2024 at 2:59 PM     Finalized by (CST): Sanya Lagos MD on 12/24/2024 at 3:00 PM            MDM      84-year-old male with fairly extensive medical history as detailed above presenting for evaluation of increasing shortness of breath.  Has been going on for couple days but worse last 24 hours.  Typical oxygen requirement is 2 L but states he has been using more at home, up to 4.  Even at that here he is not hypoxic but he is conversational dyspnea with mild respiratory distress.  No wheezing, more diffusely diminished especially at the bases.  Family has noted increasing bilateral leg edema as well.  Differential includes volume overload, COPD exacerbation, flu, COVID.    Admission disposition: 12/24/2024  3:22 PM       Update at 3:20 PM.  Workup consistent with CHF exacerbation/volume overload.  Congestion on chest x-ray with bilateral effusions.  Elevated BNP.  Creatinine 1.9 is minimally elevated from his baseline.  Troponin is negative.  Lasix ordered.  Discussed results with the patient.  I think with his comorbidities and shortness  of breath with increased oxygen requirement he should be admitted for further diuresis.        Past Medical History-COPD, CHF, CAD, pacemaker, diabetes, hypertension    Differential diagnosis before testing included volume overload, pneumonia, flu, COVID    Co-morbidities that add to the complexity of management include: Oxygen dependent COPD    Testing ordered during this visit included labs, chest x-ray, EKG    Radiographic images  I personally reviewed the radiographs and my individual interpretation shows vascular congestion, bilateral effusions  I also reviewed the official reports that showed vascular congestion, bilateral effusions    External chart review showed reviewed prior outpatient lab results    History obtained by an independent source included from family at bedside     Discussion of management withhospitalist, cardiology        Medications Provided: Lasix      Disposition:    Admission  I have discussed with the patient the results of test, differential diagnosis, and treatment plan. They expressed clear understanding of these instructions and agrees to the plan provided.           Medical Decision Making      Disposition and Plan     Clinical Impression:  1. Acute on chronic congestive heart failure, unspecified heart failure type (HCC)    2. Chronic kidney disease, unspecified CKD stage         Disposition:  Admit  12/24/2024  3:22 pm    Follow-up:  No follow-up provider specified.        Medications Prescribed:  Current Discharge Medication List              Supplementary Documentation:         Hospital Problems       Present on Admission  Date Reviewed: 10/29/2024            ICD-10-CM Noted POA    * (Principal) Acute on chronic congestive heart failure, unspecified heart failure type (HCC) I50.9 12/24/2024 Unknown             Signed by Leonid Velez MD on 12/24/2024  3:22 PM         MEDICATIONS ADMINISTERED IN LAST 1 DAY:  apixaban (Eliquis) tab 2.5 mg       Date Action Dose Route User     12/26/2024 0906 Given 2.5 mg Oral Gali Hope RN    12/25/2024 2117 Given 2.5 mg Oral Henrietta Gary RN          atorvastatin (Lipitor) tab 10 mg       Date Action Dose Route User    12/25/2024 2117 Given 10 mg Oral Henrietta Gary RN          furosemide (Lasix) 10 mg/mL injection 40 mg       Date Action Dose Route User    12/25/2024 1800 Given 40 mg Intravenous Gali Hope RN          furosemide (Lasix) 10 mg/mL injection 80 mg       Date Action Dose Route User    12/26/2024 0906 Given 80 mg Intravenous Gali Hope RN          insulin aspart (NovoLOG) 100 Units/mL FlexPen 1-10 Units       Date Action Dose Route User    12/26/2024 1223 Given 2 Units Subcutaneous (Left Upper Arm) Jyoti Bryant RN    12/26/2024 0519 Given 1 Units Subcutaneous (Right Upper Arm) Henrietta Gary RN    12/25/2024 2142 Given 2 Units Subcutaneous (Right Upper Arm) Henrietta Gary RN    12/25/2024 1759 Given 1 Units Subcutaneous (Right Upper Abdomen) Gali Hope RN          insulin degludec (Tresiba) 100 units/mL flextouch 5 Units       Date Action Dose Route User    12/26/2024 0905 Given 5 Units Subcutaneous (Left Lower Abdomen) Gali Hope RN          ipratropium-albuterol (Duoneb) 0.5-2.5 (3) MG/3ML inhalation solution 3 mL       Date Action Dose Route User    12/26/2024 1522 Given 3 mL Nebulization Sherry Lindo RCP    12/26/2024 1121 Given 3 mL Nebulization June Hernandez RCP          methylPREDNISolone sodium succinate (Solu-MEDROL) injection 40 mg       Date Action Dose Route User    12/26/2024 1214 Given 40 mg Intravenous Gali Hope RN          metoprolol tartrate (Lopressor) tab 100 mg       Date Action Dose Route User    12/26/2024 0906 Given 100 mg Oral Gali Hope RN    12/25/2024 2117 Given 100 mg Oral Henrietta Gary RN          potassium chloride (Klor-Con M20) tab 40 mEq       Date Action Dose Route User    12/26/2024 0906 Given 40 mEq Oral Gali Hope, RN           sodium ferric gluconate (Ferrlecit) 125 mg in sodium chloride 0.9% 100mL IVPB premix       Date Action Dose Route User    2024 0914 New Bag 125 mg Intravenous Gali Hope, RN          tamsulosin (Flomax) cap 0.4 mg       Date Action Dose Route User    2024 0906 Given 0.4 mg Oral Gali Hope, RN            Vitals (last day)       Date/Time Temp Pulse Resp BP SpO2 Weight O2 Device O2 Flow Rate (L/min) [facility]    24 1526 -- -- -- -- -- -- Nasal cannula -- RB    24 1209 98.6 °F (37 °C) 62 17 118/53 94 % -- Nasal cannula 6 L/min AL    24 1121 -- -- -- -- 93 % -- Nasal cannula 6 L/min ER    24 0854 98.7 °F (37.1 °C) 60 18 103/48 93 % -- Nasal cannula 6 L/min AL    24 0830 -- -- -- -- -- -- Nasal cannula 6 L/min KT    24 0500 -- 64 -- 113/57 94 % 201 lb 1 oz (91.2 kg) Nasal cannula 6 L/min MM    24 0447 98.6 °F (37 °C) 62 20 113/57 91 % 201 lb 1 oz (91.2 kg) Nasal cannula 6 L/min ZH    24 0002 98.5 °F (36.9 °C) 61 20 123/60 90 % -- Nasal cannula 6 L/min     24 212 98.3 °F (36.8 °C) 65 20 124/54 92 % -- Nasal cannula 6 L/min MM    24 98.5 °F (36.9 °C) -- 18 -- -- -- Nasal cannula 6 L/min     24 195 -- 64 -- 98/44 93 % -- -- -- MM    24 1715 98.6 °F (37 °C) 62 18 111/59 94 % -- Nasal cannula 6 L/min AJ    24 1600 -- 60 -- -- 94 % -- -- -- ZH    24 1233 98 °F (36.7 °C) 61 18 117/56 92 % -- Nasal cannula 6 L/min AJ    24 0818 98.2 °F (36.8 °C) 63 -- 91/63 96 % -- Nasal cannula 6 L/min AJ    24 0818 -- -- 18 -- -- -- -- -- AE    24 0505 98.1 °F (36.7 °C) 63 20 113/55 90 % 205 lb 7.5 oz (93.2 kg) Nasal cannula -- EJ    24 0505 -- -- -- -- -- -- -- 6 L/min             2024 Hospitalist Progress Note   [facility]   part of [facility]     Hospitalist Progress Note            Lisandro Harley Patient Status:  Observation    1940 MRN [id number]   [facility]  [facility] 2NE-A Attending Cipriano Nieves MD   Hosp Day # 0 PCP Braydon Barrett MD      Chief Complaint: dysnea         Subjective:  Patient states he feels better though O2 req elevated        Objective:  Review of Systems:   ROS completed; pertinent positive and negatives stated in subjective.     Vital signs:  Temp:  [98 °F (36.7 °C)-98.7 °F (37.1 °C)] 98.7 °F (37.1 °C)  Pulse:  [60-65] 60  Resp:  [18-20] 18  BP: ()/(44-60) 103/48  SpO2:  [90 %-94 %] 93 %     Physical Exam:    General: No acute distress  Respiratory: dec AE ,scattered wheezes  Cardiovascular: S1, S2., 1+ LE edema  Abdomen: Soft  Neuro: No new focal deficits        Diagnostic Data:    Labs:       Recent Labs   Lab 12/24/24 1427 12/25/24  0638   WBC 9.7 7.3   HGB 11.5* 10.5*   MCV 83.6 85.6   .0 189.0               Recent Labs   Lab 12/24/24  1427 12/25/24  0638 12/26/24  0710   * 243* 148*   BUN 35* 31* 40*   CREATSERUM 1.93* 1.91* 1.87*   CA 8.5* 8.6* 8.7   ALB 4.1  --   --     140 139   K 3.4* 3.4* 3.3*  3.3*    106 104   CO2 25.0 25.0 26.0   ALKPHO 106  --   --    AST 16  --   --    ALT 13  --   --    BILT 1.1  --   --    TP 7.1  --   --          Estimated Glomerular Filtration Rate: 35 mL/min/1.73m2 (A) (by CKD-EPI based on SCr of 1.87 mg/dL (H)).          Recent Labs   Lab 12/24/24  1427   TROPHS 10         No results for input(s): \"PTP\", \"INR\" in the last 168 hours.           Imaging: Imaging data reviewed in Epic.     Medications:   Scheduled Medications    furosemide  80 mg Intravenous BID (Diuretic)    insulin degludec  5 Units Subcutaneous Daily    sodium ferric gluconate  125 mg Intravenous Daily    atorvastatin  10 mg Oral Nightly    apixaban  2.5 mg Oral BID    metoprolol tartrate  100 mg Oral BID    tamsulosin  0.4 mg Oral Daily    insulin aspart  1-10 Units Subcutaneous TID AC and HS    umeclidinium bromide  1 puff Inhalation Daily               Assessment & Plan:  #Acute on chronic hypoxic resp  failure  Cont. O2 and wean as able  Per OP pulm notes on 2L @ rest and 4-6L with activity     #Acute HFpEF w/ moderate pHTN  Cont diuresis     #AECOPD  Steroids  Inhalers  BD protocol     #NSVT  Cont. BB  Replace lytes and monitor     #CKD IV  Monitor with diuresis     #A. Fib s/p PPM  Cont. Rate control  Cont. OAC     #HTN     #DM2  Insulin     #Fe de Anemia  Fe studies noted  IV iron     Dispo: as above.  CXR independently reviewed: small right effusion and opacity.    O2 req. Higher than baseline. Steroids, nebs added     Cipriano Nieves MD

## 2024-12-26 NOTE — PHYSICAL THERAPY NOTE
PT orders received and chart reviewed. Pt politely but adamantly refusing to participate in OOB mobility at this time. States he will work with PT tomorrow. Will follow and re-attempt as able and appropriate.

## 2024-12-26 NOTE — SLP NOTE
ADULT SWALLOWING EVALUATION    ASSESSMENT    ASSESSMENT/OVERALL IMPRESSION:  Order received for evaluation; chart reviewed.  Patient seen at bedside with daughter present.  Patient currently on 6L o2 via NC; SpO2 90-93%.  Patient with clear vocal quality.  Patient denied difficulty with chewing or swallowing.  Patient reported regular texture diet with thin liquids at baseline.  Patient with no focal oral motor deficits appreciated. Patient followed all directions.   Patient exhibited intact oropharyngeal swallow with no overt clinical s/s aspiration observed or suspected at this time.  Suspect patient at baseline swallow function with no skilled SLP needs identified at this time.  Patient/daughter expressed understanding, agreement, and appreciation. D/W RN.      RECOMMENDATIONS   Diet Recommendations - Solids: Regular  Diet Recommendations - Liquids: Thin Liquids   Aspiration Precautions: Upright position  Medication Administration Recommendations: No restrictions  Treatment Plan/Recommendations: No further inpatient SLP service warranted    HISTORY   MEDICAL HISTORY  Reason for Referral: R/O aspiration    Problem List  Principal Problem:    Acute on chronic congestive heart failure, unspecified heart failure type (HCC)  Active Problems:    Chronic kidney disease, unspecified CKD stage      Past Medical History  Past Medical History:    Arrhythmia    Arthritis    Back problem    spinal stenosis    Cancer (HCC)    prostate/radiation treatments only    COPD (chronic obstructive pulmonary disease) (HCC)    Coronary atherosclerosis of unspecified type of vessel, native or graft    s/p 1 cardiac stent    Diabetes mellitus (HCC)    Diarrhea, unspecified    Disorder of liver    Easy bruising    Exposure to unspecified radiation    prostate    Frequent urination    Glaucoma    High blood pressure    High cholesterol    Irregular bowel habits    Leaking of urine    Leg swelling    Nausea vomiting and diarrhea     Osteoarthritis    Pacemaker    Renal disorder    Stage III-moderate    Rheumatoid arthritis (HCC)    Sleep apnea    cpap    Stented coronary artery    Type II or unspecified type diabetes mellitus without mention of complication, not stated as uncontrolled    Visual impairment    Wears glasses          Diet Prior to Admission: Regular;Thin liquids       Patient/Family Goals: to go home    SWALLOWING HISTORY  Current Diet Consistency: Regular;Thin liquids      Dysphagia History: none      Imaging Results: CXR today:  Small right pleural effusion and patchy right lower lung airspace opacity, mildly increased in comparison to prior.  Correlate clinically for worsening infectious/inflammatory process and/or edema.  No significant left pleural effusion.  No pneumothorax.  Stable mildly enlarged cardiac silhouette.  Aortic calcifications.  Left chest pacemaker in place.         OBJECTIVE   ORAL MOTOR EXAMINATION  Dentition: Natural;Functional  Symmetry: Within Functional Limits  Strength: Within Functional Limits  Tone: Within Functional Limits  Range of Motion: Within Functional Limits  Rate of Motion: Within Functional Limits    Voice Quality: Clear  Respiratory Status: Supplemental O2;Nasal cannula  Consistencies Trialed: Thin liquids  Method of Presentation: Self presentation  Patient Positioned: Upright;Midline    Oral Phase of Swallow: Within Functional Limits                      Pharyngeal Phase of Swallow: Within Functional Limits           (Please note: Silent aspiration cannot be evaluated clinically. Videofluoroscopic Swallow Study is required to rule-out silent aspiration.)    Esophageal Phase of Swallow: No complaints consistent with possible esophageal involvement          FOLLOW UP  Treatment Plan/Recommendations: No further inpatient SLP service warranted     Follow Up Needed (Documentation Required): No       Thank you for your referral.   If you have any questions, please contact CHRISTINA Manzano  MS [place] CCC-SLP/L, pager [phone]  Speech-LanguagePathologist

## 2024-12-26 NOTE — DISCHARGE INSTRUCTIONS
Going Home Instructions  In this section you will find the tools which will guide you through the first few days after you leave the hospital. Continued use of these tools will help you develop the skills necessary to keep your heart failure under control.     Home Care Instructions Following Heart Failure - the most important things to do every day include:   Weigh yourself and review the “Self-Check Plan” sheet every morning.   Call your cardiologist office if you are in the “Pay Attention-Use Caution” (yellow zone) or “Medical Alert-Warning!” (red zone) as outlined in the Self-Check Plan sheet.  Take your medicines as prescribed.  Limit your sodium (salt) intake.  Know when to call your cardiologist, primary doctor, or nurse.  Know when to seek emergency care.      Things for You to Remember:   1. See your doctor or healthcare provider as written on your discharge instructions.  It is important that you attend this appointment to make sure your symptoms are under control.     2. Your recommended sodium intake is 3999-1900 mg daily.    3.  Weigh yourself every day.    4. Some exercise and activity is important to help keep your heart functioning and strong. Unless instructed not to exercise, you may walk at a slow to moderate pace for 10-15 minutes 2-3 days per week to start. Pace your activity to prevent shortness of breath or fatigue. Stop exercising if you develop chest pain, lightheadedness, or significant shortness of breath.       Call Your Cardiologist If:   You gain 2-3 pounds in one day or 5 pounds in one week.  You have more difficulty breathing.  You are getting more tired with normal activity.  You are more short of breath lying down, or awaken at night short of breath.  You have swelling of your feet or legs.  You urinate less often during the day and more often at night.  You have cramps in your legs.  You have blurred vision or see yellowish-green halos around objects of lights.    Go to the  Emergency Room If:   You have pain or tightness in your chest  You are extremely short of breath  You are coughing up pink-frothy mucus  You are traveling and develop symptoms of worsening heart failure      ** Please follow up with your cardiologist or Advanced Practice Provider as written on your discharge instructions. If you are not provided with an appointment, let your nurse know so you can get an appointment**      [address] [place], [place] [address]  Phone: [phone]  Fax: [phone]

## 2024-12-26 NOTE — PLAN OF CARE
Assumed care of patient at 1930. Patient alert and oriented x4. AV paced on telemetry. On 6L nasal cannula, patient reports wearing 4-6L O2 at baseline. Lung sounds diminished. Bilateral lower extremity edema noted, L>R. Primofit in place draining clear yellow urine. Patient had incontinent BM. Patient declines any pain. Patient updated on plan of care. Call light within reach, bed alarm in place.     Problem: CARDIOVASCULAR - ADULT  Goal: Maintains optimal cardiac output and hemodynamic stability  Description: INTERVENTIONS:  - Monitor vital signs, rhythm, and trends  - Monitor for bleeding, hypotension and signs of decreased cardiac output  - Evaluate effectiveness of vasoactive medications to optimize hemodynamic stability  - Monitor arterial and/or venous puncture sites for bleeding and/or hematoma  - Assess quality of pulses, skin color and temperature  - Assess for signs of decreased coronary artery perfusion - ex. Angina  - Evaluate fluid balance, assess for edema, trend weights  Outcome: Progressing  Goal: Absence of cardiac arrhythmias or at baseline  Description: INTERVENTIONS:  - Continuous cardiac monitoring, monitor vital signs, obtain 12 lead EKG if indicated  - Evaluate effectiveness of antiarrhythmic and heart rate control medications as ordered  - Initiate emergency measures for life threatening arrhythmias  - Monitor electrolytes and administer replacement therapy as ordered  Outcome: Progressing     Problem: RESPIRATORY - ADULT  Goal: Achieves optimal ventilation and oxygenation  Description: INTERVENTIONS:  - Assess for changes in respiratory status  - Assess for changes in mentation and behavior  - Position to facilitate oxygenation and minimize respiratory effort  - Oxygen supplementation based on oxygen saturation or ABGs  - Provide Smoking Cessation handout, if applicable  - Encourage broncho-pulmonary hygiene including cough, deep breathe, Incentive Spirometry  - Assess the need for  suctioning and perform as needed  - Assess and instruct to report SOB or any respiratory difficulty  - Respiratory Therapy support as indicated  - Manage/alleviate anxiety  - Monitor for signs/symptoms of CO2 retention  Outcome: Progressing     Problem: Patient/Family Goals  Goal: Patient/Family Long Term Goal  Description: Patient's Long Term Goal: stay healthy at home  Interventions:  -take medications as prescribed  -attend follow up appointments as recommended  -diet and exercise as advised  -report new or worsening symptoms to physician  - See additional Care Plan goals for specific interventions  Outcome: Progressing  Goal: Patient/Family Short Term Goal  Description: Patient's Short Term Goal: improvement in shortness of breath  Interventions:   -IV lasix  -wean O2  -daily weights/strict intake and ouput  - See additional Care Plan goals for specific interventions  Outcome: Progressing

## 2024-12-26 NOTE — PROGRESS NOTES
[facility]   [facility]     Hospitalist Progress Note     Lisandro Harley Patient Status:  Observation    1940 MRN [id number]   [facility] 2NE-A Attending Cipriano Nieves MD   Hosp Day # 0 PCP Braydon Barrett MD     Chief Complaint: dysnea     Subjective:     Patient states he feels better though O2 req elevated    Objective:    Review of Systems:   ROS completed; pertinent positive and negatives stated in subjective.    Vital signs:  Temp:  [98 °F (36.7 °C)-98.7 °F (37.1 °C)] 98.7 °F (37.1 °C)  Pulse:  [60-65] 60  Resp:  [18-20] 18  BP: ()/(44-60) 103/48  SpO2:  [90 %-94 %] 93 %    Physical Exam:    General: No acute distress  Respiratory: dec AE ,scattered wheezes  Cardiovascular: S1, S2., 1+ LE edema  Abdomen: Soft  Neuro: No new focal deficits      Diagnostic Data:    Labs:  Recent Labs   Lab 24  1427 24  0638   WBC 9.7 7.3   HGB 11.5* 10.5*   MCV 83.6 85.6   .0 189.0       Recent Labs   Lab 24  1427 24  0638 24  0710   * 243* 148*   BUN 35* 31* 40*   CREATSERUM 1.93* 1.91* 1.87*   CA 8.5* 8.6* 8.7   ALB 4.1  --   --     140 139   K 3.4* 3.4* 3.3*  3.3*    106 104   CO2 25.0 25.0 26.0   ALKPHO 106  --   --    AST 16  --   --    ALT 13  --   --    BILT 1.1  --   --    TP 7.1  --   --        Estimated Glomerular Filtration Rate: 35 mL/min/1.73m2 (A) (by CKD-EPI based on SCr of 1.87 mg/dL (H)).     Recent Labs   Lab 24  1427   TROPHS 10       No results for input(s): \"PTP\", \"INR\" in the last 168 hours.           Imaging: Imaging data reviewed in Epic.    Medications:    furosemide  80 mg Intravenous BID (Diuretic)    insulin degludec  5 Units Subcutaneous Daily    sodium ferric gluconate  125 mg Intravenous Daily    atorvastatin  10 mg Oral Nightly    apixaban  2.5 mg Oral BID    metoprolol tartrate  100 mg Oral BID    tamsulosin  0.4 mg Oral Daily    insulin aspart  1-10 Units Subcutaneous TID AC and HS     umeclidinium bromide  1 puff Inhalation Daily       Assessment & Plan:     #Acute on chronic hypoxic resp failure  Cont. O2 and wean as able  Per OP pulm notes on 2L @ rest and 4-6L with activity     #Acute HFpEF w/ moderate pHTN  Cont diuresis    #AECOPD  Steroids  Inhalers  BD protocol    #NSVT  Cont. BB  Replace lytes and monitor     #CKD IV  Monitor with diuresis     #A. Fib s/p PPM  Cont. Rate control  Cont. OAC     #HTN     #DM2  Insulin    #Fe de Anemia  Fe studies noted  IV iron    Dispo: as above.  CXR independently reviewed: small right effusion and opacity.    O2 req. Higher than baseline. Steroids, nebs added    Cipriano Nieves MD    Supplementary Documentation:   P(1) D(C2+c1+c3)  Quality:    DVT Mechanical Prophylaxis:     Early ambuation  DVT Pharmacologic Prophylaxis   Medication    apixaban (Eliquis) tab 2.5 mg                Code Status: Full Code  St: External urinary catheter in place  St Duration (in days):   Central line:    XIAO: 12/28/2024      Discharge is dependent on: clinical stability  At this point Mr. Harley is expected to be discharge to: home    The 21st Century Cures Act makes medical notes like these available to patients in the interest of transparency. Please be advised this is a medical document. Medical documents are intended to carry relevant information, facts as evident, and the clinical opinion of the practitioner. The medical note is intended as peer to peer communication and may appear blunt or direct. It is written in medical language and may contain abbreviations or verbiage that are unfamiliar.

## 2024-12-26 NOTE — PROGRESS NOTES
Heart Failure Nurse  Progress Note    Patient was evaluated by the Heart Failure Nurse  for understanding, verbalization, demonstration, and recall of education related to heart failure, overall adherence to the behaviors necessary to maintain a compensated status, and risk for readmission.     Patient assessment:    Patient is able to verbalize signs/symptoms fluid overload/impending HF exacerbation and who to contact with problems                                          _X__ yes  ___ no      Patient is following a 2000 mg sodium diet                                             _X__ yes  ___ no    If no, barriers to 2000 mg sodium diet:_______________________________________________    Patient informed of 2-Part dietician classes that is free if sign up within 30 days of discharge or $40  __X_ yes  ___ no      Patient is adherent to medication regimen                                              _X__ yes  ___ no    If no, barriers to medication regimen:    Patient has sufficient funds to purchase medication                      _X__ yes  ___ no      Patient has a scale in the home              __X_ yes  ___ no      Patient is adherent to daily weight monitoring                                        _X__ yes   ___ no    If no, barriers to daily weight monitoring:    Symptom Tracker Worksheet reviewed with patient  __X_ yes   ___ no      Patient verbalizes understanding of stoplight/heart failure zones          _X__ yes   ___ no      Patient understands the importance of 7-day follow-up appointment      __X_ yes  ___ no    Appointment Date:          Patient has adequate transportation to attend follow-up appointments    _X__ yes  ___ no    If no, was referral to Social Work made  ___yes  ___ no      Family/Friend present during education: talia García    Additional consultations required: KIANNA Fair, RN(signature)  Extension [phone]

## 2024-12-26 NOTE — CM/SW NOTE
Pt discussed in rounds and chart was reviewed.    Pt is currently on 6L NC and is still continuing diuresis.    Per chart review, pt is current with E for home O2.    SW called Isatu at [facility] and inquired about pt's home equipment. Isatu stated pt has a 10L concentrator at home. Isatu also informed SW that they have pt's baseline as 3-6L NC.    SW will continue to monitor for any changes in pt's O2 needs.    PT still pending to work with pt. Will await PT eval for anticipated therapy needs.     to remain available for support and/or discharge planning.    GERDA Felix  Discharge Planner  [phone]

## 2024-12-27 PROBLEM — J15.9 PNEUMONIA, BACTERIAL: Status: ACTIVE | Noted: 2024-12-27

## 2024-12-27 PROBLEM — J15.9 PNEUMONIA, BACTERIAL: Status: ACTIVE | Noted: 2024-01-01

## 2024-12-27 LAB
ADENOVIRUS PCR:: NOT DETECTED
ANION GAP SERPL CALC-SCNC: 9 MMOL/L (ref 0–18)
B PARAPERT DNA SPEC QL NAA+PROBE: NOT DETECTED
B PERT DNA SPEC QL NAA+PROBE: NOT DETECTED
BUN BLD-MCNC: 46 MG/DL (ref 9–23)
C PNEUM DNA SPEC QL NAA+PROBE: NOT DETECTED
CALCIUM BLD-MCNC: 8.7 MG/DL (ref 8.7–10.4)
CHLORIDE SERPL-SCNC: 103 MMOL/L (ref 98–112)
CO2 SERPL-SCNC: 24 MMOL/L (ref 21–32)
CORONAVIRUS 229E PCR:: NOT DETECTED
CORONAVIRUS HKU1 PCR:: NOT DETECTED
CORONAVIRUS NL63 PCR:: NOT DETECTED
CORONAVIRUS OC43 PCR:: NOT DETECTED
CREAT BLD-MCNC: 1.83 MG/DL
EGFRCR SERPLBLD CKD-EPI 2021: 36 ML/MIN/1.73M2 (ref 60–?)
FLUAV RNA SPEC QL NAA+PROBE: NOT DETECTED
FLUBV RNA SPEC QL NAA+PROBE: NOT DETECTED
GLUCOSE BLD-MCNC: 237 MG/DL (ref 70–99)
GLUCOSE BLD-MCNC: 252 MG/DL (ref 70–99)
GLUCOSE BLD-MCNC: 306 MG/DL (ref 70–99)
GLUCOSE BLD-MCNC: 378 MG/DL (ref 70–99)
GLUCOSE BLD-MCNC: 394 MG/DL (ref 70–99)
GLUCOSE BLD-MCNC: 464 MG/DL (ref 70–99)
MAGNESIUM SERPL-MCNC: 2 MG/DL (ref 1.6–2.6)
METAPNEUMOVIRUS PCR:: NOT DETECTED
MYCOPLASMA PNEUMONIA PCR:: NOT DETECTED
OSMOLALITY SERPL CALC.SUM OF ELEC: 302 MOSM/KG (ref 275–295)
PARAINFLUENZA 1 PCR:: NOT DETECTED
PARAINFLUENZA 2 PCR:: NOT DETECTED
PARAINFLUENZA 3 PCR:: NOT DETECTED
PARAINFLUENZA 4 PCR:: NOT DETECTED
POTASSIUM SERPL-SCNC: 3.3 MMOL/L (ref 3.5–5.1)
POTASSIUM SERPL-SCNC: 3.3 MMOL/L (ref 3.5–5.1)
POTASSIUM SERPL-SCNC: 3.6 MMOL/L (ref 3.5–5.1)
PROCALCITONIN SERPL-MCNC: 0.17 NG/ML (ref ?–0.05)
RHINOVIRUS/ENTERO PCR:: NOT DETECTED
RSV RNA SPEC QL NAA+PROBE: NOT DETECTED
SARS-COV-2 RNA NPH QL NAA+NON-PROBE: NOT DETECTED
SODIUM SERPL-SCNC: 136 MMOL/L (ref 136–145)

## 2024-12-27 PROCEDURE — 99233 SBSQ HOSP IP/OBS HIGH 50: CPT | Performed by: HOSPITALIST

## 2024-12-27 RX ORDER — POTASSIUM CHLORIDE 1500 MG/1
40 TABLET, EXTENDED RELEASE ORAL ONCE
Status: COMPLETED | OUTPATIENT
Start: 2024-12-27 | End: 2024-12-27

## 2024-12-27 NOTE — PLAN OF CARE
Assumed care of patient at change of shift, resting in bed. AO x4. Avpaced on tele monitor. O2 sat adequate on 6L nasal cannula. Plan of care updated. Bed locked, in lowest position, with alarm on. Call light and personal items in reach. All needs met.

## 2024-12-27 NOTE — PHYSICAL THERAPY NOTE
PHYSICAL THERAPY EVALUATION - INPATIENT     Room Number: 2612/2612-A  Evaluation Date: 12/27/2024  Type of Evaluation: Initial  Physician Order: PT Eval and Treat    Presenting Problem: acute on chronic respiratory failure due to CHF and COPD  Co-Morbidities : HTN, NII, spinal stenosis, Afib, DMII, pulmonary emphysema  Reason for Therapy: Mobility Dysfunction and Discharge Planning    PHYSICAL THERAPY ASSESSMENT   Patient is a 84 year old male admitted 12/24/2024 for acute on chronic respiratory failure due to CHF and COPD.  Prior to admission, patient's baseline is mod ind with rollator.  Patient is currently functioning near baseline with bed mobility, transfers, and gait.  Patient is requiring supervision and contact guard assist as a result of the following impairments: decreased functional strength, decreased endurance/aerobic capacity, impaired   balance, and decreased muscular endurance.  Physical Therapy will continue to follow for duration of hospitalization.    Patient will benefit from continued skilled PT Services at discharge to promote prior level of function and safety with additional support and return home with home health PT.    PLAN DURING HOSPITALIZATION  Nursing Mobility Recommendation : 1 Assist  PT Device Recommendation: Rolling walker              CURRENT GOALS    Goal #1 Patient is able to demonstrate supine - sit EOB @ level: supervision     Goal #2 Patient is able to demonstrate transfers Sit to/from Stand at assistance level: supervision     Goal #3 Patient is able to ambulate 150 feet with assist device: walker - rolling at assistance level: supervision     Goal #4    Goal #5    Goal #6    Goal Comments: Goals established on 12/27/2024      PHYSICAL THERAPY MEDICAL/SOCIAL HISTORY  History related to current admission: Patient is a 84 year old male admitted on 12/24/2024 from [facility] for acute on chronic respiratory failure due to CHF and COPD.    HOME SITUATION  Type of Home: Assisted living  facility  Home Layout: Elevator                     Lives With: Staff 24 hours    Drives: No   Patient Regularly Uses: Reading glasses , rollator, home O2    Prior Level of [place]: Pt is typically mod ind with ADLs and ambulates with rollator. Pt gets assist with meals. Pt is on 4-6 L O2 at home. Pt's assist package at [facility] can be increased if needed.     SUBJECTIVE  Pt pleasant and cooperative     OBJECTIVE  Precautions: Bed/chair alarm  Fall Risk: High fall risk    WEIGHT BEARING RESTRICTION     PAIN ASSESSMENT  Ratin          COGNITION  Overall Cognitive Status:  WFL - within functional limits    RANGE OF MOTION AND STRENGTH ASSESSMENT  Upper extremity ROM and strength are within functional limits     Lower extremity ROM is within functional limits     Lower extremity strength is within functional limits, except mild deconditioning    BALANCE  Static Sitting: Good  Dynamic Sitting: Good  Static Standing: Fair -  Dynamic Standing: Poor +    ADDITIONAL TESTS                                    ACTIVITY TOLERANCE                         O2 WALK       NEUROLOGICAL FINDINGS                        AM-PAC '6-Clicks' INPATIENT SHORT FORM - BASIC MOBILITY  How much difficulty does the patient currently have...  Patient Difficulty: Turning over in bed (including adjusting bedclothes, sheets and blankets)?: None   Patient Difficulty: Sitting down on and standing up from a chair with arms (e.g., wheelchair, bedside commode, etc.): A Little   Patient Difficulty: Moving from lying on back to sitting on the side of the bed?: A Little   How much help from another person does the patient currently need...   Help from Another: Moving to and from a bed to a chair (including a wheelchair)?: A Little   Help from Another: Need to walk in hospital room?: A Little   Help from Another: Climbing 3-5 steps with a railing?: A Little     AM-PAC Score:  Raw Score: 19   Approx Degree of Impairment: 41.77%   Standardized Score (AM-PAC  Scale): 45.44   CMS Modifier (G-Code): CK    FUNCTIONAL ABILITY STATUS  Gait Assessment   Functional Mobility/Gait Assessment  Gait Assistance: Contact guard assist  Distance (ft): 50  Assistive Device: Rolling walker  Pattern: Shuffle (cues to keep walker close to self)    Skilled Therapy Provided: Per RN okay to work with pt. Pt received in chair and was agreeable to PT session.     Bed Mobility:  Rolling: NT  Supine to sit: NT   Sit to supine: NT     Transfer Mobility:  Sit to stand: CGA   Stand to sit: CGA  Gait = pt ambulated with RW and CGA    Pt ambulated to/from the bathroom as well, see OT notes for details.     Therapist's Comments: Pt educated on role of therapy, goals for session, safety, fall prevention, and activity recommendations.     Exercise/Education Provided:  Bed mobility  Energy conservation  Functional activity tolerated  Gait training  Posture  Strengthening  Transfer training    Patient End of Session: Up in chair;Needs met;Call light within reach;RN aware of session/findings;All patient questions and concerns addressed;Hospital anti-slip socks;Alarm set;Family present      Patient Evaluation Complexity Level:  History Moderate - 1 or 2 personal factors and/or co-morbidities   Examination of body systems Low -  addressing 1-2 elements   Clinical Presentation Low- Stable   Clinical Decision Making Low Complexity       PT Session Time: 25 minutes  Therapeutic Activity: 8 minutes

## 2024-12-27 NOTE — CONSULTS
[facility] Pulmonary Consult Note       Lisandro Harley Patient Status:  Inpatient    1940 MRN [id number]   Location [facility] 2NE-A Attending Cipriano Nieves MD   Hosp Day # 0 PCP Braydon Barrett MD     Reason for Consultation:  Shortness of breath    History of Present Illness:  Lisandro Harley is a a(n) 84 year old male with history of COPD, follows with Dr. Harman, heart failure with preserved ejection fraction, who presented yesterday for worsening dyspnea on exertion.  He had come off of his inhalers.  Patient was started on scheduled bronchodilators and steroids and is overall feeling better, but his oxygenation is slightly worse, is on 2 L at baseline and has been up to 4-6.    History:  Past Medical History:    Arrhythmia    Arthritis    Back problem    spinal stenosis    Cancer (HCC)    prostate/radiation treatments only    COPD (chronic obstructive pulmonary disease) (HCC)    Coronary atherosclerosis of unspecified type of vessel, native or graft    s/p 1 cardiac stent    Diabetes mellitus (HCC)    Diarrhea, unspecified    Disorder of liver    Easy bruising    Exposure to unspecified radiation    prostate    Frequent urination    Glaucoma    High blood pressure    High cholesterol    Irregular bowel habits    Leaking of urine    Leg swelling    Nausea vomiting and diarrhea    Osteoarthritis    Pacemaker    Renal disorder    Stage III-moderate    Rheumatoid arthritis (HCC)    Sleep apnea    cpap    Stented coronary artery    Type II or unspecified type diabetes mellitus without mention of complication, not stated as uncontrolled    Visual impairment    Wears glasses     Past Surgical History:   Procedure Laterality Date    Cardiac pacemaker placement      medtronic    Colonoscopy N/A 2015    Procedure: COLONOSCOPY;  Surgeon: Yumiko Martin DO;  Location:  ENDOSCOPY    Hip replacement surgery Left 2018    Hip replacement surgery Right         Knee replacement surgery  Right     after 2000    Laminectomy      no hardware    Skin surgery      Total hip replacement Right     Total knee replacement Right      Family History   Problem Relation Age of Onset    Diabetes Neg     Heart Disorder Neg     Hypertension Neg       reports that he quit smoking about 19 years ago. His smoking use included cigarettes. He started smoking about 60 years ago. He has a 60 pack-year smoking history. He has never used smokeless tobacco. He reports that he does not currently use alcohol. He reports that he does not use drugs.    Allergies:  Allergies[1]    Medications:    Current Facility-Administered Medications:     furosemide (Lasix) 10 mg/mL injection 80 mg, 80 mg, Intravenous, BID (Diuretic)    methylPREDNISolone sodium succinate (Solu-MEDROL) injection 40 mg, 40 mg, Intravenous, Q12H    ipratropium-albuterol (Duoneb) 0.5-2.5 (3) MG/3ML inhalation solution 3 mL, 3 mL, Nebulization, Q4H WA (5 times daily)    insulin degludec (Tresiba) 100 units/mL flextouch 5 Units, 5 Units, Subcutaneous, Daily    sodium ferric gluconate (Ferrlecit) 125 mg in sodium chloride 0.9% 100mL IVPB premix, 125 mg, Intravenous, Daily    atorvastatin (Lipitor) tab 10 mg, 10 mg, Oral, Nightly    apixaban (Eliquis) tab 2.5 mg, 2.5 mg, Oral, BID    metoprolol tartrate (Lopressor) tab 100 mg, 100 mg, Oral, BID    tamsulosin (Flomax) cap 0.4 mg, 0.4 mg, Oral, Daily    acetaminophen (Tylenol Extra Strength) tab 1,000 mg, 1,000 mg, Oral, Q4H PRN    melatonin tab 3 mg, 3 mg, Oral, Nightly PRN    glycerin-hypromellose- (Artificial Tears) 0.2-0.2-1 % ophthalmic solution 1 drop, 1 drop, Both Eyes, QID PRN    sodium chloride (Saline Mist) 0.65 % nasal solution 1 spray, 1 spray, Each Nare, Q3H PRN    ondansetron (Zofran) 4 MG/2ML injection 4 mg, 4 mg, Intravenous, Q6H PRN    metoclopramide (Reglan) 5 mg/mL injection 5 mg, 5 mg, Intravenous, Q8H PRN    polyethylene glycol (PEG 3350) (Miralax) 17 g oral packet 17 g, 17 g, Oral, Daily  PRN    sennosides (Senokot) tab 17.2 mg, 17.2 mg, Oral, Nightly PRN    bisacodyl (Dulcolax) 10 MG rectal suppository 10 mg, 10 mg, Rectal, Daily PRN    glucose (Dex4) 15 GM/59ML oral liquid 15 g, 15 g, Oral, Q15 Min PRN **OR** glucose (Glutose) 40% oral gel 15 g, 15 g, Oral, Q15 Min PRN **OR** glucose-vitamin C (Dex-4) chewable tab 4 tablet, 4 tablet, Oral, Q15 Min PRN **OR** dextrose 50% injection 50 mL, 50 mL, Intravenous, Q15 Min PRN **OR** glucose (Dex4) 15 GM/59ML oral liquid 30 g, 30 g, Oral, Q15 Min PRN **OR** glucose (Glutose) 40% oral gel 30 g, 30 g, Oral, Q15 Min PRN **OR** glucose-vitamin C (Dex-4) chewable tab 8 tablet, 8 tablet, Oral, Q15 Min PRN    insulin aspart (NovoLOG) 100 Units/mL FlexPen 1-10 Units, 1-10 Units, Subcutaneous, TID AC and HS    umeclidinium bromide (Incruse Ellipta) 62.5 MCG/ACT inhaler 1 puff, 1 puff, Inhalation, Daily    Review of Systems:   All other review of systems are negative.    Vital signs in last 24 hours:  Patient Vitals for the past 24 hrs:   BP Temp Temp src Pulse Resp SpO2 Weight   12/26/24 2143 121/59 -- -- 75 -- 91 % --   12/26/24 1940 123/52 98.1 °F (36.7 °C) Oral 69 19 (!) 84 % --   12/26/24 1806 -- -- -- 62 -- 93 % --   12/26/24 1718 -- -- -- 67 -- 92 % --   12/26/24 1621 125/62 98.4 °F (36.9 °C) Oral 63 18 90 % --   12/26/24 1209 118/53 98.6 °F (37 °C) Oral 62 17 94 % --   12/26/24 1121 -- -- -- -- -- 93 % --   12/26/24 0854 103/48 98.7 °F (37.1 °C) Oral 60 18 93 % --   12/26/24 0500 113/57 -- -- 64 -- 94 % 201 lb 1 oz (91.2 kg)   12/26/24 0447 113/57 98.6 °F (37 °C) Oral 62 20 91 % 201 lb 1 oz (91.2 kg)   12/26/24 0002 123/60 98.5 °F (36.9 °C) Oral 61 20 90 % --       Intake/Output:    Intake/Output Summary (Last 24 hours) at 12/26/2024 2203  Last data filed at 12/26/2024 1718  Gross per 24 hour   Intake 940 ml   Output 2700 ml   Net -1760 ml       Physical Exam:   General: alert, cooperative, oriented.  No respiratory distress.   Head: Normocephalic, without  obvious abnormality, atraumatic.   Eyes: Conjunctivae/corneas clear.  No scleral icterus.  No conjunctival     hemorrhage.   Nose: Nares normal.   Throat: Lips, mucosa, and tongue normal.  No thrush noted.   Neck: Soft, supple neck; trachea midline, no adenopathy, no thyromegaly.   Lungs: diminished breath sounds bilaterally   Chest wall: No tenderness or deformity.   Heart: Regular rate and rhythm, normal S1S2, no murmur.   Abdomen: soft, non-tender, non-distended, no masses, no guarding, no     rebound, positive BS.   Extremity: no edema, no cyanosis   Skin: No rashes or lesions.   Neurological: Alert, interactive, no focal deficits    Lab Data Review:  Recent Labs   Lab 12/24/24  1427 12/25/24  0638   WBC 9.7 7.3   HGB 11.5* 10.5*   HCT 36.2* 34.4*   .0 189.0       Recent Labs   Lab 12/24/24  1427 12/25/24  0638 12/26/24  0710    140 139   K 3.4* 3.4* 3.3*  3.3*    106 104   CO2 25.0 25.0 26.0   BUN 35* 31* 40*   ALT 13  --   --    AST 16  --   --        Recent Labs   Lab 12/25/24  0638   MG 2.0       No results found for: \"PHOS\", \"PHOSPHORUS\"     No results for input(s): \"PT\", \"INR\", \"PTT\" in the last 168 hours.    No results for input(s): \"ABGPHT\", \"QNRGAX3P\", \"BUYDW0M\", \"ABGHCO3\", \"ABGBE\", \"TEMP\", \"MIHAELA\", \"SITE\", \"DEV\", \"THGB\" in the last 168 hours.    Invalid input(s): \"UBP34VKR\", \"CHOB\"    Invalid input(s): \"CKTOTAL\", \"TROPONINI\", \"TROPONINT\", \"CKMBINDEX\"      Cultures:   Hospital Encounter on 12/24/24   1. Urine Culture, Routine     Status: None    Collection Time: 12/25/24  1:40 AM    Specimen: Urine, clean catch   Result Value Ref Range    Urine Culture <10,000 CFU/ML Gram negative melinda N/A       Radiology:  XR CHEST AP PORTABLE  (CPT=71045)  Narrative: PROCEDURE:  XR CHEST AP PORTABLE  (CPT=71045)     TECHNIQUE:  AP chest radiograph was obtained.     COMPARISON:  [facility] , XR, XR CHEST AP PORTABLE  (CPT=71045), 12/24/2024, 2:53 PM.  [facility] , XR, XR CHEST PA + LAT CHEST (CPT=71046),  4/29/2024, 1:49 PM.     INDICATIONS:  Hypoxia     PATIENT STATED HISTORY: (As transcribed by Technologist)  Patient offered no additional history at this time.           FINDINGS:  Small right pleural effusion and patchy right lower lung airspace opacity, mildly increased in comparison to prior.  Correlate clinically for worsening infectious/inflammatory process and/or edema.  No significant left pleural effusion.  No   pneumothorax.  Stable mildly enlarged cardiac silhouette.  Aortic calcifications.  Left chest pacemaker in place.                   Impression: CONCLUSION:  See above        LOCATION:  [facility]                 Dictated by (CST): Damaso Najera MD on 12/26/2024 at 9:49 AM       Finalized by (CST): Damaso Najera MD on 12/26/2024 at 9:50 AM         Assessment:  Acute on chronic hypoxic respiratory failure secondary to COPD exacerbation  COPD exacerbation  Right lower lobe infiltrate versus pleural effusion  Acute on chronic heart failure with preserved ejection fraction  Pulmonary hypertension likely group 2 versus 3  Atrial fibrillation status post permanent pacemaker  Hypertension  Iron deficiency anemia  Diabetes mellitus      Plan:  Close monitoring of oxygenation status, wean down to baseline oxygen as able  Ongoing bronchodilators  Continue IV steroids for now  Check respiratory viral panel  Check and trend procalcitonin  If procal elevated would start abx  Cardiology consulted for evaluation of heart failure, appreciate their assistance  On Eliquis for A-fib controlled with metoprolol    Thank you for the consultation.  Will follow with you.    Germaine Hebert MD  [facility]  Office: [phone]         [1]   Allergies  Allergen Reactions    Tussigon [Hycodan] NAUSEA ONLY

## 2024-12-27 NOTE — CM/SW NOTE
12/27/24 1600   CM/SW Referral Data   Referral Source Social Work (self-referral)   Reason for Referral Discharge planning   Informant EMR;Clinical Staff Member;Patient;Son   Medical Hx   Does patient have an established PCP? Yes   Patient Info   Patient's Current Mental Status at Time of Assessment Alert;Oriented   Patient's Home Environment Independent Living   Patient lives with Alone   Patient Status Prior to Admission   Independent with ADLs and Mobility Yes   Services in place prior to admission DME/Supplies at home   DME Provider/Supplier HME   Type of DME/Supplies Rollator Walker;Home Oxygen   Discharge Needs   Anticipated D/C needs Home health care   Choice of Post-Acute Provider   Informed patient of right to choose their preferred provider Yes   List of appropriate post-acute services provided to patient/family with quality data Yes   Patient/family choice [facility]   Information given to Patient;Son     HOME SITUATION per PT eval  Type of Home: Assisted living facility  Home Layout: Elevator                     Lives With: Staff 24 hours    Drives: No   Patient Regularly Uses: Reading glasses , rollator, home O2     Prior Level of [place] per PT eval: Pt is typically mod ind with ADLs and ambulates with rollator. Pt gets assist with meals. Pt is on 4-6 L O2 at home. Pt's assist package at [facility] can be increased if needed.     Patient is an 85 y/o male admitted due to CHF & CKD. Anticipated therapy need for pt at [place] is HH. Referral sent in [place] and options list obtained.     BRINA met with pt at bedside to discuss [place] plan. Pt reports he is a resident at [facility]. Pt stated he owns a rollator and has home O2 through HME. Pt reports being mod independent with his ADLs.    SW discussed  services for pt at [place]. Pt agreeable with HH. SW provided him with options list. Pt reviewed list and chose [facility] . BRINA confirmed she would reserve this agency for pt.    BRINA  then discussed CG services and provided pt with resources. BRINA encouraged pt to consider setting up CG services to have more care/assistance at home after DC. Pt accepted resources and stated he would review them.    Pt informed BRINA that his son, Leonid, requested for BRINA to give him a call.    BRINA called Leonid ([phone]) and updated him on DC plan. Leonid agreeable with  services for pt. BRINA also informed Leonid that CG resources were provided to pt, and BRINA once again encouraged Leonid to look into setting up those services for pt to have more care/assistance at home. Leonid thanked BRINA for the call and stated they will look into it.    BRINA reserved [facility] in [place].     to remain available for support and/or discharge planning.    GERDA Felix  Discharge Planner  [phone]

## 2024-12-27 NOTE — PAYOR COMM NOTE
--------------  CONTINUED STAY REVIEW    Payor: SAKINA CAMPUZANO O  Subscriber #:  [id number]  Authorization Number: [id number]    Admit date: 24  Admit time: 11:15 AM    REVIEW DOCUMENTATION:   [facility]   [facility] of [facility]     Hospitalist Progress Note            Lisandro Harley Patient Status:  Observation    1940 MRN [id number]   Location [facility] 2NE-A Attending Cipriano Nieves MD   Hosp Day # 1 PCP Braydon Barrett MD      Chief Complaint: dysnea         Subjective:  Patient feels ok.          Objective:  Review of Systems:   ROS completed; pertinent positive and negatives stated in subjective.     Vital signs:  Temp:  [98.1 °F (36.7 °C)-98.6 °F (37 °C)] 98.1 °F (36.7 °C)  Pulse:  [62-75] 63  Resp:  [17-20] 20  BP: (106-125)/(52-62) 106/59  SpO2:  [84 %-95 %] 90 %     Physical Exam:    General: No acute distress  Respiratory: dec AE ,scattered wheezes  Cardiovascular: S1, S2., no edema  Abdomen: Soft  Neuro: No new focal deficits        Diagnostic Data:    Labs:       Recent Labs   Lab 24  1427 24  0638   WBC 9.7 7.3   HGB 11.5* 10.5*   MCV 83.6 85.6   .0 189.0                Recent Labs   Lab 24  1427 24  0638 24  0710 24  0626   * 243* 148* 237*   BUN 35* 31* 40* 46*   CREATSERUM 1.93* 1.91* 1.87* 1.83*   CA 8.5* 8.6* 8.7 8.7   ALB 4.1  --   --   --     140 139 136   K 3.4* 3.4* 3.3*  3.3* 3.3*  3.3*    106 104 103   CO2 25.0 25.0 26.0 24.0   ALKPHO 106  --   --   --    AST 16  --   --   --    ALT 13  --   --   --    BILT 1.1  --   --   --    TP 7.1  --   --   --          Estimated Glomerular Filtration Rate: 35.9 mL/min/1.73m2 (A) (by CKD-EPI based on SCr of 1.83 mg/dL (H)).          Recent Labs   Lab 24  1427   TROPHS 10         No results for input(s): \"PTP\", \"INR\" in the last 168 hours.           Imaging: Imaging data reviewed in Epic.     Medications:   Scheduled Medications    cefTRIAXone  2 g Intravenous Q24H     furosemide  80 mg Intravenous BID (Diuretic)    methylPREDNISolone  40 mg Intravenous Q12H    ipratropium-albuterol  3 mL Nebulization Q4H WA (5 times daily)    insulin degludec  5 Units Subcutaneous Daily    sodium ferric gluconate  125 mg Intravenous Daily    atorvastatin  10 mg Oral Nightly    apixaban  2.5 mg Oral BID    metoprolol tartrate  100 mg Oral BID    tamsulosin  0.4 mg Oral Daily    insulin aspart  1-10 Units Subcutaneous TID AC and HS    umeclidinium bromide  1 puff Inhalation Daily               Assessment & Plan:  #Acute on chronic hypoxic resp failure  Cont. O2 and wean as able  Per OP pulm notes on 2L @ rest and 4-6L with activity     #Presumptive PNA  Start rocehin  SLP evaluated: regular, thin liquids, no restrictions.      #Acute HFpEF w/ moderate pHTN  Cont diuresis     #AECOPD  Steroids  Inhalers  BD protocol     #NSVT  Cont. BB  Replace lytes and monitor     #CKD IV  Monitor with diuresis     #A. Fib s/p PPM  Cont. Rate control  Cont. OAC     #HTN     #DM2 w/ steroid hyperglycemia  Cont. Degludac and ICF  Add NPH and IC coverage     #Fe de Anemia  Fe studies noted  IV iron     Dispo: as above.  Discussed with pulm.  Add ABX. Cont. Steroids, diuretics, nebs.  Feels better though still w/ high O2 needs.  Possibly at new baseline     Cipriano Nieves MD              MEDICATIONS ADMINISTERED IN LAST 1 DAY:  apixaban (Eliquis) tab 2.5 mg       Date Action Dose Route User    12/27/2024 0951 Given 2.5 mg Oral Gali Hope RN    12/26/2024 2142 Given 2.5 mg Oral Regulo Huerta RN          atorvastatin (Lipitor) tab 10 mg       Date Action Dose Route User    12/26/2024 2142 Given 10 mg Oral Regulo Huerta RN          cefTRIAXone (Rocephin) 2 g in sodium chloride 0.9% 100 mL IVPB-ADDV       Date Action Dose Route User    12/27/2024 1109 New Bag 2 g Intravenous Gali Hope, ANIKA          furosemide (Lasix) 10 mg/mL injection 80 mg       Date Action Dose Route User    12/27/2024 0950 Given 80  mg Intravenous Gali Hope RN    12/26/2024 1643 Given 80 mg Intravenous Gali Hope RN          insulin aspart (NovoLOG) 100 Units/mL FlexPen 1-10 Units       Date Action Dose Route User    12/27/2024 0514 Given 4 Units Subcutaneous (Right Upper Arm) Regulo Huerta RN    12/26/2024 2320 Given 8 Units Subcutaneous (Right Upper Arm) Regulo Huerta RN    12/26/2024 1644 Given 5 Units Subcutaneous (Right Upper Arm) Gali Hope RN    12/26/2024 1223 Given 2 Units Subcutaneous (Left Upper Arm) Jyoti Bryant RN          insulin aspart (NovoLOG) 100 Units/mL FlexPen 2 Units       Date Action Dose Route User    12/26/2024 2330 Given 2 Units Subcutaneous (Right Upper Arm) Regulo Huerta RN          insulin degludec (Tresiba) 100 units/mL flextouch 5 Units       Date Action Dose Route User    12/27/2024 0951 Given 5 Units Subcutaneous (Left Upper Abdomen) Gali Hope RN          ipratropium-albuterol (Duoneb) 0.5-2.5 (3) MG/3ML inhalation solution 3 mL       Date Action Dose Route User    12/27/2024 1118 Given 3 mL Nebulization Shan, Min, RCP    12/27/2024 0725 Given 3 mL Nebulization Shan, Min, RCP    12/26/2024 2205 Given 3 mL Nebulization Sherry Lindo, ALISSA    12/26/2024 1805 Given 3 mL Nebulization Sherry Lindo, RAHAT    12/26/2024 1522 Given 3 mL Nebulization Sherry Lindo, RC          methylPREDNISolone sodium succinate (Solu-MEDROL) injection 40 mg       Date Action Dose Route User    12/27/2024 0950 Given 40 mg Intravenous Gali Hope RN    12/26/2024 2319 Given 40 mg Intravenous Regulo Huerta RN    12/26/2024 1214 Given 40 mg Intravenous Gali Hope RN          metoprolol tartrate (Lopressor) tab 100 mg       Date Action Dose Route User    12/27/2024 0951 Given 100 mg Oral Gali Hope RN    12/26/2024 2147 Given 100 mg Oral Regulo Huerta, RN          potassium chloride (Klor-Con M20) tab 40 mEq       Date Action Dose Route User    12/27/2024 0951 Given 40 mEq  Oral Gali Hope, RN          sodium ferric gluconate (Ferrlecit) 125 mg in sodium chloride 0.9% 100mL IVPB premix       Date Action Dose Route User    12/27/2024 0955 New Bag 125 mg Intravenous Gali Hope RN          tamsulosin (Flomax) cap 0.4 mg       Date Action Dose Route User    12/27/2024 0951 Given 0.4 mg Oral Gali Hope RN            Vitals (last day)       Date/Time Temp Pulse Resp BP SpO2 Weight O2 Device O2 Flow Rate (L/min) [facility]    12/27/24 0900 98.1 °F (36.7 °C) 63 20 106/59 90 % -- Nasal cannula 5 L/min LS    12/27/24 0725 -- -- -- -- 91 % -- Nasal cannula 5 L/min MS    12/27/24 0430 98.3 °F (36.8 °C) 73 19 122/55 93 % 189 lb 1.6 oz (85.8 kg) Nasal cannula 5 L/min     12/27/24 0200 -- -- -- -- 95 % -- Nasal cannula 5 L/min JR    12/26/24 2300 98.1 °F (36.7 °C) 72 19 122/60 94 % -- Nasal cannula 6 L/min MUNA    12/26/24 2206 -- -- -- -- -- -- Nasal cannula 6 L/min RB    12/26/24 2143 -- 75 -- 121/59 91 % -- Nasal cannula -- JR    12/26/24 1940 98.1 °F (36.7 °C) 69 19 123/52 84 % -- Nasal cannula 6 L/min MUNA    12/26/24 1806 -- -- -- -- 93 % -- Nasal cannula 6 L/min RB    12/26/24 1806 -- 62 -- -- -- -- -- -- BG    12/26/24 1718 -- 67 -- -- 92 % -- -- -- AL    12/26/24 1621 98.4 °F (36.9 °C) 63 18 125/62 90 % -- Nasal cannula 6 L/min BG    12/26/24 1526 -- -- -- -- -- -- Nasal cannula -- RB    12/26/24 1209 98.6 °F (37 °C) 62 17 118/53 94 % -- Nasal cannula 6 L/min AL    12/26/24 1121 -- -- -- -- 93 % -- Nasal cannula 6 L/min ER    12/26/24 0854 98.7 °F (37.1 °C) 60 18 103/48 93 % -- Nasal cannula 6 L/min AL    12/26/24 0830 -- -- -- -- -- -- Nasal cannula 6 L/min KT    12/26/24 0500 -- 64 -- 113/57 94 % 201 lb 1 oz (91.2 kg) Nasal cannula 6 L/min MM    12/26/24 0447 98.6 °F (37 °C) 62 20 113/57 91 % 201 lb 1 oz (91.2 kg) Nasal cannula 6 L/min ZH    12/26/24 0002 98.5 °F (36.9 °C) 61 20 123/60 90 % -- Nasal cannula 6 L/min ZH

## 2024-12-27 NOTE — PLAN OF CARE
Assumed care of patient at change of shift, resting in bed. AO x4. AV paced on tele monitor. O2 sat adequate on RA. Denies chest pain and shortness of breath. Plan of care updated. Bed locked, in lowest position, with alarm on. Call light and personal items in reach. All needs met.    Problem: CARDIOVASCULAR - ADULT  Goal: Maintains optimal cardiac output and hemodynamic stability  Description: INTERVENTIONS:  - Monitor vital signs, rhythm, and trends  - Monitor for bleeding, hypotension and signs of decreased cardiac output  - Evaluate effectiveness of vasoactive medications to optimize hemodynamic stability  - Monitor arterial and/or venous puncture sites for bleeding and/or hematoma  - Assess quality of pulses, skin color and temperature  - Assess for signs of decreased coronary artery perfusion - ex. Angina  - Evaluate fluid balance, assess for edema, trend weights  Outcome: Progressing  Goal: Absence of cardiac arrhythmias or at baseline  Description: INTERVENTIONS:  - Continuous cardiac monitoring, monitor vital signs, obtain 12 lead EKG if indicated  - Evaluate effectiveness of antiarrhythmic and heart rate control medications as ordered  - Initiate emergency measures for life threatening arrhythmias  - Monitor electrolytes and administer replacement therapy as ordered  Outcome: Progressing     Problem: RESPIRATORY - ADULT  Goal: Achieves optimal ventilation and oxygenation  Description: INTERVENTIONS:  - Assess for changes in respiratory status  - Assess for changes in mentation and behavior  - Position to facilitate oxygenation and minimize respiratory effort  - Oxygen supplementation based on oxygen saturation or ABGs  - Provide Smoking Cessation handout, if applicable  - Encourage broncho-pulmonary hygiene including cough, deep breathe, Incentive Spirometry  - Assess the need for suctioning and perform as needed  - Assess and instruct to report SOB or any respiratory difficulty  - Respiratory Therapy  support as indicated  - Manage/alleviate anxiety  - Monitor for signs/symptoms of CO2 retention  Outcome: Progressing     Problem: Patient/Family Goals  Goal: Patient/Family Long Term Goal  Description: Patient's Long Term Goal: stay healthy at home  Interventions:  -take medications as prescribed  -attend follow up appointments as recommended  -diet and exercise as advised  -report new or worsening symptoms to physician  - See additional Care Plan goals for specific interventions  Outcome: Progressing  Goal: Patient/Family Short Term Goal  Description: Patient's Short Term Goal: improvement in shortness of breath  Interventions:   -IV lasix  -wean O2  -daily weights/strict intake and ouput  - See additional Care Plan goals for specific interventions  Outcome: Progressing

## 2024-12-27 NOTE — PROGRESS NOTES
[facility]   part of [facility]    Advanced Heart Failure Progress Note    Lisandro Harley Patient Status:  Observation    1940 MRN [id number]   Location [facility] 2NE-A Attending Cipriano Nieves MD   Hosp Day # 1 PCP Braydon Barrett MD     Subjective:    No acute overnight events. Urinated well. Net negative >1L. Denies CP, still on 4-6L NC O2. Baseline 2-3L.     Objective:  /59 (BP Location: Right arm)   Pulse 63   Temp 98.1 °F (36.7 °C) (Oral)   Resp 20   Ht 5' 6\" (1.676 m)   Wt 189 lb 1.6 oz (85.8 kg)   SpO2 90%   BMI 30.52 kg/m²     Temp (24hrs), Av.3 °F (36.8 °C), Min:98.1 °F (36.7 °C), Max:98.6 °F (37 °C)      Intake/Output:    Intake/Output Summary (Last 24 hours) at 2024 0917  Last data filed at 2024 0430  Gross per 24 hour   Intake 600 ml   Output 1800 ml   Net -1200 ml       Wt Readings from Last 3 Encounters:   24 189 lb 1.6 oz (85.8 kg)   24 195 lb (88.5 kg)   10/29/24 195 lb (88.5 kg)       Allergies:  Allergies[1]    Physical Exam:  Blood pressure 106/59, pulse 63, temperature 98.1 °F (36.7 °C), temperature source Oral, resp. rate 20, height 5' 6\" (1.676 m), weight 189 lb 1.6 oz (85.8 kg), SpO2 90%.    General: Alert and oriented in no apparent distress. Chronic oxygen therapy.   HEENT: No focal deficits.  Neck: normal ROM, above clavicle JVD present.   Cardiac: Regular rate and rhythm, S1, S2 normal, no  rub or gallop.  Lungs: normal effort, no audible wheezing present, coarse breath sounds.   Abdomen: Soft, non-tender.   Extremities: minimal edema.  Peripheral pulses are 2+.  Neurologic: Alert and oriented, normal affect.  Skin: Warm and dry.     Laboratory/Data:      Labs:     Lab Results   Component Value Date    INR 2.10 (H) 2021    INR 1.83 (H) 2021    INR 2.2 (A) 2021     No results for input(s): \"BNPML\" in the last 168 hours.  BUN (mg/dL)   Date Value   2024 46 (H)   2024 40 (H)   2024 31 (H)      Blood Urea Nitrogen (mg/dL)   Date Value   06/08/2010 19   05/14/2008 18   11/27/2007 16     Creatinine, Serum (mg/dL)   Date Value   06/08/2010 1.25   05/14/2008 1.10   11/27/2007 1.1     Creatinine (mg/dL)   Date Value   12/27/2024 1.83 (H)   12/26/2024 1.87 (H)   12/25/2024 1.91 (H)       Medications:  Current Facility-Administered Medications   Medication Dose Route Frequency    potassium chloride (Klor-Con M20) tab 40 mEq  40 mEq Oral Once    furosemide (Lasix) 10 mg/mL injection 80 mg  80 mg Intravenous BID (Diuretic)    methylPREDNISolone sodium succinate (Solu-MEDROL) injection 40 mg  40 mg Intravenous Q12H    ipratropium-albuterol (Duoneb) 0.5-2.5 (3) MG/3ML inhalation solution 3 mL  3 mL Nebulization Q4H WA (5 times daily)    insulin degludec (Tresiba) 100 units/mL flextouch 5 Units  5 Units Subcutaneous Daily    sodium ferric gluconate (Ferrlecit) 125 mg in sodium chloride 0.9% 100mL IVPB premix  125 mg Intravenous Daily    atorvastatin (Lipitor) tab 10 mg  10 mg Oral Nightly    apixaban (Eliquis) tab 2.5 mg  2.5 mg Oral BID    metoprolol tartrate (Lopressor) tab 100 mg  100 mg Oral BID    tamsulosin (Flomax) cap 0.4 mg  0.4 mg Oral Daily    acetaminophen (Tylenol Extra Strength) tab 1,000 mg  1,000 mg Oral Q4H PRN    melatonin tab 3 mg  3 mg Oral Nightly PRN    glycerin-hypromellose- (Artificial Tears) 0.2-0.2-1 % ophthalmic solution 1 drop  1 drop Both Eyes QID PRN    sodium chloride (Saline Mist) 0.65 % nasal solution 1 spray  1 spray Each Nare Q3H PRN    ondansetron (Zofran) 4 MG/2ML injection 4 mg  4 mg Intravenous Q6H PRN    metoclopramide (Reglan) 5 mg/mL injection 5 mg  5 mg Intravenous Q8H PRN    polyethylene glycol (PEG 3350) (Miralax) 17 g oral packet 17 g  17 g Oral Daily PRN    sennosides (Senokot) tab 17.2 mg  17.2 mg Oral Nightly PRN    bisacodyl (Dulcolax) 10 MG rectal suppository 10 mg  10 mg Rectal Daily PRN    glucose (Dex4) 15 GM/59ML oral liquid 15 g  15 g Oral Q15 Min PRN     Or    glucose (Glutose) 40% oral gel 15 g  15 g Oral Q15 Min PRN    Or    glucose-vitamin C (Dex-4) chewable tab 4 tablet  4 tablet Oral Q15 Min PRN    Or    dextrose 50% injection 50 mL  50 mL Intravenous Q15 Min PRN    Or    glucose (Dex4) 15 GM/59ML oral liquid 30 g  30 g Oral Q15 Min PRN    Or    glucose (Glutose) 40% oral gel 30 g  30 g Oral Q15 Min PRN    Or    glucose-vitamin C (Dex-4) chewable tab 8 tablet  8 tablet Oral Q15 Min PRN    insulin aspart (NovoLOG) 100 Units/mL FlexPen 1-10 Units  1-10 Units Subcutaneous TID AC and HS    umeclidinium bromide (Incruse Ellipta) 62.5 MCG/ACT inhaler 1 puff  1 puff Inhalation Daily       Assessment and Plan:  Patient Active Problem List   Diagnosis    Essential hypertension    Lymphocytic colitis    Coronary atherosclerosis    NII (obstructive sleep apnea)    Spinal stenosis of lumbar region    Atrial fibrillation (HCC)    Mitral valve disorder    Cardiac pacemaker in situ    Hyperlipidemia    Acquired spondylolisthesis    Thrombocytopenia (HCC)    Alcoholic cirrhosis of liver without ascites (HCC)    h/o prostate cancer s/p radiation treatment    Primary pulmonary hypertension (HCC)    Type 2 diabetes mellitus with diabetic chronic kidney disease (HCC)    Pulmonary emphysema (HCC)    Hypoxemia associated with sleep    Nausea vomiting and diarrhea    Elevated bilirubin    Calculus of gallbladder with acute cholecystitis without obstruction    Pulmonary hypertension (HCC)    Diabetes mellitus type 2 in nonobese (HCC)    Acute on chronic congestive heart failure, unspecified heart failure type (HCC)    Chronic kidney disease, unspecified CKD stage     Acute on Chronic HFpEF   - per history, improving with IV diuresis  - still has signs of mild fluid overload, would continue IV diuretics, appreciate pulmonary involvement in increased oxygen requirements from baseline.   - would ideally optimize HFpEF management with MRA and SGLT 2 ideally but limited by CKD, continue  IV diuresis, transition to oral in upcoming 24 hours or so.   - trend labs, monitor I's / O's, daily weights.     Acute on chronic hypoxic respiratory failure   - secondary to HFpEF as above with mild COPD from possible viral URI   - wean to baseline oxygen therapy, treat underlying disease.     COPD   - longstanding history, follows pulmonary   - appreciate pulmonary management. Presuming combination of COPD playing a role in oxygen requirements     CKD4   - per history, diuresis, trend labs daily.     T2DM   - per history, primary management.     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 Appreciate pulmonary input  - oxygen therapy.     Reinaldo Narayan MD   Advanced Heart Failure and Transplant Cardiology   [facility] ([facility])     L3         [1]   Allergies  Allergen Reactions    Tussigon [Hycodan] NAUSEA ONLY

## 2024-12-27 NOTE — OCCUPATIONAL THERAPY NOTE
OCCUPATIONAL THERAPY ORTHO EVALUATION - INPATIENT     Room Number: 2612/2612-A  Evaluation Date: 12/27/2024  Type of Evaluation: Initial  Presenting Problem: AoC CHF    Physician Order: IP Consult to Occupational Therapy  Reason for Therapy: ADL/IADL Dysfunction and Discharge Planning    OCCUPATIONAL THERAPY ASSESSMENT   Patient is currently functioning near baseline with toileting, bathing, lower body dressing, and transfers. Prior to admission, patient's baseline is mod I to min A from [facility] staff.  Patient is requiring supervision to min A as a result of the following impairments: decreased functional strength, decreased functional reach, decreased endurance, impaired   balance, impaired coordination, decreased insight to deficits, and decreased safety awareness. Occupational Therapy will continue to follow for duration of hospitalization.    Patient will benefit from continued skilled OT Services at discharge to promote prior level of function.  Anticipate patient will return home with home health OT    History Related to Current Admission: Patient is a 84 year old male admitted on 12/24/2024 with Presenting Problem: AoC CHF. Co-Morbidities : HTN, NII, spinal stenosis, Afib, DMII, pulmonary emphysema    Recommendations for nursing staff:   Transfers: SBA and use of walker  Toileting location: toilet    EVALUATION SESSION    Patient Start of Session: supine    FUNCTIONAL TRANSFER ASSESSMENT  Commode Transfer: Stand-by Assist    BED MOBILITY  Rolling: Supervision  Supine to Sit : Stand-by Assist  Sit to Supine (OT): Stand-by Assist  Scooting: min A    BALANCE ASSESSMENT  Static Sitting: Supervision  Static Standing: Stand-by Assist    FUNCTIONAL ADL ASSESSMENT  Eating: Supervision  Grooming Seated: Minimal Assist  LB Dressing Seated: Minimal Assist  Toileting Seated: Minimal Assist    ACTIVITY TOLERANCE:   Pulse: 63  Heart Rate Source: Monitor     BP: 106/59  BP Location: Right arm  BP Method: Automatic  Patient  Position: Lying    O2 SATURATIONS  Oxygen Therapy  SPO2% on Room Air at Rest: 94  SPO2% Ambulation on Room Air: 94    Cognition  Alert, oriented x 3  Follows simple motor commands with occ repetition d/t [place]  Impaired working memory    Upper extremity  BUE AROM and strength WFL     EDUCATION PROVIDED  Patient Education : Role of Occupational Therapy; Plan of Care; Discharge Recommendations; Functional Transfer Techniques; Fall Prevention; Posture/Positioning; Proper Body Mechanics; Energy Conservation  Patient's Response to Education: Verbalized Understanding    Equipment used: RW  Demonstrates functional use, Would benefit from additional trial     Therapist comments: OT educated patient on safety,  sequencing, energy conservation, pain management, home modifications and adaptive equipment to increase independence with ADLs.    Patient able to tolerate standing during light ADLs > 5 min with no c/o fatigue.  Patient was found on 5lo2nc and o2 sats remained in the 90's throughout session, at rest and with activity.  Patient's family member in room reports patient does not typically wear the oxygen at home despite being told to use it.    Patient able to complete thorough thong-care and clothing management with min A.      Patient End of Session: Up in chair;With  staff;Needs met;Call light within reach;RN aware of session/findings;All patient questions and concerns addressed;Alarm set;Discussed recommendations with /    OCCUPATIONAL PROFILE    HOME SITUATION  Type of Home: Assisted living facility  Home Layout: Elevator  Lives With: Staff 24 hours    Toilet and Equipment: Comfort height toilet;Grab bar  Shower/Tub and Equipment: Walk-in shower;Grab bar;Shower chair  Other Equipment:  (RW)    Occupation/Status: retired  Hand Dominance: Right  Drives: No  Patient Regularly Uses: Reading glasses    Prior Level of Function: patient has assist from [facility] staff for BADLs prn, supervision during  mobility w/ use of walker.  Patient reports no falls.  Uses a w/c to the dining room for meals at times.    SUBJECTIVE   PAIN ASSESSMENT  Ratin  Location: denies       OBJECTIVE  Precautions: Bed/chair alarm  Fall Risk: High fall risk    WEIGHT BEARING RESTRICTION       ASSESSMENTS    AM-PAC '6-Clicks' Inpatient Daily Activity Short Form  -   Putting on and taking off regular lower body clothing?: A Little  -   Bathing (including washing, rinsing, drying)?: A Little  -   Toileting, which includes using toilet, bedpan or urinal? : A Little  -   Putting on and taking off regular upper body clothing?: None  -   Taking care of personal grooming such as brushing teeth?: None  -   Eating meals?: None    AM-PAC Score:  Score: 21  Approx Degree of Impairment: 32.79%  Standardized Score (AM-PAC Scale): 44.27    PLAN  OT Device Recommendations: None                ADL Goals  Patient will perform toileting with supervision and AE PRN  Patient will perform LB dressing with supervision and AE PRN    Functional Transfer Goals  Patient will perform bed mobility supine to sit with supervision  Patient will perform bed mobility sit to supine with supervision  Patient will perform toilet transfer with supervision    Additional Goals:  Patient will state precautions and maintain during ADL      Patient Evaluation Complexity Level:   Occupational Profile/Medical History MODERATE - Expanded review of history including review of medical or therapy record   Specific performance deficits impacting engagement in ADL/IADL LOW  1 - 3 performance deficits    Client Assessment/Performance Deficits LOW - No comorbidities nor modifications of tasks    Clinical Decision Making LOW - Analysis of occupational profile, problem-focused assessments, limited treatment options    Overall Complexity LOW     OT Session Time: 30 minutes  Self-Care Home Management: 10 minutes

## 2024-12-27 NOTE — PROGRESS NOTES
[facility]  Progress Note    Lisandro Harley Patient Status:  Inpatient    1940 MRN [id number]   Location [facility] 2NE-A Attending Cipriano Nieves MD   Hosp Day # 1 PCP Braydon Barrett MD     Subjective:  Lisandro Harley is a(n) 84 year old male remains afeb   States feels about the same seems in good spirits  Remains with cough-unclear if colored mucus  States uses 3 to 5 L at home for his comfort    Objective:  /55 (BP Location: Right arm)   Pulse 73   Temp 98.3 °F (36.8 °C) (Oral)   Resp 19   Ht 5' 6\" (1.676 m)   Wt 189 lb 1.6 oz (85.8 kg)   SpO2 91%   BMI 30.52 kg/m² currently on 5 L      Temp (24hrs), Av.4 °F (36.9 °C), Min:98.1 °F (36.7 °C), Max:98.7 °F (37.1 °C)      Intake/Output:    Intake/Output Summary (Last 24 hours) at 2024 0841  Last data filed at 2024 0430  Gross per 24 hour   Intake 700 ml   Output 2250 ml   Net -1550 ml       Physical Exam:   General: alert, cooperative, seems oriented.  No respiratory distress.   Head: Normocephalic, without obvious abnormality, atraumatic.   Throat: Lips, mucosa, and tongue normal.  No thrush noted.   Neck: trachea midline, no adenopathy, no thyromegaly. No JVD.   Lungs: Minimal crackles at both bases right greater than left slight dullness   Chest wall: No tenderness or deformity.   Heart: Paced murmurs noted   Abdomen: soft, non-distended, no masses, no guarding, no     Rebound.  Very protuberant but nontender no obvious hepatosplenomegaly or ascites   Extremity: Dependent edema bilaterally   Skin: No rashes or lesions.   Neurological: Alert, interactive, no focal deficits    Lab Data Review:  Recent Labs     24  1427 24  0638   WBC 9.7 7.3   HGB 11.5* 10.5*   .0 189.0     Recent Labs     24  1427 24  0638 24  0710 24  0626    140 139 136   K 3.4* 3.4* 3.3*  3.3* 3.3*  3.3*    106 104 103   CO2 25.0 25.0 26.0 24.0   BUN 35* 31* 40* 46*   CREATSERUM 1.93* 1.91*  1.87* 1.83*     No results for input(s): \"PTP\", \"INR\", \"PTT\" in the last 168 hours.    Cultures: neg panel neg urine     Radiology:  XR CHEST AP PORTABLE  (CPT=71045)    Result Date: 12/26/2024  CONCLUSION:  See above   LOCATION:  [facility]      Dictated by (CST): Damaso Najera MD on 12/26/2024 at 9:49 AM     Finalized by (CST): Damaso Najera MD on 12/26/2024 at 9:50 AM      Cxr reviewed - with increased rt basilar infiltrate ? Effusion       Medications reviewed     Assessment and Plan:   Patient Active Problem List   Diagnosis    Essential hypertension    Lymphocytic colitis    Coronary atherosclerosis    NII (obstructive sleep apnea)    Spinal stenosis of lumbar region    Atrial fibrillation (HCC)    Mitral valve disorder    Cardiac pacemaker in situ    Hyperlipidemia    Acquired spondylolisthesis    Thrombocytopenia (HCC)    Alcoholic cirrhosis of liver without ascites (HCC)    h/o prostate cancer s/p radiation treatment    Primary pulmonary hypertension (HCC)    Type 2 diabetes mellitus with diabetic chronic kidney disease (HCC)    Pulmonary emphysema (HCC)    Hypoxemia associated with sleep    Nausea vomiting and diarrhea    Elevated bilirubin    Calculus of gallbladder with acute cholecystitis without obstruction    Pulmonary hypertension (HCC)    Diabetes mellitus type 2 in nonobese (HCC)    Acute on chronic congestive heart failure, unspecified heart failure type (HCC)    Chronic kidney disease, unspecified CKD stage       Assessment:  Acute on chronic hypoxic respiratory failure secondary to COPD exacerbation  COPD exacerbation  Right lower lobe infiltrate versus pleural effusion  Acute on chronic heart failure with preserved ejection fraction  Pulmonary hypertension likely group 2 versus 3  Atrial fibrillation status post permanent pacemaker  Hypertension  Iron deficiency anemia  Diabetes mellitus    Plan:  Given slight increase in infiltrate on chest x-ray and mildly elevated Pro-Chase consider coverage for  possible evolving right lower lobe pneumonia  Close monitoring of oxygenation status, wean down to baseline oxygen as able  Ongoing bronchodilators  Continue IV steroids for now  Cardiology consulted for evaluation of heart failure, appreciate their assistance-ongoing IV diuretics  On Eliquis for A-fib controlled with metoprolol       CC     Liseth Alvarenga MD  12/27/2024  8:41 AM

## 2024-12-27 NOTE — CM/SW NOTE
Anticipated therapy need for pt at [facility] is HH.    Referral sent in Aidin. Awaiting response. Will provide pt with options list when available.     to remain available for support and/or discharge planning.    GERDA Felix  Discharge Planner  [phone]

## 2024-12-27 NOTE — PLAN OF CARE
Patient alert and oriented x 4. Up  x1 with walker. On 5L via NC (baseline per pt is 3 to 5L), Cpap when sleeping (on for 2 hours then back to NC). AV-pcaed on tele. Continent of bowel, external catheter in place. No complaints of pain, shortness of breath or chest pain/discomfort. POC : IV Lasix, IV steroid, neb tx. Fall precautions in place. Call light within reach.    Problem: CARDIOVASCULAR - ADULT  Goal: Maintains optimal cardiac output and hemodynamic stability  Description: INTERVENTIONS:  - Monitor vital signs, rhythm, and trends  - Monitor for bleeding, hypotension and signs of decreased cardiac output  - Evaluate effectiveness of vasoactive medications to optimize hemodynamic stability  - Monitor arterial and/or venous puncture sites for bleeding and/or hematoma  - Assess quality of pulses, skin color and temperature  - Assess for signs of decreased coronary artery perfusion - ex. Angina  - Evaluate fluid balance, assess for edema, trend weights  Outcome: Progressing  Goal: Absence of cardiac arrhythmias or at baseline  Description: INTERVENTIONS:  - Continuous cardiac monitoring, monitor vital signs, obtain 12 lead EKG if indicated  - Evaluate effectiveness of antiarrhythmic and heart rate control medications as ordered  - Initiate emergency measures for life threatening arrhythmias  - Monitor electrolytes and administer replacement therapy as ordered  Outcome: Progressing     Problem: RESPIRATORY - ADULT  Goal: Achieves optimal ventilation and oxygenation  Description: INTERVENTIONS:  - Assess for changes in respiratory status  - Assess for changes in mentation and behavior  - Position to facilitate oxygenation and minimize respiratory effort  - Oxygen supplementation based on oxygen saturation or ABGs  - Provide Smoking Cessation handout, if applicable  - Encourage broncho-pulmonary hygiene including cough, deep breathe, Incentive Spirometry  - Assess the need for suctioning and perform as needed  -  Assess and instruct to report SOB or any respiratory difficulty  - Respiratory Therapy support as indicated  - Manage/alleviate anxiety  - Monitor for signs/symptoms of CO2 retention  Outcome: Progressing     Problem: Patient/Family Goals  Goal: Patient/Family Long Term Goal  Description: Patient's Long Term Goal: stay healthy at home  Interventions:  -take medications as prescribed  -attend follow up appointments as recommended  -diet and exercise as advised  -report new or worsening symptoms to physician  - See additional Care Plan goals for specific interventions  Outcome: Progressing  Goal: Patient/Family Short Term Goal  Description: Patient's Short Term Goal: improvement in shortness of breath  Interventions:   -IV lasix  -wean O2  -daily weights/strict intake and ouput  - See additional Care Plan goals for specific interventions  Outcome: Progressing

## 2024-12-27 NOTE — PROGRESS NOTES
[facility]   part of [facility]     Hospitalist Progress Note     Lisandro Harley Patient Status:  Observation    1940 MRN [id number]   [facility] 2NE-A Attending Cipriano Nieves MD   Hosp Day # 1 PCP Braydon Barrett MD     Chief Complaint: dysnea     Subjective:     Patient feels ok.      Objective:    Review of Systems:   ROS completed; pertinent positive and negatives stated in subjective.    Vital signs:  Temp:  [98.1 °F (36.7 °C)-98.6 °F (37 °C)] 98.1 °F (36.7 °C)  Pulse:  [62-75] 63  Resp:  [17-20] 20  BP: (106-125)/(52-62) 106/59  SpO2:  [84 %-95 %] 90 %    Physical Exam:    General: No acute distress  Respiratory: dec AE ,scattered wheezes  Cardiovascular: S1, S2., no edema  Abdomen: Soft  Neuro: No new focal deficits      Diagnostic Data:    Labs:  Recent Labs   Lab 24  1427 24  0638   WBC 9.7 7.3   HGB 11.5* 10.5*   MCV 83.6 85.6   .0 189.0       Recent Labs   Lab 24  1427 24  0638 24  0710 24  0626   * 243* 148* 237*   BUN 35* 31* 40* 46*   CREATSERUM 1.93* 1.91* 1.87* 1.83*   CA 8.5* 8.6* 8.7 8.7   ALB 4.1  --   --   --     140 139 136   K 3.4* 3.4* 3.3*  3.3* 3.3*  3.3*    106 104 103   CO2 25.0 25.0 26.0 24.0   ALKPHO 106  --   --   --    AST 16  --   --   --    ALT 13  --   --   --    BILT 1.1  --   --   --    TP 7.1  --   --   --        Estimated Glomerular Filtration Rate: 35.9 mL/min/1.73m2 (A) (by CKD-EPI based on SCr of 1.83 mg/dL (H)).     Recent Labs   Lab 24  1427   TROPHS 10       No results for input(s): \"PTP\", \"INR\" in the last 168 hours.           Imaging: Imaging data reviewed in Epic.    Medications:    cefTRIAXone  2 g Intravenous Q24H    furosemide  80 mg Intravenous BID (Diuretic)    methylPREDNISolone  40 mg Intravenous Q12H    ipratropium-albuterol  3 mL Nebulization Q4H WA (5 times daily)    insulin degludec  5 Units Subcutaneous Daily    sodium ferric gluconate  125 mg Intravenous  Daily    atorvastatin  10 mg Oral Nightly    apixaban  2.5 mg Oral BID    metoprolol tartrate  100 mg Oral BID    tamsulosin  0.4 mg Oral Daily    insulin aspart  1-10 Units Subcutaneous TID AC and HS    umeclidinium bromide  1 puff Inhalation Daily       Assessment & Plan:     #Acute on chronic hypoxic resp failure  Cont. O2 and wean as able  Per OP pulm notes on 2L @ rest and 4-6L with activity    #Presumptive PNA  Start rocehin  SLP evaluated: regular, thin liquids, no restrictions.      #Acute HFpEF w/ moderate pHTN  Cont diuresis    #AECOPD  Steroids  Inhalers  BD protocol    #NSVT  Cont. BB  Replace lytes and monitor     #CKD IV  Monitor with diuresis     #A. Fib s/p PPM  Cont. Rate control  Cont. OAC     #HTN     #DM2 w/ steroid hyperglycemia  Cont. Degludac and ICF  Add NPH and IC coverage    #Fe de Anemia  Fe studies noted  IV iron    Dispo: as above.  Discussed with pulm.  Add ABX. Cont. Steroids, diuretics, nebs.  Feels better though still w/ high O2 needs.  Possibly at new baseline    Cipriano Nieves MD    Supplementary Documentation:     Quality:    DVT Mechanical Prophylaxis:     Early ambuation  DVT Pharmacologic Prophylaxis   Medication    apixaban (Eliquis) tab 2.5 mg                Code Status: Full Code  St: External urinary catheter in place  St Duration (in days):   Central line:    XIAO: 12/28/2024      Discharge is dependent on: clinical stability  At this point Mr. Harley is expected to be discharge to: home    The 21st Century Cures Act makes medical notes like these available to patients in the interest of transparency. Please be advised this is a medical document. Medical documents are intended to carry relevant information, facts as evident, and the clinical opinion of the practitioner. The medical note is intended as peer to peer communication and may appear blunt or direct. It is written in medical language and may contain abbreviations or verbiage that are unfamiliar.

## 2024-12-28 PROBLEM — J44.9 CHRONIC OBSTRUCTIVE PULMONARY DISEASE (HCC): Status: ACTIVE | Noted: 2023-02-06

## 2024-12-28 LAB
ALBUMIN SERPL-MCNC: 3.9 G/DL (ref 3.2–4.8)
ALBUMIN/GLOB SERPL: 1.3 {RATIO} (ref 1–2)
ALP LIVER SERPL-CCNC: 130 U/L
ALT SERPL-CCNC: 74 U/L
ANION GAP SERPL CALC-SCNC: 12 MMOL/L (ref 0–18)
AST SERPL-CCNC: 58 U/L (ref ?–34)
BASOPHILS # BLD AUTO: 0.01 X10(3) UL (ref 0–0.2)
BASOPHILS NFR BLD AUTO: 0.1 %
BILIRUB SERPL-MCNC: 0.4 MG/DL (ref 0.2–1.1)
BUN BLD-MCNC: 57 MG/DL (ref 9–23)
CALCIUM BLD-MCNC: 8.7 MG/DL (ref 8.7–10.4)
CHLORIDE SERPL-SCNC: 101 MMOL/L (ref 98–112)
CO2 SERPL-SCNC: 24 MMOL/L (ref 21–32)
CREAT BLD-MCNC: 1.95 MG/DL
EGFRCR SERPLBLD CKD-EPI 2021: 33 ML/MIN/1.73M2 (ref 60–?)
EOSINOPHIL # BLD AUTO: 0 X10(3) UL (ref 0–0.7)
EOSINOPHIL NFR BLD AUTO: 0 %
ERYTHROCYTE [DISTWIDTH] IN BLOOD BY AUTOMATED COUNT: 16.8 %
GLOBULIN PLAS-MCNC: 3.1 G/DL (ref 2–3.5)
GLUCOSE BLD-MCNC: 269 MG/DL (ref 70–99)
GLUCOSE BLD-MCNC: 279 MG/DL (ref 70–99)
GLUCOSE BLD-MCNC: 288 MG/DL (ref 70–99)
GLUCOSE BLD-MCNC: 294 MG/DL (ref 70–99)
GLUCOSE BLD-MCNC: 348 MG/DL (ref 70–99)
HCT VFR BLD AUTO: 34.3 %
HGB BLD-MCNC: 11.2 G/DL
IMM GRANULOCYTES # BLD AUTO: 0.08 X10(3) UL (ref 0–1)
IMM GRANULOCYTES NFR BLD: 0.8 %
LYMPHOCYTES # BLD AUTO: 0.48 X10(3) UL (ref 1–4)
LYMPHOCYTES NFR BLD AUTO: 4.5 %
MCH RBC QN AUTO: 26.5 PG (ref 26–34)
MCHC RBC AUTO-ENTMCNC: 32.7 G/DL (ref 31–37)
MCV RBC AUTO: 81.1 FL
MONOCYTES # BLD AUTO: 0.52 X10(3) UL (ref 0.1–1)
MONOCYTES NFR BLD AUTO: 4.9 %
NEUTROPHILS # BLD AUTO: 9.56 X10 (3) UL (ref 1.5–7.7)
NEUTROPHILS # BLD AUTO: 9.56 X10(3) UL (ref 1.5–7.7)
NEUTROPHILS NFR BLD AUTO: 89.7 %
OSMOLALITY SERPL CALC.SUM OF ELEC: 309 MOSM/KG (ref 275–295)
PLATELET # BLD AUTO: 199 10(3)UL (ref 150–450)
POTASSIUM SERPL-SCNC: 3.3 MMOL/L (ref 3.5–5.1)
POTASSIUM SERPL-SCNC: 3.3 MMOL/L (ref 3.5–5.1)
PROT SERPL-MCNC: 7 G/DL (ref 5.7–8.2)
RBC # BLD AUTO: 4.23 X10(6)UL
SODIUM SERPL-SCNC: 137 MMOL/L (ref 136–145)
WBC # BLD AUTO: 10.7 X10(3) UL (ref 4–11)

## 2024-12-28 PROCEDURE — 99232 SBSQ HOSP IP/OBS MODERATE 35: CPT | Performed by: HOSPITALIST

## 2024-12-28 PROCEDURE — 99232 SBSQ HOSP IP/OBS MODERATE 35: CPT | Performed by: INTERNAL MEDICINE

## 2024-12-28 RX ORDER — IPRATROPIUM BROMIDE AND ALBUTEROL SULFATE 2.5; .5 MG/3ML; MG/3ML
3 SOLUTION RESPIRATORY (INHALATION)
Status: DISCONTINUED | OUTPATIENT
Start: 2024-12-28 | End: 2024-12-29

## 2024-12-28 RX ORDER — FUROSEMIDE 10 MG/ML
40 INJECTION INTRAMUSCULAR; INTRAVENOUS ONCE
Status: COMPLETED | OUTPATIENT
Start: 2024-12-28 | End: 2024-12-28

## 2024-12-28 RX ORDER — PREDNISONE 20 MG/1
40 TABLET ORAL
Status: DISCONTINUED | OUTPATIENT
Start: 2024-12-29 | End: 2024-12-29

## 2024-12-28 RX ORDER — TORSEMIDE 20 MG/1
40 TABLET ORAL
Status: DISCONTINUED | OUTPATIENT
Start: 2024-12-29 | End: 2024-12-29

## 2024-12-28 NOTE — PLAN OF CARE
Pt. Is alert and oriented times 4. Forgetful at times and hard of hearing. AV paced on tele with frequent to occasional PVC's. Pt. Denies any chest pain. Shortness of breath with exertion. Weaned to 3L at rest. Pt. Breathing better while sitting up in chair. Visibly short of breath with exertion.   Pt. Has primofit in place.   Spoke with cardiology and Lasix reduced this AM.   Pt. And family updated on plan of care. Call light within reach.   Able to ambulate 50 ft in andrade and tolerated fairly well. Requiring 6L of oxygen with ambulation   Problem: CARDIOVASCULAR - ADULT  Goal: Maintains optimal cardiac output and hemodynamic stability  Description: INTERVENTIONS:  - Monitor vital signs, rhythm, and trends  - Monitor for bleeding, hypotension and signs of decreased cardiac output  - Evaluate effectiveness of vasoactive medications to optimize hemodynamic stability  - Monitor arterial and/or venous puncture sites for bleeding and/or hematoma  - Assess quality of pulses, skin color and temperature  - Assess for signs of decreased coronary artery perfusion - ex. Angina  - Evaluate fluid balance, assess for edema, trend weights  Outcome: Progressing  Goal: Absence of cardiac arrhythmias or at baseline  Description: INTERVENTIONS:  - Continuous cardiac monitoring, monitor vital signs, obtain 12 lead EKG if indicated  - Evaluate effectiveness of antiarrhythmic and heart rate control medications as ordered  - Initiate emergency measures for life threatening arrhythmias  - Monitor electrolytes and administer replacement therapy as ordered  Outcome: Progressing     Problem: RESPIRATORY - ADULT  Goal: Achieves optimal ventilation and oxygenation  Description: INTERVENTIONS:  - Assess for changes in respiratory status  - Assess for changes in mentation and behavior  - Position to facilitate oxygenation and minimize respiratory effort  - Oxygen supplementation based on oxygen saturation or ABGs  - Provide Smoking Cessation  handout, if applicable  - Encourage broncho-pulmonary hygiene including cough, deep breathe, Incentive Spirometry  - Assess the need for suctioning and perform as needed  - Assess and instruct to report SOB or any respiratory difficulty  - Respiratory Therapy support as indicated  - Manage/alleviate anxiety  - Monitor for signs/symptoms of CO2 retention  Outcome: Progressing     Problem: Patient/Family Goals  Goal: Patient/Family Long Term Goal  Description: Patient's Long Term Goal: stay healthy at home  Interventions:  -take medications as prescribed  -attend follow up appointments as recommended  -diet and exercise as advised  -report new or worsening symptoms to physician  - See additional Care Plan goals for specific interventions  Outcome: Progressing  Goal: Patient/Family Short Term Goal  Description: Patient's Short Term Goal: improvement in shortness of breath  Interventions:   -IV lasix  -wean O2  -daily weights/strict intake and ouput  - See additional Care Plan goals for specific interventions  Outcome: Progressing     Problem: PAIN - ADULT  Goal: Verbalizes/displays adequate comfort level or patient's stated pain goal  Description: INTERVENTIONS:  - Encourage pt to monitor pain and request assistance  - Assess pain using appropriate pain scale  - Administer analgesics based on type and severity of pain and evaluate response  - Implement non-pharmacological measures as appropriate and evaluate response  - Consider cultural and social influences on pain and pain management  - Manage/alleviate anxiety  - Utilize distraction and/or relaxation techniques  - Monitor for opioid side effects  - Notify MD/LIP if interventions unsuccessful or patient reports new pain  - Anticipate increased pain with activity and pre-medicate as appropriate  Outcome: Progressing     Problem: RISK FOR INFECTION - ADULT  Goal: Absence of fever/infection during anticipated neutropenic period  Description: INTERVENTIONS  - Monitor  WBC  - Administer growth factors as ordered  - Implement neutropenic guidelines  Outcome: Progressing     Problem: SAFETY ADULT - FALL  Goal: Free from fall injury  Description: INTERVENTIONS:  - Assess pt frequently for physical needs  - Identify cognitive and physical deficits and behaviors that affect risk of falls.  - [place] fall precautions as indicated by assessment.  - Educate pt/family on patient safety including physical limitations  - Instruct pt to call for assistance with activity based on assessment  - Modify environment to reduce risk of injury  - Provide assistive devices as appropriate  - Consider OT/PT consult to assist with strengthening/mobility  - Encourage toileting schedule  Outcome: Progressing     Problem: DISCHARGE PLANNING  Goal: Discharge to home or other facility with appropriate resources  Description: INTERVENTIONS:  - Identify barriers to discharge w/pt and caregiver  - Include patient/family/discharge partner in discharge planning  - Arrange for needed discharge resources and transportation as appropriate  - Identify discharge learning needs (meds, wound care, etc)  - Arrange for interpreters to assist at discharge as needed  - Consider post-discharge preferences of patient/family/discharge partner  - Complete POLST form as appropriate  - Assess patient's ability to be responsible for managing their own health  - Refer to Case Management Department for coordinating discharge planning if the patient needs post-hospital services based on physician/LIP order or complex needs related to functional status, cognitive ability or social support system  Outcome: Progressing     Problem: GENITOURINARY - ADULT  Goal: Absence of urinary retention  Description: INTERVENTIONS:  - Assess patient’s ability to void and empty bladder  - Monitor intake/output and perform bladder scan as needed  - Follow urinary retention protocol/standard of care  - Consider collaborating with pharmacy to review  patient's medication profile  - Implement strategies to promote bladder emptying  Outcome: Progressing     Problem: SKIN/TISSUE INTEGRITY - ADULT  Goal: Skin integrity remains intact  Description: INTERVENTIONS  - Assess and document risk factors for pressure ulcer development  - Assess and document skin integrity  - Monitor for areas of redness and/or skin breakdown  - Initiate interventions, skin care algorithm/standards of care as needed  Outcome: Progressing     Problem: HEMATOLOGIC - ADULT  Goal: Maintains hematologic stability  Description: INTERVENTIONS  - Assess for signs and symptoms of bleeding or hemorrhage  - Monitor labs and vital signs for trends  - Administer supportive blood products/factors, fluids and medications as ordered and appropriate  - Administer supportive blood products/factors as ordered and appropriate  Outcome: Progressing

## 2024-12-28 NOTE — PROGRESS NOTES
12/28/24 1505   Mobility   O2 walk? Yes   SPO2% on Room Air at Rest 84   SPO2% on Oxygen at Rest 3   At rest oxygen flow (liters per minute) 92   SPO2% Ambulation on Oxygen 92   Ambulation oxygen flow (liters per minute) 6     O2 walk

## 2024-12-28 NOTE — PROGRESS NOTES
Progress Note  Lisandro Harley Patient Status:  Inpatient    1940 MRN [id number]   Location [facility] 2NE-A Attending Cipriano Nieves MD   Hosp Day # 2 PCP Braydon Barrett MD     Subjective:  States breathing feels about the same. States he occasionally decreases the amount of torsemide he takes at home d/t urinary frequency/incontinence. Typically on 3-5L O2 at home - currently on 5L NC    Objective:  /42 (BP Location: Right arm)   Pulse 96   Temp 98.1 °F (36.7 °C) (Axillary)   Resp 19   Ht 5' 6\" (1.676 m)   Wt 204 lb 12.9 oz (92.9 kg)   SpO2 90%   BMI 33.06 kg/m²     Telemetry: paced, PVC's    Intake/Output:    Intake/Output Summary (Last 24 hours) at 2024 0900  Last data filed at 2024 0850  Gross per 24 hour   Intake 1130 ml   Output 1750 ml   Net -620 ml       Last 3 Weights   24 0843 204 lb 12.9 oz (92.9 kg)   24 0430 189 lb 1.6 oz (85.8 kg)   24 0500 201 lb 1 oz (91.2 kg)   24 0447 201 lb 1 oz (91.2 kg)   24 0505 205 lb 7.5 oz (93.2 kg)   24 1809 198 lb 6.6 oz (90 kg)   24 1715 198 lb 6.6 oz (90 kg)   24 1423 200 lb (90.7 kg)   24 1009 195 lb (88.5 kg)   10/29/24 0308 195 lb (88.5 kg)       Labs:  Recent Labs   Lab 24  0638 24  0710 24  0626 24  1322   * 148* 237*  --    BUN 31* 40* 46*  --    CREATSERUM 1.91* 1.87* 1.83*  --    EGFRCR 34* 35* 36*  --    CA 8.6* 8.7 8.7  --     139 136  --    K 3.4* 3.3*  3.3* 3.3*  3.3* 3.6    104 103  --    CO2 25.0 26.0 24.0  --      Recent Labs   Lab 24  1427 24  0638   RBC 4.33 4.02   HGB 11.5* 10.5*   HCT 36.2* 34.4*   MCV 83.6 85.6   MCH 26.6 26.1   MCHC 31.8 30.5*   RDW 16.6 16.7   NEPRELIM 8.07* 5.56   WBC 9.7 7.3   .0 189.0         Recent Labs   Lab 24  1427   TROPHS 10       Diagnostics:  No results found.   Review of Systems   Cardiovascular:  Positive for dyspnea on exertion and leg swelling. Negative for  chest pain, orthopnea and paroxysmal nocturnal dyspnea.   Respiratory:  Positive for cough and shortness of breath.        Physical Exam:    Gen: alert, oriented x 3, NAD  Heent: pupils equal, reactive. Mucous membranes moist.   Neck: no jvd  Cardiac: regular rate and rhythm, normal S1,S2, + LLSB murmur, no clicks, rub or gallop  Lungs: coarse, no wheezing, bilateral crackles  Abd: soft, NT/ND +bs  Ext: BLE L>R edema (LLE chronically more edema than R)  Skin: Warm, dry  Neuro: No focal deficits      Medications:     cefTRIAXone  2 g Intravenous Q24H    insulin aspart  1-68 Units Subcutaneous TID CC    insulin NPH-human isophane  12 Units Subcutaneous BID    furosemide  80 mg Intravenous BID (Diuretic)    methylPREDNISolone  40 mg Intravenous Q12H    ipratropium-albuterol  3 mL Nebulization Q4H WA (5 times daily)    insulin degludec  5 Units Subcutaneous Daily    sodium ferric gluconate  125 mg Intravenous Daily    atorvastatin  10 mg Oral Nightly    apixaban  2.5 mg Oral BID    metoprolol tartrate  100 mg Oral BID    tamsulosin  0.4 mg Oral Daily    insulin aspart  1-10 Units Subcutaneous TID AC and HS    umeclidinium bromide  1 puff Inhalation Daily       Assessment:  Acute on Chronic HFpEF, Pulmonary HTN (Group 2/3)  proBNP 2901, CXR w/RLL opacity, pulm edema  Echo 4/2024 LVEF 55-60, no RWMA, biatrial dilation, normal RV, mild-mod MR, mild-mod TR, PASP 57mmHg  Diuresis w/IV Lasix 80mg BID  Net neg 4L, wt appears inaccurate  GDMT - not on MRA, SGLT2i w/CKD  Acute on Chronic Hypoxic Respiratory Failure - Multifactorial (COPD Exacerbation/Poss PNA/HF/Pulm HTN)  Decreasing O2 requirements - On 3L NC (Baseline   IV antibx  Steroids, nebs, inhaler  Pulm following  Chronic Atrial Fibrillation, Hx SSS [employer] DC PPM  Paced on tele  On lopressor 100mg BID  On eliquis 2.5mg po BID (reduced for age, renal)  CKD - Cr stable  BMP pending  Hypertension - controlled  DMII - SSI per primary   Chronic Anemia -  stable    Plan:  Appears to be improving from volume standpoint on IV Lasix 80 BID - await BMP this am. Still on 5L O2 - higher end of his baseline at home.   Hopefully transition back to oral torsemide 40mg po BID later today or tomorrow   Strict I&O, daily weights (standing if possible), daily BMP  Antibiotics, steroids, nebs per pulmonology    ADDENDUM 1035:  Reviewed BMP. Slight uptrend in Cr. Will decreased IV lasix to 40mg today and start oral torsemide tomorrow.  Plan of care discussed with patient, RN.    Cardiology attending:  Pt seen and independently examined.  Note edited.  Plan of care performed by myself in its entirety.  Not much change in weight, but negative fluid balance.  Looks more prerenal today, so agree with 40 mg IV lasix once today and see what chems look like in the morning.    Willard Cramer MD  L3    Sowmya Sanon, APRN  12/28/2024  9:00 AM  [phone] [facility]  [phone] [facility]

## 2024-12-28 NOTE — PLAN OF CARE
Patient alert and oriented x 4. On cpap now, during day with oxygen, AV Paced on cardiac monitor. Patient denies any chest pain or chest discomfort at this time. Patient voids, last BM 12/27. Diabetic wise  blood sugar high ,house DR aware, extra dose of s/s and NPH insulin dose adjusted , DM education provided and made comfortable, on iv Steroid and Neb TX, breathing much comfortable, CPAP  is on and tolerating so far , Plan of care updated, all questions answered. Safety precautions in place. Bed alarm on. Will continue to monitor tele/labs/vital signs closely.     Plan:   Iv steroid, DM mgt , wean oxygen, NEB TX    Problem: CARDIOVASCULAR - ADULT  Goal: Maintains optimal cardiac output and hemodynamic stability  Description: INTERVENTIONS:  - Monitor vital signs, rhythm, and trends  - Monitor for bleeding, hypotension and signs of decreased cardiac output  - Evaluate effectiveness of vasoactive medications to optimize hemodynamic stability  - Monitor arterial and/or venous puncture sites for bleeding and/or hematoma  - Assess quality of pulses, skin color and temperature  - Assess for signs of decreased coronary artery perfusion - ex. Angina  - Evaluate fluid balance, assess for edema, trend weights  Outcome: Progressing  Goal: Absence of cardiac arrhythmias or at baseline  Description: INTERVENTIONS:  - Continuous cardiac monitoring, monitor vital signs, obtain 12 lead EKG if indicated  - Evaluate effectiveness of antiarrhythmic and heart rate control medications as ordered  - Initiate emergency measures for life threatening arrhythmias  - Monitor electrolytes and administer replacement therapy as ordered  Outcome: Progressing     Problem: RESPIRATORY - ADULT  Goal: Achieves optimal ventilation and oxygenation  Description: INTERVENTIONS:  - Assess for changes in respiratory status  - Assess for changes in mentation and behavior  - Position to facilitate oxygenation and minimize respiratory effort  - Oxygen  supplementation based on oxygen saturation or ABGs  - Provide Smoking Cessation handout, if applicable  - Encourage broncho-pulmonary hygiene including cough, deep breathe, Incentive Spirometry  - Assess the need for suctioning and perform as needed  - Assess and instruct to report SOB or any respiratory difficulty  - Respiratory Therapy support as indicated  - Manage/alleviate anxiety  - Monitor for signs/symptoms of CO2 retention  Outcome: Progressing     Problem: Patient/Family Goals  Goal: Patient/Family Long Term Goal  Description: Patient's Long Term Goal: stay healthy at home  Interventions:  -take medications as prescribed  -attend follow up appointments as recommended  -diet and exercise as advised  -report new or worsening symptoms to physician  - See additional Care Plan goals for specific interventions  Outcome: Progressing  Goal: Patient/Family Short Term Goal  Description: Patient's Short Term Goal: improvement in shortness of breath  Interventions:   -IV lasix  -wean O2  -daily weights/strict intake and ouput  - See additional Care Plan goals for specific interventions  Outcome: Progressing     Problem: PAIN - ADULT  Goal: Verbalizes/displays adequate comfort level or patient's stated pain goal  Description: INTERVENTIONS:  - Encourage pt to monitor pain and request assistance  - Assess pain using appropriate pain scale  - Administer analgesics based on type and severity of pain and evaluate response  - Implement non-pharmacological measures as appropriate and evaluate response  - Consider cultural and social influences on pain and pain management  - Manage/alleviate anxiety  - Utilize distraction and/or relaxation techniques  - Monitor for opioid side effects  - Notify MD/LIP if interventions unsuccessful or patient reports new pain  - Anticipate increased pain with activity and pre-medicate as appropriate  Outcome: Progressing     Problem: RISK FOR INFECTION - ADULT  Goal: Absence of fever/infection  during anticipated neutropenic period  Description: INTERVENTIONS  - Monitor WBC  - Administer growth factors as ordered  - Implement neutropenic guidelines  Outcome: Progressing     Problem: SAFETY ADULT - FALL  Goal: Free from fall injury  Description: INTERVENTIONS:  - Assess pt frequently for physical needs  - Identify cognitive and physical deficits and behaviors that affect risk of falls.  - [place] fall precautions as indicated by assessment.  - Educate pt/family on patient safety including physical limitations  - Instruct pt to call for assistance with activity based on assessment  - Modify environment to reduce risk of injury  - Provide assistive devices as appropriate  - Consider OT/PT consult to assist with strengthening/mobility  - Encourage toileting schedule  Outcome: Progressing     Problem: DISCHARGE PLANNING  Goal: Discharge to home or other facility with appropriate resources  Description: INTERVENTIONS:  - Identify barriers to discharge w/pt and caregiver  - Include patient/family/discharge partner in discharge planning  - Arrange for needed discharge resources and transportation as appropriate  - Identify discharge learning needs (meds, wound care, etc)  - Arrange for interpreters to assist at discharge as needed  - Consider post-discharge preferences of patient/family/discharge partner  - Complete POLST form as appropriate  - Assess patient's ability to be responsible for managing their own health  - Refer to Case Management Department for coordinating discharge planning if the patient needs post-hospital services based on physician/LIP order or complex needs related to functional status, cognitive ability or social support system  Outcome: Progressing     Problem: GENITOURINARY - ADULT  Goal: Absence of urinary retention  Description: INTERVENTIONS:  - Assess patient’s ability to void and empty bladder  - Monitor intake/output and perform bladder scan as needed  - Follow urinary retention  protocol/standard of care  - Consider collaborating with pharmacy to review patient's medication profile  - Implement strategies to promote bladder emptying  Outcome: Progressing     Problem: METABOLIC/FLUID AND ELECTROLYTES - ADULT  Goal: Glucose maintained within prescribed range  Description: INTERVENTIONS:  - Monitor Blood Glucose as ordered  - Assess for signs and symptoms of hyperglycemia and hypoglycemia  - Administer ordered medications to maintain glucose within target range  - Assess barriers to adequate nutritional intake and initiate nutrition consult as needed  - Instruct patient on self management of diabetes  Outcome: Progressing  Goal: Electrolytes maintained within normal limits  Description: INTERVENTIONS:  - Monitor labs and rhythm and assess patient for signs and symptoms of electrolyte imbalances  - Administer electrolyte replacement as ordered  - Monitor response to electrolyte replacements, including rhythm and repeat lab results as appropriate  - Fluid restriction as ordered  - Instruct patient on fluid and nutrition restrictions as appropriate  Outcome: Progressing  Goal: Hemodynamic stability and optimal renal function maintained  Description: INTERVENTIONS:  - Monitor labs and assess for signs and symptoms of volume excess or deficit  - Monitor intake, output and patient weight  - Monitor urine specific gravity, serum osmolarity and serum sodium as indicated or ordered  - Monitor response to interventions for patient's volume status, including labs, urine output, blood pressure (other measures as available)  - Encourage oral intake as appropriate  - Instruct patient on fluid and nutrition restrictions as appropriate  Outcome: Progressing     Problem: SKIN/TISSUE INTEGRITY - ADULT  Goal: Skin integrity remains intact  Description: INTERVENTIONS  - Assess and document risk factors for pressure ulcer development  - Assess and document skin integrity  - Monitor for areas of redness and/or skin  breakdown  - Initiate interventions, skin care algorithm/standards of care as needed  Outcome: Progressing     Problem: HEMATOLOGIC - ADULT  Goal: Maintains hematologic stability  Description: INTERVENTIONS  - Assess for signs and symptoms of bleeding or hemorrhage  - Monitor labs and vital signs for trends  - Administer supportive blood products/factors, fluids and medications as ordered and appropriate  - Administer supportive blood products/factors as ordered and appropriate  Outcome: Progressing

## 2024-12-28 NOTE — DIETARY NOTE
[facility]   part of [facility]   CLINICAL NUTRITION    Lisandro Harley     Admitting diagnosis:  Chronic kidney disease, unspecified CKD stage [N18.9]  Acute on chronic congestive heart failure, unspecified heart failure type (HCC) [I50.9]    Ht: 167.6 cm (5' 6\")  Wt: 85.8 kg (189 lb 1.6 oz).   Body mass index is 30.52 kg/m².  IBW: 64.5 kg    Wt Readings from Last 6 Encounters:   12/27/24 85.8 kg (189 lb 1.6 oz)   11/07/24 88.5 kg (195 lb)   10/29/24 88.5 kg (195 lb)   08/20/24 88.5 kg (195 lb)   08/19/24 88.5 kg (195 lb)   04/10/24 90.7 kg (200 lb)        Labs/Meds reviewed  -Glu:237, K+:3.3, BUN:46, Cr:1.83, GFR:36, elevated BNP  -Lasix, Insulin, IV abx, Solumedrol, Kcl:40 meq, IV Iron    Diet:       Procedures    Carbohydrate controlled diet 1800 kcal/60 grams; Is Patient on Accuchecks? No     Percent Meals Eaten (last 3 days)       Date/Time Percent Meals Eaten (%)    12/25/24 0800 100 %    12/25/24 1233 85 %    12/26/24 0835 100 %    12/26/24 1718 100 %    12/27/24 0955 100 %    12/27/24 1230 100 %          Pt chart reviewed d/t nutrition consult for HF.    Discussed rationale for low sodium diet. Provided handout on Low Sodium Nutrition Therapy w/ list of foods recommended, foods to avoid/limit, sample menus, and list of salt free seasoning alternatives. Pt stated he resides an an Independent Living facility where meals are provided.  Educated by HF Nurse  on 12/26.    Patient reports good appetite at this time.  Nursing notes reports Percent Meals Eaten (%): 100 % intake for last meal.  Noted SLP eval on 12/26 w/ diet recs for Regular w/ Thin Liquids.  Tolerating po diet without diarrhea, emesis, or constipation. Last BM:12/26.  R buttock; redness, fragile. Edema to LLE per RN documentation.    No significant weight changes noted per EMR hx. Wt trending down this admit d/t diuresis.    PMH:HTN, HLD, CAD, CHF, DM, COPD, on 3-4 L O2, remote tobacco use, CKD4, hx of prostate CA.     Patient is at  low nutrition risk at this time.    Please consult if patient status changes or nutrition issues arise.    June Barragan MS, RD, LDN  Clinical Dietitian  Ext:[phone]

## 2024-12-28 NOTE — PROGRESS NOTES
[facility]   part of [facility]     Hospitalist Progress Note     Lisandro Harley Patient Status:  Observation    1940 MRN [id number]   [facility] 2NE-A Attending Cipriano Nieves MD   Hosp Day # 2 PCP Braydon Barrett MD     Chief Complaint: dysnea     Subjective:     Patient feels well.      Objective:    Review of Systems:   ROS completed; pertinent positive and negatives stated in subjective.    Vital signs:  Temp:  [97.8 °F (36.6 °C)-98.6 °F (37 °C)] 98.1 °F (36.7 °C)  Pulse:  [60-96] 96  Resp:  [19-20] 19  BP: (106-126)/(42-66) 113/42  SpO2:  [84 %-94 %] 90 %    Physical Exam:    General: No acute distress  Respiratory: dec AE   Cardiovascular: S1, S2., trace edema  Abdomen: Soft  Neuro: No new focal deficits      Diagnostic Data:    Labs:  Recent Labs   Lab 24  1427 24  0638 24  0816   WBC 9.7 7.3 10.7   HGB 11.5* 10.5* 11.2*   MCV 83.6 85.6 81.1   .0 189.0 199.0       Recent Labs   Lab 24  1427 24  0638 24  0710 24  0626 24  1322 24  0816   *   < > 148* 237*  --  269*   BUN 35*   < > 40* 46*  --  57*   CREATSERUM 1.93*   < > 1.87* 1.83*  --  1.95*   CA 8.5*   < > 8.7 8.7  --  8.7   ALB 4.1  --   --   --   --  3.9      < > 139 136  --  137   K 3.4*   < > 3.3*  3.3* 3.3*  3.3* 3.6 3.3*  3.3*      < > 104 103  --  101   CO2 25.0   < > 26.0 24.0  --  24.0   ALKPHO 106  --   --   --   --  130*   AST 16  --   --   --   --  58*   ALT 13  --   --   --   --  74*   BILT 1.1  --   --   --   --  0.4   TP 7.1  --   --   --   --  7.0    < > = values in this interval not displayed.       Estimated Glomerular Filtration Rate: 33.3 mL/min/1.73m2 (A) (by CKD-EPI based on SCr of 1.95 mg/dL (H)).     Recent Labs   Lab 24  1427   TROPHS 10       No results for input(s): \"PTP\", \"INR\" in the last 168 hours.           Imaging: Imaging data reviewed in Epic.    Medications:    potassium chloride  40 mEq Intravenous  Once    furosemide  40 mg Intravenous Once    [START ON 12/29/2024] torsemide  40 mg Oral BID (Diuretic)    cefTRIAXone  2 g Intravenous Q24H    insulin aspart  1-68 Units Subcutaneous TID CC    insulin NPH-human isophane  12 Units Subcutaneous BID    methylPREDNISolone  40 mg Intravenous Q12H    ipratropium-albuterol  3 mL Nebulization Q4H WA (5 times daily)    insulin degludec  5 Units Subcutaneous Daily    sodium ferric gluconate  125 mg Intravenous Daily    atorvastatin  10 mg Oral Nightly    apixaban  2.5 mg Oral BID    metoprolol tartrate  100 mg Oral BID    tamsulosin  0.4 mg Oral Daily    insulin aspart  1-10 Units Subcutaneous TID AC and HS    umeclidinium bromide  1 puff Inhalation Daily       Assessment & Plan:     #Acute on chronic hypoxic resp failure  Cont. O2 and wean as able  Per OP pulm notes on 2L @ rest and 4-6L with activity    #Presumptive PNA  Rocephin  SLP evaluated: regular, thin liquids, no restrictions.      #Acute HFpEF w/ moderate pHTN  Cont diuresis as tolerated    #AECOPD  Steroids  Inhalers  BD protocol    #NSVT  Cont. BB  Replace lytes and monitor     #CKD III  Monitor with diuresis     #A. Fib s/p PPM  Cont. Rate control  Cont. OAC     #HTN     #DM2 w/ steroid hyperglycemia  DC degludac  Inc NPH  Inc ICF  INC IC    #Fe de Anemia  Fe studies noted  IV iron    Dispo: as above.  Cr a bit highter.  Weaning IV diuretics to PO.  On ABX, steroids.  Wean  O2 as able    Cipriano Nieves MD    Supplementary Documentation:     Quality:    DVT Mechanical Prophylaxis:     Early ambuation  DVT Pharmacologic Prophylaxis   Medication    apixaban (Eliquis) tab 2.5 mg                Code Status: Full Code  St: External urinary catheter in place  St Duration (in days):   Central line:    XIAO: 12/28/2024      Discharge is dependent on: clinical stability  At this point Mr. Harley is expected to be discharge to: home    The 21st Century Cures Act makes medical notes like these available to patients  in the interest of transparency. Please be advised this is a medical document. Medical documents are intended to carry relevant information, facts as evident, and the clinical opinion of the practitioner. The medical note is intended as peer to peer communication and may appear blunt or direct. It is written in medical language and may contain abbreviations or verbiage that are unfamiliar.

## 2024-12-28 NOTE — PROGRESS NOTES
[facility]  Progress Note    Lisandro Harley Patient Status:  Inpatient    1940 MRN [id number]   Location [facility] 2NE-A Attending Cipriano Nieves MD   Hosp Day # 2 PCP Braydon Barrett MD     Subjective:  Lisandro Harley is a(n) 84 year old male remains afebrile  States he may feel slightly better overall  Coughing spasms with decreasing frequency  Remains on 2 to 3 L of O2 currently on 3    Objective:  /66 (BP Location: Right arm)   Pulse 88   Temp 99 °F (37.2 °C) (Oral)   Resp 19   Ht 5' 6\" (1.676 m)   Wt 204 lb 12.9 oz (92.9 kg)   SpO2 93%   BMI 33.06 kg/m²       Temp (24hrs), Av.3 °F (36.8 °C), Min:97.8 °F (36.6 °C), Max:99 °F (37.2 °C)      Intake/Output:    Intake/Output Summary (Last 24 hours) at 2024 1637  Last data filed at 2024 1230  Gross per 24 hour   Intake 1020 ml   Output 1750 ml   Net -730 ml       Physical Exam:   General: alert, cooperative, oriented.  No respiratory distress.   Head: Normocephalic, without obvious abnormality, atraumatic.   Throat: Lips, mucosa, and tongue normal.  No thrush noted.   Neck: trachea midline, no adenopathy, no thyromegaly. No JVD.   Lungs: Diminished tones bibasilar crackles no current wheeze   Chest wall: No tenderness or deformity.   Heart: Murmurs noted   Abdomen: soft, non-distended, no masses, no guarding, no     Rebound.  No diarrhea   Extremity: Trace edema   Skin: No rashes or lesions.   Neurological: Alert, interactive, no focal deficits    Lab Data Review:  Recent Labs     24  0816   WBC 10.7   HGB 11.2*   .0     Recent Labs     24  0710 24  0626 24  1322 24  0816    136  --  137   K 3.3*  3.3* 3.3*  3.3* 3.6 3.3*  3.3*    103  --  101   CO2 26.0 24.0  --  24.0   BUN 40* 46*  --  57*   CREATSERUM 1.87* 1.83*  --  1.95*     No results for input(s): \"PTP\", \"INR\", \"PTT\" in the last 168 hours.    Cultures: Viral panel is negative urine culture was  negative    Radiology:  No results found.  Chest x-rays reviewed      Medications reviewed     Assessment and Plan:   Patient Active Problem List   Diagnosis    Essential hypertension    Lymphocytic colitis    Coronary atherosclerosis    NII (obstructive sleep apnea)    Spinal stenosis of lumbar region    Atrial fibrillation (HCC)    Mitral valve disorder    Cardiac pacemaker in situ    Hyperlipidemia    Acquired spondylolisthesis    Thrombocytopenia (HCC)    Alcoholic cirrhosis of liver without ascites (HCC)    h/o prostate cancer s/p radiation treatment    Primary pulmonary hypertension (HCC)    Type 2 diabetes mellitus with diabetic chronic kidney disease (HCC)    Pulmonary emphysema (HCC)    Hypoxemia associated with sleep    Nausea vomiting and diarrhea    Elevated bilirubin    Calculus of gallbladder with acute cholecystitis without obstruction    Pulmonary hypertension (HCC)    Diabetes mellitus type 2 in nonobese (HCC)    Acute on chronic congestive heart failure, unspecified heart failure type (HCC)    Chronic kidney disease, unspecified CKD stage    Pneumonia, bacterial       Assessment:  Acute on chronic hypoxic respiratory failure secondary to COPD exacerbation/component pneumonia  COPD exacerbation  Right lower lobe infiltrate suspected pneumonia clinically responding  Acute on chronic heart failure with preserved ejection fraction  Pulmonary hypertension likely group 2 versus 3  Atrial fibrillation status post permanent pacemaker  Hypertension  Acute elevation LFTs mild  Iron deficiency anemia  Diabetes mellitus     Plan:    Plan to complete course of antibiotics  Close monitoring of oxygenation status, wean down to baseline oxygen as able  Ongoing bronchodilators  To oral steroids  Cardiology consulted for evaluation of heart failure, appreciate their assistance-ongoing IV diuretics  On Eliquis for A-fib controlled with metoprolol      CC     Liseth Alvarenga MD  12/28/2024  4:37 PM

## 2024-12-29 ENCOUNTER — APPOINTMENT (OUTPATIENT)
Dept: GENERAL RADIOLOGY | Facility: HOSPITAL | Age: 84
End: 2024-12-29
Attending: INTERNAL MEDICINE
Payer: MEDICARE

## 2024-12-29 LAB
ANION GAP SERPL CALC-SCNC: 8 MMOL/L (ref 0–18)
BASOPHILS # BLD AUTO: 0.01 X10(3) UL (ref 0–0.2)
BASOPHILS NFR BLD AUTO: 0.1 %
BUN BLD-MCNC: 60 MG/DL (ref 9–23)
CALCIUM BLD-MCNC: 8.7 MG/DL (ref 8.7–10.4)
CHLORIDE SERPL-SCNC: 105 MMOL/L (ref 98–112)
CO2 SERPL-SCNC: 23 MMOL/L (ref 21–32)
CREAT BLD-MCNC: 1.7 MG/DL
EGFRCR SERPLBLD CKD-EPI 2021: 39 ML/MIN/1.73M2 (ref 60–?)
EOSINOPHIL # BLD AUTO: 0 X10(3) UL (ref 0–0.7)
EOSINOPHIL NFR BLD AUTO: 0 %
ERYTHROCYTE [DISTWIDTH] IN BLOOD BY AUTOMATED COUNT: 16.7 %
GLUCOSE BLD-MCNC: 264 MG/DL (ref 70–99)
GLUCOSE BLD-MCNC: 266 MG/DL (ref 70–99)
GLUCOSE BLD-MCNC: 281 MG/DL (ref 70–99)
HCT VFR BLD AUTO: 33.7 %
HGB BLD-MCNC: 10.6 G/DL
IMM GRANULOCYTES # BLD AUTO: 0.07 X10(3) UL (ref 0–1)
IMM GRANULOCYTES NFR BLD: 0.8 %
LYMPHOCYTES # BLD AUTO: 0.36 X10(3) UL (ref 1–4)
LYMPHOCYTES NFR BLD AUTO: 3.9 %
MCH RBC QN AUTO: 25.7 PG (ref 26–34)
MCHC RBC AUTO-ENTMCNC: 31.5 G/DL (ref 31–37)
MCV RBC AUTO: 81.8 FL
METHYLMALONIC ACID: 668 NMOL/L
MONOCYTES # BLD AUTO: 0.75 X10(3) UL (ref 0.1–1)
MONOCYTES NFR BLD AUTO: 8.1 %
NEUTROPHILS # BLD AUTO: 8.03 X10 (3) UL (ref 1.5–7.7)
NEUTROPHILS # BLD AUTO: 8.03 X10(3) UL (ref 1.5–7.7)
NEUTROPHILS NFR BLD AUTO: 87.1 %
OSMOLALITY SERPL CALC.SUM OF ELEC: 308 MOSM/KG (ref 275–295)
PLATELET # BLD AUTO: 225 10(3)UL (ref 150–450)
POTASSIUM SERPL-SCNC: 4 MMOL/L (ref 3.5–5.1)
POTASSIUM SERPL-SCNC: 4 MMOL/L (ref 3.5–5.1)
RBC # BLD AUTO: 4.12 X10(6)UL
SODIUM SERPL-SCNC: 136 MMOL/L (ref 136–145)
WBC # BLD AUTO: 9.2 X10(3) UL (ref 4–11)

## 2024-12-29 PROCEDURE — 99232 SBSQ HOSP IP/OBS MODERATE 35: CPT | Performed by: INTERNAL MEDICINE

## 2024-12-29 PROCEDURE — 71045 X-RAY EXAM CHEST 1 VIEW: CPT | Performed by: INTERNAL MEDICINE

## 2024-12-29 PROCEDURE — 99239 HOSP IP/OBS DSCHRG MGMT >30: CPT | Performed by: HOSPITALIST

## 2024-12-29 RX ORDER — IPRATROPIUM BROMIDE AND ALBUTEROL SULFATE 2.5; .5 MG/3ML; MG/3ML
3 SOLUTION RESPIRATORY (INHALATION)
Qty: 120 EACH | Refills: 0 | Status: SHIPPED | OUTPATIENT
Start: 2024-12-29

## 2024-12-29 RX ORDER — BLOOD-GLUCOSE METER
EACH MISCELLANEOUS
Qty: 1 KIT | Refills: 0 | Status: SHIPPED | OUTPATIENT
Start: 2024-12-29

## 2024-12-29 RX ORDER — BLOOD SUGAR DIAGNOSTIC
STRIP MISCELLANEOUS
Qty: 50 STRIP | Refills: 6 | Status: SHIPPED | OUTPATIENT
Start: 2024-12-29

## 2024-12-29 RX ORDER — LANCETS 33 GAUGE
EACH MISCELLANEOUS
Qty: 100 EACH | Refills: 6 | Status: SHIPPED | OUTPATIENT
Start: 2024-12-29

## 2024-12-29 RX ORDER — CEFUROXIME AXETIL 500 MG/1
500 TABLET ORAL 2 TIMES DAILY
Qty: 6 TABLET | Refills: 0 | Status: SHIPPED | OUTPATIENT
Start: 2024-12-29

## 2024-12-29 RX ORDER — PEN NEEDLE, DIABETIC 32GX 5/32"
NEEDLE, DISPOSABLE MISCELLANEOUS
Qty: 100 EACH | Refills: 6 | Status: SHIPPED | OUTPATIENT
Start: 2024-12-29

## 2024-12-29 RX ORDER — INSULIN LISPRO 100 [IU]/ML
INJECTION, SOLUTION INTRAVENOUS; SUBCUTANEOUS
Qty: 5 EACH | Refills: 3 | Status: SHIPPED | OUTPATIENT
Start: 2024-12-29

## 2024-12-29 RX ORDER — PREDNISONE 10 MG/1
TABLET ORAL
Qty: 32 TABLET | Refills: 0 | Status: SHIPPED | OUTPATIENT
Start: 2024-12-29

## 2024-12-29 RX ORDER — FERROUS SULFATE 325(65) MG
325 TABLET, DELAYED RELEASE (ENTERIC COATED) ORAL
Qty: 90 TABLET | Refills: 0 | Status: SHIPPED | OUTPATIENT
Start: 2024-12-29

## 2024-12-29 NOTE — PROGRESS NOTES
[facility]   part of [facility]     Hospitalist Progress Note     Lisandro Harley Patient Status:  Observation    1940 MRN [id number]   [facility] 2NE-A Attending Cipriano Nieves MD   Hosp Day # 3 PCP Braydon Barrett MD     Chief Complaint: dysnea     Subjective:     Patient has no complaints.     Objective:    Review of Systems:   ROS completed; pertinent positive and negatives stated in subjective.    Vital signs:  Temp:  [97.3 °F (36.3 °C)-99 °F (37.2 °C)] 97.5 °F (36.4 °C)  Pulse:  [69-90] 72  Resp:  [18-20] 19  BP: ()/(45-89) 127/61  SpO2:  [90 %-97 %] 96 %    Physical Exam:    General: No acute distress  Respiratory: clear, no wheezes  Cardiovascular: S1, S2.,chronic left leg edema only  Abdomen: Soft  Neuro: No new focal deficits      Diagnostic Data:    Labs:  Recent Labs   Lab 24  1427 24  0638 24  0816 24  0831   WBC 9.7 7.3 10.7 9.2   HGB 11.5* 10.5* 11.2* 10.6*   MCV 83.6 85.6 81.1 81.8   .0 189.0 199.0 225.0       Recent Labs   Lab 24  1427 24  0638 24  0626 24  1322 24  0816 24  0831   *   < > 237*  --  269* 264*   BUN 35*   < > 46*  --  57* 60*   CREATSERUM 1.93*   < > 1.83*  --  1.95* 1.70*   CA 8.5*   < > 8.7  --  8.7 8.7   ALB 4.1  --   --   --  3.9  --       < > 136  --  137 136   K 3.4*   < > 3.3*  3.3* 3.6 3.3*  3.3* 4.0  4.0      < > 103  --  101 105   CO2 25.0   < > 24.0  --  24.0 23.0   ALKPHO 106  --   --   --  130*  --    AST 16  --   --   --  58*  --    ALT 13  --   --   --  74*  --    BILT 1.1  --   --   --  0.4  --    TP 7.1  --   --   --  7.0  --     < > = values in this interval not displayed.       Estimated Glomerular Filtration Rate: 39.3 mL/min/1.73m2 (A) (by CKD-EPI based on SCr of 1.7 mg/dL (H)).     Recent Labs   Lab 24  1427   TROPHS 10       No results for input(s): \"PTP\", \"INR\" in the last 168 hours.           Imaging: Imaging data reviewed in  Epic.    Medications:    [START ON 12/30/2024] insulin NPH-human isophane  18 Units Subcutaneous Daily with breakfast    torsemide  40 mg Oral BID (Diuretic)    ipratropium-albuterol  3 mL Nebulization Q6H WA    predniSONE  40 mg Oral Daily with breakfast    cefTRIAXone  2 g Intravenous Q24H    insulin aspart  1-68 Units Subcutaneous TID CC    sodium ferric gluconate  125 mg Intravenous Daily    atorvastatin  10 mg Oral Nightly    apixaban  2.5 mg Oral BID    metoprolol tartrate  100 mg Oral BID    tamsulosin  0.4 mg Oral Daily    insulin aspart  1-10 Units Subcutaneous TID AC and HS    umeclidinium bromide  1 puff Inhalation Daily       Assessment & Plan:     #Acute on chronic hypoxic resp failure  Cont. O2 and wean as able  Per OP pulm notes on 2L @ rest and 4-6L with activity  Seems to be at baseline now    #Presumptive PNA  Rocephin  SLP evaluated: regular, thin liquids, no restrictions.      #Acute HFpEF w/ moderate pHTN  Cont diuresis as tolerated    #AECOPD  Steroids, wean to PO  Inhalers  BD protocol    #NSVT  Cont. BB  Replace lytes and monitor     #CKD III  Monitor with diuresis  Overall stable     #A. Fib s/p PPM  Cont. Rate control  Cont. OAC     #HTN  BP stable     #DM2 w/ steroid hyperglycemia  DC degludac  Cont.  NPH, ICF, IC  Expect glu will drop now that he's on PO pred, dec ICF, IC and monitor closely    #Fe de Anemia  Fe studies noted  IV iron    Dispo: as above. Seems euvolemic now.  Titrate to PO steroids. Resp status seems to be at baseline.  DC planning    Cipriano Nieves MD    Supplementary Documentation:     Quality:    DVT Mechanical Prophylaxis:     Early ambuation  DVT Pharmacologic Prophylaxis   Medication    apixaban (Eliquis) tab 2.5 mg                Code Status: Full Code  St: External urinary catheter in place  St Duration (in days):   Central line:    XIAO:       Discharge is dependent on: clinical stability  At this point Mr. Harley is expected to be discharge to: home    The  21st Century Cures Act makes medical notes like these available to patients in the interest of transparency. Please be advised this is a medical document. Medical documents are intended to carry relevant information, facts as evident, and the clinical opinion of the practitioner. The medical note is intended as peer to peer communication and may appear blunt or direct. It is written in medical language and may contain abbreviations or verbiage that are unfamiliar.

## 2024-12-29 NOTE — CM/SW NOTE
CM messaged [facility] in aidin system for discharge update.  CM informed by RN patient will need portable oxygen tank for discharge.  CM called [employer] ([phone]) for oxygen tank request.  CM asked to contact Resp.Therapist to dispense portable oxygen tank to patient for discharge.  RN updated.     Bernie Ha RN Case Manager [phone]

## 2024-12-29 NOTE — PLAN OF CARE
Pt. Is alert and oriented times 4. AV paced. On 3L at rest requiring 5-6L with exertion.   Received discharge orders from hospitalist.   0933: Cardiology APN notified of BMP results. Okay for torsemide this AM  1037Hospitalist notified that family had concern about arranging care for this evening. Per hospitalist pt. Is medically cleared.   Family notified of likely discharge today.   1149: hospitalist notified that there is no NPH insulin ordered for this morning.   1211: Pulm notified that patient is cleared by hospitalist and cardiology but has some concerns about going home due to weakness. Clear medically from pulm standpoint.     Son will stay with patient tonight. Family has arranged further care.   1500 Social work notified of need for portable tank to take on way home as pts. Portable tanks are both empthy.             Problem: CARDIOVASCULAR - ADULT  Goal: Maintains optimal cardiac output and hemodynamic stability  Description: INTERVENTIONS:  - Monitor vital signs, rhythm, and trends  - Monitor for bleeding, hypotension and signs of decreased cardiac output  - Evaluate effectiveness of vasoactive medications to optimize hemodynamic stability  - Monitor arterial and/or venous puncture sites for bleeding and/or hematoma  - Assess quality of pulses, skin color and temperature  - Assess for signs of decreased coronary artery perfusion - ex. Angina  - Evaluate fluid balance, assess for edema, trend weights  Outcome: Progressing  Goal: Absence of cardiac arrhythmias or at baseline  Description: INTERVENTIONS:  - Continuous cardiac monitoring, monitor vital signs, obtain 12 lead EKG if indicated  - Evaluate effectiveness of antiarrhythmic and heart rate control medications as ordered  - Initiate emergency measures for life threatening arrhythmias  - Monitor electrolytes and administer replacement therapy as ordered  Outcome: Progressing     Problem: RESPIRATORY - ADULT  Goal: Achieves optimal ventilation and  oxygenation  Description: INTERVENTIONS:  - Assess for changes in respiratory status  - Assess for changes in mentation and behavior  - Position to facilitate oxygenation and minimize respiratory effort  - Oxygen supplementation based on oxygen saturation or ABGs  - Provide Smoking Cessation handout, if applicable  - Encourage broncho-pulmonary hygiene including cough, deep breathe, Incentive Spirometry  - Assess the need for suctioning and perform as needed  - Assess and instruct to report SOB or any respiratory difficulty  - Respiratory Therapy support as indicated  - Manage/alleviate anxiety  - Monitor for signs/symptoms of CO2 retention  Outcome: Progressing     Problem: Patient/Family Goals  Goal: Patient/Family Long Term Goal  Description: Patient's Long Term Goal: stay healthy at home  Interventions:  -take medications as prescribed  -attend follow up appointments as recommended  -diet and exercise as advised  -report new or worsening symptoms to physician  - See additional Care Plan goals for specific interventions  Outcome: Progressing  Goal: Patient/Family Short Term Goal  Description: Patient's Short Term Goal: improvement in shortness of breath  Interventions:   -IV lasix  -wean O2  -daily weights/strict intake and ouput  - See additional Care Plan goals for specific interventions  Outcome: Progressing     Problem: PAIN - ADULT  Goal: Verbalizes/displays adequate comfort level or patient's stated pain goal  Description: INTERVENTIONS:  - Encourage pt to monitor pain and request assistance  - Assess pain using appropriate pain scale  - Administer analgesics based on type and severity of pain and evaluate response  - Implement non-pharmacological measures as appropriate and evaluate response  - Consider cultural and social influences on pain and pain management  - Manage/alleviate anxiety  - Utilize distraction and/or relaxation techniques  - Monitor for opioid side effects  - Notify MD/LIP if  interventions unsuccessful or patient reports new pain  - Anticipate increased pain with activity and pre-medicate as appropriate  Outcome: Progressing     Problem: RISK FOR INFECTION - ADULT  Goal: Absence of fever/infection during anticipated neutropenic period  Description: INTERVENTIONS  - Monitor WBC  - Administer growth factors as ordered  - Implement neutropenic guidelines  Outcome: Progressing     Problem: SAFETY ADULT - FALL  Goal: Free from fall injury  Description: INTERVENTIONS:  - Assess pt frequently for physical needs  - Identify cognitive and physical deficits and behaviors that affect risk of falls.  - [place] fall precautions as indicated by assessment.  - Educate pt/family on patient safety including physical limitations  - Instruct pt to call for assistance with activity based on assessment  - Modify environment to reduce risk of injury  - Provide assistive devices as appropriate  - Consider OT/PT consult to assist with strengthening/mobility  - Encourage toileting schedule  Outcome: Progressing     Problem: DISCHARGE PLANNING  Goal: Discharge to home or other facility with appropriate resources  Description: INTERVENTIONS:  - Identify barriers to discharge w/pt and caregiver  - Include patient/family/discharge partner in discharge planning  - Arrange for needed discharge resources and transportation as appropriate  - Identify discharge learning needs (meds, wound care, etc)  - Arrange for interpreters to assist at discharge as needed  - Consider post-discharge preferences of patient/family/discharge partner  - Complete POLST form as appropriate  - Assess patient's ability to be responsible for managing their own health  - Refer to Case Management Department for coordinating discharge planning if the patient needs post-hospital services based on physician/LIP order or complex needs related to functional status, cognitive ability or social support system  Outcome: Progressing     Problem:  GENITOURINARY - ADULT  Goal: Absence of urinary retention  Description: INTERVENTIONS:  - Assess patient’s ability to void and empty bladder  - Monitor intake/output and perform bladder scan as needed  - Follow urinary retention protocol/standard of care  - Consider collaborating with pharmacy to review patient's medication profile  - Implement strategies to promote bladder emptying  Outcome: Progressing     Problem: SKIN/TISSUE INTEGRITY - ADULT  Goal: Skin integrity remains intact  Description: INTERVENTIONS  - Assess and document risk factors for pressure ulcer development  - Assess and document skin integrity  - Monitor for areas of redness and/or skin breakdown  - Initiate interventions, skin care algorithm/standards of care as needed  Outcome: Progressing     Problem: HEMATOLOGIC - ADULT  Goal: Maintains hematologic stability  Description: INTERVENTIONS  - Assess for signs and symptoms of bleeding or hemorrhage  - Monitor labs and vital signs for trends  - Administer supportive blood products/factors, fluids and medications as ordered and appropriate  - Administer supportive blood products/factors as ordered and appropriate  Outcome: Progressing

## 2024-12-29 NOTE — DISCHARGE PLANNING
NURSING DISCHARGE NOTE    Discharged Home via Wheelchair.  Accompanied by RN  Belongings Taken by patient/family.    IV removed. Discharge education completed. All questions answered.

## 2024-12-29 NOTE — PROGRESS NOTES
Progress Note  Lisandro Harley Patient Status:  Inpatient    1940 MRN [id number]   Location [facility] 2NE-A Attending Cipriano Nieves MD   Hosp Day # 3 PCP Braydon Barrett MD     Subjective:  Pt sitting up to eat breakfast this am. Breathing is improving, denies orthopnea, PND.     Objective:  /89 (BP Location: Left arm)   Pulse 69   Temp 97.9 °F (36.6 °C) (Oral)   Resp 18   Ht 5' 6\" (1.676 m)   Wt 204 lb 4.8 oz (92.7 kg)   SpO2 97%   BMI 32.97 kg/m²     Telemetry: A/V paced, PVC's    Intake/Output:    Intake/Output Summary (Last 24 hours) at 2024 0710  Last data filed at 2024 2300  Gross per 24 hour   Intake 1150 ml   Output 950 ml   Net 200 ml       Last 3 Weights   24 0552 204 lb 4.8 oz (92.7 kg)   24 0843 204 lb 12.9 oz (92.9 kg)   24 0430 189 lb 1.6 oz (85.8 kg)   24 0500 201 lb 1 oz (91.2 kg)   24 0447 201 lb 1 oz (91.2 kg)   24 0505 205 lb 7.5 oz (93.2 kg)   24 1809 198 lb 6.6 oz (90 kg)   24 1715 198 lb 6.6 oz (90 kg)   24 1423 200 lb (90.7 kg)   24 1009 195 lb (88.5 kg)   10/29/24 0308 195 lb (88.5 kg)       Labs:  Recent Labs   Lab 24  0710 24  0626 24  1322 24  0816   * 237*  --  269*   BUN 40* 46*  --  57*   CREATSERUM 1.87* 1.83*  --  1.95*   EGFRCR 35* 36*  --  33*   CA 8.7 8.7  --  8.7    136  --  137   K 3.3*  3.3* 3.3*  3.3* 3.6 3.3*  3.3*    103  --  101   CO2 26.0 24.0  --  24.0     Recent Labs   Lab 24  1427 24  0638 24  0816   RBC 4.33 4.02 4.23   HGB 11.5* 10.5* 11.2*   HCT 36.2* 34.4* 34.3*   MCV 83.6 85.6 81.1   MCH 26.6 26.1 26.5   MCHC 31.8 30.5* 32.7   RDW 16.6 16.7 16.8   NEPRELIM 8.07* 5.56 9.56*   WBC 9.7 7.3 10.7   .0 189.0 199.0         Recent Labs   Lab 24  1427   TROPHS 10       Diagnostics:  XR CHEST AP PORTABLE  (CPT=71045)    Result Date: 2024  CONCLUSION:  Stable cardiac and mediastinal contours.   Stable positioning of left chest wall ICD.  Patchy airspace disease of the right lung base is improved compared to 12/26/2024.  No significant pleural effusion or appreciable pneumothorax.   LOCATION:  [facility]      Dictated by (CST): Gaby Barfield MD on 12/29/2024 at 7:01 AM     Finalized by (CST): Gaby Barfield MD on 12/29/2024 at 7:01 AM       Review of Systems   Cardiovascular:  Positive for dyspnea on exertion and leg swelling. Negative for chest pain, orthopnea and paroxysmal nocturnal dyspnea.   Respiratory:  Positive for cough and shortness of breath.        Physical Exam:    Gen: alert, oriented x 3, NAD  Heent: pupils equal, reactive. Mucous membranes moist.   Neck: no jvd  Cardiac: regular rate and rhythm, normal S1,S2, +LLSB murmur, no clicks, rub or gallop  Lungs: Coarse, no wheezing   Abd: soft, NT/ND +bs  Ext: trace edema (L>R at baseline)  Skin: Warm, dry  Neuro: No focal deficits      Medications:     torsemide  40 mg Oral BID (Diuretic)    insulin NPH-human isophane  18 Units Subcutaneous BID    ipratropium-albuterol  3 mL Nebulization Q6H WA    predniSONE  40 mg Oral Daily with breakfast    cefTRIAXone  2 g Intravenous Q24H    insulin aspart  1-68 Units Subcutaneous TID CC    sodium ferric gluconate  125 mg Intravenous Daily    atorvastatin  10 mg Oral Nightly    apixaban  2.5 mg Oral BID    metoprolol tartrate  100 mg Oral BID    tamsulosin  0.4 mg Oral Daily    insulin aspart  1-10 Units Subcutaneous TID AC and HS    umeclidinium bromide  1 puff Inhalation Daily       Assessment:  Acute on Chronic HFpEF, Pulmonary HTN (Group 2/3)  proBNP 2901, CXR w/RLL opacity, pulm edema  Echo 4/2024 LVEF 55-60, no RWMA, biatrial dilation, normal RV, mild-mod MR, mild-mod TR, PASP 57mmHg  Diuresis w/IV Lasix - decreased to 40mg x1 yesterday d/t increased Cr   Net neg 4L (I&O incomplete), wt appears inaccurate  GDMT - not on MRA, SGLT2i w/CKD  Acute on Chronic Hypoxic Respiratory Failure - Multifactorial (COPD  Exacerbation/Poss PNA/HF/Pulm HTN)  Decreasing O2 requirements - On 3L NC this am (Baseline 3-5 at home)  IV antibx  Steroids, nebs, inhaler  Pulm following  Chronic Atrial Fibrillation, Hx SSS [employer] DC PPM  Paced on tele  On lopressor 100mg BID  On eliquis 2.5mg po BID (reduced for age, renal)  CKD - Cr uptrended 12/28  BMP pending this am  Decreased Lasix dose yesterday  Hypertension - controlled  DMII - SSI per primary   Chronic Anemia - stable    Plan:  Plan to transition to oral torsemide 40mg po BID today - BMP pending this am (this is previously prescribed home dose but pt was taking less often)  Strict I&O, daily weights (standing if possible), daily BMP, HF order set   Continue lopressor, eliquis, statin  Antibiotics, steroids, nebs per pulmonology      Cardiology attending:  Pt seen and independently examined.  Note edited.  Plan of care performed by myself in its entirety.  CV status is stable.  Agree with torsemide 40 mg BID on dc.    Willard Cramer MD  L2    Plan of care discussed with patient, RN.    Sowmya Sanon, APRN  12/29/2024  7:10 AM  [phone] [facility]  [phone] [facility]

## 2024-12-29 NOTE — PLAN OF CARE
Patient is alert, oriented x4. On 3L NC, CPAP at night. Up with 1 assist, GB, FWW. Purewick in place with moderate amount of output. Tele showing AV paced. Denies chest pain, shortness of breath. insulin given per order parameter. Left IV is leaking and presence of fresh blood. Painful per pt. IV removed. Tele showing SR with frequent PVC. VSS. Fall precaution maintained. All concern and question addressed.       Problem: CARDIOVASCULAR - ADULT  Goal: Maintains optimal cardiac output and hemodynamic stability  Description: INTERVENTIONS:  - Monitor vital signs, rhythm, and trends  - Monitor for bleeding, hypotension and signs of decreased cardiac output  - Evaluate effectiveness of vasoactive medications to optimize hemodynamic stability  - Monitor arterial and/or venous puncture sites for bleeding and/or hematoma  - Assess quality of pulses, skin color and temperature  - Assess for signs of decreased coronary artery perfusion - ex. Angina  - Evaluate fluid balance, assess for edema, trend weights  Outcome: Progressing  Goal: Absence of cardiac arrhythmias or at baseline  Description: INTERVENTIONS:  - Continuous cardiac monitoring, monitor vital signs, obtain 12 lead EKG if indicated  - Evaluate effectiveness of antiarrhythmic and heart rate control medications as ordered  - Initiate emergency measures for life threatening arrhythmias  - Monitor electrolytes and administer replacement therapy as ordered  Outcome: Progressing     Problem: RESPIRATORY - ADULT  Goal: Achieves optimal ventilation and oxygenation  Description: INTERVENTIONS:  - Assess for changes in respiratory status  - Assess for changes in mentation and behavior  - Position to facilitate oxygenation and minimize respiratory effort  - Oxygen supplementation based on oxygen saturation or ABGs  - Provide Smoking Cessation handout, if applicable  - Encourage broncho-pulmonary hygiene including cough, deep breathe, Incentive Spirometry  - Assess the need  for suctioning and perform as needed  - Assess and instruct to report SOB or any respiratory difficulty  - Respiratory Therapy support as indicated  - Manage/alleviate anxiety  - Monitor for signs/symptoms of CO2 retention  Outcome: Progressing     Problem: Patient/Family Goals  Goal: Patient/Family Long Term Goal  Description: Patient's Long Term Goal: stay healthy at home  Interventions:  -take medications as prescribed  -attend follow up appointments as recommended  -diet and exercise as advised  -report new or worsening symptoms to physician  - See additional Care Plan goals for specific interventions  Outcome: Progressing  Goal: Patient/Family Short Term Goal  Description: Patient's Short Term Goal: improvement in shortness of breath  Interventions:   -IV lasix  -wean O2  -daily weights/strict intake and ouput  - See additional Care Plan goals for specific interventions  Outcome: Progressing     Problem: PAIN - ADULT  Goal: Verbalizes/displays adequate comfort level or patient's stated pain goal  Description: INTERVENTIONS:  - Encourage pt to monitor pain and request assistance  - Assess pain using appropriate pain scale  - Administer analgesics based on type and severity of pain and evaluate response  - Implement non-pharmacological measures as appropriate and evaluate response  - Consider cultural and social influences on pain and pain management  - Manage/alleviate anxiety  - Utilize distraction and/or relaxation techniques  - Monitor for opioid side effects  - Notify MD/LIP if interventions unsuccessful or patient reports new pain  - Anticipate increased pain with activity and pre-medicate as appropriate  Outcome: Progressing     Problem: SAFETY ADULT - FALL  Goal: Free from fall injury  Description: INTERVENTIONS:  - Assess pt frequently for physical needs  - Identify cognitive and physical deficits and behaviors that affect risk of falls.  - Superior fall precautions as indicated by assessment.  -  Educate pt/family on patient safety including physical limitations  - Instruct pt to call for assistance with activity based on assessment  - Modify environment to reduce risk of injury  - Provide assistive devices as appropriate  - Consider OT/PT consult to assist with strengthening/mobility  - Encourage toileting schedule  Outcome: Progressing

## 2024-12-29 NOTE — PROGRESS NOTES
[facility]  Progress Note    Lisandro Harley Patient Status:  Inpatient    1940 MRN [id number]   Location [facility] 2NE-A Attending Cipriano Nieves MD   Hosp Day # 3 PCP Bryadon Barrett MD     Subjective:  Lisandro Harley is a(n) 84 year old male remains afebrile  Overall feels better  Reports remaining weak  Sats stable on 3 L  Cough nonproductive      Objective:  /69 (BP Location: Left arm)   Pulse 82   Temp 97.9 °F (36.6 °C) (Oral)   Resp 19   Ht 5' 6\" (1.676 m)   Wt 204 lb 4.8 oz (92.7 kg)   SpO2 96%   BMI 32.97 kg/m² currently 3 to 4 L      Temp (24hrs), Av.6 °F (36.4 °C), Min:97.3 °F (36.3 °C), Max:97.9 °F (36.6 °C)      Intake/Output:    Intake/Output Summary (Last 24 hours) at 2024 1253  Last data filed at 2024 1130  Gross per 24 hour   Intake 390 ml   Output 1100 ml   Net -710 ml       Physical Exam:   General: alert, cooperative, oriented.  No respiratory distress.   Head: Normocephalic, without obvious abnormality, atraumatic.   Throat: Lips, mucosa, and tongue normal.  No thrush noted.   Neck: trachea midline, no adenopathy, no thyromegaly. No JVD.  At 90 degrees   Lungs: Bibasilar rales mild rare sense of rhonchi   Chest wall: No tenderness or deformity.   Heart: Patient is AV paced noted   Abdomen: soft, non-distended, no masses, no guarding, no     Rebound.  Mildly protuberant no diarrhea   Extremity: Nontender   Skin: No rashes or lesions.   Neurological: Alert, interactive, no focal deficits    Lab Data Review:  Recent Labs     24  0816 24  0831   WBC 10.7 9.2   HGB 11.2* 10.6*   .0 225.0     Recent Labs     24  0626 24  1322 24  0816 24  0831     --  137 136   K 3.3*  3.3* 3.6 3.3*  3.3* 4.0  4.0     --  101 105   CO2 24.0  --  24.0 23.0   BUN 46*  --  57* 60*   CREATSERUM 1.83*  --  1.95* 1.70*     No results for input(s): \"PTP\", \"INR\", \"PTT\" in the last 168 hours.    Cultures: Reviewed and  negative expanded panel urine    Radiology:  XR CHEST AP PORTABLE  (CPT=71045)    Result Date: 12/29/2024  CONCLUSION:  Stable cardiac and mediastinal contours.  Stable positioning of left chest wall ICD.  Patchy airspace disease of the right lung base is improved compared to 12/26/2024.  No significant pleural effusion or appreciable pneumothorax.   LOCATION:  [facility]      Dictated by (CST): Gaby Barfield MD on 12/29/2024 at 7:01 AM     Finalized by (CST): Gaby Barfield MD on 12/29/2024 at 7:01 AM      Chest x-ray with bilateral infiltrates right greater than left improved from 12/26 no evidence of advancing effusions      Medications reviewed     Assessment and Plan:   Patient Active Problem List   Diagnosis    Essential hypertension    Lymphocytic colitis    Coronary atherosclerosis    NII (obstructive sleep apnea)    Spinal stenosis of lumbar region    Atrial fibrillation (HCC)    Mitral valve disorder    Cardiac pacemaker in situ    Hyperlipidemia    Acquired spondylolisthesis    Thrombocytopenia (HCC)    Alcoholic cirrhosis of liver without ascites (HCC)    h/o prostate cancer s/p radiation treatment    Primary pulmonary hypertension (HCC)    Type 2 diabetes mellitus with diabetic chronic kidney disease (HCC)    Chronic obstructive pulmonary disease (HCC)    Hypoxemia associated with sleep    Nausea vomiting and diarrhea    Elevated bilirubin    Calculus of gallbladder with acute cholecystitis without obstruction    Pulmonary hypertension (HCC)    Diabetes mellitus type 2 in nonobese (HCC)    Acute on chronic congestive heart failure, unspecified heart failure type (HCC)    Chronic kidney disease, unspecified CKD stage    Pneumonia, bacterial       Assessment:  Acute on chronic hypoxic respiratory failure secondary to COPD exacerbation/component pneumonia-continues to improve  COPD exacerbation-baseline O2 dependent-, recent 6-minute walk without significant need  Right lower lobe infiltrate suspected pneumonia  clinically responding-and resolving  Acute on chronic heart failure with preserved ejection fraction  Pulmonary hypertension likely group 2 versus 3-stable fluid status is  Atrial fibrillation status post permanent pacemaker  Hypertension  Acute elevation LFTs mild  Iron deficiency anemia  Diabetes mellitus  NII    Plan:  Okay to home from pulmonary standpoint  We will plan on attempting to get CPAP supplies for patient  Plan for pulmonary follow-up with Dr. Ghazal Alvarenga MD  12/29/2024  12:53 PM

## 2024-12-30 ENCOUNTER — TELEPHONE (OUTPATIENT)
Dept: FAMILY MEDICINE CLINIC | Facility: CLINIC | Age: 84
End: 2024-12-30

## 2024-12-30 ENCOUNTER — TELEPHONE (OUTPATIENT)
Facility: CLINIC | Age: 84
End: 2024-12-30

## 2024-12-30 NOTE — TELEPHONE ENCOUNTER
Returned patient's call.  Patient informed he was evaluated at the hospital with continuous flow oxygen.  He would need to have a POC evaluation to see what oxygen flow he would need.  Patient asking if he can have a eval done at home.  Will ask Dr. Alvarenga for an order and send to [facility].

## 2024-12-30 NOTE — TELEPHONE ENCOUNTER
Received a call from pharmacy asking for Insulin orders.  Informed to call Primary MD Dr. Barrett for orders.

## 2024-12-30 NOTE — TELEPHONE ENCOUNTER
Got it.  Yes, fill those meds.  Do they need anything from us now?  Might be worth making a hospital follow up.

## 2024-12-30 NOTE — TELEPHONE ENCOUNTER
Daughter called back and they found the printed scripts and will take them to the pharmacy to get them filled and will not stop the prednisone.

## 2024-12-30 NOTE — TELEPHONE ENCOUNTER
Scar was in the hospital and given prednisone which puts his sugars in the 300's per daughter. So the hospital also ordered insulin on a sliding scale. All prescriptions made it to pharmacy except the insulin and needles which look like it was possibly done as a paper script but they were given nothing. They reached out to hospital for information and was told to contact PCP for filling. These are the prescriptions not sent: (5)    Rx: Insulin Lispro, 1 Unit Dial, (HUMALOG KWIKPEN) 100 UNIT/ML Subcutaneous Solution Pen-injector    Start Date: Dec 29, 2024   End Date:         Qty Disp:   *5 (Five) each*   Sig:           Test blood glucose 3 times daily before meals Inject 1 unit if blood glucose is between 141-180 mg/dL Inject 2 units if blood glucose is between 181-220 mg/dL Inject 3 units if blood glucose is between 221-260 mg/dL Inject 4 units if blood glucose is between 261-300 mg/dL Inject 5 units if blood glucose is between 301-350 mg/dL Call your physician if blood glucose is greater than 351 mg/dL,   Refills:       *3 (Three)*      Rx: OneTouch Delica Lancets 33G Does not apply Misc    Start Date: Dec 29, 2024   End Date:         Qty Disp:   *100 (One [place]) each*   Sig:           Test blood sugar 3 times per day before each meal.   Refills:       *6 (Six)*     Rx: Insulin Pen Needle (BD PEN NEEDLE AISHWARYA U/F) 32G X 4 MM Does not apply Misc    Start Date: Dec 29, 2024   End Date:         Qty Disp:   *100 (One [place]) each*   Sig:           Inject 4 times per day. One (1) injection per day for long acting insulin and three (3) injections per day (before each meal) for short acting insulin.   Use a new pen needle with each injection   Refills:       *6 (Six)*      Rx: Glucose Blood (ONETOUCH VERIO) In Vitro Strip    Start Date: Dec 29, 2024   End Date:         Qty Disp:   *50 (Fifty) strip*   Sig:           Acccuchecks three times a day before each meal   Refills:       *6 (Six)*      Rx: Blood Glucose  Monitoring Suppl ([employer] SYSTEM) w/Device Does not apply Kit    Start Date: Dec 29, 2024   End Date:         Qty Disp:   *1 (One) kit*   Sig:           Acccuchecks three times a day before each meal   Refills:       *0 (Zero)*     Daughter Raquel's phone #: [phone]

## 2024-12-30 NOTE — TELEPHONE ENCOUNTER
Pt's daughter is calling regarding her dad, he was discharged from the hospital with all kinds of orders and insulin but they never prescribed it and said to talk to us for a sliding scale. Please advise.

## 2024-12-30 NOTE — TELEPHONE ENCOUNTER
Liseth Alvarenga MD  P [facility] Pulmonary Sleep Staff  This patient was in the hospital now home--can you please make sure that he gets some new masks ordered for his CPAP-let me know if there is a hitch thank you    Orders for re-supply sent to [facility] via parachute.

## 2024-12-31 ENCOUNTER — PATIENT OUTREACH (OUTPATIENT)
Dept: CASE MANAGEMENT | Age: 84
End: 2024-12-31

## 2024-12-31 NOTE — PROGRESS NOTES
Transitional Care Management   Discharge Date: 24  Contact Date: 2024    Assessment:  TCM Initial Assessment    General:  Assessment completed with: Patient  Patient Subjective: I'm feeling better, up to snuff. Breathing is better.  Chief Complaint: Acute on chronic congestive heart failure, unspecified heart failure type  Verify patient name and  with patient/ caregiver: Yes    Hospital Stay/Discharge:  Tell me what you understand of why you were in the hospital or emergency department: shortness of breath  Prior to leaving the hospital were your Discharge Instructions reviewed with you?: Yes  Did you receive a copy of your written Discharge Instructions?: Yes  What questions do you have about your Discharge Instructions?: none  Do you feel better or worse since you left the hospital or emergency department?: Better    Follow - Up Appointment:  Do you have a follow-up appointment?: No  Are there any barriers to getting to your follow-up appointment?: No    Home Health/DME:  Prior to leaving the hospital was Home Health (HH) arranged for you?: Yes     Prior to leaving the hospital or emergency department was Durable Medical Equipment (DME), medical supplies, or infusions arranged for you?: No  Are DME/medical supply/infusions needs identified by staff during this assessment?: No     Medications/Diet:  Did any of your medications change, during or after your hospital stay or ED visit?: Yes  Do you have your new or updated medications?: No (Pt states that he got the Ceftin and Prednisone and daughter will be picking up the rest)  Do you understand what your medications are for and possible side effects?: Yes  Are there any reasons that keep you from taking your medication as prescribed?: No  Any concerns about medication refills?: No    Were you given a different diet per your Discharge Instructions?: No     Questions/Concerns:  Do you have any questions or concerns that have not been discussed?: No          Nursing Interventions: [facility] reviewed discharge instructions and when to seek medical attention with the patient. He states that he is feeling better and breathing has improved. He states that he is on 3L and o2 sats have been 93-95%. [facility] attempted med review but he stated that his daughter is picking up some medications. He states that he does have the Ceftin and Prednisone. He also states that she will be getting his blood sugar meter as well. He states that he forgot to weigh himself today.He was quick to answer questions and again stated that he was doing fine and thanked [facility] for the phone call.     Medication Review:   Current Outpatient Medications   Medication Sig Dispense Refill    ferrous sulfate 325 (65 FE) MG Oral Tab EC Take 1 tablet (325 mg total) by mouth daily with breakfast. 90 tablet 0    Blood Glucose Monitoring Suppl (ONETOUCH VERIO FLEX SYSTEM) w/Device Does not apply Kit Acccuchecks three times a day before each meal 1 kit 0    Glucose Blood (ONETOUCH VERIO) In Vitro Strip Acccuchecks three times a day before each meal 50 strip 6    OneTouch Delica Lancets 33G Does not apply Misc Test blood sugar 3 times per day before each meal. 100 each 6    Insulin Pen Needle (BD PEN NEEDLE AISHWARYA U/F) 32G X 4 MM Does not apply Misc Inject 4 times per day. One (1) injection per day for long acting insulin and three (3) injections per day (before each meal) for short acting insulin.   Use a new pen needle with each injection 100 each 6    Insulin Lispro, 1 Unit Dial, (HUMALOG KWIKPEN) 100 UNIT/ML Subcutaneous Solution Pen-injector Test blood glucose 3 times daily before meals Inject 1 unit if blood glucose is between 141-180 mg/dL Inject 2 units if blood glucose is between 181-220 mg/dL Inject 3 units if blood glucose is between 221-260 mg/dL Inject 4 units if blood glucose is between 261-300 mg/dL Inject 5 units if blood glucose is between 301-350 mg/dL Call your physician if blood glucose is greater than  351 mg/dL, 5 each 3    ipratropium-albuterol 0.5-2.5 (3) MG/3ML Inhalation Solution Take 3 mL by nebulization every 6 (six) hours while awake. 120 each 0    umeclidinium bromide 62.5 MCG/ACT Inhalation Aerosol Powder, Breath Activated Inhale 1 puff into the lungs daily. 1 each 0    cefuroxime 500 MG Oral Tab Take 1 tablet (500 mg total) by mouth 2 (two) times daily. 6 tablet 0    predniSONE 10 MG Oral Tab 4 p.o. every morning x 2 days then 3 p.o. every morning x 4 days then 2 p.o. every morning x 4 days then 1 p.o. every morning x 4 days 32 tablet 0    torsemide 20 MG Oral Tab Take 2 tablets (40 mg total) by mouth 2 (two) times daily.      atorvastatin 10 MG Oral Tab Take 1 tablet (10 mg total) by mouth nightly. 90 tablet 3    ELIQUIS 2.5 MG Oral Tab Take 1 tablet (2.5 mg total) by mouth 2 (two) times daily.      metoprolol tartrate 100 MG Oral Tab TAKE 1 TABLET(100 MG) BY MOUTH TWICE DAILY 180 tablet 0    JARDIANCE 10 MG Oral Tab Take 1 tablet (10 mg total) by mouth daily.      tamsulosin 0.4 MG Oral Cap Take 1 capsule (0.4 mg total) by mouth daily. 90 capsule 3    Potassium Chloride ER 10 MEQ Oral Tab CR Take 1 tablet (10 mEq total) by mouth daily.       Did patient review medications using current pill bottles and not just a medication list?  No  Discharge medications reviewed/discussed/and reconciled against outpatient medications with patient.  Any changes or updates to medications sent to primary care provider.  Patient Acknowledged    SDOH:   Transportation Needs: No Transportation Needs (12/24/2024)    Transportation Needs     Lack of Transportation: No     Car Seat: Not on file     Financial Resource Strain: Low Risk  (9/27/2021)    [facility], [facility]    Overall Financial Resource Strain (CARDIA)     Difficulty of Paying Living Expenses: Not very hard       Follow-up Appointments:  Your appointments       Date & Time Appointment Department (Katy)    Feb 03, 2025 6:00  AM CST  INTERVENTIONAL SUITES with  IVS RM 2 EP [facility] ([facility])    When you arrive, park in the [facility], then proceed to the ground floor of the [facility] lobby and check in at the [facility] registration desk so we know you have arrived (even if you already completed eCheck-in online).     Your physician or physician's representative will follow-up with you and your loved one by phone after you are discharged.    A nurse from the Interventional Suites Department will be calling you prior to your procedure to perform a health history screening and give you instructions.  The time the nurse assigns you to arrive will be one hour prior to your procedure.  This will allow plenty of time to register and to get you prepared for your procedure.     If you have any questions, please call [phone].  We are available Monday through Friday, 8:00 AM to 5:00 PM.      Feb 19, 2025 9:30 AM CST Medicare Annual Well Visit with Braydon Barrett MD [facility] ([facility])        Aug 25, 2025 9:15 AM CDT Sleep Follow Up with Luma Rubio DO [facility] ([facility])    Sleep Patients:  Please bring your PAP device (no mask/hose) to each appointment unless instructed otherwise.        Oct 21, 2025 2:00 PM CDT Exam - Established with Heladio Harman MD [facility] ([facility])              [facility]  [facility]  [address] [address]  [phone] [facility] [place]  [address]  [facility] [id number] [phone] [place] [address]  [address]  [place] [address]  [phone]    [facility] [address]  [address]  [place] [address]  [phone]            Transitional Care Clinic  Was TCC Ordered: No    Primary Care Provider (If no TCC appointment)  Does patient already have a PCP appointment scheduled? No  Nurse Care Manager Attempted to schedule PCP office TCM appointment with patient   -If no appointment scheduled: Explain -daughter will take care of scheduling appt.     Specialist  Does the patient have any other follow-up appointment(s) that need to be scheduled? Yes   -If yes: Nurse Care Manager reviewed upcoming specialist appointments with patient: Yes   -Does the patient need assistance scheduling appointment(s): No, daughter will schedule appts.     CCM referral placed:  No    Book By Date: 1/12/25

## 2024-12-31 NOTE — PAYOR COMM NOTE
--------------  CONTINUED STAY REVIEW    Payor: SAKINA CAMPUZANO O  Subscriber #:  [id number]  Authorization Number: [id number]    Admit date: 12/26/24  Admit time: 11:15 AM    REVIEW DOCUMENTATION:   12-28-24           States breathing feels about the same. States he occasionally decreases the amount of torsemide he takes at home d/t urinary frequency/incontinence. Typically on 3-5L O2 at home - currently on 5L NC     /42 (BP Location: Right arm)   Pulse 96   Temp 98.1 °F (36.7 °C) (Axillary)   Resp 19   Ht 5' 6\" (1.676 m)   Wt 204 lb 12.9 oz (92.9 kg)   SpO2 90%   BMI 33.06 kg/m²      Telemetry: paced, PVC's     Physical Exam:    Gen: alert, oriented x 3, NAD  Heent: pupils equal, reactive. Mucous membranes moist.   Neck: no jvd  Cardiac: regular rate and rhythm, normal S1,S2, + LLSB murmur, no clicks, rub or gallop  Lungs: coarse, no wheezing, bilateral crackles  Abd: soft, NT/ND +bs  Ext: BLE L>R edema (LLE chronically more edema than R)  Skin: Warm, dry  Neuro: No focal deficits    Scheduled Medications[]Expand by Default    cefTRIAXone  2 g Intravenous Q24H    insulin aspart  1-68 Units Subcutaneous TID CC    insulin NPH-human isophane  12 Units Subcutaneous BID    furosemide  80 mg Intravenous BID (Diuretic)    methylPREDNISolone  40 mg Intravenous Q12H    ipratropium-albuterol  3 mL Nebulization Q4H WA (5 times daily)    insulin degludec  5 Units Subcutaneous Daily    sodium ferric gluconate  125 mg Intravenous Daily    atorvastatin  10 mg Oral Nightly    apixaban  2.5 mg Oral BID    metoprolol tartrate  100 mg Oral BID    tamsulosin  0.4 mg Oral Daily    insulin aspart  1-10 Units Subcutaneous TID AC and HS    umeclidinium bromide  1 puff Inhalation Daily        Assessment:  Acute on Chronic HFpEF, Pulmonary HTN (Group 2/3)  proBNP 2901, CXR w/RLL opacity, pulm edema  Echo 4/2024 LVEF 55-60, no RWMA, biatrial dilation, normal RV, mild-mod MR, mild-mod TR, PASP 57mmHg  Diuresis w/IV Lasix 80mg BID  Net  neg 4L, wt appears inaccurate  GDMT - not on MRA, SGLT2i w/CKD  Acute on Chronic Hypoxic Respiratory Failure - Multifactorial (COPD Exacerbation/Poss PNA/HF/Pulm HTN)  Decreasing O2 requirements - On 3L NC (Baseline   IV antibx  Steroids, nebs, inhaler  Pulm following  Chronic Atrial Fibrillation, Hx SSS Medtr[employer] DC PPM  Paced on tele  On lopressor 100mg BID  On eliquis 2.5mg po BID (reduced for age, renal)  CKD - Cr stable  BMP pending  Hypertension - controlled  DMII - SSI per primary   Chronic Anemia - stable     Plan:  Appears to be improving from volume standpoint on IV Lasix 80 BID - await BMP this am. Still on 5L O2 - higher end of his baseline at home.   Hopefully transition back to oral torsemide 40mg po BID later today or tomorrow   Strict I&O, daily weights (standing if possible), daily BMP  Antibiotics, steroids, nebs per pulmonology

## 2025-01-01 ENCOUNTER — APPOINTMENT (OUTPATIENT)
Dept: GENERAL RADIOLOGY | Facility: HOSPITAL | Age: 85
End: 2025-01-01
Attending: NURSE PRACTITIONER
Payer: MEDICARE

## 2025-01-01 ENCOUNTER — APPOINTMENT (OUTPATIENT)
Dept: GENERAL RADIOLOGY | Facility: HOSPITAL | Age: 85
End: 2025-01-01
Attending: STUDENT IN AN ORGANIZED HEALTH CARE EDUCATION/TRAINING PROGRAM
Payer: MEDICARE

## 2025-01-01 ENCOUNTER — HOSPITAL ENCOUNTER (INPATIENT)
Facility: HOSPITAL | Age: 85
LOS: 5 days | Discharge: HOSPICE | End: 2025-01-01
Attending: EMERGENCY MEDICINE | Admitting: HOSPITALIST
Payer: MEDICARE

## 2025-01-01 ENCOUNTER — APPOINTMENT (OUTPATIENT)
Dept: CT IMAGING | Facility: HOSPITAL | Age: 85
End: 2025-01-01
Payer: MEDICARE

## 2025-01-01 ENCOUNTER — APPOINTMENT (OUTPATIENT)
Dept: CV DIAGNOSTICS | Facility: HOSPITAL | Age: 85
End: 2025-01-01
Attending: NURSE PRACTITIONER
Payer: MEDICARE

## 2025-01-01 ENCOUNTER — APPOINTMENT (OUTPATIENT)
Dept: GENERAL RADIOLOGY | Facility: HOSPITAL | Age: 85
End: 2025-01-01
Attending: INTERNAL MEDICINE
Payer: MEDICARE

## 2025-01-01 ENCOUNTER — APPOINTMENT (OUTPATIENT)
Dept: CT IMAGING | Facility: HOSPITAL | Age: 85
End: 2025-01-01
Attending: EMERGENCY MEDICINE
Payer: MEDICARE

## 2025-01-01 VITALS
HEIGHT: 66 IN | BODY MASS INDEX: 30.37 KG/M2 | DIASTOLIC BLOOD PRESSURE: 69 MMHG | WEIGHT: 189 LBS | HEART RATE: 91 BPM | RESPIRATION RATE: 19 BRPM | TEMPERATURE: 98 F | SYSTOLIC BLOOD PRESSURE: 122 MMHG | OXYGEN SATURATION: 94 %

## 2025-01-01 VITALS
BODY MASS INDEX: 29 KG/M2 | WEIGHT: 182.56 LBS | DIASTOLIC BLOOD PRESSURE: 53 MMHG | OXYGEN SATURATION: 93 % | HEART RATE: 81 BPM | TEMPERATURE: 99 F | SYSTOLIC BLOOD PRESSURE: 122 MMHG | RESPIRATION RATE: 16 BRPM

## 2025-01-01 DIAGNOSIS — I63.9 ACUTE ISCHEMIC STROKE (HCC): Primary | ICD-10-CM

## 2025-01-01 DIAGNOSIS — R41.0 DISORIENTATION: ICD-10-CM

## 2025-01-01 DIAGNOSIS — E87.20 METABOLIC ACIDOSIS: ICD-10-CM

## 2025-01-01 LAB
ALBUMIN SERPL-MCNC: 4.3 G/DL (ref 3.2–4.8)
ALBUMIN SERPL-MCNC: 4.5 G/DL (ref 3.2–4.8)
ALBUMIN/GLOB SERPL: 1.2 {RATIO} (ref 1–2)
ALP LIVER SERPL-CCNC: 107 U/L (ref 45–117)
ALP LIVER SERPL-CCNC: 121 U/L (ref 45–117)
ALT SERPL-CCNC: 104 U/L (ref 10–49)
ALT SERPL-CCNC: 87 U/L (ref 10–49)
ANION GAP SERPL CALC-SCNC: 14 MMOL/L (ref 0–18)
ANION GAP SERPL CALC-SCNC: 14 MMOL/L (ref 0–18)
ANION GAP SERPL CALC-SCNC: 16 MMOL/L (ref 0–18)
ANION GAP SERPL CALC-SCNC: 16 MMOL/L (ref 0–18)
APTT PPP: 31.1 SECONDS (ref 23–36)
AST SERPL-CCNC: 46 U/L (ref ?–34)
AST SERPL-CCNC: 66 U/L (ref ?–34)
ATRIAL RATE: 62 BPM
BASOPHILS # BLD AUTO: 0.05 X10(3) UL (ref 0–0.2)
BASOPHILS # BLD AUTO: 0.07 X10(3) UL (ref 0–0.2)
BASOPHILS NFR BLD AUTO: 0.3 %
BASOPHILS NFR BLD AUTO: 0.5 %
BILIRUB DIRECT SERPL-MCNC: 0.3 MG/DL (ref ?–0.3)
BILIRUB SERPL-MCNC: 0.6 MG/DL (ref 0.2–1.1)
BILIRUB SERPL-MCNC: 0.7 MG/DL (ref 0.2–1.1)
BILIRUB UR QL STRIP.AUTO: NEGATIVE
BUN BLD-MCNC: 43 MG/DL (ref 9–23)
BUN BLD-MCNC: 45 MG/DL (ref 9–23)
BUN BLD-MCNC: 49 MG/DL (ref 9–23)
BUN BLD-MCNC: 49 MG/DL (ref 9–23)
CALCIUM BLD-MCNC: 9.1 MG/DL (ref 8.7–10.6)
CALCIUM BLD-MCNC: 9.4 MG/DL (ref 8.7–10.6)
CALCIUM BLD-MCNC: 9.4 MG/DL (ref 8.7–10.6)
CALCIUM BLD-MCNC: 9.5 MG/DL (ref 8.7–10.6)
CHLORIDE SERPL-SCNC: 100 MMOL/L (ref 98–112)
CHLORIDE SERPL-SCNC: 109 MMOL/L (ref 98–112)
CHLORIDE SERPL-SCNC: 113 MMOL/L (ref 98–112)
CHLORIDE SERPL-SCNC: 99 MMOL/L (ref 98–112)
CHOLEST SERPL-MCNC: 101 MG/DL (ref ?–200)
CO2 SERPL-SCNC: 18 MMOL/L (ref 21–32)
CO2 SERPL-SCNC: 20 MMOL/L (ref 21–32)
CO2 SERPL-SCNC: 21 MMOL/L (ref 21–32)
CO2 SERPL-SCNC: 22 MMOL/L (ref 21–32)
CREAT BLD-MCNC: 2.08 MG/DL (ref 0.7–1.3)
CREAT BLD-MCNC: 2.14 MG/DL (ref 0.7–1.3)
CREAT BLD-MCNC: 2.2 MG/DL (ref 0.7–1.3)
CREAT BLD-MCNC: 2.2 MG/DL (ref 0.7–1.3)
EGFRCR SERPLBLD CKD-EPI 2021: 29 ML/MIN/1.73M2 (ref 60–?)
EGFRCR SERPLBLD CKD-EPI 2021: 29 ML/MIN/1.73M2 (ref 60–?)
EGFRCR SERPLBLD CKD-EPI 2021: 30 ML/MIN/1.73M2 (ref 60–?)
EGFRCR SERPLBLD CKD-EPI 2021: 31 ML/MIN/1.73M2 (ref 60–?)
EOSINOPHIL # BLD AUTO: 0.02 X10(3) UL (ref 0–0.7)
EOSINOPHIL # BLD AUTO: 0.03 X10(3) UL (ref 0–0.7)
EOSINOPHIL NFR BLD AUTO: 0.1 %
EOSINOPHIL NFR BLD AUTO: 0.2 %
ERYTHROCYTE [DISTWIDTH] IN BLOOD BY AUTOMATED COUNT: 15.4 %
ERYTHROCYTE [DISTWIDTH] IN BLOOD BY AUTOMATED COUNT: 15.4 %
ERYTHROCYTE [DISTWIDTH] IN BLOOD BY AUTOMATED COUNT: 15.8 %
ERYTHROCYTE [DISTWIDTH] IN BLOOD BY AUTOMATED COUNT: 16.1 %
EST. AVERAGE GLUCOSE BLD GHB EST-MCNC: 209 MG/DL (ref 68–126)
GLOBULIN PLAS-MCNC: 3.7 G/DL (ref 2–3.5)
GLUCOSE BLD-MCNC: 204 MG/DL (ref 70–99)
GLUCOSE BLD-MCNC: 210 MG/DL (ref 70–99)
GLUCOSE BLD-MCNC: 216 MG/DL (ref 70–99)
GLUCOSE BLD-MCNC: 217 MG/DL (ref 70–99)
GLUCOSE BLD-MCNC: 219 MG/DL (ref 70–99)
GLUCOSE BLD-MCNC: 219 MG/DL (ref 70–99)
GLUCOSE BLD-MCNC: 220 MG/DL (ref 70–99)
GLUCOSE BLD-MCNC: 227 MG/DL (ref 70–99)
GLUCOSE BLD-MCNC: 234 MG/DL (ref 70–99)
GLUCOSE BLD-MCNC: 234 MG/DL (ref 70–99)
GLUCOSE BLD-MCNC: 244 MG/DL (ref 70–99)
GLUCOSE BLD-MCNC: 245 MG/DL (ref 70–99)
GLUCOSE BLD-MCNC: 247 MG/DL (ref 70–99)
GLUCOSE BLD-MCNC: 264 MG/DL (ref 70–99)
GLUCOSE BLD-MCNC: 282 MG/DL (ref 70–99)
GLUCOSE BLD-MCNC: 286 MG/DL (ref 70–99)
GLUCOSE BLD-MCNC: 291 MG/DL (ref 70–99)
GLUCOSE BLD-MCNC: 296 MG/DL (ref 70–99)
GLUCOSE UR STRIP.AUTO-MCNC: >1000 MG/DL
HBA1C MFR BLD: 8.9 % (ref ?–5.7)
HCT VFR BLD AUTO: 40.5 % (ref 39–53)
HCT VFR BLD AUTO: 41.1 % (ref 39–53)
HCT VFR BLD AUTO: 41.7 % (ref 39–53)
HCT VFR BLD AUTO: 43.9 % (ref 39–53)
HDLC SERPL-MCNC: 28 MG/DL (ref 40–59)
HGB BLD-MCNC: 13.1 G/DL (ref 13–17.5)
HGB BLD-MCNC: 13.4 G/DL (ref 13–17.5)
HGB BLD-MCNC: 13.6 G/DL (ref 13–17.5)
HGB BLD-MCNC: 13.8 G/DL (ref 13–17.5)
IMM GRANULOCYTES # BLD AUTO: 0.21 X10(3) UL (ref 0–1)
IMM GRANULOCYTES # BLD AUTO: 0.26 X10(3) UL (ref 0–1)
IMM GRANULOCYTES NFR BLD: 1.5 %
IMM GRANULOCYTES NFR BLD: 1.6 %
INR BLD: 1.48 (ref 0.8–1.2)
KETONES UR STRIP.AUTO-MCNC: NEGATIVE MG/DL
LDLC SERPL CALC-MCNC: 59 MG/DL (ref ?–100)
LEUKOCYTE ESTERASE UR QL STRIP.AUTO: 500
LYMPHOCYTES # BLD AUTO: 0.69 X10(3) UL (ref 1–4)
LYMPHOCYTES # BLD AUTO: 0.75 X10(3) UL (ref 1–4)
LYMPHOCYTES NFR BLD AUTO: 4.2 %
LYMPHOCYTES NFR BLD AUTO: 5.3 %
MAGNESIUM SERPL-MCNC: 2.5 MG/DL (ref 1.6–2.6)
MAGNESIUM SERPL-MCNC: 2.7 MG/DL (ref 1.6–2.6)
MAGNESIUM SERPL-MCNC: 2.9 MG/DL (ref 1.6–2.6)
MCH RBC QN AUTO: 27.9 PG (ref 26–34)
MCH RBC QN AUTO: 28 PG (ref 26–34)
MCH RBC QN AUTO: 28 PG (ref 26–34)
MCH RBC QN AUTO: 28.2 PG (ref 26–34)
MCHC RBC AUTO-ENTMCNC: 31.4 G/DL (ref 31–37)
MCHC RBC AUTO-ENTMCNC: 32.1 G/DL (ref 31–37)
MCHC RBC AUTO-ENTMCNC: 32.3 G/DL (ref 31–37)
MCHC RBC AUTO-ENTMCNC: 33.1 G/DL (ref 31–37)
MCV RBC AUTO: 85.1 FL (ref 80–100)
MCV RBC AUTO: 86.5 FL (ref 80–100)
MCV RBC AUTO: 87.1 FL (ref 80–100)
MCV RBC AUTO: 88.7 FL (ref 80–100)
MONOCYTES # BLD AUTO: 0.98 X10(3) UL (ref 0.1–1)
MONOCYTES # BLD AUTO: 1.79 X10(3) UL (ref 0.1–1)
MONOCYTES NFR BLD AUTO: 12.6 %
MONOCYTES NFR BLD AUTO: 6 %
MRSA DNA SPEC QL NAA+PROBE: NEGATIVE
NEUTROPHILS # BLD AUTO: 11.35 X10 (3) UL (ref 1.5–7.7)
NEUTROPHILS # BLD AUTO: 11.35 X10(3) UL (ref 1.5–7.7)
NEUTROPHILS # BLD AUTO: 14.4 X10 (3) UL (ref 1.5–7.7)
NEUTROPHILS # BLD AUTO: 14.4 X10(3) UL (ref 1.5–7.7)
NEUTROPHILS NFR BLD AUTO: 79.9 %
NEUTROPHILS NFR BLD AUTO: 87.8 %
NITRITE UR QL STRIP.AUTO: NEGATIVE
NONHDLC SERPL-MCNC: 73 MG/DL (ref ?–130)
NT-PROBNP SERPL-MCNC: 3489 PG/ML (ref ?–450)
OSMOLALITY SERPL CALC.SUM OF ELEC: 302 MOSM/KG (ref 275–295)
OSMOLALITY SERPL CALC.SUM OF ELEC: 302 MOSM/KG (ref 275–295)
OSMOLALITY SERPL CALC.SUM OF ELEC: 314 MOSM/KG (ref 275–295)
OSMOLALITY SERPL CALC.SUM OF ELEC: 329 MOSM/KG (ref 275–295)
PH UR STRIP.AUTO: 5.5 [PH] (ref 5–8)
PHOSPHATE SERPL-MCNC: 3.5 MG/DL (ref 2.4–5.1)
PHOSPHATE SERPL-MCNC: 3.9 MG/DL (ref 2.4–5.1)
PHOSPHATE SERPL-MCNC: 4.6 MG/DL (ref 2.4–5.1)
PLATELET # BLD AUTO: 401 10(3)UL (ref 150–450)
PLATELET # BLD AUTO: 402 10(3)UL (ref 150–450)
PLATELET # BLD AUTO: 420 10(3)UL (ref 150–450)
PLATELET # BLD AUTO: 432 10(3)UL (ref 150–450)
POTASSIUM SERPL-SCNC: 4 MMOL/L (ref 3.5–5.1)
POTASSIUM SERPL-SCNC: 4.1 MMOL/L (ref 3.5–5.1)
POTASSIUM SERPL-SCNC: 4.5 MMOL/L (ref 3.5–5.1)
POTASSIUM SERPL-SCNC: 4.6 MMOL/L (ref 3.5–5.1)
PROT SERPL-MCNC: 7.8 G/DL (ref 5.7–8.2)
PROT SERPL-MCNC: 8.2 G/DL (ref 5.7–8.2)
PROT UR STRIP.AUTO-MCNC: NEGATIVE MG/DL
PROTHROMBIN TIME: 18.1 SECONDS (ref 11.6–14.8)
Q-T INTERVAL: 522 MS
QRS DURATION: 176 MS
QTC CALCULATION (BEZET): 631 MS
R AXIS: -67 DEGREES
RBC # BLD AUTO: 4.68 X10(6)UL (ref 3.8–5.8)
RBC # BLD AUTO: 4.79 X10(6)UL (ref 3.8–5.8)
RBC # BLD AUTO: 4.83 X10(6)UL (ref 3.8–5.8)
RBC # BLD AUTO: 4.95 X10(6)UL (ref 3.8–5.8)
RBC #/AREA URNS AUTO: >10 /HPF
SODIUM SERPL-SCNC: 134 MMOL/L (ref 136–145)
SODIUM SERPL-SCNC: 136 MMOL/L (ref 136–145)
SODIUM SERPL-SCNC: 143 MMOL/L (ref 136–145)
SODIUM SERPL-SCNC: 149 MMOL/L (ref 136–145)
SP GR UR STRIP.AUTO: 1.03 (ref 1–1.03)
T AXIS: 107 DEGREES
TRIGL SERPL-MCNC: 64 MG/DL (ref 30–149)
TROPONIN I SERPL HS-MCNC: 11 NG/L (ref ?–53)
TSI SER-ACNC: 4.29 UIU/ML (ref 0.55–4.78)
UROBILINOGEN UR STRIP.AUTO-MCNC: NORMAL MG/DL
VENTRICULAR RATE: 88 BPM
VLDLC SERPL CALC-MCNC: 9 MG/DL (ref 0–30)
WBC # BLD AUTO: 12.6 X10(3) UL (ref 4–11)
WBC # BLD AUTO: 14.2 X10(3) UL (ref 4–11)
WBC # BLD AUTO: 16.4 X10(3) UL (ref 4–11)
WBC # BLD AUTO: 16.4 X10(3) UL (ref 4–11)
WBC #/AREA URNS AUTO: >50 /HPF

## 2025-01-01 PROCEDURE — 99223 1ST HOSP IP/OBS HIGH 75: CPT | Performed by: HOSPITALIST

## 2025-01-01 PROCEDURE — 99291 CRITICAL CARE FIRST HOUR: CPT | Performed by: OTHER

## 2025-01-01 PROCEDURE — 99239 HOSP IP/OBS DSCHRG MGMT >30: CPT | Performed by: HOSPITALIST

## 2025-01-01 PROCEDURE — 71045 X-RAY EXAM CHEST 1 VIEW: CPT | Performed by: INTERNAL MEDICINE

## 2025-01-01 PROCEDURE — 99232 SBSQ HOSP IP/OBS MODERATE 35: CPT | Performed by: STUDENT IN AN ORGANIZED HEALTH CARE EDUCATION/TRAINING PROGRAM

## 2025-01-01 PROCEDURE — 99233 SBSQ HOSP IP/OBS HIGH 50: CPT | Performed by: INTERNAL MEDICINE

## 2025-01-01 PROCEDURE — 70496 CT ANGIOGRAPHY HEAD: CPT | Performed by: EMERGENCY MEDICINE

## 2025-01-01 PROCEDURE — 99232 SBSQ HOSP IP/OBS MODERATE 35: CPT | Performed by: HOSPITALIST

## 2025-01-01 PROCEDURE — 99232 SBSQ HOSP IP/OBS MODERATE 35: CPT

## 2025-01-01 PROCEDURE — 0042T CT STROKE (DAWN) CTA BRAIN/CTA NECK+PERF(CPT=70496/70498/0042T): CPT | Performed by: EMERGENCY MEDICINE

## 2025-01-01 PROCEDURE — 99291 CRITICAL CARE FIRST HOUR: CPT | Performed by: NEUROLOGICAL SURGERY

## 2025-01-01 PROCEDURE — 74230 X-RAY XM SWLNG FUNCJ C+: CPT | Performed by: STUDENT IN AN ORGANIZED HEALTH CARE EDUCATION/TRAINING PROGRAM

## 2025-01-01 PROCEDURE — 93306 TTE W/DOPPLER COMPLETE: CPT | Performed by: NURSE PRACTITIONER

## 2025-01-01 PROCEDURE — 71045 X-RAY EXAM CHEST 1 VIEW: CPT | Performed by: NURSE PRACTITIONER

## 2025-01-01 PROCEDURE — 70498 CT ANGIOGRAPHY NECK: CPT | Performed by: EMERGENCY MEDICINE

## 2025-01-01 PROCEDURE — 71045 X-RAY EXAM CHEST 1 VIEW: CPT | Performed by: STUDENT IN AN ORGANIZED HEALTH CARE EDUCATION/TRAINING PROGRAM

## 2025-01-01 PROCEDURE — 99292 CRITICAL CARE ADDL 30 MIN: CPT | Performed by: OTHER

## 2025-01-01 PROCEDURE — 70450 CT HEAD/BRAIN W/O DYE: CPT | Performed by: EMERGENCY MEDICINE

## 2025-01-01 PROCEDURE — 99233 SBSQ HOSP IP/OBS HIGH 50: CPT | Performed by: NEUROLOGICAL SURGERY

## 2025-01-01 RX ORDER — SCOPOLAMINE 1 MG/3D
1 PATCH, EXTENDED RELEASE TRANSDERMAL
Status: DISCONTINUED | OUTPATIENT
Start: 2025-01-01 | End: 2025-01-01

## 2025-01-01 RX ORDER — ACETAMINOPHEN 500 MG
500 TABLET ORAL EVERY 4 HOURS PRN
Status: DISCONTINUED | OUTPATIENT
Start: 2025-01-01 | End: 2025-01-01

## 2025-01-01 RX ORDER — LORAZEPAM 2 MG/ML
2 INJECTION INTRAMUSCULAR EVERY 4 HOURS PRN
Status: DISCONTINUED | OUTPATIENT
Start: 2025-01-01 | End: 2025-01-01

## 2025-01-01 RX ORDER — INSULIN DEGLUDEC 100 U/ML
3 INJECTION, SOLUTION SUBCUTANEOUS DAILY
Status: DISCONTINUED | OUTPATIENT
Start: 2025-01-01 | End: 2025-01-01

## 2025-01-01 RX ORDER — IPRATROPIUM BROMIDE AND ALBUTEROL SULFATE 2.5; .5 MG/3ML; MG/3ML
3 SOLUTION RESPIRATORY (INHALATION) EVERY 6 HOURS PRN
Status: DISCONTINUED | OUTPATIENT
Start: 2025-01-01 | End: 2025-01-01

## 2025-01-01 RX ORDER — HEPARIN SODIUM 5000 [USP'U]/ML
5000 INJECTION, SOLUTION INTRAVENOUS; SUBCUTANEOUS EVERY 8 HOURS SCHEDULED
Status: DISCONTINUED | OUTPATIENT
Start: 2025-01-01 | End: 2025-01-01

## 2025-01-01 RX ORDER — BISACODYL 10 MG
10 SUPPOSITORY, RECTAL RECTAL
Status: DISCONTINUED | OUTPATIENT
Start: 2025-01-01 | End: 2025-01-01

## 2025-01-01 RX ORDER — DEXTROSE MONOHYDRATE 25 G/50ML
50 INJECTION, SOLUTION INTRAVENOUS
Status: DISCONTINUED | OUTPATIENT
Start: 2025-01-01 | End: 2025-01-01

## 2025-01-01 RX ORDER — ONDANSETRON 2 MG/ML
4 INJECTION INTRAMUSCULAR; INTRAVENOUS EVERY 6 HOURS PRN
Status: DISCONTINUED | OUTPATIENT
Start: 2025-01-01 | End: 2025-01-01

## 2025-01-01 RX ORDER — MORPHINE SULFATE 10 MG/5ML
5 SOLUTION ORAL
Refills: 0 | Status: DISCONTINUED | OUTPATIENT
Start: 2025-01-01 | End: 2025-01-01

## 2025-01-01 RX ORDER — ASPIRIN 300 MG/1
300 SUPPOSITORY RECTAL DAILY
Status: DISCONTINUED | OUTPATIENT
Start: 2025-01-01 | End: 2025-01-01

## 2025-01-01 RX ORDER — TORSEMIDE 20 MG/1
40 TABLET ORAL DAILY
Status: DISCONTINUED | OUTPATIENT
Start: 2025-01-01 | End: 2025-01-01

## 2025-01-01 RX ORDER — ACETAMINOPHEN 325 MG/1
650 TABLET ORAL EVERY 4 HOURS PRN
Status: DISCONTINUED | OUTPATIENT
Start: 2025-01-01 | End: 2025-01-01

## 2025-01-01 RX ORDER — POLYETHYLENE GLYCOL 3350 17 G/17G
17 POWDER, FOR SOLUTION ORAL DAILY PRN
Status: DISCONTINUED | OUTPATIENT
Start: 2025-01-01 | End: 2025-01-01

## 2025-01-01 RX ORDER — METOCLOPRAMIDE HYDROCHLORIDE 5 MG/ML
5 INJECTION INTRAMUSCULAR; INTRAVENOUS EVERY 8 HOURS PRN
Status: DISCONTINUED | OUTPATIENT
Start: 2025-01-01 | End: 2025-01-01

## 2025-01-01 RX ORDER — SENNOSIDES 8.6 MG
17.2 TABLET ORAL NIGHTLY PRN
Status: DISCONTINUED | OUTPATIENT
Start: 2025-01-01 | End: 2025-01-01

## 2025-01-01 RX ORDER — LABETALOL HYDROCHLORIDE 5 MG/ML
10 INJECTION, SOLUTION INTRAVENOUS EVERY 10 MIN PRN
Status: DISCONTINUED | OUTPATIENT
Start: 2025-01-01 | End: 2025-01-01

## 2025-01-01 RX ORDER — ACETAMINOPHEN 650 MG/1
650 SUPPOSITORY RECTAL EVERY 4 HOURS PRN
Status: DISCONTINUED | OUTPATIENT
Start: 2025-01-01 | End: 2025-01-01

## 2025-01-01 RX ORDER — ONDANSETRON 4 MG/1
4 TABLET, ORALLY DISINTEGRATING ORAL EVERY 6 HOURS PRN
Status: DISCONTINUED | OUTPATIENT
Start: 2025-01-01 | End: 2025-01-01

## 2025-01-01 RX ORDER — ATORVASTATIN CALCIUM 80 MG/1
80 TABLET, FILM COATED ORAL NIGHTLY
Status: DISCONTINUED | OUTPATIENT
Start: 2025-01-01 | End: 2025-01-01

## 2025-01-01 RX ORDER — SODIUM BICARBONATE 650 MG/1
650 TABLET ORAL AS NEEDED
Status: DISCONTINUED | OUTPATIENT
Start: 2025-01-01 | End: 2025-01-01

## 2025-01-01 RX ORDER — NICOTINE POLACRILEX 4 MG
30 LOZENGE BUCCAL
Status: DISCONTINUED | OUTPATIENT
Start: 2025-01-01 | End: 2025-01-01

## 2025-01-01 RX ORDER — ASPIRIN 325 MG
325 TABLET ORAL DAILY
Status: DISCONTINUED | OUTPATIENT
Start: 2025-01-01 | End: 2025-01-01

## 2025-01-01 RX ORDER — SODIUM CHLORIDE 9 MG/ML
INJECTION, SOLUTION INTRAVENOUS CONTINUOUS
Status: DISCONTINUED | OUTPATIENT
Start: 2025-01-01 | End: 2025-01-01

## 2025-01-01 RX ORDER — LORAZEPAM 2 MG/ML
0.5 INJECTION INTRAMUSCULAR EVERY 4 HOURS PRN
Status: DISCONTINUED | OUTPATIENT
Start: 2025-01-01 | End: 2025-01-01

## 2025-01-01 RX ORDER — TAMSULOSIN HYDROCHLORIDE 0.4 MG/1
0.4 CAPSULE ORAL DAILY
Status: DISCONTINUED | OUTPATIENT
Start: 2025-01-01 | End: 2025-01-01

## 2025-01-01 RX ORDER — NICOTINE POLACRILEX 4 MG
15 LOZENGE BUCCAL
Status: DISCONTINUED | OUTPATIENT
Start: 2025-01-01 | End: 2025-01-01

## 2025-01-01 RX ORDER — LORAZEPAM 2 MG/ML
1 INJECTION INTRAMUSCULAR EVERY 4 HOURS PRN
Status: DISCONTINUED | OUTPATIENT
Start: 2025-01-01 | End: 2025-01-01

## 2025-01-01 RX ORDER — ATROPINE SULFATE 10 MG/ML
2 SOLUTION/ DROPS OPHTHALMIC EVERY 2 HOUR PRN
Status: DISCONTINUED | OUTPATIENT
Start: 2025-01-01 | End: 2025-01-01

## 2025-01-01 RX ORDER — HYDRALAZINE HYDROCHLORIDE 20 MG/ML
10 INJECTION INTRAMUSCULAR; INTRAVENOUS EVERY 2 HOUR PRN
Status: DISCONTINUED | OUTPATIENT
Start: 2025-01-01 | End: 2025-01-01

## 2025-01-01 RX ORDER — ATORVASTATIN CALCIUM 40 MG/1
40 TABLET, FILM COATED ORAL NIGHTLY
Status: DISCONTINUED | OUTPATIENT
Start: 2025-01-01 | End: 2025-01-01

## 2025-01-02 ENCOUNTER — PATIENT MESSAGE (OUTPATIENT)
Dept: FAMILY MEDICINE CLINIC | Facility: CLINIC | Age: 85
End: 2025-01-02

## 2025-01-02 NOTE — DISCHARGE SUMMARY
[facility]     Lisandro Harley Patient Status:  Inpatient    1940 MRN [id number]   Location [facility] Attending Barney Alcaraz MD   Hosp Day # 3 PCP Braydon Barrett MD     Date of Admission:  2024  Date of Discharge:   2024    Discharge Disposition: Home Health Care Services    Discharge Diagnosis:    #Acute on chronic hypoxic resp failure   #Presumptive PNA   #Acute HFpEF w/ moderate pHTN   #AECOPD   #NSVT   #CKD III   #A. Fib s/p PPM   #HTN   #DM2 w/ steroid hyperglycemia   #Fe de Anemia      History of Present Illness:    Lisandor Harley is a 84 year old male with a past medical history of CHF, CAD, hypertension, diabetes, COPD hyperlipidemia.  The patient acute on chronic proximal respiratory failure states that he is normally on about 3 to 4 L of oxygen at home.  Over the past couple days he has had increasing dyspnea and has increased his oxygen to about 8 L.  He states that he has felt more lower extremity edema and short of breath.     Brief Synopsis:    The patient was admitted due to acute on chronic proximal respiratory failure secondary to pneumonia, acute HFpEF and acute exacerbation COPD.  He was started on antibiotics, diuretics and steroids.  He had steady improvement in his symptoms.  He was eventually able to be transition to oral antibiotics, steroids and diuretics.  He was discharged home with plans to follow-up with his primary care doctor, pulmonologist and cardiologist.    All diagnosis' and recommendations discussed with patient and/or family in detail.      Lace+ Score: 78  59-90 High Risk  29-58 Medium Risk  0-28   Low Risk       TCM Follow-Up Recommendation:  LACE > 58: High Risk of readmission after discharge from the hospital.    Consultants:  Pulmonology  cardiology    Discharge Medication List:     Discharge Medications        START taking these medications        Instructions Prescription details   BD Pen Needle Debra U/F 32G X 4  MM Misc  Generic drug: Insulin Pen Needle      Inject 4 times per day. One (1) injection per day for long acting insulin and three (3) injections per day (before each meal) for short acting insulin.   Use a new pen needle with each injection   Quantity: 100 each  Refills: 6     cefuroxime 500 MG Tabs  Commonly known as: Ceftin      Take 1 tablet (500 mg total) by mouth 2 (two) times daily.   Quantity: 6 tablet  Refills: 0     ferrous sulfate 325 (65 FE) MG Tbec      Take 1 tablet (325 mg total) by mouth daily with breakfast.   Quantity: 90 tablet  Refills: 0     Insulin Lispro (1 Unit Dial) 100 UNIT/ML Sopn  Commonly known as: HumaLOG KwikPen      Test blood glucose 3 times daily before meals Inject 1 unit if blood glucose is between 141-180 mg/dL Inject 2 units if blood glucose is between 181-220 mg/dL Inject 3 units if blood glucose is between 221-260 mg/dL Inject 4 units if blood glucose is between 261-300 mg/dL Inject 5 units if blood glucose is between 301-350 mg/dL Call your physician if blood glucose is greater than 351 mg/dL,   Quantity: 5 each  Refills: 3     ipratropium-albuterol 0.5-2.5 (3) MG/3ML Soln  Commonly known as: Duoneb      Take 3 mL by nebulization every 6 (six) hours while awake.   Quantity: 120 each  Refills: 0     OneTouch Delica Lancets 33G Misc      Test blood sugar 3 times per day before each meal.   Quantity: 100 each  Refills: 6     OneTouch Verio Flex System w/Device Kit      Acccuchecks three times a day before each meal   Quantity: 1 kit  Refills: 0     OneTouch Verio Strp      Acccuchecks three times a day before each meal   Quantity: 50 strip  Refills: 6     predniSONE 10 MG Tabs  Commonly known as: Deltasone      4 p.o. every morning x 2 days then 3 p.o. every morning x 4 days then 2 p.o. every morning x 4 days then 1 p.o. every morning x 4 days   Quantity: 32 tablet  Refills: 0     umeclidinium bromide 62.5 MCG/ACT Aepb  Commonly known as: Incruse Ellipta      Inhale 1 puff into  the lungs daily.   Quantity: 1 each  Refills: 0            CONTINUE taking these medications        Instructions Prescription details   atorvastatin 10 MG Tabs  Commonly known as: Lipitor      Take 1 tablet (10 mg total) by mouth nightly.   Quantity: 90 tablet  Refills: 3     Eliquis 2.5 MG Tabs  Generic drug: apixaban      Take 1 tablet (2.5 mg total) by mouth 2 (two) times daily.   Refills: 0     Jardiance 10 MG Tabs  Generic drug: empagliflozin      Take 1 tablet (10 mg total) by mouth daily.   Refills: 0     metoprolol tartrate 100 MG Tabs  Commonly known as: Lopressor      TAKE 1 TABLET(100 MG) BY MOUTH TWICE DAILY   Quantity: 180 tablet  Refills: 0     Potassium Chloride ER 10 MEQ Tbcr      Take 1 tablet (10 mEq total) by mouth daily.   Refills: 0     tamsulosin 0.4 MG Caps  Commonly known as: Flomax      Take 1 capsule (0.4 mg total) by mouth daily.   Quantity: 90 capsule  Refills: 3     torsemide 20 MG Tabs  Commonly known as: Demadex      Take 2 tablets (40 mg total) by mouth 2 (two) times daily.   Refills: 0            STOP taking these medications      dilTIAZem HCl ER Beads 120 MG Cp24  Commonly known as: TIAZAC                  Where to Get Your Medications        These medications were sent to [id number] - [place], [place] - [address] AT [facility] [address], [phone], [phone] [address], [facility] [address]      Phone: [phone]   cefuroxime 500 MG Tabs  ferrous sulfate 325 (65 FE) MG Tbec  ipratropium-albuterol 0.5-2.5 (3) MG/3ML Soln  predniSONE 10 MG Tabs  umeclidinium bromide 62.5 MCG/ACT Aepb       Please  your prescriptions at the location directed by your doctor or nurse    Bring a paper prescription for each of these medications  BD Pen Needle Debra U/F 32G X 4 MM Misc  Insulin Lispro (1 Unit Dial) 100 UNIT/ML Sopn  OneTouch Delica Lancets 33G Misc  OneTouch Verio Flex System w/Device Kit  OneTouch Verio Strp         [place]  [place]    Follow-up appointment:   Heladio Harman MD  [address]  [facility] [address]  [phone]    Follow up in 2 week(s)      Lenin Villa MD  [address] [address]  [phone]    Follow up in 1 week(s)  Office will call you to schedule follow up      Vital signs:       Physical Exam:    General: No acute distress   Lungs: mild rhonchi  Cardiovascular: S1, S2  Abdomen: Soft      -----------------------------------------------------------------------------------------------  PATIENT DISCHARGE INSTRUCTIONS: See electronic chart    Cipriano Nieves MD    Total minutes spent on discharge plannin      The 21st Century Cures Act makes medical notes like these available to patients in the interest of transparency. Please be advised this is a medical document. Medical documents are intended to carry relevant information, facts as evident, and the clinical opinion of the practitioner. The medical note is intended as peer to peer communication and may appear blunt or direct. It is written in medical language and may contain abbreviations or verbiage that are unfamiliar.

## 2025-01-13 ENCOUNTER — HOME HEALTH CHARGES (OUTPATIENT)
Dept: FAMILY MEDICINE CLINIC | Facility: CLINIC | Age: 85
End: 2025-01-13

## 2025-01-13 DIAGNOSIS — Z71.89 COUNSELING REGARDING ADVANCE CARE PLANNING AND GOALS OF CARE: Primary | ICD-10-CM

## 2025-01-13 DIAGNOSIS — Z76.89 NEED FOR OCCUPATIONAL THERAPY ASSESSMENT: ICD-10-CM

## 2025-01-13 DIAGNOSIS — Z01.89 PHYSICAL THERAPY EVALUATION, INITIAL: ICD-10-CM

## 2025-01-14 ENCOUNTER — MED REC SCAN ONLY (OUTPATIENT)
Facility: CLINIC | Age: 85
End: 2025-01-14

## 2025-01-14 ENCOUNTER — OFFICE VISIT (OUTPATIENT)
Dept: FAMILY MEDICINE CLINIC | Facility: CLINIC | Age: 85
End: 2025-01-14
Payer: MEDICARE

## 2025-01-14 ENCOUNTER — TELEPHONE (OUTPATIENT)
Facility: CLINIC | Age: 85
End: 2025-01-14

## 2025-01-14 VITALS
DIASTOLIC BLOOD PRESSURE: 60 MMHG | OXYGEN SATURATION: 95 % | HEIGHT: 66 IN | WEIGHT: 188 LBS | HEART RATE: 79 BPM | SYSTOLIC BLOOD PRESSURE: 120 MMHG | BODY MASS INDEX: 30.22 KG/M2 | RESPIRATION RATE: 16 BRPM

## 2025-01-14 DIAGNOSIS — J15.9 PNEUMONIA, BACTERIAL: ICD-10-CM

## 2025-01-14 DIAGNOSIS — N18.32 TYPE 2 DIABETES MELLITUS WITH STAGE 3B CHRONIC KIDNEY DISEASE, WITHOUT LONG-TERM CURRENT USE OF INSULIN (HCC): ICD-10-CM

## 2025-01-14 DIAGNOSIS — I10 ESSENTIAL HYPERTENSION: ICD-10-CM

## 2025-01-14 DIAGNOSIS — E11.22 TYPE 2 DIABETES MELLITUS WITH STAGE 3B CHRONIC KIDNEY DISEASE, WITHOUT LONG-TERM CURRENT USE OF INSULIN (HCC): ICD-10-CM

## 2025-01-14 DIAGNOSIS — I50.9 ACUTE ON CHRONIC CONGESTIVE HEART FAILURE, UNSPECIFIED HEART FAILURE TYPE (HCC): ICD-10-CM

## 2025-01-14 DIAGNOSIS — J96.21 ACUTE ON CHRONIC HYPOXIC RESPIRATORY FAILURE (HCC): Primary | ICD-10-CM

## 2025-01-14 DIAGNOSIS — E78.2 MIXED HYPERLIPIDEMIA: ICD-10-CM

## 2025-01-14 PROCEDURE — 1160F RVW MEDS BY RX/DR IN RCRD: CPT | Performed by: FAMILY MEDICINE

## 2025-01-14 PROCEDURE — 1111F DSCHRG MED/CURRENT MED MERGE: CPT | Performed by: FAMILY MEDICINE

## 2025-01-14 PROCEDURE — 3008F BODY MASS INDEX DOCD: CPT | Performed by: FAMILY MEDICINE

## 2025-01-14 PROCEDURE — 1159F MED LIST DOCD IN RCRD: CPT | Performed by: FAMILY MEDICINE

## 2025-01-14 PROCEDURE — 99214 OFFICE O/P EST MOD 30 MIN: CPT | Performed by: FAMILY MEDICINE

## 2025-01-14 PROCEDURE — 3074F SYST BP LT 130 MM HG: CPT | Performed by: FAMILY MEDICINE

## 2025-01-14 PROCEDURE — 3078F DIAST BP <80 MM HG: CPT | Performed by: FAMILY MEDICINE

## 2025-01-14 RX ORDER — ATORVASTATIN CALCIUM 10 MG/1
10 TABLET, FILM COATED ORAL NIGHTLY
Qty: 90 TABLET | Refills: 3 | Status: SHIPPED | OUTPATIENT
Start: 2025-01-14

## 2025-01-14 RX ORDER — METOPROLOL TARTRATE 100 MG/1
100 TABLET ORAL 2 TIMES DAILY
Qty: 180 TABLET | Refills: 3 | Status: SHIPPED | OUTPATIENT
Start: 2025-01-14

## 2025-01-14 RX ORDER — DILTIAZEM HYDROCHLORIDE 240 MG/1
240 CAPSULE, EXTENDED RELEASE ORAL DAILY
COMMUNITY
Start: 2024-12-18

## 2025-01-14 RX ORDER — TAMSULOSIN HYDROCHLORIDE 0.4 MG/1
0.4 CAPSULE ORAL DAILY
Qty: 90 CAPSULE | Refills: 3 | Status: SHIPPED | OUTPATIENT
Start: 2025-01-14

## 2025-01-14 NOTE — TELEPHONE ENCOUNTER
[facility]  Call placed to pt and no answer/no voicemail with ID.  Call placed to daughter and she states that pt has not received his POC.  Call placed to [facility] to notify them of approval and spoke with Juanis.  They have approval in pt's file.  Suzette will be reaching out to pt today to schedule delivery of POC.  Daughter notified.  Advised her to call [facility] and/or our office if they do not received POC.

## 2025-01-14 NOTE — PROGRESS NOTES
Chief Complaint   Patient presents with    Hospital F/U     Patient here for hospital follow up      HPI:   Lisandro Harley is a 84 year old male who presents to the office hospital follow up.   He was discharged from Inpatient hospital, [facility] to Home health  Admit Date: 12/24  Discharge Date: 12/29/24  Hospital Discharge Diagnosis: PNA, acute on chronic hypoxic respoiratory failure.      HPI: Pt is a 85yo male with PMH CHF, CAD, HTN, DM, COPD who presents for hospital follow up.  At baseline on 3-4L of O2 at home.  Over a few days leading up to hospital admission, he was experiencing increased dyspnea and increased his oxygen to 8 L.  Increased edema in his legs  In the hospital, symptoms thought to be due to pneumonia and acute exacerbation of heart failure and COPD.  He was started on antibiotics, diuretics and steroids.  Symptoms did improve over several days.      Sugars - was on graduated scale for insulin in the hospital and even after discharge.  He continues to check sugars.  Off prednisone for the last week.  Today, BS after meal was 140. Took 3 units of insulin this AM.      Feeling back to his normal self.  Normal bowels and urination.  Appetite good.        REVIEW OF SYSTEMS:   Constitutional: negative  Eyes: negative  Ears, nose, mouth, throat, and face: negative  Respiratory: negative  Cardiovascular: negative  Gastrointestinal: negative  Genitourinary: negative  Neurological: negative    EXAM:   /60   Pulse 79   Resp 16   Ht 5' 6\" (1.676 m)   Wt 188 lb (85.3 kg)   SpO2 95%   BMI 30.34 kg/m²     General appearance - alert, well appearing, and in no distress and with daughter, and walker  Neck - supple, no significant adenopathy  Chest - clear to auscultation, no wheezes, rales or rhonchi, symmetric air entry  Heart - normal rate, regular rhythm, normal S1, S2, no murmurs, rubs, clicks or gallops  Abdomen - soft, nontender, nondistended, no masses or organomegaly  Neurological -  alert, oriented, normal speech, no focal findings or movement disorder noted  Extremities - peripheral pulses normal, no pedal edema, no clubbing or cyanosis    Initial CXR (12/26/24): CONCLUSION:  Small bilateral pleural effusions.  Cardiomegaly with prominence of the central pulmonary vascularity, subtle increased interstitial markings lungs, and patchy ground-glass opacity in the lower lungs is concerning for congestive failure with    mild pulmonary edema.  Clinical correlation recommended.       12/29 CXR: CONCLUSION:  Stable cardiac and mediastinal contours.  Stable positioning of left chest wall ICD.  Patchy airspace disease of the right lung base is improved compared to 12/26/2024.  No significant pleural effusion or appreciable pneumothorax.     EKG: AV dual-paced rhythm with occasional Premature ventricular complexes   Abnormal ECG   When compared with ECG of 03-DEC-2022 10:39,   Premature ventricular complexes are now Present   Vent. rate has decreased BY   4 BPM   Confirmed by JAMI LU, SAMEER     Component      Latest Ref Rng 12/24/2024   pro-BETA NATRIURETIC PEPTIDE      <450 pg/mL 2,901 (H)    Troponin I (High Sensitivity)      <=53 ng/L 10        Hemoglobin   Latest Ref Rng 13.0 - 17.5 g/dL   12/24/2024 11.5 (L)    12/25/2024 10.5 (L)    12/28/2024 11.2 (L)    12/29/2024 10.6 (L)        CREATININE   Latest Ref Rng 0.70 - 1.30 mg/dL   12/24/2024 1.93 (H)    12/25/2024 1.91 (H)    12/26/2024 1.87 (H)    12/27/2024 1.83 (H)    12/28/2024 1.95 (H)    12/29/2024 1.70 (H)       Legend:  (H) High    Component  Ref Range & Units    SARS-CoV-2 PCR:  Not Detected Not Detected   Adenovirus PCR:  Not Detected Not Detected   Coronavirus 229E PCR:  Not Detected Not Detected   Coronavirus Hku1 PCR:  Not Detected Not Detected   Coronavirus Nl63 PCR:  Not Detected Not Detected   Coronavirus Oc43 PCR:  Not Detected Not Detected   Metapneumovirus PCR:  Not Detected Not Detected   Rhinovirus/Entero PCR:  Not Detected  Not Detected   Influenza A PCR:  Not Detected Not Detected   Influenza B PCR:  Not Detected Not Detected   Parainfluenza 1 PCR:  Not Detected Not Detected   Parainfluenza 2 PCR  Not Detected Not Detected   Parainfluenza 3 PCR  Not Detected Not Detected   Parainfluenza 4 PCR  Not Detected Not Detected   Resp Syncytial Virus PCR  Not Detected Not Detected   Bordetella Pertussis PCR  Not Detected Not Detected   Bordetella Parapertussis PCR  Not Detected Not Detected   Chlamydia pneumonia PCR:  Not Detected Not Detected   Mycoplasma pneumonia PCR:  Not Detected Not Detected     ASSESSMENT AND PLAN:     Lisandro Harley was seen in the office today:  had concerns including Hospital F/U (Patient here for hospital follow up).    1. Acute on chronic hypoxic respiratory failure (HCC)  2. Acute on chronic congestive heart failure, unspecified heart failure type (HCC)  Quick turnaround since hospitalization  Successful diuresis.  Symptoms markedly improved, and back to baseline.   No edema in legs,  lungs clear.   Continue to watch salt, stay active.  Getting PT now at [facility] from home health.  Continue these exercises after its complete.     3. Mixed hyperlipidemia  On statin  Med refilled - to [facility]'s pharmacy  - atorvastatin 10 MG Oral Tab; Take 1 tablet (10 mg total) by mouth nightly.  Dispense: 90 tablet; Refill: 3    4. Pneumonia, bacterial  Possible infection.  On antibiotics during the admission.   Resolved.     5. Essential hypertension  BP controlled  Med refilled   - metoprolol tartrate 100 MG Oral Tab; Take 1 tablet (100 mg total) by mouth 2 (two) times daily.  Dispense: 180 tablet; Refill: 3    6. DM with CKD 3.    Given insulin after DC due to prednisone  This has since completed  Ok to stop insulin.  Revert to his usual Jardiance med.    Watch BS.       Braydon Barrett M.D.   EMG 3  01/14/25      Note to patient: The 21 Century Cures Act makes medical notes like these available to  patients in the interest of transparency. However, be advised this is a medical document. It is intended as peer to peer communication. It is written in medical language and may contain abbreviations or verbiage that are unfamiliar. It may appear blunt or direct. Medical documents are intended to carry relevant information, facts as evident, and the clinical opinion of the practitioner.

## 2025-01-17 ENCOUNTER — TELEPHONE (OUTPATIENT)
Facility: CLINIC | Age: 85
End: 2025-01-17

## 2025-01-17 NOTE — TELEPHONE ENCOUNTER
Call placed to pt and no answer.  Call placed to daughter and she believes pt is calling the wrong MD office.  She will discuss with him.  Pt has still not received POC.  She will call HME today and will call us if there are any problems with pt getting it.

## 2025-01-20 ENCOUNTER — HOME HEALTH CHARGES (OUTPATIENT)
Dept: FAMILY MEDICINE CLINIC | Facility: CLINIC | Age: 85
End: 2025-01-20

## 2025-01-20 DIAGNOSIS — Z76.89 SEEN BY OCCUPATIONAL THERAPY SERVICE: Primary | ICD-10-CM

## 2025-01-21 NOTE — TELEPHONE ENCOUNTER
Refill request for:    Requested Prescriptions     Pending Prescriptions Disp Refills    JARDIANCE 10 MG Oral Tab  0     Sig: Take 1 tablet (10 mg total) by mouth daily.        Last Prescribed Quantity Refills   04.15.2024       LOV 1/14/2025     Patient was asked to follow-up in:     Appointment due:     Appointment scheduled: 2/19/2025 Braydon Barrett MD    Medication not on protocol.     Routed to Dr. Barrett

## 2025-01-22 RX ORDER — EMPAGLIFLOZIN 10 MG/1
10 TABLET, FILM COATED ORAL DAILY
Qty: 90 TABLET | Refills: 1 | Status: SHIPPED | OUTPATIENT
Start: 2025-01-22

## 2025-01-23 ENCOUNTER — TELEPHONE (OUTPATIENT)
Facility: CLINIC | Age: 85
End: 2025-01-23

## 2025-01-24 ENCOUNTER — HOME HEALTH CHARGES (OUTPATIENT)
Dept: FAMILY MEDICINE CLINIC | Facility: CLINIC | Age: 85
End: 2025-01-24

## 2025-01-24 DIAGNOSIS — Z99.81 DEPENDENCE ON SUPPLEMENTAL OXYGEN: ICD-10-CM

## 2025-01-24 DIAGNOSIS — J44.1 OBSTRUCTIVE CHRONIC BRONCHITIS WITH EXACERBATION (HCC): Primary | ICD-10-CM

## 2025-02-03 ENCOUNTER — HOSPITAL ENCOUNTER (OUTPATIENT)
Dept: INTERVENTIONAL RADIOLOGY/VASCULAR | Facility: HOSPITAL | Age: 85
Discharge: HOME | End: 2025-02-03
Attending: INTERNAL MEDICINE
Payer: MEDICARE

## 2025-02-12 PROBLEM — C61 MALIGNANT NEOPLASM OF PROSTATE (HCC): Status: RESOLVED | Noted: 2025-02-12 | Resolved: 2025-02-12

## 2025-02-12 PROBLEM — C61 MALIGNANT NEOPLASM OF PROSTATE (HCC): Status: RESOLVED | Noted: 2025-01-01 | Resolved: 2025-01-01

## 2025-02-17 ENCOUNTER — HOME HEALTH CHARGES (OUTPATIENT)
Dept: FAMILY MEDICINE CLINIC | Facility: CLINIC | Age: 85
End: 2025-02-17

## 2025-02-17 DIAGNOSIS — I13.0 HYPERTENSIVE HEART AND RENAL DISEASE WITH CONGESTIVE HEART FAILURE (HCC): ICD-10-CM

## 2025-02-17 DIAGNOSIS — Z01.89 NEED FOR PHYSICAL THERAPY ASSESSMENT: Primary | ICD-10-CM

## 2025-02-17 DIAGNOSIS — J44.1 COPD EXACERBATION (HCC): Primary | ICD-10-CM

## 2025-02-17 DIAGNOSIS — Z99.81 DEPENDENCE ON SUPPLEMENTAL OXYGEN: ICD-10-CM

## 2025-02-19 ENCOUNTER — OFFICE VISIT (OUTPATIENT)
Dept: FAMILY MEDICINE CLINIC | Facility: CLINIC | Age: 85
End: 2025-02-19
Payer: MEDICARE

## 2025-02-19 VITALS
WEIGHT: 195 LBS | SYSTOLIC BLOOD PRESSURE: 120 MMHG | DIASTOLIC BLOOD PRESSURE: 60 MMHG | OXYGEN SATURATION: 94 % | BODY MASS INDEX: 31.34 KG/M2 | HEIGHT: 66 IN | HEART RATE: 90 BPM | RESPIRATION RATE: 16 BRPM

## 2025-02-19 DIAGNOSIS — K80.00 CALCULUS OF GALLBLADDER WITH ACUTE CHOLECYSTITIS WITHOUT OBSTRUCTION: ICD-10-CM

## 2025-02-19 DIAGNOSIS — I25.10 ATHEROSCLEROSIS OF NATIVE CORONARY ARTERY OF NATIVE HEART WITHOUT ANGINA PECTORIS: Chronic | ICD-10-CM

## 2025-02-19 DIAGNOSIS — M43.10 ACQUIRED SPONDYLOLISTHESIS: ICD-10-CM

## 2025-02-19 DIAGNOSIS — I48.91 ATRIAL FIBRILLATION, UNSPECIFIED TYPE (HCC): ICD-10-CM

## 2025-02-19 DIAGNOSIS — E11.22 TYPE 2 DIABETES MELLITUS WITH STAGE 3B CHRONIC KIDNEY DISEASE, WITHOUT LONG-TERM CURRENT USE OF INSULIN (HCC): ICD-10-CM

## 2025-02-19 DIAGNOSIS — K70.30 ALCOHOLIC CIRRHOSIS OF LIVER WITHOUT ASCITES (HCC): ICD-10-CM

## 2025-02-19 DIAGNOSIS — J43.9 PULMONARY EMPHYSEMA, UNSPECIFIED EMPHYSEMA TYPE (HCC): ICD-10-CM

## 2025-02-19 DIAGNOSIS — R80.9 MICROALBUMINURIA DUE TO TYPE 2 DIABETES MELLITUS (HCC): ICD-10-CM

## 2025-02-19 DIAGNOSIS — Z85.46 HISTORY OF PROSTATE CANCER: ICD-10-CM

## 2025-02-19 DIAGNOSIS — I10 ESSENTIAL HYPERTENSION: ICD-10-CM

## 2025-02-19 DIAGNOSIS — E78.2 MIXED HYPERLIPIDEMIA: ICD-10-CM

## 2025-02-19 DIAGNOSIS — E11.29 MICROALBUMINURIA DUE TO TYPE 2 DIABETES MELLITUS (HCC): ICD-10-CM

## 2025-02-19 DIAGNOSIS — K52.832 LYMPHOCYTIC COLITIS: ICD-10-CM

## 2025-02-19 DIAGNOSIS — Z95.0 CARDIAC PACEMAKER IN SITU: ICD-10-CM

## 2025-02-19 DIAGNOSIS — G47.33 OSA (OBSTRUCTIVE SLEEP APNEA): ICD-10-CM

## 2025-02-19 DIAGNOSIS — Z00.00 ENCOUNTER FOR ANNUAL HEALTH EXAMINATION: Primary | ICD-10-CM

## 2025-02-19 DIAGNOSIS — I05.9 MITRAL VALVE DISORDER: ICD-10-CM

## 2025-02-19 DIAGNOSIS — I27.0 PRIMARY PULMONARY HYPERTENSION (HCC): ICD-10-CM

## 2025-02-19 DIAGNOSIS — M48.061 SPINAL STENOSIS OF LUMBAR REGION, UNSPECIFIED WHETHER NEUROGENIC CLAUDICATION PRESENT: ICD-10-CM

## 2025-02-19 DIAGNOSIS — N18.32 TYPE 2 DIABETES MELLITUS WITH STAGE 3B CHRONIC KIDNEY DISEASE, WITHOUT LONG-TERM CURRENT USE OF INSULIN (HCC): ICD-10-CM

## 2025-02-19 DIAGNOSIS — G47.36 HYPOXEMIA ASSOCIATED WITH SLEEP: ICD-10-CM

## 2025-02-19 DIAGNOSIS — I50.32 CHRONIC HEART FAILURE WITH PRESERVED EJECTION FRACTION (HCC): ICD-10-CM

## 2025-02-19 PROBLEM — I27.20 PULMONARY HYPERTENSION (HCC): Status: RESOLVED | Noted: 2024-10-30 | Resolved: 2025-02-19

## 2025-02-19 PROBLEM — E11.9 DIABETES MELLITUS TYPE 2 IN NONOBESE (HCC): Status: RESOLVED | Noted: 2024-10-30 | Resolved: 2025-02-19

## 2025-02-19 PROBLEM — D69.6 THROMBOCYTOPENIA: Chronic | Status: RESOLVED | Noted: 2020-12-14 | Resolved: 2025-02-19

## 2025-02-19 PROBLEM — R11.2 NAUSEA VOMITING AND DIARRHEA: Status: RESOLVED | Noted: 2024-01-01 | Resolved: 2025-01-01

## 2025-02-19 PROBLEM — R11.2 NAUSEA VOMITING AND DIARRHEA: Status: RESOLVED | Noted: 2024-10-29 | Resolved: 2025-02-19

## 2025-02-19 PROBLEM — D69.6 THROMBOCYTOPENIA: Chronic | Status: RESOLVED | Noted: 2020-12-14 | Resolved: 2025-01-01

## 2025-02-19 PROBLEM — R17 ELEVATED BILIRUBIN: Status: RESOLVED | Noted: 2024-01-01 | Resolved: 2025-01-01

## 2025-02-19 PROBLEM — R17 ELEVATED BILIRUBIN: Status: RESOLVED | Noted: 2024-10-29 | Resolved: 2025-02-19

## 2025-02-19 PROBLEM — I27.20 PULMONARY HYPERTENSION (HCC): Status: RESOLVED | Noted: 2024-01-01 | Resolved: 2025-01-01

## 2025-02-19 PROBLEM — R19.7 NAUSEA VOMITING AND DIARRHEA: Status: RESOLVED | Noted: 2024-10-29 | Resolved: 2025-02-19

## 2025-02-19 PROBLEM — E11.9 DIABETES MELLITUS TYPE 2 IN NONOBESE (HCC): Status: RESOLVED | Noted: 2024-01-01 | Resolved: 2025-01-01

## 2025-02-19 PROBLEM — R19.7 NAUSEA VOMITING AND DIARRHEA: Status: RESOLVED | Noted: 2024-01-01 | Resolved: 2025-01-01

## 2025-02-19 PROCEDURE — G0439 PPPS, SUBSEQ VISIT: HCPCS | Performed by: FAMILY MEDICINE

## 2025-02-19 PROCEDURE — 3074F SYST BP LT 130 MM HG: CPT | Performed by: FAMILY MEDICINE

## 2025-02-19 PROCEDURE — 1159F MED LIST DOCD IN RCRD: CPT | Performed by: FAMILY MEDICINE

## 2025-02-19 PROCEDURE — 1170F FXNL STATUS ASSESSED: CPT | Performed by: FAMILY MEDICINE

## 2025-02-19 PROCEDURE — 3008F BODY MASS INDEX DOCD: CPT | Performed by: FAMILY MEDICINE

## 2025-02-19 PROCEDURE — 1160F RVW MEDS BY RX/DR IN RCRD: CPT | Performed by: FAMILY MEDICINE

## 2025-02-19 PROCEDURE — 99499 UNLISTED E&M SERVICE: CPT | Performed by: FAMILY MEDICINE

## 2025-02-19 PROCEDURE — 3078F DIAST BP <80 MM HG: CPT | Performed by: FAMILY MEDICINE

## 2025-02-19 PROCEDURE — 96160 PT-FOCUSED HLTH RISK ASSMT: CPT | Performed by: FAMILY MEDICINE

## 2025-02-19 PROCEDURE — 1126F AMNT PAIN NOTED NONE PRSNT: CPT | Performed by: FAMILY MEDICINE

## 2025-02-19 NOTE — PROGRESS NOTES
Subjective:   Lisandro Harley is a 84 year old male who presents for a MA AHA (Medicare Advantage Annual Health Assessment) and Subsequent Annual Wellness visit (Pt already had Initial Annual Wellness) and scheduled follow up of multiple significant but stable problems.     Preventative Screening  Colonoscopy - 83yo.  No indication for screening c-scope.   Prostate - h/o cancer s/p radiation.  PSA a year ago 0.1.    Immunizations - PNA UTD x 2.      Heart - CAD, SSS and a-fib with pacemaker.  On Eliquis.  Things still feeling good.  Scheduled for pacemaker change next month.      DM2 - last A1C controlled in Dec.  7.7.  +micro.  Taking Jardiance.     Liver - gallstones, acute rob 10/2024.  Declined surgical intervention at the time.  Plt count has been ok.  Liver inflamed on most recent testing.   No pains or symptoms of this any longer.         History/Other:   Fall Risk Assessment:   He has been screened for Falls and is High Risk. Fall Prevention information provided to patient in After Visit Summary.    Do you feel unsteady when standing or walking?: Yes  Do you worry about falling?: Yes  Have you fallen in the past year?: No     Cognitive Assessment:   Abnormal  What day of the week is this?: Correct  What month is it?: Correct  What year is it?: Correct  Recall \"Ball\": Correct  Recall \"Flag\": Incorrect  Recall \"Tree\": Incorrect    Functional Ability/Status:   Lisandro Harley has some abnormal functions as listed below:  He has Driving difficulties based on screening of functional status. He has Meal Preparation difficulties based on screening of functional status.He has Walking problems based on screening of functional status. He has problems with Memory based on screening of functional status.       Depression Screening (PHQ):  PHQ-2 SCORE: 0  , done 2/19/2025             Advanced Directives:   He does have a Living Will but we do NOT have it on file in Epic.    He does have a POA but we do NOT have it on file  in Epic.    Discussed Advance Care Planning with patient (and family/surrogate if present). Standard forms made available to patient in After Visit Summary.      Patient Active Problem List   Diagnosis    Essential hypertension    Lymphocytic colitis    Coronary atherosclerosis    NII (obstructive sleep apnea)    Spinal stenosis of lumbar region    Atrial fibrillation (HCC)    Mitral valve disorder    Cardiac pacemaker in situ    Hyperlipidemia    Acquired spondylolisthesis    Alcoholic cirrhosis of liver without ascites (HCC)    h/o prostate cancer s/p radiation treatment    Primary pulmonary hypertension (HCC)    Type 2 diabetes mellitus with diabetic chronic kidney disease (HCC)    Chronic obstructive pulmonary disease (HCC)    Hypoxemia associated with sleep    Calculus of gallbladder with acute cholecystitis without obstruction    Chronic heart failure with preserved ejection fraction (HCC)    Chronic kidney disease, unspecified CKD stage     Allergies:  He is allergic to tussigon [hycodan].    Current Medications:  Outpatient Medications Marked as Taking for the 2/19/25 encounter (Office Visit) with Braydon Barrett MD   Medication Sig    JARDIANCE 10 MG Oral Tab Take 1 tablet (10 mg total) by mouth daily.    DILT- MG Oral Capsule SR 24 Hr Take 1 capsule (240 mg total) by mouth daily.    atorvastatin 10 MG Oral Tab Take 1 tablet (10 mg total) by mouth nightly.    tamsulosin 0.4 MG Oral Cap Take 1 capsule (0.4 mg total) by mouth daily.    metoprolol tartrate 100 MG Oral Tab Take 1 tablet (100 mg total) by mouth 2 (two) times daily.    ferrous sulfate 325 (65 FE) MG Oral Tab EC Take 1 tablet (325 mg total) by mouth daily with breakfast.    Blood Glucose Monitoring Suppl (ONETOUCH VERIO FLEX SYSTEM) w/Device Does not apply Kit Acccuchecks three times a day before each meal    Glucose Blood (ONETOUCH VERIO) In Vitro Strip Acccuchecks three times a day before each meal    OneTouch Delica Lancets 33G Does not  apply Misc Test blood sugar 3 times per day before each meal.    ipratropium-albuterol 0.5-2.5 (3) MG/3ML Inhalation Solution Take 3 mL by nebulization every 6 (six) hours while awake.    umeclidinium bromide 62.5 MCG/ACT Inhalation Aerosol Powder, Breath Activated Inhale 1 puff into the lungs daily.    torsemide 20 MG Oral Tab Take 2 tablets (40 mg total) by mouth 2 (two) times daily.    ELIQUIS 2.5 MG Oral Tab Take 1 tablet (2.5 mg total) by mouth 2 (two) times daily.    Potassium Chloride ER 10 MEQ Oral Tab CR Take 1 tablet (10 mEq total) by mouth daily.       Medical History:  He  has a past medical history of Arrhythmia, Arthritis, Back problem, Cancer ([facility]) (2010), COPD (chronic obstructive pulmonary disease) (HCC), Coronary atherosclerosis of unspecified type of vessel, native or graft, Diabetes mellitus ([facility]), Diarrhea, unspecified, Disorder of liver, Easy bruising, Exposure to unspecified radiation (2010), Frequent urination, Glaucoma, High blood pressure, High cholesterol, Irregular bowel habits, Leaking of urine, Leg swelling, Nausea vomiting and diarrhea (10/29/2024), Osteoarthritis, Pacemaker, Renal disorder, Rheumatoid arthritis ([facility]), Sleep apnea, Stented coronary artery, Type II or unspecified type diabetes mellitus without mention of complication, not stated as uncontrolled, Visual impairment, and Wears glasses.  Surgical History:  He  has a past surgical history that includes skin surgery; total hip replacement (Right); total knee replacement (Right); laminectomy; Cardiac pacemaker placement (2010); colonoscopy (N/A, 09/25/2015); hip replacement surgery (Left, 06/26/2018); hip replacement surgery (Right); and knee replacement surgery (Right).   Family History:  His family history is not on file.  Social History:  He  reports that he quit smoking about 20 years ago. His smoking use included cigarettes. He started smoking about 60 years ago. He has a 60 pack-year smoking history. He has never used  smokeless tobacco. He reports that he does not currently use alcohol. He reports that he does not use drugs.    Tobacco:  He smoked tobacco in the past but quit greater than 12 months ago.  Social History     Tobacco Use   Smoking Status Former    Current packs/day: 0.00    Average packs/day: 1.5 packs/day for 40.0 years (60.0 ttl pk-yrs)    Types: Cigarettes    Start date: 1965    Quit date: 2005    Years since quittin.1   Smokeless Tobacco Never        CAGE Alcohol Screen:   CAGE screening score of 0 on 2025, showing low risk of alcohol abuse.      Patient Care Team:  Braydon Barrett MD as PCP - General (Family Medicine)  Jamey Narayan (Cardiovascular Diseases)  Georgia Sena APRN (Nurse Practitioner)  Shayla Clemente MD (OPHTHALMOLOGY)    Review of Systems  Constitutional: negative.  Moving slow, but gets around well.    Eyes: negative  Ears, nose, mouth, throat, and face: negative  Respiratory: negative  Cardiovascular: negative  Gastrointestinal: negative  Genitourinary: negative  Neurological: negative.  Some mild memory problems.       Objective:   Physical Exam  General Appearance:  Alert, cooperative, no distress, appears stated age   Head:  Normocephalic, without obvious abnormality, atraumatic   Eyes:  PERRL, conjunctiva/corneas clear, EOM's intact   Neck: Supple, symmetrical, trachea midline, no adenopathy   Back:   Symmetric, no curvature, ROM normal, no CVA tenderness   Lungs:   Clear to auscultation bilaterally, respirations unlabored   Chest Wall:  No tenderness or deformity   Heart:  Regular rate and rhythm, S1, S2 normal, no murmur, rub or gallop.  Pacemaker in place.   Abdomen:   Soft, non-tender, bowel sounds active all four quadrants,  no masses, no organomegaly   Extremities: Bilateral barefoot skin diabetic exam is normal, visualized feet and the appearance is normal.  Dry skin noted  Bilateral monofilament/sensation of both feet is normal.  Pulsation pedal pulse exam of  both lower legs/feet is normal as well.   Pulses: 2+ and symmetric   Skin: Skin color, texture, turgor normal, no rashes or lesions   Neurologic: Normal      /60   Pulse 90   Resp 16   Ht 5' 6\" (1.676 m)   Wt 195 lb (88.5 kg)   SpO2 94%   BMI 31.47 kg/m²  Estimated body mass index is 31.47 kg/m² as calculated from the following:    Height as of this encounter: 5' 6\" (1.676 m).    Weight as of this encounter: 195 lb (88.5 kg).    Medicare Hearing Assessment:   Hearing Screening    Time taken: 2/19/2025  9:40 AM  Screening Method: Whisper Test  Whisper Test Result: Pass         Visual Acuity:   Right Eye Visual Acuity: Uncorrected Right Eye Chart Acuity: 20/100   Left Eye Visual Acuity: Uncorrected Left Eye Chart Acuity: 20/50   Both Eyes Visual Acuity: Uncorrected Both Eyes Chart Acuity: 20/70   Able To Tolerate Visual Acuity: Yes        Assessment & Plan:     Lisandro Harley was seen in the office today:  had concerns including Health Maintenance (Reviewed Preventative/Wellness form with patient./).    1. Encounter for annual health examination  Overall doing fair  We discussed in the office that he has several ongoing medical problems, but each is currently pretty well-controlled  Continue the medications, continue to be as active as able  Preventative screening up-to-date.  Will check PSA annually  No indication for colon screening at this age    2. Atherosclerosis of native coronary artery of native heart without angina pectoris  3. Chronic heart failure with preserved ejection fraction (HCC)  4. Atrial fibrillation, unspecified type (HCC)  5. Cardiac pacemaker in situ  6. Mitral valve disorder  Followed by the cardiology team  So far, heart has been stable  Meds are controlling the symptoms, reducing risk factors  He will continue to see them on a regular basis    7. Essential hypertension  Blood pressure at goal today  Stable torsemide, metoprolol  - Comp Metabolic Panel (14); Future    8. Mixed  hyperlipidemia  Cholesterol at goal.  Taking the atorvastatin this is managed by the cardiology team.  Continue  - Comp Metabolic Panel (14); Future  - Lipid Panel; Future    9. Pulmonary emphysema, unspecified emphysema type (HCC)  Breathing is stable  Has inhalers at home.  Regular use and as needed inhalers    10. Primary pulmonary hypertension (HCC)  Normal labs.  Quite severe on the most recent echo.  Working to control blood pressure, pulmonary issues with the specialists    11. NII (obstructive sleep apnea)  12. Hypoxemia associated with sleep  Uses CPAP nightly  This has been effective.  Continue.  He benefits from regular usage    13. Type 2 diabetes mellitus with stage 3b chronic kidney disease, without long-term current use of insulin (HCC)  Last check blood sugars controlled.  This was in December  No changes in diet.  Does have limited activity  He is still taking the Jardiance at 10 mg  Will check blood work in a few months  - Hemoglobin A1C; Future  - Microalb/Creat Ratio, Random Urine; Future    14. Microalbuminuria due to type 2 diabetes mellitus (HCC)  Noted in the past.  Currently on no ACEI or ARB.  Will monitor.   - Microalb/Creat Ratio, Random Urine; Future    15. Alcoholic cirrhosis of liver without ascites (HCC)  Noted in the past.  Traditionally it liver enzymes are normal and controlled  Did have inflammation that with last check, due to some gallbladder issues  Will recheck this blood work here shortly    16. h/o prostate cancer s/p radiation treatment  PSA traditionally well-controlled  Due for repeat levels  - PSA, Total - Diagnostic [E]; Future    17. Calculus of gallbladder with acute cholecystitis without obstruction  Noted on imaging in the late fall 2024  Opted not to have surgical resection.  No symptoms now, denies any abdominal pain  Will check blood work with the upcoming labs to see if there is any signs of liver inflammation, alk phos elevation    18. Acquired  spondylolisthesis  19. Spinal stenosis of lumbar region, unspecified whether neurogenic claudication present  Known spinal issues.  Uses a four-wheel walker to help ambulate  This has been effective.  He worries about falls, but is pretty stable when he walks with the device    20. Lymphocytic colitis  Past  No active issues.  Will continue to watch for changes  - CBC With Differential With Platelet; Future            The patient indicates understanding of these issues and agrees to the plan.  Reinforced healthy diet, lifestyle, and exercise.      No follow-ups on file.     Braydon Barrett MD, 2/19/2025     Supplementary Documentation:   General Health:  In the past six months, have you lost more than 10 pounds without trying?: 2 - No  Has your appetite been poor?: No  Type of Diet: Balanced  How does the patient maintain a good energy level?: Daily Walks  How would you describe your daily physical activity?: Moderate  How would you describe your current health state?: Good  How do you maintain positive mental well-being?: Social Interaction;Visiting Family;Puzzles;Games;Visiting Friends  On a scale of 0 to 10, with 0 being no pain and 10 being severe pain, what is your pain level?: 0 - (None)  In the past six months, have you experienced urine leakage?: 1-Yes  At any time do you feel concerned for the safety/well-being of yourself and/or your children, in your home or elsewhere?: No  Have you had any immunizations at another office such as Influenza, Hepatitis B, Tetanus, or Pneumococcal?: No    Health Maintenance   Topic Date Due    Zoster Vaccines (1 of 2) Never done    Colorectal Cancer Screening  09/25/2018    Diabetes Care Dilated Eye Exam  08/21/2020    COVID-19 Vaccine (4 - 2024-25 season) 09/01/2024    Influenza Vaccine (1) 10/01/2024    Annual Well Visit  01/01/2025    Annual Depression Screening  01/01/2025    Fall Risk Screening (Annual)  01/01/2025    Diabetes Care: Foot Exam (Annual)  01/01/2025     Diabetes Care: Microalb/Creat Ratio (Annual)  01/01/2025    PSA  02/22/2025    Diabetes Care A1C  06/24/2025    Diabetes Care: GFR  12/29/2025    Pneumococcal Vaccine: 50+ Years  Completed    Meningococcal B Vaccine  Aged Out

## 2025-03-10 ENCOUNTER — TELEPHONE (OUTPATIENT)
Dept: FAMILY MEDICINE CLINIC | Facility: CLINIC | Age: 85
End: 2025-03-10

## 2025-03-10 ENCOUNTER — HOME HEALTH CHARGES (OUTPATIENT)
Dept: FAMILY MEDICINE CLINIC | Facility: CLINIC | Age: 85
End: 2025-03-10

## 2025-03-10 DIAGNOSIS — Z99.11 DEPENDENCE ON NON-INVASIVE VENTILATION (HCC): ICD-10-CM

## 2025-03-10 DIAGNOSIS — Z01.89 PHYSICAL THERAPY EVALUATION, INITIAL: Primary | ICD-10-CM

## 2025-03-10 DIAGNOSIS — J44.1 COPD EXACERBATION (HCC): Primary | ICD-10-CM

## 2025-03-10 DIAGNOSIS — I13.0 HYPERTENSIVE HEART AND RENAL DISEASE WITH CONGESTIVE HEART FAILURE (HCC): ICD-10-CM

## 2025-03-10 NOTE — TELEPHONE ENCOUNTER
Pt's daughter is calling pt has [employer] CAMPUZANO and we no longer take that insurance as of 3/1/25.  He can't get into a new provider until 7/24/25 and will run out of medications.. please send all meds to get him to his appt to [facility].

## 2025-03-11 NOTE — PAT NURSING NOTE
Per PAT encounter/MyChart message sent to pt/took notes as well:    PreOp Instructions     You are scheduled for: a Cardiac Procedure     Date of Procedure: 03/17/25 Monday     You need non-fasting bloodwork drawn prior to your procedure.     Diet Instructions: Do not eat or drink anything after midnight including gum, mints, candy, etc.     Medications: Medications you are allowed to take can be taken with a sip of water the morning of your procedure (Metoprolol).     Do not take the following Blood Thinner(s): Last dose of Eliquis will be Saturday morning 3/15; Do NOT take your doses Saturday evening, Nikita 3/16, and Monday morning 3/17.     Medications to Stop: Hold herbal supplements and vitamins     Other Medications: Do NOT take your doses of Jardiance, Torsemide, and Potassium, on Monday morning 3/17.     Skin Prep : Shower with antibacterial soap using a clean washcloth, prior to procedure. Once dried off, no lotions/powders/creams/ointments, etc., Do not shave the procedure area, this will be completed at the hospital during the preparation phase.     Arrival Time: The Friday prior to your procedure you will receive a phone call before 6:00 pm with your arrival time. If you haven't received a phone call, please check your voicemail messages., If you did not receive a voice mail and it is after 6:00 pm, please call the nursing supervisor at [phone].    Driving After Procedure: Sedation will be given so you WILL NOT be able to drive home. You will need a responsible adult  to drive you home. You can NOT take uber or taxi unless approved by your physician in advance.     Discharge Teaching: Your nurse will give you specific instructions before discharge, Most people can resume normal activities in 2-3 days, Any questions, please call the physician's office      parking is available starting at 6 am or park in the [place] parking garage at [facility]. Check in at the [facility] reception  desk. Our  will be there to check you in for your procedure. Please bring your insurance cards and ID with you.                                                                                                                                      Please DO NOT respond to this message, the inbasket is not monitored for messages. For any questions, please call the physician's office.    Of note: Pt noted [facility] (where he resides) has someone come in to draw blood on Tues/Thurs mornings. Called IV at [phone], spoke with Tanya, the . Discussed above info; Tanya provided fax #[phone] to have orders sent. Thanked Tanya for her assistance. OMS message sent urgent for orders to be faxed.

## 2025-03-14 LAB
AMB EXT CALCIUM: 8.7
AMB EXT CARBON DIOXIDE: 26
AMB EXT CHLORIDE: 104
AMB EXT CREATININE: 1.9 MG/DL
AMB EXT EGFR NON-AA: 33.9
AMB EXT EGFR-AA: 41.1
AMB EXT GLUCOSE: 184 MG/DL
AMB EXT HEMATOCRIT: 37.5
AMB EXT HEMOGLOBIN: 12
AMB EXT MCV: 84.9
AMB EXT PLATELETS: 195
AMB EXT POSTASSIUM: 3.8 MMOL/L
AMB EXT SODIUM: 139 MMOL/L
AMB EXT WBC: 7.2 X10(3)UL

## 2025-03-17 ENCOUNTER — HOSPITAL ENCOUNTER (OUTPATIENT)
Dept: INTERVENTIONAL RADIOLOGY/VASCULAR | Facility: HOSPITAL | Age: 85
Discharge: HOME | End: 2025-03-17
Attending: INTERNAL MEDICINE | Admitting: INTERNAL MEDICINE
Payer: MEDICARE

## 2025-03-17 ENCOUNTER — HOME HEALTH CHARGES (OUTPATIENT)
Dept: FAMILY MEDICINE CLINIC | Facility: CLINIC | Age: 85
End: 2025-03-17

## 2025-03-17 VITALS
BODY MASS INDEX: 31.34 KG/M2 | TEMPERATURE: 97 F | SYSTOLIC BLOOD PRESSURE: 102 MMHG | HEIGHT: 66 IN | RESPIRATION RATE: 23 BRPM | OXYGEN SATURATION: 91 % | WEIGHT: 195 LBS | HEART RATE: 70 BPM | DIASTOLIC BLOOD PRESSURE: 60 MMHG

## 2025-03-17 DIAGNOSIS — J44.1 ACUTE EXACERBATION OF CHRONIC OBSTRUCTIVE PULMONARY DISEASE (HCC): Primary | ICD-10-CM

## 2025-03-17 DIAGNOSIS — Z45.010 PACEMAKER AT END OF BATTERY LIFE: ICD-10-CM

## 2025-03-17 DIAGNOSIS — Z99.81 DEPENDENCE ON SUPPLEMENTAL OXYGEN: ICD-10-CM

## 2025-03-17 DIAGNOSIS — I13.0 HYPERTENSIVE HEART AND RENAL DISEASE WITH HEART FAILURE (HCC): ICD-10-CM

## 2025-03-17 LAB — GLUCOSE BLD-MCNC: 172 MG/DL (ref 70–99)

## 2025-03-17 PROCEDURE — 82962 GLUCOSE BLOOD TEST: CPT

## 2025-03-17 PROCEDURE — 0JPT0PZ REMOVAL OF CARDIAC RHYTHM RELATED DEVICE FROM TRUNK SUBCUTANEOUS TISSUE AND FASCIA, OPEN APPROACH: ICD-10-PCS | Performed by: INTERNAL MEDICINE

## 2025-03-17 PROCEDURE — 33228 REMV&REPLC PM GEN DUAL LEAD: CPT | Performed by: INTERNAL MEDICINE

## 2025-03-17 PROCEDURE — 99153 MOD SED SAME PHYS/QHP EA: CPT | Performed by: INTERNAL MEDICINE

## 2025-03-17 PROCEDURE — 99152 MOD SED SAME PHYS/QHP 5/>YRS: CPT | Performed by: INTERNAL MEDICINE

## 2025-03-17 PROCEDURE — 0JH606Z INSERTION OF PACEMAKER, DUAL CHAMBER INTO CHEST SUBCUTANEOUS TISSUE AND FASCIA, OPEN APPROACH: ICD-10-PCS | Performed by: INTERNAL MEDICINE

## 2025-03-17 RX ORDER — ONDANSETRON 2 MG/ML
4 INJECTION INTRAMUSCULAR; INTRAVENOUS EVERY 6 HOURS PRN
Status: DISCONTINUED | OUTPATIENT
Start: 2025-03-17 | End: 2025-03-17

## 2025-03-17 RX ORDER — SODIUM CHLORIDE 9 MG/ML
INJECTION, SOLUTION INTRAVENOUS
Status: DISCONTINUED | OUTPATIENT
Start: 2025-03-18 | End: 2025-03-17 | Stop reason: HOSPADM

## 2025-03-17 RX ORDER — WATER 10 ML/10ML
INJECTION INTRAMUSCULAR; INTRAVENOUS; SUBCUTANEOUS
Status: COMPLETED
Start: 2025-03-17 | End: 2025-03-17

## 2025-03-17 RX ORDER — MIDAZOLAM HYDROCHLORIDE 1 MG/ML
INJECTION INTRAMUSCULAR; INTRAVENOUS
Status: COMPLETED
Start: 2025-03-17 | End: 2025-03-17

## 2025-03-17 RX ORDER — CHLORHEXIDINE GLUCONATE 40 MG/ML
SOLUTION TOPICAL
Status: DISCONTINUED | OUTPATIENT
Start: 2025-03-18 | End: 2025-03-17 | Stop reason: HOSPADM

## 2025-03-17 RX ORDER — LIDOCAINE HYDROCHLORIDE 10 MG/ML
INJECTION, SOLUTION EPIDURAL; INFILTRATION; INTRACAUDAL; PERINEURAL
Status: COMPLETED
Start: 2025-03-17 | End: 2025-03-17

## 2025-03-17 RX ORDER — VANCOMYCIN HYDROCHLORIDE 1 G/20ML
INJECTION, POWDER, LYOPHILIZED, FOR SOLUTION INTRAVENOUS
Status: COMPLETED
Start: 2025-03-17 | End: 2025-03-17

## 2025-03-17 NOTE — H&P
[facility]  Pre Procedure History and Physical    Lisandro Harley Patient Status:  Outpatient    1940 MRN [id number]   Location [facility] INTERVENTIONAL SUITES Attending Dirk Murry MD   Hosp Day # 0 PCP Braydon Barrett MD     Consults      History of Present Illness:  Lisandro Harley is a a(n) 84 year old male here for a Pacemaker Gen change      Prior H/P Reviewed with no changes    History:  Past Medical History:    Arrhythmia    Arthritis    Back problem    spinal stenosis    Cancer (HCC)    prostate/radiation treatments only    COPD (chronic obstructive pulmonary disease) (HCC)    Coronary atherosclerosis of unspecified type of vessel, native or graft    s/p 1 cardiac stent    Diabetes mellitus (HCC)    Diarrhea, unspecified    Disorder of liver    Easy bruising    Exposure to unspecified radiation    prostate    Frequent urination    Glaucoma    High blood pressure    High cholesterol    Irregular bowel habits    Leaking of urine    Leg swelling    Nausea vomiting and diarrhea    Osteoarthritis    Pacemaker    Renal disorder    Stage III-moderate    Rheumatoid arthritis (HCC)    Sleep apnea    cpap    Stented coronary artery    Type II or unspecified type diabetes mellitus without mention of complication, not stated as uncontrolled    Visual impairment    Wears glasses     Past Surgical History:   Procedure Laterality Date    Cardiac pacemaker placement      medtronic    Colonoscopy N/A 2015    Procedure: COLONOSCOPY;  Surgeon: Yumiko Martin DO;  Location:  ENDOSCOPY    Hip replacement surgery Left 2018    Hip replacement surgery Right         Knee replacement surgery Right     after     Laminectomy      no hardware    Skin surgery      Total hip replacement Right     Total knee replacement Right      Family History   Problem Relation Age of Onset    Diabetes Neg     Heart Disorder Neg     Hypertension Neg       reports that he quit smoking about 20 years  ago. His smoking use included cigarettes. He started smoking about 60 years ago. He has a 60 pack-year smoking history. He has never used smokeless tobacco. He reports that he does not currently use alcohol. He reports that he does not use drugs.    Allergies:  Allergies[1]    Medications:    Current Facility-Administered Medications:     [START ON 3/18/2025] chlorhexidine (Hibiclens) 4 % external liquid, , Topical, On Call    [START ON 3/18/2025] sodium chloride 0.9% infusion, , Intravenous, On Call    OBJECTIVE      Physical Exam:  Physical Exam   Blood pressure 127/71, pulse 68, temperature 97.2 °F (36.2 °C), resp. rate 16, height 5' 6\" (1.676 m), weight 195 lb (88.5 kg), SpO2 93%.  Temp (24hrs), Av.2 °F (36.2 °C), Min:97.2 °F (36.2 °C), Max:97.2 °F (36.2 °C)    Wt Readings from Last 3 Encounters:   25 195 lb (88.5 kg)   25 195 lb (88.5 kg)   25 188 lb (85.3 kg)       NAD  PERRLA/EOMI  Neck veins not elevated  Carotids- no bruits  CTA bilaterally  Cardiac- RRR  Abdomen- Soft,Nontender, normal BS  Extremities- pulses normal  Edema-   Mood /Affect Congruent  Skin- no lesions          Results:   No results for input(s): \"GLU\", \"BUN\", \"CREATSERUM\", \"GFRAA\", \"GFRNAA\", \"EGFRCR\", \"CA\", \"NA\", \"K\", \"CL\", \"CO2\" in the last 168 hours.  No results for input(s): \"RBC\", \"HGB\", \"HCT\", \"MCV\", \"MCH\", \"MCHC\", \"RDW\", \"NEPRELIM\", \"WBC\", \"PLT\" in the last 168 hours.      [unfilled]  No results for input(s): \"BNP\" in the last 168 hours.  Lab Results   Component Value Date    INR 2.10 (H) 2021    INR 1.83 (H) 2021    INR 2.2 (A) 2021     Lab Results   Component Value Date    TROP 0.099 (HH) 2021    TROP <0.045 2021         No results found.       Assessment and Plan    Patient Active Problem List   Diagnosis    Essential hypertension    Lymphocytic colitis    Coronary atherosclerosis    NII (obstructive sleep apnea)    Spinal stenosis of lumbar region    Atrial fibrillation (HCC)     Mitral valve disorder    Cardiac pacemaker in situ    Hyperlipidemia    Acquired spondylolisthesis    Alcoholic cirrhosis of liver without ascites (HCC)    h/o prostate cancer s/p radiation treatment    Primary pulmonary hypertension (HCC)    Type 2 diabetes mellitus with diabetic chronic kidney disease (HCC)    Chronic obstructive pulmonary disease (HCC)    Hypoxemia associated with sleep    Calculus of gallbladder with acute cholecystitis without obstruction    Chronic heart failure with preserved ejection fraction (HCC)    Chronic kidney disease, unspecified CKD stage       Consent: Risks, Benefits and alternatives discussed in clinic. Reverified on the day of the procedure    Procedure Planned: PM Gen change- Medtronic    Sedation Plan: Moderate    Discharge Plan: same day      Dirk Murry MD  Cardiac Electrophysiology  [facility]  3/17/2025  12:36 PM         [1] No Known Allergies

## 2025-03-17 NOTE — PROCEDURES
[facility]  Device Implantation Procedure Note    Lisandro Harley Patient Status:  Outpatient    1940 MRN [id number]   [facility] INTERVENTIONAL SUITES Attending Dirk Murry MD   [facility] Day # 0 PCP Braydon Barrett MD     OPERATION(S) PERFORMED:   1. Medtronic DC- PPM. Device - Medtronic   2. DC- PPM Generator Removal  3. DC- PPM Generator replacement     : Dirk Murry MD  INDICATION: ASHLIE  COMPLICATIONS: None      ESTIMATED BLOOD LOSS: Minimal.  SEDATION: IV was maintained by RN. Patient was assessed by myself and the nursing staff, IV sedation          METHODS: The patient was brought to the EP lab in a fasting and nonsedated state after providing informed consent. IV sedation was administered during continuous ECG, pulse oximeter, and noninvasive hemodynamic monitoring. After administering 1% lidocaine for local anesthesia, an incision was made near the prior incision.    The device pocket was accessed with blunt dissection and Plasmablade electrocautery.    The device was removed and the leads inspected for visual insulation defects. Electrical testimg was performed to assess for any lead malfunction and there was none.    The new device was attached and electrical testing was performed.    The pocket was irrigated with antibiotic solution. Bleeding was sought for until hemostasis was achieved. The incision was closed in 3 layers using 2-0 and 3-0 Vicryl and a 4-0 subcuticular.   Steri-Strips were applied followed by a sterile dressing.     There were no apparent intraoperative complications.            CONCLUSIONS:   1. Status post successful Medtronic DC- PPM pulse generator replacement.    2. Resume OAC on Thursday       Dirk Murry MD  Cardiac Electrophysiology  [facility]  3/17/2025  12:42 PM

## 2025-03-17 NOTE — IVS NOTE
DISCHARGE NOTE      Pt is able to sit up and ambulate without difficulty.   Pt voided and tolerated fluids and food.   Procedural site remains dry and intact.  No signs and symptoms of bleeding/hematoma noted.   IV access removed  Instruction provided, patient/family verbalizes understanding.   Pt discharge via wheelchair to [facility]   Follow up Appointment: make appt in 2-4 weeks

## 2025-03-17 NOTE — DISCHARGE INSTRUCTIONS
Resume Eliquis on Thursday            Pacemaker and Defibrillator Discharge Instructions    Activity  Plan to rest tonight and tomorrow.  Avoid drinking alcohol and driving a car for next 24 hours.     Incision Care/Bathing  Keep incision site completely dry for 5 days after surgery. After day 5, you can remove top dressing and shower, letting clean soapy water run over incision area, patting dry.   The white steri-strips placed directly over the incision will gradually fall off over the next few weeks and can be physically removed after 3 weeks. Do not take them off before this time. (If you have a zipline dressing, this will be removed by device nurse or MD in 4 weeks)   Keep procedure site clean and dry, do not apply any ointments, creams, lotions to the incision.   Look at your incision daily to monitor for signs and symptoms of infection (redness, swelling, drainage, foul odor from procedure site)  Do not cover incision with extra dressings or gauze unless otherwise instructed.   Do not submerge incision in water, take whirlpool baths or swim for 30 days or until site is completely healed.     When to notify your physician:   If you have chest pain, shortness of breath or persistent cough  If you experience any signs of fever (temperature >101), chills, infection (redness, swelling, thick yellow drainage, foul order from procedure site)  New or worsening swelling of arm with implanted device     [facility] Direct Line: [phone]. Monday-Friday. 8:30AM-4PM.   [facility] Main Office: [phone] Monday- Friday 8AM-5PM   [facility] Main Office: [phone] Monday-Friday 8AM-5PM

## 2025-03-28 ENCOUNTER — TELEPHONE (OUTPATIENT)
Dept: FAMILY MEDICINE CLINIC | Facility: CLINIC | Age: 85
End: 2025-03-28

## 2025-03-28 NOTE — TELEPHONE ENCOUNTER
Received fax from [facility] lab of patient blood work to be given to the doctor.

## 2025-04-08 ENCOUNTER — HOME HEALTH CHARGES (OUTPATIENT)
Dept: FAMILY MEDICINE CLINIC | Facility: CLINIC | Age: 85
End: 2025-04-08

## 2025-04-08 DIAGNOSIS — Z99.81 DEPENDENCE ON SUPPLEMENTAL OXYGEN: ICD-10-CM

## 2025-04-08 DIAGNOSIS — I13.0 HYPERTENSIVE HEART AND RENAL DISEASE WITH CONGESTIVE HEART FAILURE (HCC): ICD-10-CM

## 2025-04-08 DIAGNOSIS — I50.21 ACUTE SYSTOLIC HEART FAILURE (HCC): ICD-10-CM

## 2025-04-08 DIAGNOSIS — J44.1 COPD EXACERBATION (HCC): Primary | ICD-10-CM

## 2025-04-10 ENCOUNTER — LAB REQUISITION (OUTPATIENT)
Dept: LAB | Facility: HOSPITAL | Age: 85
End: 2025-04-10
Payer: MEDICARE

## 2025-04-10 DIAGNOSIS — I27.20 PULMONARY HYPERTENSION, UNSPECIFIED (HCC): ICD-10-CM

## 2025-04-10 LAB
ALBUMIN SERPL-MCNC: 4.3 G/DL (ref 3.2–4.8)
ALBUMIN/GLOB SERPL: 1.6 {RATIO} (ref 1–2)
ALP LIVER SERPL-CCNC: 123 U/L (ref 45–117)
ALT SERPL-CCNC: 9 U/L (ref 10–49)
ANION GAP SERPL CALC-SCNC: 10 MMOL/L (ref 0–18)
AST SERPL-CCNC: 16 U/L (ref ?–34)
BASOPHILS # BLD AUTO: 0.05 X10(3) UL (ref 0–0.2)
BASOPHILS NFR BLD AUTO: 0.8 %
BILIRUB SERPL-MCNC: 0.9 MG/DL (ref 0.2–1.1)
BUN BLD-MCNC: 38 MG/DL (ref 9–23)
CALCIUM BLD-MCNC: 9.5 MG/DL (ref 8.7–10.6)
CHLORIDE SERPL-SCNC: 103 MMOL/L (ref 98–112)
CO2 SERPL-SCNC: 27 MMOL/L (ref 21–32)
CREAT BLD-MCNC: 2.22 MG/DL (ref 0.7–1.3)
EGFRCR SERPLBLD CKD-EPI 2021: 29 ML/MIN/1.73M2 (ref 60–?)
EOSINOPHIL # BLD AUTO: 0.45 X10(3) UL (ref 0–0.7)
EOSINOPHIL NFR BLD AUTO: 7.3 %
ERYTHROCYTE [DISTWIDTH] IN BLOOD BY AUTOMATED COUNT: 16 %
FASTING STATUS PATIENT QL REPORTED: YES
GLOBULIN PLAS-MCNC: 2.7 G/DL (ref 2–3.5)
GLUCOSE BLD-MCNC: 205 MG/DL (ref 70–99)
HCT VFR BLD AUTO: 38.5 % (ref 39–53)
HGB BLD-MCNC: 12.4 G/DL (ref 13–17.5)
IMM GRANULOCYTES # BLD AUTO: 0.06 X10(3) UL (ref 0–1)
IMM GRANULOCYTES NFR BLD: 1 %
LYMPHOCYTES # BLD AUTO: 0.8 X10(3) UL (ref 1–4)
LYMPHOCYTES NFR BLD AUTO: 12.9 %
MCH RBC QN AUTO: 28.4 PG (ref 26–34)
MCHC RBC AUTO-ENTMCNC: 32.2 G/DL (ref 31–37)
MCV RBC AUTO: 88.3 FL (ref 80–100)
MONOCYTES # BLD AUTO: 0.77 X10(3) UL (ref 0.1–1)
MONOCYTES NFR BLD AUTO: 12.4 %
NEUTROPHILS # BLD AUTO: 4.07 X10 (3) UL (ref 1.5–7.7)
NEUTROPHILS # BLD AUTO: 4.07 X10(3) UL (ref 1.5–7.7)
NEUTROPHILS NFR BLD AUTO: 65.6 %
NT-PROBNP SERPL-MCNC: 2566 PG/ML (ref ?–450)
OSMOLALITY SERPL CALC.SUM OF ELEC: 305 MOSM/KG (ref 275–295)
PLATELET # BLD AUTO: 219 10(3)UL (ref 150–450)
POTASSIUM SERPL-SCNC: 4.2 MMOL/L (ref 3.5–5.1)
PROT SERPL-MCNC: 7 G/DL (ref 5.7–8.2)
RBC # BLD AUTO: 4.36 X10(6)UL (ref 3.8–5.8)
SODIUM SERPL-SCNC: 140 MMOL/L (ref 136–145)
WBC # BLD AUTO: 6.2 X10(3) UL (ref 4–11)

## 2025-04-10 PROCEDURE — 80053 COMPREHEN METABOLIC PANEL: CPT | Performed by: STUDENT IN AN ORGANIZED HEALTH CARE EDUCATION/TRAINING PROGRAM

## 2025-04-10 PROCEDURE — 85025 COMPLETE CBC W/AUTO DIFF WBC: CPT | Performed by: STUDENT IN AN ORGANIZED HEALTH CARE EDUCATION/TRAINING PROGRAM

## 2025-04-10 PROCEDURE — 83880 ASSAY OF NATRIURETIC PEPTIDE: CPT | Performed by: STUDENT IN AN ORGANIZED HEALTH CARE EDUCATION/TRAINING PROGRAM

## 2025-04-14 ENCOUNTER — HOME HEALTH CHARGES (OUTPATIENT)
Dept: FAMILY MEDICINE CLINIC | Facility: CLINIC | Age: 85
End: 2025-04-14

## 2025-04-14 DIAGNOSIS — Z01.89 PHYSICAL THERAPY EVALUATION, INITIAL: Primary | ICD-10-CM

## 2025-05-24 ENCOUNTER — APPOINTMENT (OUTPATIENT)
Dept: CT IMAGING | Facility: HOSPITAL | Age: 85
End: 2025-05-24
Payer: MEDICARE

## 2025-05-24 ENCOUNTER — HOSPITAL ENCOUNTER (INPATIENT)
Facility: HOSPITAL | Age: 85
LOS: 5 days | Discharge: HOSPICE | End: 2025-05-29
Attending: EMERGENCY MEDICINE | Admitting: HOSPITALIST
Payer: MEDICARE

## 2025-05-24 ENCOUNTER — APPOINTMENT (OUTPATIENT)
Dept: CT IMAGING | Facility: HOSPITAL | Age: 85
End: 2025-05-24
Attending: EMERGENCY MEDICINE
Payer: MEDICARE

## 2025-05-24 PROBLEM — E87.20 METABOLIC ACIDOSIS: Status: ACTIVE | Noted: 2025-01-01

## 2025-05-24 PROBLEM — I63.9 ACUTE ISCHEMIC STROKE (HCC): Status: ACTIVE | Noted: 2025-05-24

## 2025-05-24 PROBLEM — I63.9 ACUTE ISCHEMIC STROKE (HCC): Status: ACTIVE | Noted: 2025-01-01

## 2025-05-24 PROBLEM — E87.20 METABOLIC ACIDOSIS: Status: ACTIVE | Noted: 2025-05-24

## 2025-05-24 PROBLEM — R41.0 DISORIENTATION: Status: ACTIVE | Noted: 2025-05-24

## 2025-05-24 PROBLEM — R41.0 DISORIENTATION: Status: ACTIVE | Noted: 2025-01-01

## 2025-05-25 ENCOUNTER — APPOINTMENT (OUTPATIENT)
Dept: GENERAL RADIOLOGY | Facility: HOSPITAL | Age: 85
End: 2025-05-25
Attending: NURSE PRACTITIONER
Payer: MEDICARE

## 2025-05-25 PROBLEM — I49.5 SSS (SICK SINUS SYNDROME) (HCC): Status: ACTIVE | Noted: 2025-05-25

## 2025-05-25 PROBLEM — I25.10 CORONARY ARTERY DISEASE INVOLVING NATIVE HEART: Status: ACTIVE | Noted: 2018-06-26

## 2025-05-25 PROBLEM — I49.5 SSS (SICK SINUS SYNDROME) (HCC): Status: ACTIVE | Noted: 2025-01-01

## 2025-05-25 PROBLEM — N30.00 ACUTE CYSTITIS WITHOUT HEMATURIA: Status: ACTIVE | Noted: 2025-01-01

## 2025-05-25 PROBLEM — N30.00 ACUTE CYSTITIS WITHOUT HEMATURIA: Status: ACTIVE | Noted: 2025-05-25

## 2025-05-25 NOTE — ED PROVIDER NOTES
Patient Seen in: [facility] Emergency Department        History  Chief Complaint   Patient presents with    Stroke     Stated Complaint:     Subjective:   HPI            Patient is an 85-year-old male who was brought in by ambulance from assisted living facility or after he was found to be altered had fallen.  Apparently he did have a small contusion to the right side of his temporal area.  He normally is ANO x 3 and was not answering questions or showing any normal mentation skills at the time they saw him.  He was put in a c-collar and ambulance was called.  Patient apparently is on Eliquis but do not know when he took his last dose as he takes his own medications.  Patient was met on the stroke launch pad      Objective:     No pertinent past medical history.            No pertinent past surgical history.              No pertinent social history.                              Physical Exam    ED Triage Vitals [05/24/25 2145]   BP (!) 133/92   Pulse 98   Resp 22   Temp    Temp src    SpO2 98 %   O2 Device None (Room air)       Current Vitals:   Vital Signs  BP: 132/75  Pulse: 70  Resp: 24  MAP (mmHg): 91    Oxygen Therapy  SpO2: 95 %  O2 Device: None (Room air)            Physical Exam    Vital signs reviewed  General appearance: Patient is in a c-collar, not answering questions.    HEENT: Eyes are mildly deviated to the left but is able to track to the right but does seem to have normal pupil reflexes no scleral icterus, mucous membranes are moist, there is no erythema or exudate in the posterior pharynx  Neck: Supple no JVD no lymphadenopathy no meningismus no carotid bruit  CV: Regular rate and rhythm no murmur rub  Respiratory: Clear to auscultation bilaterally no crackles no wheezes no accessory muscle use  Abdomen: Soft nontender nondistended, no rebound no guarding  no hepatosplenomegaly bowel sounds are present , no pulsatile mass  Extremities: No clubbing cyanosis or edema 2+ distal pulses.  Neuro:  Patient withdrew from painful stimuli bilaterally.  Patient does have some right-sided deficit NIH of 15-16          ED Course  Labs Reviewed   COMP METABOLIC PANEL (14) - Abnormal; Notable for the following components:       Result Value    Glucose 291 (*)     Sodium 134 (*)     BUN 49 (*)     Creatinine 2.08 (*)     Calculated Osmolality 302 (*)     eGFR-Cr 31 (*)     AST 66 (*)      (*)     Alkaline Phosphatase 121 (*)     Globulin  3.7 (*)     All other components within normal limits   CBC WITH DIFFERENTIAL WITH PLATELET - Abnormal; Notable for the following components:    WBC 16.4 (*)     Neutrophil Absolute Prelim 14.40 (*)     Neutrophil Absolute 14.40 (*)     Lymphocyte Absolute 0.69 (*)     All other components within normal limits   PROTHROMBIN TIME (PT) - Abnormal; Notable for the following components:    PT 18.1 (*)     INR 1.48 (*)     All other components within normal limits   PTT, ACTIVATED - Normal   TROPONIN I HIGH SENSITIVITY - Normal   RAINBOW DRAW GOLD     EKG    Rate, intervals and axes as noted on EKG Report.  Rate: 88  Rhythm: Sinus Rhythm  Reading: Ventricular paced rhythm with occasional PVCs              CT STROKE (EARNEST) CTA BRAIN/CTA NECK+PERF(CPT=70496/55578/0042T)  Result Date: 5/24/2025  CONCLUSION:  1. Asymmetric perfusion within the left MCA distribution suggestive of an infarct. 2. There is occlusion of the insular M2 segment of the left middle cerebral artery. 3. Cerebral atrophy with chronic microvascular ischemic changes. 4. Occlusion of the V3 and V4 segments of the right vertebral artery which may be chronic.  5. This critical value result was called to Dr. Vivar on 5/24/2025 at 10:17 p.m..  Results read back was performed. .    LOCATION:  [facility]   Dictated by (CST): Amanda Olguin MD on 5/24/2025 at 10:09 PM     Finalized by (CST): Amanda Olguin MD on 5/24/2025 at 10:25 PM       CT STROKE BRAIN (NO IV)(CPT=70450)  Result Date: 5/24/2025  CONCLUSION: 1. No acute  intracranial findings 2. Cerebral atrophy with chronic microvascular ischemic changes.  3. Maxillary sinusitis.  4. This critical value result was called to Dr. Vivar on 5/24/2025 at 9:57 p.m..  Results read back was performed.   LOCATION:  [facility]   Dictated by (CST): Amanda Olguin MD on 5/24/2025 at 9:53 PM     Finalized by (CST): Amanda Olguin MD on 5/24/2025 at 9:59 PM       Patient was met on the stroke launching pad and was sent immediately to CT scan.  Stroke coordinator came down and did an assessment got an NIH of 15-16.  CT a did show a occlusion of the M2 segment of the left MCA.  Neurosurgery was consulted who came down and saw patient and discussed with family.  At this point patient is not a candidate for tPA and will monitor patient closely in the CN ICU.  Also discussed case with neurointensivist and the hospitalist.  Patient will be started on                Patient was seen immediately upon arrival due to a high probability of sudden, clinically significant, or life threatening deterioration of the patient's clinical status.  The patient required my time at the bedside performing direct personal management, the absence of which would likely result in sudden, clinically significant or life threatening deterioration of  clinical condition.   The patient required my constant attention. Time was spent ordering, reviewing, and interpreting ancillary testing and imaging. The patient was frequently reevaluated, and I made frequent checks of  vital signs and monitor. Nursing notes were reviewed.     Critical care time was[60 minutes], and exclusive of procedures.  MDM     Differential diagnosis reflecting the complexity of care include: Fall, facial contusion, ischemic stroke, hemorrhagic stroke, infection    Comorbidities that add complexity to management include: Hypertension, high cholesterol, diabetes, pacemaker        History obtained by an independent source was from: All history was from medics  as patient was not able to answer questions appropriately    Discussions of management was done with: Neurosurgery hospitalist and neurointensivist.    My independent interpretation of studies of: Initial CT did not show any acute intracranial findings.  There was no bleed however CTA did show asymmetric perfusion within the distribution of the left MCA    Diagnostic tests and medications considered but not ordered were: MRI was considered but do feel can be done from 4        Shared decision making was done by myself patient family along with hospitalist neurosurgeon and neurointensivist.  Patient will be transferred to the floor for definitive care        Admission disposition: 5/24/2025 10:41 PM           Medical Decision Making      Disposition and Plan     Clinical Impression:  1. Acute ischemic stroke (HCC)    2. Disorientation    3. Metabolic acidosis         Disposition:  Admit  5/24/2025 10:41 pm    Follow-up:  No follow-up provider specified.        Medications Prescribed:  Current Discharge Medication List                Supplementary Documentation: Patient was evaluated in the emergency department and at this point patient will need admission for further management of medical condition.  Patient was stable in the emergency department and will be transferred to floor for further definitive care.  Patient's questions were answered.    This note was prepared using Dragon Medical voice recognition dictation software. As a result errors may occur. When identified these errors have been corrected. While every attempt is made to correct errors during dictation discrepancies may still exist        Hospital Problems       Present on Admission  Date Reviewed: 2/19/2025          ICD-10-CM Noted POA    * (Principal) Acute ischemic stroke (HCC) I63.9 5/24/2025 Unknown         TNK/ NI Documentation:    Date/Time last known well:   5/24/2025 5:00 PM    NIHSS on presentation: N/A     Chief Complaint   Patient presents  with    Stroke     IV Tenecteplase (TNK) administered: No; Patient is not a Candidate for IV TNK due to: DOAC- patient on DOAC and took a dose less than 48 hours    Candidate for Endovascular thrombectomy (EVT): Neurosurgery did not recommend     Disposition: There is no disposition on file for this visit.

## 2025-05-25 NOTE — PLAN OF CARE
Patient awake following commands oriented to place, month and self.  Right arm flaccid Right leg 3/5.  NPO swallow test in am.  IV fluids at 50 ml/hr.  Sat at side of bed with PT leaned to right. Daughter at bedside.  Problem: NEUROLOGICAL - ADULT  Goal: Achieves stable or improved neurological status  Description: INTERVENTIONS  - Assess for and report changes in neurological status  - Initiate measures to prevent increased intracranial pressure  - Maintain blood pressure and fluid volume within ordered parameters to optimize cerebral perfusion and minimize risk of hemorrhage  - Monitor temperature, glucose, and sodium. Initiate appropriate interventions as ordered  Outcome: Progressing  Goal: Absence of seizures  Description: INTERVENTIONS  - Monitor for seizure activity  - Administer anti-seizure medications as ordered  - Monitor neurological status  Outcome: Progressing  Goal: Remains free of injury related to seizure activity  Description: INTERVENTIONS:  - Maintain airway, patient safety  and administer oxygen as ordered  - Monitor patient for seizure activity, document and report duration and description of seizure to MD/LIP  - If seizure occurs, turn patient to side and suction secretions as needed  - Reorient patient post seizure  - Seizure pads on all 4 side rails  - Instruct patient/family to notify RN of any seizure activity  - Instruct patient/family to call for assistance with activity based on assessment  Outcome: Progressing  Goal: Achieves maximal functionality and self care  Description: INTERVENTIONS  - Monitor swallowing and airway patency with patient fatigue and changes in neurological status  - Encourage and assist patient to increase activity and self care with guidance from PT/OT  - Encourage visually impaired, hearing impaired and aphasic patients to use assistive/communication devices  Outcome: Progressing

## 2025-05-25 NOTE — OCCUPATIONAL THERAPY NOTE
OCCUPATIONAL THERAPY EVALUATION - INPATIENT     Room Number: 6605/6605-A  Evaluation Date: 5/25/2025  Type of Evaluation: Initial    Physician Order: IP Consult to Occupational Therapy  Reason for Therapy: ADL/IADL Dysfunction and Discharge Planning    OCCUPATIONAL THERAPY ASSESSMENT   Baseline function: independent ADL except assist bathing, ambulatory with walker  Current function: max of 2  Below baseline with ADL/transfers.   Deficits: RUE no AROM, RLE strength, midline positioning (R lean) R visual attention, communication  Continue OT in hospital.    Patient will benefit from continued skilled OT Services to facilitate return to prior level of function as patient demonstrates high motivation with excellent tolerance to an intensive therapy program once medically stable.          History: Patient is a 85 year old male admitted on 5/24/2025 with Presenting Problem: L MCA CVA. Co-Morbidities : heart failure, COPD, DM2, cirrhosis, prostate CA, PM, a fib, NII    Imaging  CT stroke 5/24 CONCLUSION:    1. Asymmetric perfusion within the left MCA distribution suggestive of an infarct.   2. There is occlusion of the insular M2 segment of the left middle cerebral artery.   3. Cerebral atrophy with chronic microvascular ischemic changes.   4. Occlusion of the V3 and V4 segments of the right vertebral artery which may be chronic.         Recommendations for nursing staff:   Transfers: total lift  Toileting location: bed level  Positioning: pillow under RUE  Other: encourage visual attention to R side, encourage attempting to move RUE, call light and hot liquids on LEFT    EVALUATION SESSION:    ACTIVITY TOLERANCE: BP within parameters throughout. O2 >90% on RA    COGNITION  Following Commands:  follows one step commands consistently  Awareness of Errors:  assistance required to identify errors made  Increased processing time    VISION  Current Vision: able to track to R with increased time and cueing. Easily looks to  auditory stimuli on L. Head position slightly L at rest.     PERCEPTION  Right Attention:   impaired    COMMUNICAION increased processing time, able to provide 1-2 word answers to most questions. See SLP note for more information.     BEHAVIORAL/EMOTIONAL/SOCIAL Cooperative and engaged    UPPER EXTREMITY  ROM: within functional limits PROM  Strength: is within functional limits except for the following;  0/5 throughout RUE  Gross motor coordination: impaired RUE  Fine motor coordination: impaired RUE  Sensation: Light touch:  endorses same sensation R v L  Tone: RUE flaccid   Shoulder joint integrity: intact  Neurological findings:  Unable to complete alternating movement on R    EDUCATION PROVIDED  Patient Education : Role of Occupational Therapy; Plan of Care; Functional Transfer Techniques; Fall Prevention; Posture/Positioning  Patient's Response to Education: Verbalized Understanding    PATIENT START OF SESSION: semi supine  FUNCTIONAL TRANSFER ASSESSMENT  Sit to Stand: Edge of Bed  Edge of Bed: Dependent    BED MOBILITY  Supine to Sit : Dependent  Sit to Supine (OT): Dependent  Scooting: dependent    BALANCE ASSESSMENT  Static Sitting: -- (fluctuates mod-CGA with RIGHT and posterior lean)  Static Standing: Dependent (heavy R lean)    FUNCTIONAL ADL ASSESSMENT  LB Dressing Seated: Dependent (socks)       THERAPIST COMMENTS: UE/vision assessment as above. Socks bed level dependent, able to lift LEs to facilitate. Able to correct R lean with LUE support on bed footboard with cueing. Not able to maintain without UE support. Able to reach across midline and find R hand with LUE. Sit to stand with max of 2,  unable to shift weight to midline or straighten R leg. Therapist support of RUE during transfer.     Patient End of Session: In bed, Needs met, Call light within reach, RN aware of session/findings, All patient questions and concerns addressed, Hospital anti-slip socks, SCDs in place    OCCUPATIONAL  PROFILE    HOME SITUATION  Type of Home: Assisted living facility  Home Layout: One level  Lives With: Alone    Toilet and Equipment: Comfort height toilet, Grab bar  Shower/Tub and Equipment: Walk-in shower, Shower chair, Grab bar          Hand Dominance: Left          Prior Level of Function: Pt reports independence with ADL except assist for bathing prior to admit. Pt walks to the dining andrade with walker for meals.       SUBJECTIVE   Pt states \"Why are we doing this?\"    PAIN ASSESSMENT  Ratin  Location: none reported       OBJECTIVE  Precautions:  (-220)  Fall Risk: High fall risk      ASSESSMENTS  AM-PAC ‘6-Clicks’ Inpatient Daily Activity Short Form  -   Putting on and taking off regular lower body clothing?: Total  -   Bathing (including washing, rinsing, drying)?: Total  -   Toileting, which includes using toilet, bedpan or urinal? : Total  -   Putting on and taking off regular upper body clothing?: A Lot  -   Taking care of personal grooming such as brushing teeth?: A Lot  -   Eating meals?: A Lot    AM-PAC Score:  Score: 9  Approx Degree of Impairment: 79.59%  Standardized Score (AM-PAC Scale): 25.33    ADDITIONAL TESTS     NEUROLOGICAL FINDINGS        PLAN  OT Treatment Plan: Balance activities, ADL training, Functional transfer training, UE strengthening/ROM, Endurance training, Patient/Family education, Patient/Family training, Equipment eval/education, Compensatory technique education, Visual perceptual training, Cognitive reorientation  Rehab Potential : Good  Frequency: 3-5x/week  Number of Visits to Meet Established Goals: 10    ADL Goals   Patient will perform grooming: with min assist  Patient will perform toileting: with mod assist    Functional Transfer Goals  Patient will transfer from sit to supine:  with mod assist  Patient will transfer from supine to sit:  with mod assist  Patient will transfer from sit to stand:  with mod assist  Patient will transfer to bedside commode:  with  mod assist    UE Exercise Program Goal  Patient will be minimum assistance with right self-ROM HEP (home exercise program).    Additional Goals  Pt will maintain unsupported sit 5 minutes supervision during light ADL  Pt will identify 8/10 targets in R visual field on 3 consecutive trials    Therapist Goals  Complete PhQ9    Patient Evaluation Complexity Level:   Occupational Profile/Medical History HIGH - Extensive review of history including review of medical or therapy records    Specific performance deficits impacting engagement in ADL/IADL HIGH  5+ performance deficits    Client Assessment/Performance Deficits HIGH - Comorbidities and significant modifications of tasks    Clinical Decision Making HIGH - Analysis of occupational profile, comprehensive assessments, multiple treatment options    Overall Complexity HIGH     OT Session Time: 30 minutes  Self-Care Home Management:  minutes  Therapeutic Activity: 15 minutes  Neuromuscular Re-education:  minutes  Therapeutic Exercise: minutes

## 2025-05-25 NOTE — PROGRESS NOTES
[facility]   part of [facility]     Hospitalist Progress Note     Lisandro Harley Patient Status:  Inpatient    1940 MRN [id number]   Location [facility] 6NE-A Attending Danile Petit,    Hosp Day # 1 PCP Braydon Barrett MD     Chief Complaint: Fall     Subjective:     Patient resting in bed and appears stable. Family at bedside     Objective:    Review of Systems:   A comprehensive review of systems was completed; pertinent positive and negatives stated in subjective.    Vital signs:  Temp:  [96.5 °F (35.8 °C)-97.8 °F (36.6 °C)] 97.8 °F (36.6 °C)  Pulse:  [70-98] 70  Resp:  [17-26] 24  BP: (105-137)/() 123/73  SpO2:  [91 %-99 %] 95 %    Physical Exam:    General: No acute distress  Respiratory: No wheezes, no rhonchi  Cardiovascular: S1, S2, regular rate and rhythm  Abdomen: Soft, Non-tender, non-distended, positive bowel sounds  Neuro: Right facial droop  Extremities: No edema    Diagnostic Data:    Labs:  Recent Labs   Lab 25  0432   WBC 16.4*  --  16.4*   HGB 13.6  --  13.1   MCV 85.1  --  86.5   .0  --  401.0   INR  --  1.48*  --        Recent Labs   Lab 25  0432   * 244*   BUN 49* 45*   CREATSERUM 2.08* 2.20*   CA 9.4 9.4   ALB 4.5  --    * 136   K 4.5 4.6   CL 99 100   CO2 21.0 22.0   ALKPHO 121*  --    AST 66*  --    *  --    BILT 0.7  --    TP 8.2  --        Estimated Glomerular Filtration Rate: 29 mL/min/1.73m2 (A) (result from lab).    Recent Labs   Lab 25   TROPHS 11       Recent Labs   Lab 25   PTP 18.1*   INR 1.48*       Lab Results   Component Value Date    TSH 4.294 2025             Microbiology    No results found for this visit on 25.      Imaging: Reviewed in Epic.    Medications: Scheduled Medications[1]    Assessment & Plan:      #Fall  #Acute CVA, occluded M2 segment of left MCA, V3 and V4 occlusion right vertebral (chronic?)  - CT head reviewed   -  MRI brain ordered  - Echo bubble study ordered  - Stroke protocol  - Aspirin, statin   - Plan for video swallow study   - Neurosurgery following   - Neurocritical care following    #UTI  - Urine culture in lab   - IV antibiotics      #CAD s/p PCI  #Essential hypertension  #Dyslipidemia  #Atrial fibrillation on DOAC  #SSS sp PPM  #Chronic heart failure with preserved EF  #Pulmonary hypertension  - Torsemide  - DOAC on hold; defer to NCC/USMAN    #Transaminitis  - Monitor LFTs      #COPD     #Diabetes mellitus type 2  - Hyperglycemia protocol      #Chronic kidney disease stage 4     #Rheumatoid arthritis     #Prostate cancer      Daniel Petit DO    Supplementary Documentation:     Quality:  DVT Mechanical Prophylaxis:   SCDs,    DVT Pharmacologic Prophylaxis   Medication    heparin (Porcine) 5000 UNIT/ML injection 5,000 Units    DVT Pharmacologic prophylaxis: Aspirin 325 mg           Code Status: DNAR/Selective Treatment  St: External urinary catheter in place  St Duration (in days):   Central line:    XIAO:     Discharge is dependent on: Clinical improvement   At this point Mr. Harley is expected to be discharge to: Home     The 21st Century Cures Act makes medical notes like these available to patients in the interest of transparency. Please be advised this is a medical document. Medical documents are intended to carry relevant information, facts as evident, and the clinical opinion of the practitioner. The medical note is intended as peer to peer communication and may appear blunt or direct. It is written in medical language and may contain abbreviations or verbiage that are unfamiliar.              **Certification      PHYSICIAN Certification of Need for Inpatient Hospitalization - Initial Certification    Patient will require inpatient services that will reasonably be expected to span two midnight's based on the clinical documentation in H+P.   Based on patients current state of illness, I anticipate that,  after discharge, patient will require TBD.           [1]    heparin  5,000 Units Subcutaneous Q8H ARAMIS    aspirin  300 mg Rectal Daily    Or    aspirin  325 mg Oral Daily    tamsulosin  0.4 mg Oral Daily    [Held by provider] torsemide  40 mg Oral Daily    insulin aspart  1-68 Units Subcutaneous TID CC    insulin aspart  1-10 Units Subcutaneous Q6H    atorvastatin  40 mg Oral Nightly    cefTRIAXone  1 g Intravenous Q24H

## 2025-05-25 NOTE — H&P
[facility]     Lisandro Harley Patient Status:  Emergency    1940 MRN [id number]   [facility] EMERGENCY DEPARTMENT Attending Pete Vivar MD   Hosp Day # 0 PCP Braydon Barrett MD     Chief Complaint: Fall    Subjective:    History of Present Illness:     Lisandro Harley is a 85 year old male with a history of CAD sp PCI, essential hypertension, dyslipidemia, atrial fibrillation on DOAC, SSS sp PPM, chronic heart failure with preserved EF, pulmonary hypertension, COPD, diabetes mellitus type 2, chronic kidney disease stage 4, rheumatoid arthritis, prostate cancer who presents after a fall. Patient lives at an assisted living facility. At baseline, patient is AAOx3 and walks with walker. Family was with patient the day prior to admission. On day of admission, patient had a fall and noted to have confusion and right sided weakness prompting ER evaluation. Patient is awake, alert. He can recognize family members, he does have a speech impairment. He denies chest pain, shortness of breath. No headache or vision change. No double vision. No nausea, vomiting, diarrhea. No complaints of pain.     History/Other:    Past Medical History:  Past Medical History:    Arrhythmia    Arthritis    Back problem    spinal stenosis    Cancer (HCC)    prostate/radiation treatments only    COPD (chronic obstructive pulmonary disease) (HCC)    Coronary atherosclerosis of unspecified type of vessel, native or graft    s/p 1 cardiac stent    Diabetes mellitus (HCC)    Diarrhea, unspecified    Disorder of liver    Easy bruising    Exposure to unspecified radiation    prostate    Frequent urination    Glaucoma    High blood pressure    High cholesterol    Irregular bowel habits    Leaking of urine    Leg swelling    Nausea vomiting and diarrhea    Osteoarthritis    Pacemaker    Renal disorder    Stage III-moderate    Rheumatoid arthritis (HCC)    Sleep apnea    cpap    Stented coronary artery     Type II or unspecified type diabetes mellitus without mention of complication, not stated as uncontrolled    Visual impairment    Wears glasses     Past Surgical History:   Past Surgical History:   Procedure Laterality Date    Cardiac pacemaker placement  2010    medtronic    Colonoscopy N/A 09/25/2015    Procedure: COLONOSCOPY;  Surgeon: Yumiko Martin DO;  Location:  ENDOSCOPY    Hip replacement surgery Left 06/26/2018    Hip replacement surgery Right     2000    Knee replacement surgery Right     after 2000    Laminectomy      no hardware    Skin surgery      Total hip replacement Right     Total knee replacement Right       Family History:   Family History   Problem Relation Age of Onset    Diabetes Neg     Heart Disorder Neg     Hypertension Neg      Social History:    reports that he quit smoking about 20 years ago. His smoking use included cigarettes. He started smoking about 60 years ago. He has a 60 pack-year smoking history. He has never used smokeless tobacco. He reports that he does not currently use alcohol. He reports that he does not use drugs.     Allergies: No Known Allergies    Medications:    Current Facility-Administered Medications on File Prior to Encounter   Medication Dose Route Frequency Provider Last Rate Last Admin    [COMPLETED] sterile water for injection (PF) injection             [COMPLETED] vancomycin (Vancocin) 1 g injection             [COMPLETED] ceFAZolin (Ancef) 2 g/10mL IV syringe premix             [COMPLETED] fentaNYL (Sublimaze) 50 mcg/mL injection             [COMPLETED] midazolam (Versed) 2 MG/2ML injection             [COMPLETED] lidocaine PF (Xylocaine-MPF) 1 % injection              Current Outpatient Medications on File Prior to Encounter   Medication Sig Dispense Refill    JARDIANCE 10 MG Oral Tab Take 1 tablet (10 mg total) by mouth daily. 90 tablet 1    atorvastatin 10 MG Oral Tab Take 1 tablet (10 mg total) by mouth nightly. 90 tablet 3    tamsulosin 0.4 MG  Oral Cap Take 1 capsule (0.4 mg total) by mouth daily. 90 capsule 3    metoprolol tartrate 100 MG Oral Tab Take 1 tablet (100 mg total) by mouth 2 (two) times daily. 180 tablet 3    ferrous sulfate 325 (65 FE) MG Oral Tab EC Take 1 tablet (325 mg total) by mouth daily with breakfast. 90 tablet 0    Blood Glucose Monitoring Suppl (ONETOUCH VERIO FLEX SYSTEM) w/Device Does not apply Kit Acccuchecks three times a day before each meal 1 kit 0    Glucose Blood (ONETOUCH VERIO) In Vitro Strip Acccuchecks three times a day before each meal 50 strip 6    OneTouch Delica Lancets 33G Does not apply Misc Test blood sugar 3 times per day before each meal. 100 each 6    ipratropium-albuterol 0.5-2.5 (3) MG/3ML Inhalation Solution Take 3 mL by nebulization every 6 (six) hours while awake. (Patient not taking: Reported on 3/11/2025) 120 each 0    torsemide 20 MG Oral Tab Take 2 tablets (40 mg total) by mouth daily.      ELIQUIS 2.5 MG Oral Tab Take 1 tablet (2.5 mg total) by mouth 2 (two) times daily.      Potassium Chloride ER 10 MEQ Oral Tab CR Take 1 tablet (10 mEq total) by mouth daily.         Review of Systems:   Limited d/t patient factors.    Objective:   Physical Exam:    /66   Pulse 76   Temp 97.4 °F (36.3 °C) (Axillary)   Resp 24   Wt 194 lb 3.6 oz (88.1 kg)   SpO2 97%   BMI 31.35 kg/m²   General: No acute distress, Alert  Respiratory: Diminished  Cardiovascular: S1, S2. Regular rate and rhythm  Abdomen: Soft, Non-tender, non-distended, positive bowel sounds  Neuro: right facial droop, motor compromised on right, able to slowly raise right arm, no issues with LUE able to raise RLE, stronger on LLE  Extremities: No edema    Results:    Labs:      Labs Last 24 Hours:    Recent Labs   Lab 05/24/25 2143   RBC 4.83   HGB 13.6   HCT 41.1   MCV 85.1   MCH 28.2   MCHC 33.1   RDW 15.4   NEPRELIM 14.40*   WBC 16.4*   .0       Recent Labs   Lab 05/24/25 2143   *   BUN 49*   CREATSERUM 2.08*   EGFRCR 31*    CA 9.4   ALB 4.5   *   K 4.5   CL 99   CO2 21.0   ALKPHO 121*   AST 66*   *   BILT 0.7   TP 8.2       Estimated Glomerular Filtration Rate: 31 mL/min/1.73m2 (A) (result from lab).    Lab Results   Component Value Date    INR 1.48 (H) 05/24/2025    INR 2.10 (H) 09/26/2021    INR 1.83 (H) 09/25/2021       Recent Labs   Lab 05/24/25  2143   TROPHS 11       No results for input(s): \"TROP\", \"PBNP\" in the last 168 hours.    No results for input(s): \"PCT\" in the last 168 hours.    Imaging: Imaging data reviewed in Epic.    Assessment & Plan:      #Fall  #Acute CVA, occluded M2 segment of left MCA, V3 and V4 occlusion right vertebral (chronic?)  -Aspirin  -Lipitor  -Defer anticoagulation to NCC/USMAN  -MRI if PPM is compatible  -ECHO with  bubble  -Stroke protocol  -USMAN & NCC consult    #CAD sp PCI  #Essential hypertension  #Dyslipidemia  #Atrial fibrillation on DOAC  #SSS sp PPM  #Chronic heart failure with preserved EF  #Pulmonary hypertension  -Permissive hypertension  -Torsemide  -DOAC held - defer to NCC/USMAN  -Monitor hemodynamics  -Telemetry    #COPD    #Diabetes mellitus type 2  -Correctional scale  -Carb scale  -A1c    #Chronic kidney disease stage 4  -Monitor UOP, creatinine and electrolytes    #Rheumatoid arthritis    #Prostate cancer    #Leukocytosis  -Check UA & CXR    #Transaminitis   -Monitor LFTs    Plan of care discussed with patient (as able), family and ER.    Ernesto Singh MD    Supplementary Documentation:     The 21st Century Cures Act makes medical notes like these available to patients in the interest of transparency. Please be advised this is a medical document. Medical documents are intended to carry relevant information, facts as evident, and the clinical opinion of the practitioner. The medical note is intended as peer to peer communication and may appear blunt or direct. It is written in medical language and may contain abbreviations or verbiage that are unfamiliar.

## 2025-05-25 NOTE — PROGRESS NOTES
Code stroke:    Called to A3 paged at 2135 for Code Stroke.    Arrived to dept at 2140     85 yr old pt from assisted living, medics were called for lift assist after a fall;  initially unresponsive per medics. Nonverbal when arrived to ER.     Upon my initial assessment patient awake, answering simple questions, L gaze, R facial droop and R side weakness noted. After CT pt improved and started moving R leg.      1.   A (0)        B (2)        C (0)  2.   0  ( L preference) able to cross midline  3.   2  R field cut  4.   3  unable to smile / R facial paralysis  5l . 0  5r.  2  6l.  0  6r  1   7   0 [id number]  8   2  severe sensory loss to light touch on R side  9 1   delayed response/ mild aphasia ( unable to describe picture/ unable to read mostly d/t visual impairment   10. 1  mild slurred speech  11.  2  visual  and sensory on R    NIHSS ~ 16    Last Known Normal at 5 pm    mRS ~4 lives at assisted living facility, walks with walker at baseline    Patient does have contraindications for TNK - being on Eliquis    Accompanied patient to CT scanner immediately and scanning began at 2144 2154: Discussed case with Dr. Story neurologist on call.    2201: Dr. Toledo notified of Code stroke    2220: Dr. Toledo at bedside. Spoke with pt and family and discussed findings on CT and treatment options.     Plan for CNICU admission and close monitoring keeping HOB flat     Discussed case with Dr. Vivar, bedside RN, patient, and family.

## 2025-05-25 NOTE — PROGRESS NOTES
[facility]  Neurocritical Care APRN Progress Note    NAME: Lisandro Harley - ROOM: A3/A3 - MRN: [id number] - Age: 85 year old - :1940    History Of Present Illness:  Lisandro Harley is a 85 year old male with PMHx significant for lumbar stenosis, afib on eliquis, HFpEF, HTN, HLD, CAD, SSS s/p PPM, chronic respiratory failure on home O2, COPD, pHTN, CKD3b, prostate cancer s/p RT, gallstones, alcoholic cirrhosis, lymphocytic colitis, rheumatoid arthritis, DM2, who now presents with aphasia, R VF cut, and R sided weakness/numbness, found to have new L MCA stroke.    At baseline, patient is independent with ADLs though he does live in Assisted Living and has meals made for him and uses a walker for ambulation. He is mildly forgetful at baseline but with no history of dementia. mRS 2.     Patient has been in his normal state of health. He is unsure what happened today to bring him to the hospital. Per his daughter Raquel, patient was at his Assisted Living, and sometime between 5-9pm [facility] , he attempted to stand using his chair lift and collapsed, leaning to the right, and hitting his head on a TV stand. Per ED notes, patient was found at Assisted Living to be altered after falling, not answering any questions. EMS was called and patient was brought to the [place] ED.     In the ED, patient was HDS (AF T97.4F, HR 98, /92, RR 22, SpO2 98% on RA). His neuro exam was significant for aphasia (nonverbal), with R VF cut and R sided weakness and numbness, with NIHSS 15-16. A Stroke Code was called and he was taken for CT brain which showed no acute findings, with chronic microvascular ischemic changes and cerebral atrophy and changes of maxillary sinusitis, and CTA H&N and CT perfusion that showed asymmetric L MCA perfusion with occlusion of the insular M2 L MCA segment c/f acute infarct, with suspected chronic occlusion of the R V3/V4 segments. He was not a candidate for thrombolytics with  home eliquis use - per patient, last eliquis dose was today 5/24 AM and he reports no recent missed doses. NeuroIR was consulted for possible mechanical thrombectomy; on exam, patient was improving and was antigravity on the R side. Decision was made to treat with conservative measures for now given advanced age, medical comorbidities, DNR status. Labs on arrival were significant for Na 134, K 4.1, BUN 49, sCr 2.08, glucose 291, WBC 16.4, Hgb 13.6, plt 432, PTT 31.1, INR 1.48.     Patient was admitted to the CNICU where he was A&Ox2 with mild dysarthria, R VF cut, L gaze preference, and subtle R sided weakness with R neglect. He had no complaints on arrival. Per daughter Raquel, patient has been slowly improving from arrival. He was initially nonverbal and now talking, initially unable to lift RUE/RLE antigravity with strength now improved as below, initially with fixed L gaze deviation but now able to overcome.     Contact: Daughter Raquel Krishnamurthy (updated with patient at bedside)     Discussed code status with patient and talia García. Confirm DNR/Selective (no intubation, no chest compressions, no defibrillation) with copy of advanced directives present at baseline     Past Medical History:  Past Medical History[1]  Past Surgical History:   Past Surgical History[2]   Family History:   Family History[3]  Social History:    reports that he quit smoking about 20 years ago. His smoking use included cigarettes. He started smoking about 60 years ago. He has a 60 pack-year smoking history. He has never used smokeless tobacco. He reports that he does not currently use alcohol. He reports that he does not use drugs.     Review of Systems:   A comprehensive 10 point review of systems was completed.  Pertinent positives and negatives noted in the HPI.    Current Hospital Medications[4]  OBJECTIVE  Vitals:  /67   Pulse 72   Temp 96.5 °F (35.8 °C) (Temporal)   Resp 21   Wt 182 lb 8.7 oz (82.8 kg)   SpO2  93%   BMI 29.46 kg/m²           Physical Exam:    CONSTITUTIONAL: Awake, alert, cooperative, no apparent distress, appears stated age  NEUROLOGIC:    A&Ox2 (person, place), weak raspy voice, mild dysarthria, minimal speech    R pupil 3mm / L pupil 2mm both reactive, L gaze preference but can overcome and bury to R, R hemianopsia    R facial weakness, tongue midline   LUE, LLE 5/5 strength, follows commands, no drift   RUE 4/5 strength with drift, proximal stronger than distal with R hand grasp 2/5 strength, limited by R neglect   RLE 5/5 proximal strength with 4/5 strength distally    Diminished sensation to light touch throughout R side, intact to pain. Normal sensation on L   R sided neglect   RUE ataxia    Mildly tremulous  LUNGS: upper airway wheeze heard when speaking; on auscultation, CTA b/l however lungs diminished throughout, no wheezing or crackles appreciated  CARDIOVASCULAR: RRR, no murmurs noted, peripheral pulses intact  ABDOMEN: normal bowel sounds, non-distended, non-tender  GENITAL/URINARY: voiding  SKIN: no bleeding, small hematoma noted to R forehead  Lines: PIV    NIHSS 5/24/2025 2345:   1A LOC: 0  1B Questions: 0  1C Commands: 0  2 EOMs: 1  3 VF: 2  4 Facial: 2   5A LUE: 0  5B RUE: 1  6A LLE: 0  6B RLE: 0  7 Ataxia: 1  8 Sensation: 1  9 Language: 0  10 Dysarthria: 1  11 Extinction: 1  Total: 10     Data this admission:  CT STROKE (EARNEST) CTA BRAIN/CTA NECK+PERF(CPT=70496/96977/0042T)  Result Date: 5/24/2025  CONCLUSION:  1. Asymmetric perfusion within the left MCA distribution suggestive of an infarct. 2. There is occlusion of the insular M2 segment of the left middle cerebral artery. 3. Cerebral atrophy with chronic microvascular ischemic changes. 4. Occlusion of the V3 and V4 segments of the right vertebral artery which may be chronic.  5. This critical value result was called to Dr. Vivar on 5/24/2025 at 10:17 p.m..  Results read back was performed. .    LOCATION:  [facility]   Dictated by (CST):  Amanda Olguin MD on 5/24/2025 at 10:09 PM     Finalized by (CST): Amanda Olguin MD on 5/24/2025 at 10:25 PM       CT STROKE BRAIN (NO IV)(CPT=70450)  Result Date: 5/24/2025  CONCLUSION: 1. No acute intracranial findings 2. Cerebral atrophy with chronic microvascular ischemic changes.  3. Maxillary sinusitis.  4. This critical value result was called to Dr. Vivar on 5/24/2025 at 9:57 p.m..  Results read back was performed.   LOCATION:  [facility]   Dictated by (CST): Amanda Olguin MD on 5/24/2025 at 9:53 PM     Finalized by (CST): Amanda Olguin MD on 5/24/2025 at 9:59 PM       Labs:  Lab Results   Component Value Date    WBC 16.4 05/24/2025    HGB 13.6 05/24/2025    HCT 41.1 05/24/2025    .0 05/24/2025    CREATSERUM 2.08 05/24/2025    BUN 49 05/24/2025     05/24/2025    K 4.5 05/24/2025    CL 99 05/24/2025    CO2 21.0 05/24/2025     05/24/2025    CA 9.4 05/24/2025    ALB 4.5 05/24/2025    ALKPHO 121 05/24/2025    BILT 0.7 05/24/2025    TP 8.2 05/24/2025    AST 66 05/24/2025     05/24/2025    PTT 31.1 05/24/2025    INR 1.48 05/24/2025     Assessment/Plan:  85yoM PMHx lumbar stenosis, afib on eliquis, HFpEF, HTN, HLD, CAD, SSS s/p PPM, chronic respiratory failure on home O2, COPD, pHTN, CKD3b, prostate cancer s/p RT, gallstones, alcoholic cirrhosis, lymphocytic colitis, rheumatoid arthritis, DM2, who now presents with aphasia, R VF cut, and R sided weakness/numbness, found to have new L MCA stroke.    NEURO:   L MCA ischemic stroke: lives in Assisted Living, history somewhat unclear but with fall when attempting to stand at home some time ~1524-2746, found on ground, EMS called and taken to ER with aphasia (mute), R sided weakness, numbness, neglect, R VF cut with L gaze unable to overcome. Stroke Code called, CT brain with no acute findings, showing chronic microvascular ischemic changes and cerebral atrophy and changes of maxillary sinusitis, CTA H&N and CT perfusion showing  asymmetric L MCA perfusion with occlusion of the insular M2 L MCA segment c/f acute infarct, with suspected chronic occlusion of the R V3/V4 segments. Not a candidate for thrombolytics with home eliquis use (last eliquis 5/24 AM, no recent missed doses). NeuroIR consulted, mechanical thrombectomy deferred with exam improving, advanced age, medical comorbidities, DNR status. Multiple stroke risk factors (afib, HLD, DM2). PSD#0  Hx lumbar stenosis:   - Admit to CNICU, q1 neuros and VS  - Not a candidate for thrombolytics with home eliquis use. Per discussion between NSGY and family, defer mechanical thrombectomy for now, especially in light of improving symptoms, comorbidities, DNR status. Will re-discuss if worsening exam   - Permissive HTN SBP<220, HOB flat  - Consider IVF if worsening neurologic status but will hold for now with chronic volume overload   - Secondary stroke ppx: start ASA 325mg daily, lipitor 80mg nightly   - Stroke work-up: EKG, TTE, CTA H&N, MRI if PPM compatible, FLP, A1c, TSH  - Hold home eliquis for now  - Pain control with APAP PRN  - C-collar cleared in ED  - PT, OT, SLP consulted    CARDIAC: follows with [facility]. Most recent TTE 4/2024 with EF 55-60%, dilated atria, mild/mod MR, mild/mod TR, PASP mod/markedly increased 57, small pericardial effusion. Last BNP 4/2025 elevated to 2566    Hx afib on eliquis: admit EKG v-paced rhythm with occasional atrial-paced complexes and frequent PVCs, with known history of paroxsymal afib. On home metoprolol 100mg BID, possible diltiazem (discontinued during prior admit but still listed on MAR? Patient unsure of all home meds). Of note, eliquis dose reduced with age, renal function  Hx HFpEF: TTE as above; home meds as above. Not on MRA or SGLT2i with CKD  Hx HTN, HLD: on home metoprolol and diltiazem as above, atorvastatin 10mg daily   Hx CAD: hx1x RCA stent placed in 2013 (Dr Narayan). On home eliquis    Hx SSS s/p PPM:   - Permissive HTN as  above  - Continue home atorvastatin but increase to high dose 80mg daily   - Hold home metoprolol, diltiazem, torsemide with goal permissive HTN. Confirm meds with pharmacy in AM  - Hold home eliquis in setting of acute stroke  - Obtain EKG, TTE, FLP, BNP   - Continuous telemetry    RESP: follows with Pulm Dr Harman, Sleep Medicine Dr Rubio    Chronic respiratory insufficiency intermittently on home O2: on home O2 intermittently when sats are low, does not use every day or with activity   Hx COPD, former smoker: quit smoking ~20yr PTA, 60 pack year history. 6/2023 PFTs with no obstruction, no restriction, severely reduced DLCO. Previously on incruse ellipta but discontinued   Hx NII on CPAP:   Hx pHTN: as above, PASP 57  - IS, cough/deep breathe as able  - Obtain baseline CXR    - Goal SpO2 > 88%   - Nocturnal CPAP as tolerated   - Duonebs PRN for SOB/wheezing     RENAL:   Hx CKD 3b: baseline sCr ~1.7-2.2 with chronically elevated BUN. On admit, renal function near baseline (sCr 1.08, BUN 49)  Hx BPH: on home tamsulosin 0.4mg daily  Hx prostate cancer s/p radiation: remote; last PSA 2/2024 WNL 0.10  - Daily BMP, replete lytes PRN, monitor I&Os  - Continue home tamsulosin   - Hold home torsemide    GI:   Hx gallstones, acute cholecystitis: presented 10/2024 with abdominal pain, diarrhea, found to have acute cholecystitis with CT and US showing gallstones with mildly dilated GB, seen by Surgery, declined intervention  Hx alcoholic cirrhosis: hx alcohol cirrhosis with remote heavy drinking, now with rare ETOH use; cirrhotic changes noted on prior CTs. Baseline LFTs with no to very mild elevation of transaminases, with mildly elevated transaminases on admit (AST 66, , alk phos 21, tbili WNL 0.7)  Hx lymphocytic colitis:   - SLP for swallow screen, NPO for now  - Bowel regimen PRN    HEME:   On home anticoagulation: on home eliquis 2.5mg BID for afib as above    Elevated INR: INR 1.48 on arrival, likely in  setting of eliquis use  Hx NAYE: on home iron . Admit Hgb 13.6  - Daily CBC, goal Hgb>7, plt>100k  - Hold home eliquis for now   - VTE ppx with SCDs, HSQ    ID:   Leukocytosis: admit WBC elevated to 16.4, afebrile  - Monitor for infectious signs   - Obtain baseline CXR and UA     ENDO:   Hx DM2: on home jardiance 10mg daily. Last A1c 12/2024 7.7%  - Insulin management per Medicine  - Hold home jardiance for now     RHEUM:   Hx RA: not on home meds  - Outpatient f/u     F/E/N:  -IV fluids  -Follow lytes and replete prn    ABCDEF Bundle  A- Assess, prevent and manage pain--prn pain medications  B- Spontaneous breathing trials--N/A - not intubated  C- Sedation--RASS 0   D- Delirium--CAM-ICU negative  E- Early mobility--Bedrest  F- Family engagement--Update as available  G- Central Line/ St-- N/A    Proph:  -SQ heparin  -SCDs    Dispo:     Code Status: DNAR/Selective Treatment  -Neuro ICU monitoring    Plan of care discussed with Neuro-Intensivist On-Call    A total of 60 minutes of critical care time (exclusive of billable procedures) was administered. This involved direct patient intervention, complex decision making, and/or extensive discussions with the patient, family, and clinical staff.    The 21st Century Cures Act makes medical notes like these available to patients in the interest of transparency. Please be advised this is a medical document. Medical documents are intended to carry relevant information, facts as evident, and the clinical opinion of the practitioner. The medical note is intended as peer to peer communication and may appear blunt or direct. It is written in medical language and may contain abbreviations or verbiage that are unfamiliar.      Flavio Brown, SETH  CNICU         [1]   Past Medical History:   Arrhythmia    Arthritis    Back problem    spinal stenosis    Cancer (HCC)    prostate/radiation treatments only    COPD (chronic obstructive pulmonary disease) (HCC)    Coronary  atherosclerosis of unspecified type of vessel, native or graft    s/p 1 cardiac stent    Diabetes mellitus (HCC)    Diarrhea, unspecified    Disorder of liver    Easy bruising    Exposure to unspecified radiation    prostate    Frequent urination    Glaucoma    High blood pressure    High cholesterol    Irregular bowel habits    Leaking of urine    Leg swelling    Nausea vomiting and diarrhea    Osteoarthritis    Pacemaker    Renal disorder    Stage III-moderate    Rheumatoid arthritis (HCC)    Sleep apnea    cpap    Stented coronary artery    Type II or unspecified type diabetes mellitus without mention of complication, not stated as uncontrolled    Visual impairment    Wears glasses   [2]   Past Surgical History:  Procedure Laterality Date    Cardiac pacemaker placement  2010    medtronic    Colonoscopy N/A 09/25/2015    Procedure: COLONOSCOPY;  Surgeon: Yumiko Martin DO;  Location:  ENDOSCOPY    Hip replacement surgery Left 06/26/2018    Hip replacement surgery Right     2000    Knee replacement surgery Right     after 2000    Laminectomy      no hardware    Skin surgery      Total hip replacement Right     Total knee replacement Right    [3]   Family History  Problem Relation Age of Onset    Diabetes Neg     Heart Disorder Neg     Hypertension Neg    [4]   Current Facility-Administered Medications   Medication Dose Route Frequency    acetaminophen (Tylenol) tab 650 mg  650 mg Oral Q4H PRN    Or    acetaminophen (Tylenol) rectal suppository 650 mg  650 mg Rectal Q4H PRN    labetalol (Trandate) 5 mg/mL injection 10 mg  10 mg Intravenous Q10 Min PRN    hydrALAzine (Apresoline) 20 mg/mL injection 10 mg  10 mg Intravenous Q2H PRN    metoclopramide (Reglan) 5 mg/mL injection 5 mg  5 mg Intravenous Q8H PRN    heparin (Porcine) 5000 UNIT/ML injection 5,000 Units  5,000 Units Subcutaneous Q8H [facility]    aspirin 300 MG rectal suppository 300 mg  300 mg Rectal Daily    Or    aspirin tab 325 mg  325 mg Oral Daily     atorvastatin (Lipitor) tab 80 mg  80 mg Oral Nightly    tamsulosin (Flomax) cap 0.4 mg  0.4 mg Oral Daily    torsemide (Demadex) tab 40 mg  40 mg Oral Daily    glucose (Dex4) 15 GM/59ML oral liquid 15 g  15 g Oral Q15 Min PRN    Or    glucose (Glutose) 40% oral gel 15 g  15 g Oral Q15 Min PRN    Or    glucose-vitamin C (Dex-4) chewable tab 4 tablet  4 tablet Oral Q15 Min PRN    Or    dextrose 50% injection 50 mL  50 mL Intravenous Q15 Min PRN    Or    glucose (Dex4) 15 GM/59ML oral liquid 30 g  30 g Oral Q15 Min PRN    Or    glucose (Glutose) 40% oral gel 30 g  30 g Oral Q15 Min PRN    Or    glucose-vitamin C (Dex-4) chewable tab 8 tablet  8 tablet Oral Q15 Min PRN    acetaminophen (Tylenol Extra Strength) tab 500 mg  500 mg Oral Q4H PRN    ondansetron (Zofran) 4 MG/2ML injection 4 mg  4 mg Intravenous Q6H PRN    polyethylene glycol (PEG 3350) (Miralax) 17 g oral packet 17 g  17 g Oral Daily PRN    sennosides (Senokot) tab 17.2 mg  17.2 mg Oral Nightly PRN    bisacodyl (Dulcolax) 10 MG rectal suppository 10 mg  10 mg Rectal Daily PRN    insulin aspart (NovoLOG) 100 Units/mL FlexPen 1-10 Units  1-10 Units Subcutaneous TID AC and HS    insulin aspart (NovoLOG) 100 Units/mL FlexPen 1-68 Units  1-68 Units Subcutaneous TID CC    ipratropium-albuterol (Duoneb) 0.5-2.5 (3) MG/3ML inhalation solution 3 mL  3 mL Nebulization Q6H PRN

## 2025-05-25 NOTE — CONSULTS
[facility]  Neurological Surgery Consultation Note    Lisandro Harley Patient Status:  Emergency    1940 MRN [id number]   [facility] EMERGENCY DEPARTMENT Attending Pete Vivar MD   Hosp Day # 0 PCP Braydon Barrett MD     REASON FOR CONSULTATION:  Acute ischemic stroke, left M2 occlusion    HISTORY OF PRESENT ILLNESS:  Lisandro Harley is a 85 year old male patient with congestive heart failure with a pacemaker, coronary artery disease status post stenting, CKD, hypertension, diabetes, COPD, and hyperlipidemia on [facility] who lives in assisted living and ambulates with a walker and performs most ADLs per family who presents to the emergency department after developing acute onset right sided weakness.  Upon presentation to the ED, he was found to have right sided neglect and has weakness in the right upper and lower extremity but is antigravity.  Head CT demonstrates no acute abnormality.  CT perfusion demonstrates approximately 100 cc of penumbra.  CTA demonstrates a dominant left M2 occlusion.    PAST MEDICAL HISTORY:  Past Medical History[1]    PAST SURGICAL HISTORY:  Past Surgical History[2]    FAMILY HISTORY:  family history is not on file.    SOCIAL HISTORY:   reports that he quit smoking about 20 years ago. His smoking use included cigarettes. He started smoking about 60 years ago. He has a 60 pack-year smoking history. He has never used smokeless tobacco. He reports that he does not currently use alcohol. He reports that he does not use drugs.    ALLERGIES:  Allergies[3]    MEDICATIONS:  Prescriptions Prior to Admission[4]  Current Hospital Medications[5]    REVIEW OF SYSTEMS:  A 10-point system was reviewed.  Pertinent positives and negatives are noted in HPI.      PHYSICAL EXAMINATION:  VITAL SIGNS: /75   Pulse 70   Resp 24   Wt 194 lb 3.6 oz (88.1 kg)   SpO2 95%   BMI 31.35 kg/m²   GENERAL:  Patient is a 85 year old male in no acute  distress.  HEENT:  Normocephalic, atraumatic  LUNGS: Nonlabored breathing  HEART:  Well perfused  NEUROLOGICAL:    Alert and oriented to self  Right facial droop  Right-sided neglect but able to cross midline with  Right upper and lower extremity antigravity, left side full strength    DIAGNOSTIC DATA:   Lab Results   Component Value Date    WBC 16.4 05/24/2025    HGB 13.6 05/24/2025    HCT 41.1 05/24/2025    .0 05/24/2025    CREATSERUM 2.08 05/24/2025    BUN 49 05/24/2025     05/24/2025    K 4.5 05/24/2025    CL 99 05/24/2025    CO2 21.0 05/24/2025     05/24/2025    CA 9.4 05/24/2025    ALB 4.5 05/24/2025    ALKPHO 121 05/24/2025    BILT 0.7 05/24/2025    TP 8.2 05/24/2025    AST 66 05/24/2025     05/24/2025    PTT 31.1 05/24/2025    INR 1.48 05/24/2025    PTP 18.1 05/24/2025       ASSESSMENT:  85-year-old male with acute ischemic stroke and left M2 occlusion    I had an extensive discussion with the patient's daughter at the bedside regarding the current clinical situation and plan of care.  We discussed the risks, benefits, terms, goals, expectations of stroke intervention.  I explained that with stroke intervention, there is risk of his neurological function worsening and as he is currently antigravity in his motor exam, it would be reasonable to optimize clinically.  The patient's daughter expressed that given his advanced age and medical comorbidities and expressed DNR order, she agreed with not proceeding with emergent intervention at this time.  I discussed that we will monitor him closely in the ICU and optimize him medically and should he have any change in his neurological exam, we may consider further intervention.  After this discussion and answering her questions, she expressed understanding and agreement with the plan.    Plan:  No acute neurovascular intervention at this time  Will admit to the ICU and optimize medically including head of bed to remain flat and and  maintenance of normotension    Case discussed with neurocritical care who agrees with the plan and case discussed with emergency room physician    Joni Toledo MD  Neurological Surgery  [facility]    Upon my evaluation, this patient had a high probability of imminent neurological deterioration due to acute CVA/stroke , which required my direct attention, intervention, and personal management.    I have personally provided 45 minutes of critical care time, exclusive of time spent on separately billable procedures.  Time includes review of all pertinent laboratory/radiology results, discussion with consultants, and monitoring for potential decompensation.  Performed interventions included optimization of physiologic parameters in collaboration with neurointensivist and emergency room physician.           [1]   Past Medical History:   Arrhythmia    Arthritis    Back problem    spinal stenosis    Cancer (HCC)    prostate/radiation treatments only    COPD (chronic obstructive pulmonary disease) (HCC)    Coronary atherosclerosis of unspecified type of vessel, native or graft    s/p 1 cardiac stent    Diabetes mellitus (HCC)    Diarrhea, unspecified    Disorder of liver    Easy bruising    Exposure to unspecified radiation    prostate    Frequent urination    Glaucoma    High blood pressure    High cholesterol    Irregular bowel habits    Leaking of urine    Leg swelling    Nausea vomiting and diarrhea    Osteoarthritis    Pacemaker    Renal disorder    Stage III-moderate    Rheumatoid arthritis (HCC)    Sleep apnea    cpap    Stented coronary artery    Type II or unspecified type diabetes mellitus without mention of complication, not stated as uncontrolled    Visual impairment    Wears glasses   [2]   Past Surgical History:  Procedure Laterality Date    Cardiac pacemaker placement  2010    medtronic    Colonoscopy N/A 09/25/2015    Procedure: COLONOSCOPY;  Surgeon:  Yumiko Martin DO;  Location:  ENDOSCOPY    Hip replacement surgery Left 06/26/2018    Hip replacement surgery Right     2000    Knee replacement surgery Right     after 2000    Laminectomy      no hardware    Skin surgery      Total hip replacement Right     Total knee replacement Right    [3] No Known Allergies  [4] (Not in a hospital admission)  [5]   No current facility-administered medications for this encounter.

## 2025-05-25 NOTE — PLAN OF CARE
Assumed care of pt @ 6794.  Rec'd pt from ED s/p code stroke.  Pt. Opens eyes to voice, MAEx4 to command.  Pt. can state birthdate, cannot state current month/year.  Pupils unequal, R>L, reactive.  R. Visual field cut noted.  R. Sided weakness noted, R. Arm drift, R. Weak hand grasp.  Pt. With slurred speech, +dysarthria, R. Facial droop noted.   Decreased sensation to R. Side.  OWEN SANCHEZ @ bs to evaluate pt.  Daughter @ bs,updated with POC.  Pt. Is DNAR select, pressors only.  Plan:  HOB flat, SBP<220, MRI/ECHO

## 2025-05-25 NOTE — PROGRESS NOTES
[facility]  Neurosurgery Progress Note    Lisandro Harley Patient Status:  Inpatient    1940 MRN [id number]   Location [facility] 6NE-A Attending Daniel Petit, DO   Hosp Day # 1 PCP Braydon Barrett MD     PRINCIPLE PROBLEM:   Acute ischemic stroke, left M2 occlusion     SUBJECTIVE     Patient sleeping but awakens to voice. He has no complaints at present. He is moving upper extremities symmetrically this morning. No acute events overnight.     OBJECTIVE/PHYSICAL EXAM     VITALS:  BP (!) 105/95   Pulse 70   Temp 97 °F (36.1 °C) (Temporal)   Resp 24   Wt 182 lb 8.7 oz (82.8 kg)   SpO2 93%   BMI 29.46 kg/m²     GENERAL: No acute distress, non-toxic appearing, mood appropriate    HEENT: Normocephalic, atraumatic    RESP:  Respirations non-labored, even    CV:  Well perfused    NEURO: Alert and oriented to self.  Follows commands. Right facial droop. Moves upper extremities symmetrically. BUE and BLE anti-gravity.     LABS     Lab Results   Component Value Date    WBC 16.4 2025    HGB 13.1 2025    HCT 40.5 2025    .0 2025    CREATSERUM 2.20 2025    BUN 45 2025     2025    K 4.6 2025     2025    CO2 22.0 2025     2025    CA 9.4 2025    ALB 4.5 2025    ALKPHO 121 2025    BILT 0.7 2025    TP 8.2 2025    AST 66 2025     2025    PTT 31.1 2025    INR 1.48 2025    PTP 18.1 2025    TSH 4.294 2025    MG 2.5 2025    PHOS 3.9 2025    PGLU 220 2025     IMAGING     XR CHEST AP PORTABLE  (CPT=71045)  Result Date: 2025  CONCLUSION:  Stable cardiac and mediastinal contours.  Stable positioning of right chest wall ICD.  Findings of pulmonary venous hypertension without overt pulmonary edema.  Patchy left basilar opacity is suspicious for pneumonia or aspiration.  No associated pleural effusion or pneumothorax.   LOCATION:  [facility]       Dictated by (CST): Gaby Barfield MD on 5/25/2025 at 7:24 AM     Finalized by (CST): Gaby Barfield MD on 5/25/2025 at 7:25 AM       CT STROKE (EARNEST) CTA BRAIN/CTA NECK+PERF(CPT=70496/17977/0042T)  Result Date: 5/24/2025  CONCLUSION:  1. Asymmetric perfusion within the left MCA distribution suggestive of an infarct. 2. There is occlusion of the insular M2 segment of the left middle cerebral artery. 3. Cerebral atrophy with chronic microvascular ischemic changes. 4. Occlusion of the V3 and V4 segments of the right vertebral artery which may be chronic.  5. This critical value result was called to Dr. Vivar on 5/24/2025 at 10:17 p.m..  Results read back was performed. .    LOCATION:  [facility]   Dictated by (CST): Amanda Olguin MD on 5/24/2025 at 10:09 PM     Finalized by (CST): Amanda Olguin MD on 5/24/2025 at 10:25 PM       CT STROKE BRAIN (NO IV)(CPT=70450)  Result Date: 5/24/2025  CONCLUSION: 1. No acute intracranial findings 2. Cerebral atrophy with chronic microvascular ischemic changes.  3. Maxillary sinusitis.  4. This critical value result was called to Dr. Vivar on 5/24/2025 at 9:57 p.m..  Results read back was performed.   LOCATION:  [facility]   Dictated by (CST): Amanda Olguin MD on 5/24/2025 at 9:53 PM     Finalized by (CST): Amanda Olguin MD on 5/24/2025 at 9:59 PM       ASSESSMENT & PLAN     ASSESSMENT:  Acute ischemic stroke, left M2 occlusion     PLAN:  No acute neurosurgical intervention is indicated at this time  Maintain normotension  Medical management per hospitalist  Optimization per Neurocritical care    The above plan was discussed with Dr. Toledo.    A total of 15 minutes of visit time (exclusible of billable procedures) was administered.  >50 % of time spent counseling/coordinating care.  Is this a shared or split note between Advanced Practice Provider and Physician? Yes    Magali Joshi, APRN-NP  [facility]  5/25/2025, 9:34 AM

## 2025-05-25 NOTE — CONSULTS
[facility]   NEUROCRITICAL CARE   CONSULT NOTE    Admission date: 5/24/2025  Reason for Consult: AIS  Chief Complaint:   Chief Complaint   Patient presents with    Stroke   ________________________________________________________________    History     History of Presenting Illness  85 year old male with PMH of lumbar stenosis, afib on eliquis, HFpEF, HTN, HLD, CAD, SSS s/p PPM, COPD on home O2, pHTN, CKD3b, prostate cancer s/p RT, gallstones, alcoholic cirrhosis, lymphocytic colitis, rheumatoid arthritis, DM2, who now presents with aphasia, R VF cut, and R sided weakness/numbness, found to have new L MCA stroke.    Patient lives in assisted living and uses a walker for ambulation, mildly forgetful.  Per discussion with the ED, patient was found altered after falling not answering questions appropriately so was brought in as a code stroke.  CT head without bleed.  CTA showing left M2 occlusion with perfusion changes.  Not a candidate for TNK given home anticoagulation use. NIHSS 15, improving down to 10 at time of arrival to ICU where he was not talking and able to be antigravity on the right.  This along the location of occlusion and comorbidities, patient was not a candidate for intervention at that time but potentially if he would worsen so was brought to the cardiac neuro ICU for further monitoring and management.     Past Medical History[1]  Past Surgical History[2]  Short Social Hx on File[3]  Family History[4]  Allergies Allergies[5]    Home Meds  Current Outpatient Medications   Medication Instructions    atorvastatin (LIPITOR) 10 mg, Oral, Nightly    Blood Glucose Monitoring Suppl (ONETOUCH VERIO FLEX SYSTEM) w/Device Does not apply Kit Acccuchecks three times a day before each meal    Eliquis 2.5 mg, 2 times daily    ferrous sulfate 325 mg, Oral, Daily with breakfast    Glucose Blood (ONETOUCH VERIO) In Vitro Strip Acccuchecks three times a day before each meal    ipratropium-albuterol  0.5-2.5 (3) MG/3ML Inhalation Solution 3 mL, Nebulization, Every 6 hours while awake (RT)    Jardiance 10 mg, Oral, Daily    metoprolol tartrate (LOPRESSOR) 100 mg, Oral, 2 times daily    OneTouch Delica Lancets 33G Does not apply Misc Test blood sugar 3 times per day before each meal.    Potassium Chloride ER 10 MEQ Oral Tab CR 10 mEq, Daily    tamsulosin (FLOMAX) 0.4 mg, Oral, Daily    torsemide (DEMADEX) 40 mg, Daily     Scheduled Meds:Scheduled Medications[6]  Continuous Infusions:Medication Infusions[7]  PRN Meds:PRN Medications[8]    OBJECTIVE   VITAL SIGNS:   Temp:  [96.5 °F (35.8 °C)-97.4 °F (36.3 °C)] 97 °F (36.1 °C)  Pulse:  [70-98] 70  Resp:  [17-24] 24  BP: (105-137)/(46-95) 105/95  SpO2:  [91 %-99 %] 93 %    INTAKE/OUTPUT:  Intake/Output Summary (Last 24 hours) at 5/25/2025 0904  Last data filed at 5/25/2025 0453  Gross per 24 hour   Intake --   Output 600 ml   Net -600 ml       PHYSICAL EXAM:  GENERAL APPEARANCE: Patient resting comfortably in bed, NAD  HEENT: Normocephalic, atraumatic.   LUNGS: Normal respiratory rate and effort   HEART: Regular rate and rhythm   ABDOMEN: Soft. No tenderness, guarding, or rebound.     NEUROLOGIC:    Mental Status:  Drowsy but arousable to verbal stim, oriented x 4 following simple commands, mild dysarthria, able to name and repeat.  Cranial nerves: Right pupil irregular, not reactive.  Left gaze preference, able to slightly past midline.  Right field cut.  Right facial droop,  Hearing grossly intact. Tongue midline w  Motor: Drift: Right lower extremity drift down to bed, right upper extremity 2/5.  Left at least 4/5  Sensation: Decree sensation right to light touch  Cerebellar: Normal Finger-To-Nose test on the left, too weak to complete on the right    NIH Stroke Scale:    1a.  Level of consciousness: 1   1b.  LOC questions:0   1c.  LOC commands: 0   2.    Best Gaze:1   3.    Visual: 2   4.    Facial Palsy: 2   5a.  Motor left arm: 0   5b.  Motor right arm: 3   6a.   Motor left le   6b.  Motor right le   7.    Limb Ataxia: 0   8.    Sensory: 1   9.    Best Language:0  10.   Dysarthria: 1  11.   Extinction and Inattention: 0    Total NIHSS:  12    LABORATORY DATA:  Last 24 hour labs were reviewed in detail.  Recent Labs   Lab 25   * 136   K 4.5 4.6   CL 99 100   CO2 21.0 22.0   * 244*   BUN 49* 45*   CREATSERUM 2.08* 2.20*     Recent Labs   Lab 25   WBC 16.4* 16.4*   HGB 13.6 13.1   .0 401.0     Recent Labs   Lab 25   *   AST 66*     Recent Labs   Lab 25   MG 2.5   PHOS 3.9       Radiology:    XR CHEST AP PORTABLE  (CPT=71045)  Result Date: 2025  CONCLUSION:  Stable cardiac and mediastinal contours.  Stable positioning of right chest wall ICD.  Findings of pulmonary venous hypertension without overt pulmonary edema.  Patchy left basilar opacity is suspicious for pneumonia or aspiration.  No associated pleural effusion or pneumothorax.   LOCATION:  [facility]      Dictated by ([facility]): Gaby Barfield MD on 2025 at 7:24 AM     Finalized by (CST): Gaby Barfield MD on 2025 at 7:25 AM       CT STROKE (EARNEST) CTA BRAIN/CTA NECK+PERF(CPT=70496/73571/0042T)  Result Date: 2025  CONCLUSION:  1. Asymmetric perfusion within the left MCA distribution suggestive of an infarct. 2. There is occlusion of the insular M2 segment of the left middle cerebral artery. 3. Cerebral atrophy with chronic microvascular ischemic changes. 4. Occlusion of the V3 and V4 segments of the right vertebral artery which may be chronic.  5. This critical value result was called to Dr. Vivar on 2025 at 10:17 p.m..  Results read back was performed. .    LOCATION:  [facility]   Dictated by (CST): Amanda Olguin MD on 2025 at 10:09 PM     Finalized by (CST): Amanda Olguin MD on 2025 at 10:25 PM       CT STROKE BRAIN (NO IV)(CPT=70450)  Result Date: 2025  CONCLUSION: 1. No acute  intracranial findings 2. Cerebral atrophy with chronic microvascular ischemic changes.  3. Maxillary sinusitis.  4. This critical value result was called to Dr. Vivar on 5/24/2025 at 9:57 p.m..  Results read back was performed.   LOCATION:  [facility]   Dictated by (CST): Amanda Olguin MD on 5/24/2025 at 9:53 PM     Finalized by (CST): Amanda Olguin MD on 5/24/2025 at 9:59 PM       ASSESSMENT/PLAN   85 year old male with PMH of lumbar stenosis, afib on eliquis, HFpEF, HTN, HLD, CAD, SSS s/p PPM, COPD on home O2, pHTN, CKD3b, prostate cancer s/p RT, gallstones, alcoholic cirrhosis, lymphocytic colitis, rheumatoid arthritis, DM2, who now presents with aphasia, R VF cut, and R sided weakness/numbness, found to have new L MCA stroke.    Neurological:  L MCA Ischemic Stroke   - Initial NIHSS 15, down to 10, slightly worse this morning   - Etiology either cardioembolic versus large vessel atherosclerotic disease given other findings of chronic occlusions in posterior circulation and therapeutic anticoagulation in setting of CKD without any missed doses   - No Tenecteplase given Eliquis use   - CTA showing left M2 occlusion with perfusion deficit on CTP    - No intervention given location of occlusion, comorbidities and improving exam    - Brought to ICU for further management, potential for intervention if exam worsens    - Discussed worsening exam this morning with POA, daughter, she does not feel that even if he worsened more that he would not want intervention given the risk associated with it.  Will raise head of bed today and work with therapy as on his exam still remains well today stable may transfer to the floor later today.   - Stroke labs: TSH wnl, A1c pending, Lipid panel with LDL 59   - Increase home statin to high intensity, start full dose aspirin    - Eventually need to resume home anticoagulation as well   - Permissive hypertension to goal SBP < 220 mm Hg   - MRI brain if able to obtain    - TTE  ordered   - Continue cardiac monitor   - Slowing with head of bed and allow therapy evaluation, lower back down if exam worsens    Cardiovascular:  Atrial fibrillation  HFpEF, HTN, HLD, CAD, SSS s/p PPM   - SBP goal as noted above    - Intervention: Hold home beta-blocker to allow hypertension in setting of above, spot dose IV Lopressor if tachycardia develops   - Hold anticoagulation for today pending neuro stability and possible MRI scan   - Increase home statin to high intensity   - IV labetalol/hydralazine pushes prn      Pulmonary:  Chronic respiratory failure  COPD  Pulmonary hypertension        - Admission CXR with possible left basilar opacities concerning for pneumonia or aspiration   - Does not appear overloaded   - O2 goal greater than 88%        - Satting well on RA         - Continue bronchopulmonary hygiene with IS/NebS prn    Renal:   CKD  BPH  Hyponatremia        - Monitor I&Os          - Volume down, hold IV fluid in setting of above   - Sodium normalizing   - Continue home Flomax, hold torsemide for today   - Avoid nephrotoxic medications    Gastrointestinal:  Nutrition:  History of alcoholic cirrhosis        - NPO pending speech evaluation        - Bowel regimen: ordered prn    Infectious Disease:   Leukocytosis    - Hypothermic initially, chest x-ray as noted above, UA concerning for infection, culture pending   - Start empiric ceftriaxone pending antibiotic results    Heme/Onc - Hb goal > 7, continue to monitor daily     Endocrine/Rheum:  DM Type II, uncontrolled    - Hold PO Meds in the hospital   - HbA1c pending   - Accuchecks q6 with SSI prn     Checklist   - Lines: PIVs   - DVT Prophylaxis: SCDs and HSQ   - Diet: NPO pending speech eval   - IVF: -   - Electrolytes: Replete per protocol   - Code Status: DNR/Select    Dispo: CNICU pending neuro stability while raising head of bed and working with therapy    76 minutes of critical care time (exclusive of billable procedures) was administered to  manage and/or prevent neurologic instability. This involved direct patient intervention, complex decision making, and/or extensive discussions with the patient, family, and clinical staff.    Anastasia Story MD  Neurocritical Care  [facility]       [1]   Past Medical History:   Arrhythmia    Arthritis    Back problem    spinal stenosis    Cancer (HCC)    prostate/radiation treatments only    COPD (chronic obstructive pulmonary disease) (HCC)    Coronary atherosclerosis of unspecified type of vessel, native or graft    s/p 1 cardiac stent    Diabetes mellitus (HCC)    Diarrhea, unspecified    Disorder of liver    Easy bruising    Exposure to unspecified radiation    prostate    Frequent urination    Glaucoma    High blood pressure    High cholesterol    Irregular bowel habits    Leaking of urine    Leg swelling    Nausea vomiting and diarrhea    Osteoarthritis    Pacemaker    Renal disorder    Stage III-moderate    Rheumatoid arthritis (HCC)    Sleep apnea    cpap    Stented coronary artery    Type II or unspecified type diabetes mellitus without mention of complication, not stated as uncontrolled    Visual impairment    Wears glasses   [2]   Past Surgical History:  Procedure Laterality Date    Cardiac pacemaker placement  2010    medtronic    Colonoscopy N/A 09/25/2015    Procedure: COLONOSCOPY;  Surgeon: Yumiko Martin DO;  Location:  ENDOSCOPY    Hip replacement surgery Left 06/26/2018    Hip replacement surgery Right     2000    Knee replacement surgery Right     after 2000    Laminectomy      no hardware    Skin surgery      Total hip replacement Right     Total knee replacement Right    [3]   Social History  Socioeconomic History    Marital status:    Tobacco Use    Smoking status: Former     Current packs/day: 0.00     Average packs/day: 1.5 packs/day for 40.0 years (60.0 ttl pk-yrs)     Types: Cigarettes     Start date: 1/1/1965     Quit date: 1/1/2005     Years since  quittin.4    Smokeless tobacco: Never   Vaping Use    Vaping status: Never Used   Substance and Sexual Activity    Alcohol use: Not Currently     Comment: rarely, 1 glass wine/month    Drug use: No   Other Topics Concern    Caffeine Concern Yes     Comment: decafe coffee 2 a day    Exercise No    Seat Belt Yes     Service No    Sleep Concern No     Social Drivers of Health     Food Insecurity: No Food Insecurity (2025)    NCSS - Food Insecurity     Worried About Running Out of Food in the Last Year: No     Ran Out of Food in the Last Year: No   Transportation Needs: No Transportation Needs (2025)    NCSS - Transportation     Lack of Transportation: No   Housing Stability: Not At Risk (2025)    NCSS - Housing/Utilities     Has Housing: Yes     Worried About Losing Housing: No     Unable to Get Utilities: No   [4]   Family History  Problem Relation Age of Onset    Diabetes Neg     Heart Disorder Neg     Hypertension Neg    [5] No Known Allergies  [6]    heparin  5,000 Units Subcutaneous Q8H ARAMIS    aspirin  300 mg Rectal Daily    Or    aspirin  325 mg Oral Daily    atorvastatin  80 mg Oral Nightly    tamsulosin  0.4 mg Oral Daily    [Held by provider] torsemide  40 mg Oral Daily    insulin aspart  1-68 Units Subcutaneous TID CC    insulin aspart  1-10 Units Subcutaneous Q6H   [7] [8]   acetaminophen **OR** acetaminophen    labetalol    hydrALAzine    metoclopramide    glucose **OR** glucose **OR** glucose-vitamin C **OR** dextrose **OR** glucose **OR** glucose **OR** glucose-vitamin C    acetaminophen    ondansetron    polyethylene glycol (PEG 3350)    sennosides    bisacodyl    ipratropium-albuterol

## 2025-05-25 NOTE — PHYSICAL THERAPY NOTE
PHYSICAL THERAPY EVALUATION - INPATIENT     Room Number: 6605/6605-A  Evaluation Date: 5/25/2025  Type of Evaluation: Initial       Presenting Problem: Fall + CVA  Co-Morbidities : heart failure, COPD, DM2, cirrhosis, prostate CA, PM, a fib, NII  Reason for Therapy: Mobility Dysfunction and Discharge Planning    PHYSICAL THERAPY ASSESSMENT   Patient is a 85 year old male admitted 5/24/2025 for fall with AMS, R neglect and aphasia.  Prior to admission, patient's baseline is Modified [facility] with rw at [facility].  Patient is currently functioning below baseline with bed mobility, transfers, gait, stair negotiation, maintaining seated position, standing prolonged periods, and performing household tasks.  Patient is requiring maximum assist as a result of the following impairments: decreased functional strength, impaired sitting & standing balance, impaired coordination, impaired motor planning, decreased muscular endurance, and medical status.  Physical Therapy will continue to follow for duration of hospitalization.    Patient will benefit from continued skilled PT Services to facilitate return to prior level of function as patient demonstrates high motivation with excellent tolerance to an intensive therapy program .    PLAN DURING HOSPITALIZATION  Nursing Mobility Recommendation : Lift Equipment  PT Device Recommendation: Mechanical lift  PT Treatment Plan: Bed mobility, Coordination, Endurance, Energy conservation, Patient education, Family education, Gait training, Neuromuscular re-educate, Strengthening, Transfer training, Balance training  Rehab Potential : Good  Frequency (Obs): 3-5x/week     CURRENT GOALS    Goal #1 Patient is able to demonstrate supine - sit EOB @ level: minimum assistance     Goal #2 Patient is able to demonstrate transfers EOB to/from Chair/Wheelchair at assistance level: moderate assistance     Goal #3 Patient is able to sit at EOB with close sba x 5 minutes     Goal #4    Goal #5    Goal #6     Goal Comments: Goals established on 2025      PHYSICAL THERAPY MEDICAL/SOCIAL HISTORY  History related to current admission: Patient is a 85 year old male admitted on 2025 from home ([facility]) for fall, AMS aphasia and neglect.  Pt diagnosed with ischemic CVA and metabolic acidosis per neuro no intervention at this time. Head CT demonstrates no acute abnormality. CT perfusion demonstrates approximately 100 cc of penumbra. CTA demonstrates a dominant left M2 occlusion.    HOME SITUATION  Type of Home: Assisted living facility  Home Layout: One level  Stairs to Enter : 0        Stairs to Bedroom: 0         Lives With: Alone    Drives: No          Prior Level of [place]: PTA per patient amb to dining andrade with rw and performed own ADLs in apartment, supportive daughter in the area.    SUBJECTIVE  \"Why are we doing this?\"  ROle of rehab explained to patient    OBJECTIVE  Precautions:  (-220)  Fall Risk: High fall risk    WEIGHT BEARING RESTRICTION     PAIN ASSESSMENT  Ratin  Location: denies       COGNITION  Overall Cognitive Status:  KAUSHIK - unable to assess  Orientation Level:  oriented to place, oriented to situation, and oriented to person  Following Commands:  follows multistep commands with increased time and follows multistep commands with repetition  Safety Judgement:  decreased awareness of need for assistance and decreased awareness of need for safety    RANGE OF MOTION AND STRENGTH ASSESSMENT  Upper extremity ROM and strength are see OT assessment    Lower extremity PROM is within functional limits, B ankles to neutral; LLE AROM in supine    Lower extremity strength is within functional limits Right Hip flexion  2-/5  Left Hip flexion  3+/5  Right Knee extension  2-/5  Left Knee extension  3-/5  Right Dorsiflexion  1/5  Left Dorsiflexion  3+/5    BALANCE  Static Sitting: Fair - (With LUE holding foot of bed)  Dynamic Sitting: Dependent  Static Standing: Dependent  Dynamic  Standing: Not tested    ADDITIONAL TESTS  Additional Tests: Modified Angela              Modified [place]: 5                  ACTIVITY TOLERANCE                         O2 WALK       NEUROLOGICAL FINDINGS  Neurological Findings: Sensation, Coordination - Rapid Alternating Movement, Coordination - Heel to Shin     Coordination - Heel to Shin: Asymmetrical, Right decreased speed, Right decreased accuracy  Coordination - Rapid Alternating Movement: Asymmetrical, Right decreased speed  Sensation: light touch grossly intact George         AM-PAC '6-Clicks' INPATIENT SHORT FORM - BASIC MOBILITY  How much difficulty does the patient currently have...  Patient Difficulty: Turning over in bed (including adjusting bedclothes, sheets and blankets)?: A Lot   Patient Difficulty: Sitting down on and standing up from a chair with arms (e.g., wheelchair, bedside commode, etc.): Unable   Patient Difficulty: Moving from lying on back to sitting on the side of the bed?: A Lot   How much help from another person does the patient currently need...   Help from Another: Moving to and from a bed to a chair (including a wheelchair)?: Total   Help from Another: Need to walk in hospital room?: Total   Help from Another: Climbing 3-5 steps with a railing?: Total     AM-PAC Score:  Raw Score: 8   Approx Degree of Impairment: 86.62%   Standardized Score (AM-PAC Scale): 28.58   CMS Modifier (G-Code): CM    FUNCTIONAL ABILITY STATUS  Gait Assessment   Functional Mobility/Gait Assessment  Gait Assistance: Not tested    Skilled Therapy Provided     Bed Mobility:  Rolling: mod a to L  Supine to sit: Max a for trunk and RLE   Sit to supine: max a     Transfer Mobility:  Sit to stand: dependent (assist x 2)   Stand to sit: assist x 2 dependent  Gait = NT    Therapist's Comments: Patient noted with R neglect in supine and sitting, will actively rotate head to R/past midline with cues but then returns to L deviation.  Patient seen with RN present.  Sat at  EOB and then attempted standing with RUE supported on rw.  Patient with heavy lean to R and unable to obtain midline posture.  RN aware of need for lift at present for transfers.    Exercise/Education Provided:  Bed mobility  Functional activity tolerated  Transfer training  Discharge planning    Patient End of Session: In bed, Needs met, Call light within reach, RN aware of session/findings, All patient questions and concerns addressed, Hospital anti-slip socks, SCDs in place, Alarm set      Patient Evaluation Complexity Level:  History High - 3 or more personal factors and/or co-morbidities   Examination of body systems Moderate - addressing a total of 3 or more elements   Clinical Presentation Low- Stable   Clinical Decision Making Moderate Complexity       PT Session Time: 40 minutes    Therapeutic Activity: 30 minutes  Neuromuscular Re-education: 10 minutes

## 2025-05-25 NOTE — SLP NOTE
ADULT SWALLOWING EVALUATION    ASSESSMENT    PERTINENT BACKGROUND INFORMATION:  Patient is an 85 year-old male who was admitted on 5/24/25 with Metabolic acidosis [E87.20], Disorientation [R41.0], Acute ischemic stroke (HCC) [I63.9].  Imaging results reviewed and noted below.  MRI brain pending.  Currently on RA with adequate SPO2 saturations of 98%. PMH is significant for COPD which may impact care. Patient resides at [facility].   Prior to this hospitalization, patient was consuming a regular diet with thin liquids.  Patient is familiar with this ST department; see ST history described below.  Failed RN swallow screening. Patient is currently NPO.     ASSESSMENT:   Patient was alert with inconsistent cues to attend/focus. Difficulty tracking SLP to the right side.  Able to maintain midline position with adequate trunk and head control when upright in bed. Oriented to name and month. Difficulty counting 1-10 today. Voice very dysphonic c/b hoarseness and breathiness in which POA stated this is new. Hypoverbal, answering questions with short responses and no initiation noted. Oral motor mechanism exam was remarkable for dense R facial droop (new per POA).  He had difficulty protruding tongue past teeth and moving tongue laterally.  Difficulty following most oral-motor commands, therefore, difficulty assessing strength and coordination of his oral musculature. Patient presented with an oropharyngeal dysphagia with overt signs/symptoms of aspiration during the controlled conditions of this exam with thin liquid by spoon and nectar-thick liquid by cup. Difficulty ruling out aspiration by bedside exam and would benefit from further objective measures.   Please see below objective section for details.     EDUCATION:  - Patient/family (POA) verbalized understanding to results and recommendations of this evaluation and was in agreement.   - Spoke with ANIKA Oro.        RECOMMENDATIONS/PLAN OF CARE   Diet  Recommendations - Solids: NPO  Diet Recommendations - Liquids: NPO  Medication Administration Recommendations: Non-oral    Treatment Plan/Recommendations: Videofluoroscopic swallow study, Communication evaluation, frequent oral care     HISTORY   MEDICAL HISTORY  Reason for Referral: Stroke protocol    Problem List  Principal Problem:    Acute ischemic stroke (HCC)  Active Problems:    Primary hypertension    Coronary artery disease involving native heart    Disorientation    Metabolic acidosis    SSS (sick sinus syndrome) (HCC)    Acute cystitis without hematuria      Past Medical History  Past Medical History[1]    Prior Living Situation: Assisted living ([facility])  Diet Prior to Admission: Regular, Thin liquids  Precautions: Aspiration    Patient/Family Goals: not stated at this time    SWALLOWING HISTORY  Current Diet Consistency: NPO  Dysphagia History:   12/26/24: CSE by Shikha Haile, SLP:   Patient seen at bedside with daughter present.  Patient currently on 6L o2 via NC; SpO2 90-93%.  Patient with clear vocal quality.  Patient denied difficulty with chewing or swallowing.  Patient reported regular texture diet with thin liquids at baseline.  Patient with no focal oral motor deficits appreciated. Patient followed all directions.   Patient exhibited intact oropharyngeal swallow with no overt clinical s/s aspiration observed or suspected at this time.  Suspect patient at baseline swallow function with no skilled SLP needs identified at this time.   Diet Recommendations - Solids: Regular  Diet Recommendations - Liquids: Thin Liquids  Aspiration Precautions: Upright position  Medication Administration Recommendations: No restrictions  Treatment Plan/Recommendations: No further inpatient SLP service warranted    IMAGING   CT STROKE(EARNEST BRAIN)CTA BRAIN/CTA NECK+PERF   CONCLUSION:    1. Asymmetric perfusion within the left MCA distribution suggestive of an infarct.   2. There is occlusion of the  insular M2 segment of the left middle cerebral artery.   3. Cerebral atrophy with chronic microvascular ischemic changes.   4. Occlusion of the V3 and V4 segments of the right vertebral artery which may be chronic.    5. This critical value result was called to Dr. Vivar on 5/24/2025 at 10:17 p.m..  Results read back was performed. .       LOCATION:  [facility]      Dictated by (CST): Amanda Olguin MD on 5/24/2025 at 10:09 PM       Finalized by (CST): Amanda Olguin MD on 5/24/2025 at 10:25 PM       CT STROKE BRAIN (NO IV)   CONCLUSION:   1. No acute intracranial findings   2. Cerebral atrophy with chronic microvascular ischemic changes.    3. Maxillary sinusitis.    4. This critical value result was called to Dr. Vivar on 5/24/2025 at 9:57 p.m..  Results read back was performed.      LOCATION:  [facility]      Dictated by (CST): Amanda Olguin MD on 5/24/2025 at 9:53 PM       Finalized by (CST): Amanda Olguin MD on 5/24/2025 at 9:59 PM         SUBJECTIVE   POA present.     OBJECTIVE   ORAL MOTOR EXAMINATION  Dentition: Natural, Functional (POA stated he wears a \"retainer with 1 tooth on it\".)  Symmetry: Reduced right facial  Strength: Unable to assess     Range of Motion: Reduced right facial  Rate of Motion: Unable to assess    Voice Quality: Weak, Breathy, Harsh/Strained  Respiratory Status: Unlabored  Consistencies Trialed: Thin liquids, Nectar thick liquids, Puree  Method of Presentation: Spoon, Cup, Single sips, Self presentation, Staff/Clinician assistance  Patient Positioned: Upright, Midline    Oral Phase of Swallow: Impaired  Bolus Retrieval: Intact  Bilabial Seal: Impaired (R side anterior spillage)  Bolus Formation: Impaired (scant oral residue scattered throughout oral cavity;cued for repeat swallow in which pt had difficulty performing)  Bolus Propulsion: Impaired     Retention: Impaired    Pharyngeal Phase of Swallow: Appears Impaired  Laryngeal Elevation Timing: Appears impaired  Laryngeal Elevation  Strength:  (hyolaryngeal elevation palpated)  Laryngeal Elevation Coordination: Appears intact (1:1 swallow ratio)  (Please note: Silent aspiration cannot be evaluated clinically. Videofluoroscopic Swallow Study is required to rule-out silent aspiration.)    Esophageal Phase of Swallow: No complaints consistent with possible esophageal involvement  Comments:               GOALS  Goal #1 Videoflouroscopic swallow study (VFSS) suggested to assess oropharyngeal swallow function, r/o aspiration, assess compensatory strategies to optimize safe swallowing, recommend safest, most least restrictive diet and help guide POC.  Goal Established         Goal #2 Communication evaluation    Goal Established           FOLLOW UP  Treatment Plan/Recommendations: Videofluoroscopic swallow study, Communication evaluation  Duration: 1 week  Follow Up Needed (Documentation Required): Yes  SLP Follow-up Date: 05/26/25    Thank you for your referral.   If you have any questions, please contact CHRISTINA López         [1]   Past Medical History:   Arrhythmia    Arthritis    Back problem    spinal stenosis    Cancer (HCC)    prostate/radiation treatments only    COPD (chronic obstructive pulmonary disease) (HCC)    Coronary atherosclerosis of unspecified type of vessel, native or graft    s/p 1 cardiac stent    Diabetes mellitus (HCC)    Diarrhea, unspecified    Disorder of liver    Easy bruising    Exposure to unspecified radiation    prostate    Frequent urination    Glaucoma    High blood pressure    High cholesterol    Irregular bowel habits    Leaking of urine    Leg swelling    Nausea vomiting and diarrhea    Osteoarthritis    Pacemaker    Renal disorder    Stage III-moderate    Rheumatoid arthritis (HCC)    Sleep apnea    cpap    Stented coronary artery    Type II or unspecified type diabetes mellitus without mention of complication, not stated as uncontrolled    Visual impairment    Wears glasses

## 2025-05-26 ENCOUNTER — APPOINTMENT (OUTPATIENT)
Dept: GENERAL RADIOLOGY | Facility: HOSPITAL | Age: 85
End: 2025-05-26
Attending: STUDENT IN AN ORGANIZED HEALTH CARE EDUCATION/TRAINING PROGRAM
Payer: MEDICARE

## 2025-05-26 ENCOUNTER — APPOINTMENT (OUTPATIENT)
Dept: GENERAL RADIOLOGY | Facility: HOSPITAL | Age: 85
End: 2025-05-26
Attending: INTERNAL MEDICINE
Payer: MEDICARE

## 2025-05-26 NOTE — PLAN OF CARE
Received into 7615 in bed accompanied by family members. Oriented to room & surroundings.  A&OX3. Neuro status unchanged. See flow sheets.  Tele - .  Dobhoff TF initiated at ordered rate. NPO status maintained.  Denies pain or discomfort.  Resting comfortably.  Care Ongoing,

## 2025-05-26 NOTE — DIETARY NOTE
[facility]   part of [facility]    NUTRITION ASSESSMENT    Unable to diagnose malnutrition criteria at this time.    NUTRITION INTERVENTION:    Meal and Snacks - Currently NPO per SLP recommendation. Advance as able.   Enteral Nutrition - Via NG Recommend starting Glucerna 1.5 at 20 mL/hr advancing 20 mL/hr q 6 hours to goal rate of 50 mL/hr.   This will provide 1800 kcal, 99 grams protein, 912 mL total free water, and 100% of RDI's.   Recommend 180 mL water flush q 4 hours without IV fluids   TF+FWF provides 1992 mL total free water.     PATIENT STATUS:     05/26/25 86 yo male. Presented from assisted living facility after fall, confused and right sided weakness. Admitted on 5/24 with acute ischemic stroke.  Seen by SLP today, recommended strict NPO. NG tube was placed, consulted to start tube feedings.     Visited patient in room, eyes closed but opened when called his name. Not providing much information. Called daughter Raquel for nutrition/weight history. Reports pt lives at an assisted living facility where 3 balanced meals are provided. Pt has been trying to lose weight and will sometimes skip a meal. Discussed noted weight fluctuation, daughter believes it is due to fluid loss rather than weight loss.     Pmhx - Includes CAD sp PCI, essential hypertension, dyslipidemia, atrial fibrillation on DOAC, SSS sp PPM, chronic heart failure with preserved EF, pulmonary hypertension, COPD, diabetes mellitus type 2, chronic kidney disease stage 4, rheumatoid arthritis, prostate cancer     ANTHROPOMETRICS:  Ht:  5'6\"  Wt: 82.8 kg (182 lb 8.7 oz).   BMI: Body mass index is 29.46 kg/m².  IBW: 64.5 kg      WEIGHT HISTORY:   Weight loss: weight fluctuates, daughter believes it is fluid loss.    Wt Readings from Last 10 Encounters:   05/25/25 82.8 kg (182 lb 8.7 oz)   05/25/25 82.8 kg (182 lb 8.7 oz)   03/11/25 88.5 kg (195 lb)   02/19/25 88.5 kg (195 lb)   01/14/25 85.3 kg (188 lb)   01/22/25 83.5 kg (184 lb)    24 88.5 kg (195 lb)   10/29/24 88.5 kg (195 lb)   24 88.5 kg (195 lb)   24 88.5 kg (195 lb)        NUTRITION:  Diet:       Procedures    NPO      Food Allergies: No  Cultural/Ethnic/Quaker Preferences Addressed: Yes    Percent Meals Eaten (last 3 days)       None            GI system review: swallowing dysfunction    Skin and wounds: intact    NUTRITION RELATED PHYSICAL FINDINGS:     1. Body Fat/Muscle Mass: no wasting noted - muscle loss to temporals and fat loss to buccal - appropriate for age     2. Fluid Accumulation: generalized non pitting edema, RLE and LLE +1 pitting edema.    NUTRITION PRESCRIPTION:  82.8 kg Actual Body Weight  Calories: 1656 - 2070 calories/day (20-25 kcal/kg)  Protein: 77  - 97 grams protein/day (1.2-1.5 grams protein per kg of IBW)  Fluid: ~1 ml/kcal or per MD discretion    NUTRITION DIAGNOSIS/PROBLEM:  Inadequate energy intake related to inability to take or tolerate  as evidenced by need for NPO status      MONITOR AND EVALUATE/NUTRITION GOALS:  Tolerate alternative nutrition at 100% of goal - New      MEDICATIONS:  Ssi (10 units given in 24 hrs),   Gtt: NS @ 50 mL/hr    LABS:  Glu:219, POC glucose, 204-227, Co2:18, BUN:43, Cr:2.2, M.7.A1c 8.9% (25)    Pt is at High nutrition risk    Radha Del Valle RDN, LDN, [facility]  Clinical Dietitian   [id number]

## 2025-05-26 NOTE — PROGRESS NOTES
[facility]  Neurocritical Care Progress Note     Lisandro Harley Patient Status:  Inpatient    1940 MRN [id number]   Location [facility] 6NE-A Attending Daniel Petit, DO   Hosp Day # 2 PCP Braydon Barrett MD     Subjective:     No acute events overnight.    Vitals:   Temp:  [97.3 °F (36.3 °C)-98.6 °F (37 °C)] 97.3 °F (36.3 °C)  Pulse:  [70-94] 73  Resp:  [18-30] 27  BP: ()/(41-95) 141/55  SpO2:  [91 %-96 %] 94 %  Body mass index is 29.46 kg/m².    Intake/Output:    Intake/Output Summary (Last 24 hours) at 2025 0953  Last data filed at 2025 0400  Gross per 24 hour   Intake 354 ml   Output 1300 ml   Net -946 ml     Current Meds:  Current Facility-Administered Medications   Medication Dose Route Frequency    acetaminophen (Tylenol) tab 650 mg  650 mg Oral Q4H PRN    Or    acetaminophen (Tylenol) rectal suppository 650 mg  650 mg Rectal Q4H PRN    labetalol (Trandate) 5 mg/mL injection 10 mg  10 mg Intravenous Q10 Min PRN    hydrALAzine (Apresoline) 20 mg/mL injection 10 mg  10 mg Intravenous Q2H PRN    metoclopramide (Reglan) 5 mg/mL injection 5 mg  5 mg Intravenous Q8H PRN    heparin (Porcine) 5000 UNIT/ML injection 5,000 Units  5,000 Units Subcutaneous Q8H [facility]    aspirin 300 MG rectal suppository 300 mg  300 mg Rectal Daily    Or    aspirin tab 325 mg  325 mg Oral Daily    tamsulosin (Flomax) cap 0.4 mg  0.4 mg Oral Daily    [Held by provider] torsemide (Demadex) tab 40 mg  40 mg Oral Daily    glucose (Dex4) 15 GM/59ML oral liquid 15 g  15 g Oral Q15 Min PRN    Or    glucose (Glutose) 40% oral gel 15 g  15 g Oral Q15 Min PRN    Or    glucose-vitamin C (Dex-4) chewable tab 4 tablet  4 tablet Oral Q15 Min PRN    Or    dextrose 50% injection 50 mL  50 mL Intravenous Q15 Min PRN    Or    glucose (Dex4) 15 GM/59ML oral liquid 30 g  30 g Oral Q15 Min PRN    Or    glucose (Glutose) 40% oral gel 30 g  30 g Oral Q15 Min PRN    Or    glucose-vitamin C (Dex-4) chewable tab 8 tablet   8 tablet Oral Q15 Min PRN    acetaminophen (Tylenol Extra Strength) tab 500 mg  500 mg Oral Q4H PRN    ondansetron (Zofran) 4 MG/2ML injection 4 mg  4 mg Intravenous Q6H PRN    polyethylene glycol (PEG 3350) (Miralax) 17 g oral packet 17 g  17 g Oral Daily PRN    sennosides (Senokot) tab 17.2 mg  17.2 mg Oral Nightly PRN    bisacodyl (Dulcolax) 10 MG rectal suppository 10 mg  10 mg Rectal Daily PRN    ipratropium-albuterol (Duoneb) 0.5-2.5 (3) MG/3ML inhalation solution 3 mL  3 mL Nebulization Q6H PRN    insulin aspart (NovoLOG) 100 Units/mL FlexPen 1-10 Units  1-10 Units Subcutaneous Q6H    atorvastatin (Lipitor) tab 40 mg  40 mg Oral Nightly    cefTRIAXone (Rocephin) 1 g in sodium chloride 0.9% 100 mL IVPB-ADDV  1 g Intravenous Q24H    sodium chloride 0.9% infusion   Intravenous Continuous     Physical Examination:   General- No acute distress  CV- RRR  Resp- CTA Bilat  Neuro-  Mental Status:  Drowsy but arousable to verbal stim, oriented x 4 following simple commands, mild dysarthria, able to name and repeat.  Cranial nerves: Right pupil irregular, not reactive.  Left gaze preference, able to slightly past midline.  Right field cut.  Right facial droop,  Hearing grossly intact. Tongue midline w  Motor: Drift: Right lower extremity drift down to bed, right upper extremity 1/5.  Left at least 4/5  Sensation: Decree sensation right to light touch  Cerebellar: Normal Finger-To-Nose test on the left, too weak to complete on the right    Diagnostics:   No results found.  Lab Review     Recent Labs   Lab 05/24/25 2143 05/25/25 0432 05/26/25 0416   * 136 143   K 4.5 4.6 4.0   CL 99 100 109   CO2 21.0 22.0 18.0*   * 244* 219*   BUN 49* 45* 43*   CREATSERUM 2.08* 2.20* 2.20*     Recent Labs   Lab 05/24/25 2143 05/25/25 0432 05/26/25  0416   WBC 16.4* 16.4* 14.2*   HGB 13.6 13.1 13.4   .0 401.0 402.0     Assesment/Plan:   85 year old male with PMH of lumbar stenosis, afib on eliquis, HFpEF, HTN,  HLD, CAD, SSS s/p PPM, COPD on home O2, pHTN, CKD3b, prostate cancer s/p RT, gallstones, alcoholic cirrhosis, lymphocytic colitis, rheumatoid arthritis, DM2, who now presents with aphasia, R VF cut, and R sided weakness/numbness, found to have new L MCA stroke.     Neurological:  L MCA Ischemic Stroke         - Initial NIHSS 15, down to 10 in ED, now up to 12         - Etiology either cardioembolic versus large vessel atherosclerotic disease given other findings of chronic occlusions in posterior circulation and therapeutic anticoagulation in setting of CKD without any missed doses         - No Tenecteplase given Eliquis use         - CTA showing left M2 occlusion with perfusion deficit on CTP                - No intervention given location of occlusion, comorbidities and improving exam                - Brought to ICU for further management, potential for intervention if exam worsens                - Discussed worsening exam 5/25 with POA, daughter, she does not feel that even if he worsened more that he would not want intervention given the risk associated with it.           - Stroke labs: TSH wnl, A1c 8.9 poorly controlled, Lipid panel with LDL 59         - Increase home statin to high intensity, start full dose aspirin                - Eventually need to resume home anticoagulation as well after mri         - Permissive hypertension to goal SBP < 220 mm Hg         - MRI brain pending         - TTE ordered         - Continue cardiac monitor         - Slowing with head of bed and allow therapy evaluation     Cardiovascular:  Atrial fibrillation  HFpEF, HTN, HLD, CAD, SSS s/p PPM         - SBP goal as noted above          - Intervention: Hold home beta-blocker to allow hypertension in setting of above, spot dose IV Lopressor if tachycardia develops         - Hold anticoagulation for today pending neuro stability and possible MRI scan         - Increase home statin to high intensity         - IV labetalol/hydralazine  pushes prn            Pulmonary:  Chronic respiratory failure  COPD  Pulmonary hypertension        - Admission CXR with possible left basilar opacities concerning for pneumonia or aspiration         - Does not appear overloaded         - O2 goal greater than 88%        - Satting well on RA         - Continue bronchopulmonary hygiene with IS/NebS prn     Renal:   CKD  BPH  Hyponatremia        - Monitor I&Os          - Volume down, hold IV fluid in setting of above         - Sodium normalizing         - Continue home Flomax, hold torsemide for today         - Avoid nephrotoxic medications     Gastrointestinal:  Nutrition:  History of alcoholic cirrhosis        - NPO per speech, place DHT to start TF        - Bowel regimen: ordered prn     Infectious Disease:   Leukocytosis          - Hypothermic initially, chest x-ray as noted above, UA concerning for infection, culture pending         - Start empiric ceftriaxone pending antibiotic results     Heme/Onc - Hb goal > 7, continue to monitor daily      Endocrine/Rheum:  DM Type II, uncontrolled          - Hold PO Meds in the hospital         - HbA1c pending         - Accuchecks q6 with SSI prn      Checklist         - Lines: PIVs         - DVT Prophylaxis: SCDs and HSQ         - Diet: DHT for TFs         - IVF: -         - Electrolytes: Replete per protocol         - Code Status: DNR/Select        40 minutes of care time (exclusive of billable procedures) was administered to manage and/or prevent neurologic instability. This involved direct patient intervention, complex decision making, and/or extensive discussions with the patient, family, and clinical staff.    Tomeka Chester MD, [facility]  Medical Director of System Neurosciences  Chief, Section of Neurocritical Care  [facility]

## 2025-05-26 NOTE — SLP NOTE
SPEECH DAILY NOTE - INPATIENT    ASSESSMENT & PLAN   ASSESSMENT  Pt seen for po trials to assess appropriateness for VFSS. Pt aroused, oral care provided via suction, as pt's oral cavity was observed to be dry. Pt tolerated well. No swallow response triggered or observed, no s/s of aspiration with oral care. Pt in on room air and comfortable.  Pt given po trials of mildly thick liquids by tsp. Pt with good acceptance and containment; no bilabial spillage present, pt observed to slowly push bolus posteriorly in the mouth to the pharyngeal area. No laryngeal elevation observed upon palpation. Even with dry spoon attempts accompanied with downward pressure of the spoon x2. Pt stated, \"I'm trying\" when provided with verbal cues to trigger a swallow.  Recommend to continue STRICT NPO and to hold VFSS for today. Reassess for appropriateness tomorrow. RN aware.    Diet Recommendations - Solids: NPO  Diet Recommendations - Liquids: NPO          Medication Administration Recommendations: Non-oral    Patient Experiencing Pain: Yes  Pain Rating: Unable to Rate  Pain Location:  (R arm)  Pain Control: IV meds  Nursing Aware of Pain?: Yes    Treatment Plan  Treatment Plan/Recommendations: Videofluoroscopic swallow study    Interdisciplinary Communication: Discussed with RN            GOALS  Goal #1 Reassess appropriateness for VFSS.  In Progress     FOLLOW UP  Follow Up Needed (Documentation Required): Yes  SLP Follow-up Date: 05/27/25  Duration: 1 week        If you have any questions, please contact CHRISTINA Shafer

## 2025-05-26 NOTE — PLAN OF CARE
Assumed care of Mr. Harley at 1930.    Q4H neuro checks in place.  Transfer orders in place.  Patient is forgetful and confused at times.  Right side defects noted on neuro exam.  Neuro exam unchanged during shift.     See flowsheets for more details.     Patient updated on plan of care, all questions answered and patient verbalized understandings.

## 2025-05-26 NOTE — PLAN OF CARE
Assumed care of patient this am. Q4H neuro exams- patient alert and oriented x3, disoriented to self (birthday). Right side remains weak- see flowsheet for detailed exam.     DHT inserted per order for nutrition, awaiting dietician recs.       Problem: GASTROINTESTINAL - ADULT  Goal: Maintains adequate nutritional intake (undernourished)  Description: INTERVENTIONS:  - Monitor percentage of each meal consumed  - Identify factors contributing to decreased intake, treat as appropriate  - Assist with meals as needed  - Monitor I&O, WT and lab values  - Obtain nutritional consult as needed  - Optimize oral hygiene and moisture  - Encourage food from home; allow for food preferences  - Enhance eating environment  Outcome: Progressing

## 2025-05-26 NOTE — PROGRESS NOTES
[facility]   part of [facility]     Hospitalist Progress Note     Lisandro Harley Patient Status:  Inpatient    1940 MRN [id number]   Location [place] 6NE-A Attending Daniel Petit, DO   Hosp Day # 2 PCP Braydon Barrett MD     Chief Complaint: Fall     Subjective:     Patient resting in bed and appears stable. Family at bedside     Objective:    Review of Systems:   A comprehensive review of systems was completed; pertinent positive and negatives stated in subjective.    Vital signs:  Temp:  [97.3 °F (36.3 °C)-98.6 °F (37 °C)] 97.3 °F (36.3 °C)  Pulse:  [70-94] 73  Resp:  [18-30] 27  BP: ()/(48-95) 141/55  SpO2:  [91 %-96 %] 94 %    Physical Exam:    General: No acute distress  Respiratory: No wheezes, no rhonchi  Cardiovascular: S1, S2, regular rate and rhythm  Abdomen: Soft, Non-tender, non-distended, positive bowel sounds  Neuro: Right facial droop.  Decreased motor and sensation in right extremities  Extremities: No edema    Diagnostic Data:    Labs:  Recent Labs   Lab 256   WBC 16.4*  --  16.4* 14.2*   HGB 13.6  --  13.1 13.4   MCV 85.1  --  86.5 87.1   .0  --  401.0 402.0   INR  --  1.48*  --   --        Recent Labs   Lab 25  0416   * 244* 219*   BUN 49* 45* 43*   CREATSERUM 2.08* 2.20* 2.20*   CA 9.4 9.4 9.1   ALB 4.5 4.3  --    * 136 143   K 4.5 4.6 4.0   CL 99 100 109   CO2 21.0 22.0 18.0*   ALKPHO 121* 107  --    AST 66* 46*  --    * 87*  --    BILT 0.7 0.6  --    TP 8.2 7.8  --        Estimated Glomerular Filtration Rate: 29 mL/min/1.73m2 (A) (result from lab).    Recent Labs   Lab 25  2143   TROPHS 11       Recent Labs   Lab 25   PTP 18.1*   INR 1.48*                    Microbiology    Hospital Encounter on 25   1. Urine Culture, Routine     Status: None    Collection Time: 25  4:47 AM    Specimen: Urine, clean catch   Result  Value Ref Range    Urine Culture  N/A     >100,000 cfu/ml Three or more species of Gram negative bacilli; none predominant. No further workup performed.         Imaging: Reviewed in Epic.    Medications: Scheduled Medications[1]    Assessment & Plan:      #Fall  #Acute left MCA ischemic stroke  - CT head reviewed   - MRI brain ordered  - Echo bubble study ordered  - Aspirin, statin   - Neurology following  - Neurosurgery signed off  - Neurocritical care following    #Dysphagia  - NPO per SLP  - NG tube placed for tube feeds  - Nutrition consulted     #UTI  - Urine culture in lab   - IV antibiotics      #CAD s/p PCI  #Essential hypertension  #Dyslipidemia  #Atrial fibrillation on DOAC  #SSS sp PPM  #Chronic heart failure with preserved EF  #Pulmonary hypertension  - Torsemide on hold  - DOAC on hold; defer to NCC/USMAN    #Transaminitis  - Monitor LFTs      #COPD     #Diabetes mellitus type 2  - Hyperglycemia protocol      #Chronic kidney disease stage 4     #Rheumatoid arthritis     #Prostate cancer      Daniel Petit DO    Supplementary Documentation:     Quality:  DVT Mechanical Prophylaxis:   SCDs,    DVT Pharmacologic Prophylaxis   Medication    heparin (Porcine) 5000 UNIT/ML injection 5,000 Units    DVT Pharmacologic prophylaxis: Aspirin 325 mg           Code Status: DNAR/Selective Treatment  St: External urinary catheter in place  St Duration (in days):   Central line:    XIAO:     Discharge is dependent on: Clinical improvement   At this point Mr. Harley is expected to be discharge to: Home     The 21st Century Cures Act makes medical notes like these available to patients in the interest of transparency. Please be advised this is a medical document. Medical documents are intended to carry relevant information, facts as evident, and the clinical opinion of the practitioner. The medical note is intended as peer to peer communication and may appear blunt or direct. It is written in medical language and may  contain abbreviations or verbiage that are unfamiliar.              **Certification      PHYSICIAN Certification of Need for Inpatient Hospitalization - Initial Certification    Patient will require inpatient services that will reasonably be expected to span two midnight's based on the clinical documentation in H+P.   Based on patients current state of illness, I anticipate that, after discharge, patient will require TBD.           [1]    heparin  5,000 Units Subcutaneous Q8H ARAMIS    aspirin  300 mg Rectal Daily    Or    aspirin  325 mg Oral Daily    tamsulosin  0.4 mg Oral Daily    [Held by provider] torsemide  40 mg Oral Daily    insulin aspart  1-10 Units Subcutaneous Q6H    atorvastatin  40 mg Oral Nightly    cefTRIAXone  1 g Intravenous Q24H

## 2025-05-27 ENCOUNTER — APPOINTMENT (OUTPATIENT)
Dept: GENERAL RADIOLOGY | Facility: HOSPITAL | Age: 85
End: 2025-05-27
Attending: NURSE PRACTITIONER
Payer: MEDICARE

## 2025-05-27 ENCOUNTER — APPOINTMENT (OUTPATIENT)
Dept: CV DIAGNOSTICS | Facility: HOSPITAL | Age: 85
End: 2025-05-27
Attending: NURSE PRACTITIONER
Payer: MEDICARE

## 2025-05-27 ENCOUNTER — APPOINTMENT (OUTPATIENT)
Dept: GENERAL RADIOLOGY | Facility: HOSPITAL | Age: 85
End: 2025-05-27
Attending: STUDENT IN AN ORGANIZED HEALTH CARE EDUCATION/TRAINING PROGRAM
Payer: MEDICARE

## 2025-05-27 NOTE — PROGRESS NOTES
Left nostril dobhoff inserted, confirmed via x-ray at 70 cm marking, advanced as recommended via x-ray reading  Tube feeds re-started at 40 ml/hr, left hand mitten applied  Aspiration precautions in place  Family would not like to proceed with CT Head at this time, stating no benefit from it, Neurology aware.

## 2025-05-27 NOTE — PLAN OF CARE
Assumed care at 1930,  Patient is alert and oriented X 2-3  Neuro checks Q 4 hours  V-paced on tele, On RA, NG tube in place  Dobhoff TF going at 30ml/hr, increase Q 6 hrs  Denies any pain or discomfort at this time.  Call light within reach, all needs met  Plan of care discussed with patient and family.

## 2025-05-27 NOTE — SPIRITUAL CARE NOTE
Spiritual Care Visit Note    Patient Name: Lisandro Harley Date of Spiritual Care Visit: 25   : 1940 Primary Dx: Acute ischemic stroke (HCC)       Referred By: Referral From: Family    Spiritual Care Taxonomy:    Intended Effects: Aligning care plan with patient's values    Methods: Collaborate with care team member, Encourage self reflection, Encourage sharing of feelings, Encouraging spiritual/Shinto practices, Offer emotional support, Assist with spiritual/Shinto practices    Interventions: Acknowledge current situation, Active listening, Ask guided questions, Ask guided questions about hortensia, Ask questions to bring forth feelings, Assist with identifying strengths, Explain  role, Facilitate preparing for end of life, Identify supportive relationship(s), Perform a blessing, Share words of hope and inspiration, Silent prayer    Visit Type/Summary:     - Spiritual Care: Responded to a request via the on call phone from patient's son requesting spiritual care. Consulted with RN. At time of visit, patient appeared to be resting comfortably in bed with his son, Andrea, and daughter in law at bedside.  explored patient's spiritual/emotional resources and needs. Patient shared about his 4 children and 12 grandchildren that bring him great annetta. Family is a source of comfort and support. He shared he draws strength from his Christianity hortensia and requested a  visit. offered empathic listening and emotional support. Scar and family expressed appreciation for  visit. Provided support for Scar's spiritual/Shinto requests. Attempted to coordinate a  visit for Sacrament of the Sick. No response today, Spiritual Care Team will follow up on request tomorrow. Provided information regarding how to contact Spiritual Care and left a Spiritual Care information card.  remains available as needed for follow up.    Spiritual Care support can be requested via an Epic  consult. For urgent/immediate needs, please contact the On Call  at: [facility]: ext [phone]    Rev Macarena Gabriel MA

## 2025-05-27 NOTE — OCCUPATIONAL THERAPY NOTE
OCCUPATIONAL THERAPY TREATMENT NOTE - INPATIENT     Room Number: 7615/7615-A  Session: 1   Number of Visits to Meet Established Goals: 9    Presenting Problem: L MCA CVA    ASSESSMENT   Patient demonstrates limited progress this session, goals remain in progress.    Patient continues to function below baseline with toileting, bathing, upper body dressing, lower body dressing, bed mobility, transfers, static sitting balance, maintaining seated position, and functional standing tolerance.   Contributing factors to remaining limitations include decreased functional strength, decreased functional reach, decreased endurance, impaired sit/stand balance, decreased muscular endurance, cognitive deficits (EF), medical status, decreased insight to deficits, and decreased safety awareness.  Next session anticipate patient to progress toileting, bathing, upper body dressing, lower body dressing, grooming, bed mobility, transfers, static sitting balance, static standing balance, maintaining seated position, and functional standing tolerance.  Occupational Therapy will continue to follow patient for duration of hospitalization.    Patient continues to benefit from continued skilled OT services: to facilitate return to prior level of function as patient demonstrates high motivation with excellent tolerance to an intensive therapy program.     History:  Patient is a 85 year old male admitted on 5/24/2025 with Presenting Problem: L MCA CVA. Co-Morbidities : heart failure, COPD, DM2, cirrhosis, prostate CA, PM, a fib, NII     Imaging  CT stroke 5/24 CONCLUSION:    1. Asymmetric perfusion within the left MCA distribution suggestive of an infarct.   2. There is occlusion of the insular M2 segment of the left middle cerebral artery.   3. Cerebral atrophy with chronic microvascular ischemic changes.   4. Occlusion of the V3 and V4 segments of the right vertebral artery which may be chronic.       WEIGHT BEARING RESTRICTION        Recommendations for nursing staff:   Transfers: x2 mariana rodriguez  Toileting location: commode    TREATMENT SESSION:  Patient Start of Session: bed    FUNCTIONAL TRANSFER ASSESSMENT  Sit to Stand: Edge of Bed  Edge of Bed: Dependent    BED MOBILITY  Supine to Sit : Maximum Assist  Sit to Supine (OT): Dependent  Scooting: dependent    BALANCE ASSESSMENT  Static Sitting: Minimal Assist  Static Standing: Dependent    FUNCTIONAL ADL ASSESSMENT  LB Dressing Seated: Dependent    ACTIVITY TOLERANCE: fair                         O2 SATURATIONS       EDUCATION PROVIDED  Patient Education : Role of Occupational Therapy; Plan of Care; Discharge Recommendations; DME Recommendations; Functional Transfer Techniques; Posture/Positioning; Energy Conservation; Proper Body Mechanics  Patient's Response to Education: Verbalized Understanding    UPPER EXTREMITY  ROM: within functional limits   Strength: 0/5 RUE      Therapist comments: Received pt supine in bed, maxA for bed mobility, Yandel static sitting balance. MaxA x 2 for simulated commode tx. From chair, pt is maxA x 2 but unable to come to complete standing position. Dependent assist for LB dressing/toileting  Patient End of Session: Up in chair, Needs met, Call light within reach, RN aware of session/findings, All patient questions and concerns addressed, Hospital anti-slip socks, Alarm set    SUBJECTIVE  Calm and cooperative.  \"I'm not going to need that\" (referring to walker)    PAIN ASSESSMENT  Ratin  Location: none reported        OBJECTIVE  Precautions:  (-220)    AM-PAC ‘6-Clicks’ Inpatient Daily Activity Short Form  -   Putting on and taking off regular lower body clothing?: Total  -   Bathing (including washing, rinsing, drying)?: Total  -   Toileting, which includes using toilet, bedpan or urinal? : Total  -   Putting on and taking off regular upper body clothing?: A Lot  -   Taking care of personal grooming such as brushing teeth?: A Lot  -   Eating meals?:  A Lot    AM-PAC Score:  Score: 9  Approx Degree of Impairment: 79.59%  Standardized Score (AM-PAC Scale): 25.33    PLAN     OT Treatment Plan: Balance activities, Energy conservation/work simplification techniques, ADL training, Functional transfer training, UE strengthening/ROM, Patient/Family education, Patient/Family training, Compensatory technique education  Rehab Potential : Fair  Frequency: 5x/week    OT Goals: ADL Goals   Patient will perform grooming: with min assist  Patient will perform toileting: with mod assist     Functional Transfer Goals  Patient will transfer from sit to supine:  with mod assist  Patient will transfer from supine to sit:  with mod assist  Patient will transfer from sit to stand:  with mod assist  Patient will transfer to bedside commode:  with mod assist     UE Exercise Program Goal  Patient will be minimum assistance with right self-ROM HEP (home exercise program).     Additional Goals  Pt will maintain unsupported sit 5 minutes supervision during light ADL  Pt will identify 8/10 targets in R visual field on 3 consecutive trials    OT Session Time: 25 minutes  Self-Care Home Management:  minutes  Therapeutic Activity: 25 minutes  Neuromuscular Re-education:  minutes  Therapeutic Exercise:  minutes  Cognitive Skills:  minutes  Sensory Integrative:  minutes  Orthotic Management and Training:  minutes  Can add/delete any of these

## 2025-05-27 NOTE — PROGRESS NOTES
[facility]  [facility] Neurology Progress Note    Lisandro Harley Patient Status:  Inpatient    1940 MRN [id number]   Location [facility] 7NE-A Attending Daniel Petit, DO   Hosp Day # 3 PCP Braydon Barrett MD     CC: Right sided weakness, speech difficulties    Subjective:  Lisandro Harley is an 85 years old male with PMHx significant for  lumbar stenosis, afib on eliquis, HFpEF, HTN, HLD, CAD, SSS s/p PPM, COPD on home O2, pHTN, CKD3b, prostate cancer s/p RT, gallstones, alcoholic cirrhosis, lymphocytic colitis, rheumatoid arthritis, DM2, who presented to the ED with aphasia, R VF cut, and R sided weakness/numbness, work up revealed likely a new L MCA stroke. Initial NIHSS was 15, down to 10 in ED, pt was admitted. No TNK given given pt was on Eliquis at home and also with CKD. CTA showed a left M2 occlusion with perfusion deficit on CTP. No intervention given location of occlusion, comorbidities and improving exam. Was admitted to CNICU for furhter management and potential for intervention if exam worsens. Exam has worsened on , case was discussed with patient's daughter/POA. No intervention as family flet he would not want an intervention given the risks associated with it. Pt was transferred out of CNICU yesterday. Anticoagulation on hold, MRI anselmo pending.    Seen for a follow up visit today. Sitting in the chair. Awake, alert, regards and follows simple commands, able to say a few words, voice soft.  Denies pain, feeling tired, minimally verbal. Right side flaccid. Pt states he is a lefty. Had a Dobbhoff tube overnight, pulled it out. Currently remains NPO, failed swallow studies. No family at bedside at this time.         MEDICATIONS:  Prior to Admission Medications[1]  Current Hospital Medications[2]    REVIEW OF SYSTEMS:  A 10-point system was reviewed.  Pertinent positives and negatives are noted in HPI.      PHYSICAL EXAMINATION:  VITAL SIGNS: /78 (BP Location: Left arm)   Pulse 73    Temp 97.8 °F (36.6 °C) (Oral)   Resp 20   Wt 182 lb 8.7 oz (82.8 kg)   SpO2 93%   BMI 29.46 kg/m²   GENERAL:  Patient is a 85 year old male in no acute distress.  HEENT:  Normocephalic, atraumatic  ABD: Soft, non tender  SKIN: Warm, dry, no rashes    NEUROLOGICAL:   Mental status and speech: Drowsy but arousable to verbal stim, oriented x 4 following simple commands, mild dysarthria, able to name and repeat. Hypophonic voice.   Memory and comprehension: Intact   Cranial Nerves: Right pupil irregular, not reactive. Left gaze preference, able to slightly past midline. Right field cut. Right facial droop, Hearing grossly intact. Tongue midline , facial sensation intact  Motor: Drift: Right lower extremity drift down to bed, right upper extremity 1/5.  Left at least 4/5   Sensory: Decreased sensation on the right to light touch   Coordination: FTN intact with left arm, unable to do with right  Gait: Deferred      Imaging/Diagnostics:  XR CHEST AP/PA (1 VIEW) (CPT=71045)  Result Date: 5/26/2025  CONCLUSION:  Enteric tube with tip in the expected location of stomach.   LOCATION:  [facility]      Dictated by (CST): Amanda Olguin MD on 5/26/2025 at 1:20 PM     Finalized by (CST): Amanda Olguin MD on 5/26/2025 at 1:22 PM       XR CHEST AP PORTABLE  (CPT=71045)  Result Date: 5/26/2025  CONCLUSION:  1. Gastric tube terminates in the proximal stomach.  Advancing gastric tube is recommended.   LOCATION:  [facility]      Dictated by (CST): Damaso Kaplan MD on 5/26/2025 at 11:33 AM     Finalized by (CST): Damaso Kaplan MD on 5/26/2025 at 11:33 AM       XR CHEST AP PORTABLE  (CPT=71045)  Result Date: 5/25/2025  CONCLUSION:  Stable cardiac and mediastinal contours.  Stable positioning of right chest wall ICD.  Findings of pulmonary venous hypertension without overt pulmonary edema.  Patchy left basilar opacity is suspicious for pneumonia or aspiration.  No associated pleural effusion or pneumothorax.   LOCATION:  [facility]       Dictated by (CST): Gaby Barfield MD on 5/25/2025 at 7:24 AM     Finalized by (CST): Gaby Barfield MD on 5/25/2025 at 7:25 AM       CT STROKE (EARNEST) CTA BRAIN/CTA NECK+PERF(CPT=70496/77084/0042T)  Result Date: 5/24/2025  CONCLUSION:  1. Asymmetric perfusion within the left MCA distribution suggestive of an infarct. 2. There is occlusion of the insular M2 segment of the left middle cerebral artery. 3. Cerebral atrophy with chronic microvascular ischemic changes. 4. Occlusion of the V3 and V4 segments of the right vertebral artery which may be chronic.  5. This critical value result was called to Dr. Vivar on 5/24/2025 at 10:17 p.m..  Results read back was performed. .    LOCATION:  [facility]   Dictated by (CST): Amanda Olguin MD on 5/24/2025 at 10:09 PM     Finalized by (CST): Amanda Olguin MD on 5/24/2025 at 10:25 PM       CT STROKE BRAIN (NO IV)(CPT=70450)  Result Date: 5/24/2025  CONCLUSION: 1. No acute intracranial findings 2. Cerebral atrophy with chronic microvascular ischemic changes.  3. Maxillary sinusitis.  4. This critical value result was called to Dr. Vivar on 5/24/2025 at 9:57 p.m..  Results read back was performed.   LOCATION:  [facility]   Dictated by (CST): Amanda Olguin MD on 5/24/2025 at 9:53 PM     Finalized by (CST): Amanda Olguin MD on 5/24/2025 at 9:59 PM           Labs:  Recent Labs   Lab 05/24/25  2143 05/25/25  0432 05/26/25  0416 05/27/25  0541   RBC 4.83 4.68 4.79 4.95   HGB 13.6 13.1 13.4 13.8   HCT 41.1 40.5 41.7 43.9   MCV 85.1 86.5 87.1 88.7   MCH 28.2 28.0 28.0 27.9   MCHC 33.1 32.3 32.1 31.4   RDW 15.4 15.4 15.8 16.1   NEPRELIM 14.40*  --  11.35*  --    WBC 16.4* 16.4* 14.2* 12.6*   .0 401.0 402.0 420.0         Recent Labs   Lab 05/25/25  0432 05/26/25  0416 05/27/25  0541   * 219* 247*   BUN 45* 43* 49*   CREATSERUM 2.20* 2.20* 2.14*   EGFRCR 29* 29* 30*   CA 9.4 9.1 9.5    143 149*   K 4.6 4.0 4.1    109 113*   CO2 22.0 18.0* 20.0*     Assessment and  Plan:    An 85 years old male with:    Left MCA ischemic stroke - in a setting of PMHx of lumbar stenosis, afib on eliquis, HFpEF, HTN, HLD, CAD, SSS s/p PPM, COPD on home O2, pHTN, CKD3b, prostate cancer s/p RT, gallstones, alcoholic cirrhosis, lymphocytic colitis, rheumatoid arthritis, DM2,  etiology is either cardioembolic versus large vessel atherosclerotic disease given other findings of chronic occlusions in posterior circulation and therapeutic anticoagulation in setting of CKD without any missed doses. Initial NIHSS was 15, then down to 10 in ED, worsened in CNICU to 12. Work up:  No Tenecteplase given Eliquis use  CTA showing left M2 occlusion with perfusion deficit on CTP  No intervention given location of occlusion, comorbidities and improving exam  Brought to ICU for further management, potential for intervention if exam   Exam worsened on 5/25, discussed with POA, daughter, she does not feel that even if he worsened more that he would not want intervention given the risk associated with it.    Stroke labs: TSH wnl, A1c 8.9 poorly controlled, lipid panel with LDL 59  Increased home statin to high intensity, started full dose aspirin/ 300 mg PA as NPO   Eventually need to resume home anticoagulation as well after MRI brain  MRI brain pending PPM clearance, will check CT head while waiting - pending, DOAC restart pending above testing.   TTE - ef 55-60%, no thrombi, no shunt   Continue cardiac monitor   PT/OLT/ST and rehab evlas  Failed swallow studies, consider alternatives for nutrition and meds. GI consulted.   Hx of Atrial fibrillation, HFpEF, HTN, HLD, CAD, SSS s/p PPM-  Currently DOAC on hold, pending MRI brain/CT head , permissive HTN with SBP up to 160. Home statin increased to high intensity.  UTI - on Rocephin daily..   DM2 uncontrolled - A1c 8.9. Further management per Medicine.   Discussed with dr. Conn, to follow with further recommendations if indicated.   Will continue to follow.    Is this  a shared or split note between Advanced Practice Provider and Physician? Yes       Jes Ledbetterlauramichael ANN  [facility]  5/27/2025, 11:57 AM   [facility] # [id number]         [1]   No current outpatient medications on file.   [2]   Current Facility-Administered Medications   Medication Dose Route Frequency    dextrose 10% infusion (TPN no rate)   Intravenous Continuous PRN    sodium bicarbonate tab 650 mg  650 mg Per G Tube PRN    And    pancrelipase (Lip-Prot-Amyl) (Zenpep) DR particles cap 10,000 Units  10,000 Units Per G Tube PRN    acetaminophen (Tylenol) tab 650 mg  650 mg Oral Q4H PRN    Or    acetaminophen (Tylenol) rectal suppository 650 mg  650 mg Rectal Q4H PRN    labetalol (Trandate) 5 mg/mL injection 10 mg  10 mg Intravenous Q10 Min PRN    hydrALAzine (Apresoline) 20 mg/mL injection 10 mg  10 mg Intravenous Q2H PRN    metoclopramide (Reglan) 5 mg/mL injection 5 mg  5 mg Intravenous Q8H PRN    heparin (Porcine) 5000 UNIT/ML injection 5,000 Units  5,000 Units Subcutaneous Q8H ARAMIS    aspirin 300 MG rectal suppository 300 mg  300 mg Rectal Daily    Or    aspirin tab 325 mg  325 mg Oral Daily    tamsulosin (Flomax) cap 0.4 mg  0.4 mg Oral Daily    [Held by provider] torsemide (Demadex) tab 40 mg  40 mg Oral Daily    glucose (Dex4) 15 GM/59ML oral liquid 15 g  15 g Oral Q15 Min PRN    Or    glucose (Glutose) 40% oral gel 15 g  15 g Oral Q15 Min PRN    Or    glucose-vitamin C (Dex-4) chewable tab 4 tablet  4 tablet Oral Q15 Min PRN    Or    dextrose 50% injection 50 mL  50 mL Intravenous Q15 Min PRN    Or    glucose (Dex4) 15 GM/59ML oral liquid 30 g  30 g Oral Q15 Min PRN    Or    glucose (Glutose) 40% oral gel 30 g  30 g Oral Q15 Min PRN    Or    glucose-vitamin C (Dex-4) chewable tab 8 tablet  8 tablet Oral Q15 Min PRN    acetaminophen (Tylenol Extra Strength) tab 500 mg  500 mg Oral Q4H PRN    ondansetron (Zofran) 4 MG/2ML injection 4 mg  4 mg Intravenous Q6H PRN    polyethylene glycol (PEG 3350)  (Miralax) 17 g oral packet 17 g  17 g Oral Daily PRN    sennosides (Senokot) tab 17.2 mg  17.2 mg Oral Nightly PRN    bisacodyl (Dulcolax) 10 MG rectal suppository 10 mg  10 mg Rectal Daily PRN    ipratropium-albuterol (Duoneb) 0.5-2.5 (3) MG/3ML inhalation solution 3 mL  3 mL Nebulization Q6H PRN    insulin aspart (NovoLOG) 100 Units/mL FlexPen 1-10 Units  1-10 Units Subcutaneous Q6H    atorvastatin (Lipitor) tab 40 mg  40 mg Oral Nightly    cefTRIAXone (Rocephin) 1 g in sodium chloride 0.9% 100 mL IVPB-ADDV  1 g Intravenous Q24H    sodium chloride 0.9% infusion   Intravenous Continuous
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
## 2025-05-27 NOTE — PROGRESS NOTES
[facility]   part of [facility]     Hospitalist Progress Note     Lisandro Harley Patient Status:  Inpatient    1940 MRN [id number]   [facility] 6NE-A Attending Daniel Petit,    Hosp Day # 3 PCP Braydon Barrett MD     Chief Complaint: Fall     Subjective:     Patient   awake/ sleepy   voice soft said few words  Denies headache   Family at bedside     Objective:    Review of Systems:   A comprehensive review of systems was completed; pertinent positive and negatives stated in subjective.    Vital signs:  Temp:  [97.4 °F (36.3 °C)-97.9 °F (36.6 °C)] 97.8 °F (36.6 °C)  Pulse:  [73-80] 73  Resp:  [18-26] 20  BP: (120-147)/(55-78) 120/78  SpO2:  [92 %-95 %] 93 %    Physical Exam:    General: No acute distress  awake/ sleepy  + facial droop  voice soft  limited tongue actions   Respiratory: No wheezes, no rhonchi  unlabored  RA   Cardiovascular: S1, S2, regular rate and rhythm  v paced   Abdomen: Soft, Non-tender, non-distended, positive bowel sounds  Neuro: Right facial droop.   Flaccid  right extremities, decreased sensation  sl movement r foot     Extremities: No edema    Diagnostic Data:    Labs:  Recent Labs   Lab 25  0541   WBC 16.4*  --  16.4* 14.2* 12.6*   HGB 13.6  --  13.1 13.4 13.8   MCV 85.1  --  86.5 87.1 88.7   .0  --  401.0 402.0 420.0   INR  --  1.48*  --   --   --        Recent Labs   Lab 25  0541   * 244* 219* 247*   BUN 49* 45* 43* 49*   CREATSERUM 2.08* 2.20* 2.20* 2.14*   CA 9.4 9.4 9.1 9.5   ALB 4.5 4.3  --   --    * 136 143 149*   K 4.5 4.6 4.0 4.1   CL 99 100 109 113*   CO2 21.0 22.0 18.0* 20.0*   ALKPHO 121* 107  --   --    AST 66* 46*  --   --    * 87*  --   --    BILT 0.7 0.6  --   --    TP 8.2 7.8  --   --        Estimated Glomerular Filtration Rate: 30 mL/min/1.73m2 (A) (result from lab).    Recent Labs   Lab  05/24/25  2143   TROPHS 11       Recent Labs   Lab 05/24/25 2144   PTP 18.1*   INR 1.48*                    Microbiology    Hospital Encounter on 05/24/25   1. Urine Culture, Routine     Status: None    Collection Time: 05/25/25  4:47 AM    Specimen: Urine, clean catch   Result Value Ref Range    Urine Culture  N/A     >100,000 cfu/ml Three or more species of Gram negative bacilli; none predominant. No further workup performed.         Imaging: Reviewed in Epic.    Medications: Scheduled Medications[1]    Assessment & Plan:      #Fall  #Acute left MCA ischemic stroke  - CT head reviewed   - MRI brain ordered/ P   awaiting paperwork from [facility]- they are working on it. I called [facility]     - Echo bubble study  completed  results P    - Aspirin, statin   - Neurology following  - Neurosurgery signed off  - pt/ot/speech following    - will need AR  will discuss w/ SW     #Dysphagia  - NPO per SLP/ video swallow  today   - NG tube placed for tube feeds- pulled out overnoc   - Nutrition consulted   - add IVF  while NPO   - discussed poss need for PEG placement     #UTI  - Urine culture  >100k > 3 GNbacilli from report    - IV antibiotics  rocephin       #CAD s/p PCI  #Essential hypertension  #Dyslipidemia  #Atrial fibrillation uses  DOAC  on hold  for now   #SSS sp PPM  #Chronic heart failure with preserved EF  #Pulmonary hypertension  - Torsemide on hold  - DOAC on hold; defer to nuero /USMAN    #Transaminitis  - Monitor LFTs      #COPD     #Diabetes mellitus type 2  - Hyperglycemia protocol   A1c 8.9   BS elevated   ISS increased for every 20 pts above 140      #Chronic kidney disease stage 4   cr 2.14   #Rheumatoid arthritis     #Prostate cancer    POC   Video swallow  today  may need PEG   DOAC on hold   Pt/ot to see today   needs to be up w/ lift  Will need AR  will talk to SW  Cont abx   Increase IVF while NPO   Needs MRI   Discussed at length w/ family  discussing PEG, da asked what would happen if no PEG placed, touched  briefly on hospice . They see some improvement in his speech today  flaccid on r side   Had lived at [facility]  prior to CVA   Failed video swallow and aspirated per RN  need to replace Aaron  and resume CHAD Hurtado NP      Supplementary Documentation:     Quality:  DVT Mechanical Prophylaxis:   SCDs,    DVT Pharmacologic Prophylaxis   Medication    heparin (Porcine) 5000 UNIT/ML injection 5,000 Units    DVT Pharmacologic prophylaxis: Aspirin 325 mg           Code Status: DNAR/Selective Treatment  St: External urinary catheter in place  St Duration (in days):   Central line:    XIAO:     Discharge is dependent on: Clinical improvement   At this point Mr. Harley is expected to be discharge to: AR     The 21st Century Cures Act makes medical notes like these available to patients in the interest of transparency. Please be advised this is a medical document. Medical documents are intended to carry relevant information, facts as evident, and the clinical opinion of the practitioner. The medical note is intended as peer to peer communication and may appear blunt or direct. It is written in medical language and may contain abbreviations or verbiage that are unfamiliar.              **Certification      PHYSICIAN Certification of Need for Inpatient Hospitalization - Initial Certification    Patient will require inpatient services that will reasonably be expected to span two midnight's based on the clinical documentation in H+P.   Based on patients current state of illness, I anticipate that, after discharge, patient will require TBD.           [1]    heparin  5,000 Units Subcutaneous Q8H ARAMIS    aspirin  300 mg Rectal Daily    Or    aspirin  325 mg Oral Daily    tamsulosin  0.4 mg Oral Daily    [Held by provider] torsemide  40 mg Oral Daily    insulin aspart  1-10 Units Subcutaneous Q6H    atorvastatin  40 mg Oral Nightly    cefTRIAXone  1 g Intravenous Q24H

## 2025-05-27 NOTE — PROGRESS NOTES
[facility]   part of [facility]     Hospitalist Progress Note     Lisandro Harley Patient Status:  Inpatient    1940 MRN [id number]   [facility] 6NE-A Attending Daniel Petit, DO   Hosp Day # 3 PCP Braydon Barrett MD     Chief Complaint: Fall     Subjective:     Patient resting in bed and reports feeling okay.  He denies any headaches, dizziness    Objective:    Review of Systems:   A comprehensive review of systems was completed; pertinent positive and negatives stated in subjective.    Vital signs:  Temp:  [97.4 °F (36.3 °C)-97.9 °F (36.6 °C)] 97.8 °F (36.6 °C)  Pulse:  [73-80] 73  Resp:  [18-26] 20  BP: (120-147)/(55-78) 120/78  SpO2:  [92 %-95 %] 93 %    Physical Exam:    General: No acute distress  Respiratory: No wheezes, no rhonchi  Cardiovascular: S1, S2, regular rate and rhythm  Abdomen: Soft, Non-tender, non-distended, positive bowel sounds  Neuro: Right facial droop.  Decreased motor and sensation in right extremities  Extremities: No edema    Diagnostic Data:    Labs:  Recent Labs   Lab 252 25  0541   WBC 16.4*  --  16.4* 14.2* 12.6*   HGB 13.6  --  13.1 13.4 13.8   MCV 85.1  --  86.5 87.1 88.7   .0  --  401.0 402.0 420.0   INR  --  1.48*  --   --   --        Recent Labs   Lab 25  0541   * 244* 219* 247*   BUN 49* 45* 43* 49*   CREATSERUM 2.08* 2.20* 2.20* 2.14*   CA 9.4 9.4 9.1 9.5   ALB 4.5 4.3  --   --    * 136 143 149*   K 4.5 4.6 4.0 4.1   CL 99 100 109 113*   CO2 21.0 22.0 18.0* 20.0*   ALKPHO 121* 107  --   --    AST 66* 46*  --   --    * 87*  --   --    BILT 0.7 0.6  --   --    TP 8.2 7.8  --   --        Estimated Glomerular Filtration Rate: 30 mL/min/1.73m2 (A) (result from lab).    Recent Labs   Lab 25   TROPHS 11       Recent Labs   Lab 25   PTP 18.1*   INR 1.48*                     Microbiology    Hospital Encounter on 05/24/25   1. Urine Culture, Routine     Status: None    Collection Time: 05/25/25  4:47 AM    Specimen: Urine, clean catch   Result Value Ref Range    Urine Culture  N/A     >100,000 cfu/ml Three or more species of Gram negative bacilli; none predominant. No further workup performed.         Imaging: Reviewed in Epic.    Medications: Scheduled Medications[1]    Assessment & Plan:      #Fall  #Acute left MCA ischemic stroke  - CT head reviewed   - MRI brain ordered  - Echo bubble study ordered  - Aspirin, statin   - Neurology following  - Neurosurgery signed off  - Neurocritical care following  - PT OT recommend acute rehab    #Dysphagia  - NPO per SLP  - Plan for video swallow study today  - NG tube pulled out overnight  - Nutrition following    #UTI  - Urine culture in lab   - IV antibiotics      #CAD s/p PCI  #Essential hypertension  #Dyslipidemia  #Atrial fibrillation   #SSS sp PPM  #Chronic heart failure with preserved EF  #Pulmonary hypertension  - Torsemide on hold  - DOAC on hold; defer to Neuro    #Transaminitis  - Monitor LFTs      #COPD     #Diabetes mellitus type 2  - Hyperglycemia protocol      #Chronic kidney disease stage 4     #Rheumatoid arthritis     #Prostate cancer      Daniel Petit DO    Supplementary Documentation:     Quality:  DVT Mechanical Prophylaxis:   SCDs,    DVT Pharmacologic Prophylaxis   Medication    heparin (Porcine) 5000 UNIT/ML injection 5,000 Units    DVT Pharmacologic prophylaxis: Aspirin 325 mg           Code Status: DNAR/Selective Treatment  St: External urinary catheter in place  St Duration (in days):   Central line:    XIAO:     Discharge is dependent on: Clinical improvement   At this point Mr. Harley is expected to be discharge to: Home     The 21st Century Cures Act makes medical notes like these available to patients in the interest of transparency. Please be advised this is a medical document. Medical documents  are intended to carry relevant information, facts as evident, and the clinical opinion of the practitioner. The medical note is intended as peer to peer communication and may appear blunt or direct. It is written in medical language and may contain abbreviations or verbiage that are unfamiliar.              **Certification      PHYSICIAN Certification of Need for Inpatient Hospitalization - Initial Certification    Patient will require inpatient services that will reasonably be expected to span two midnight's based on the clinical documentation in H+P.   Based on patients current state of illness, I anticipate that, after discharge, patient will require TBD.           [1]    heparin  5,000 Units Subcutaneous Q8H ARAMIS    aspirin  300 mg Rectal Daily    Or    aspirin  325 mg Oral Daily    tamsulosin  0.4 mg Oral Daily    [Held by provider] torsemide  40 mg Oral Daily    insulin aspart  1-10 Units Subcutaneous Q6H    atorvastatin  40 mg Oral Nightly    cefTRIAXone  1 g Intravenous Q24H

## 2025-05-27 NOTE — PAYOR COMM NOTE
--------------  ADMISSION REVIEW     Payor: SAKINA CAMPUZANO [facility] – [place]  Subscriber #:  [id number]  Authorization Number: [id number]    Admit date: 5/24/25  Admit time: 11:34 PM       REVIEW DOCUMENTATION:     ED Provider Notes      Patient Seen in: [facility] Emergency Department    History  Chief Complaint   Patient presents with    Stroke     Subjective:   HPI         Patient is an 85-year-old male who was brought in by ambulance from assisted living facility or after he was found to be altered had fallen.  Apparently he did have a small contusion to the right side of his temporal area.  He normally is ANO x 3 and was not answering questions or showing any normal mentation skills at the time they saw him.  He was put in a c-collar and ambulance was called.  Patient apparently is on Eliquis but do not know when he took his last dose as he takes his own medications.  Patient was met on the stroke launch pad    Physical Exam    ED Triage Vitals [05/24/25 2145]   BP (!) 133/92   Pulse 98   Resp 22   Temp    Temp src    SpO2 98 %   O2 Device None (Room air)     Current Vitals:   Vital Signs  BP: 132/75  Pulse: 70  Resp: 24  MAP (mmHg): 91    Physical Exam    Vital signs reviewed  General appearance: Patient is in a c-collar, not answering questions.    HEENT: Eyes are mildly deviated to the left but is able to track to the right but does seem to have normal pupil reflexes no scleral icterus, mucous membranes are moist, there is no erythema or exudate in the posterior pharynx  Neck: Supple no JVD no lymphadenopathy no meningismus no carotid bruit  CV: Regular rate and rhythm no murmur rub  Respiratory: Clear to auscultation bilaterally no crackles no wheezes no accessory muscle use  Abdomen: Soft nontender nondistended, no rebound no guarding  no hepatosplenomegaly bowel sounds are present , no pulsatile mass  Extremities: No clubbing cyanosis or edema 2+ distal pulses.  Neuro: Patient withdrew from painful stimuli bilaterally.   Patient does have some right-sided deficit NIH of 15-16    ED Course  Labs Reviewed   COMP METABOLIC PANEL (14) - Abnormal; Notable for the following components:       Result Value    Glucose 291 (*)     Sodium 134 (*)     BUN 49 (*)     Creatinine 2.08 (*)     Calculated Osmolality 302 (*)     eGFR-Cr 31 (*)     AST 66 (*)      (*)     Alkaline Phosphatase 121 (*)     Globulin  3.7 (*)     All other components within normal limits   CBC WITH DIFFERENTIAL WITH PLATELET - Abnormal; Notable for the following components:    WBC 16.4 (*)     Neutrophil Absolute Prelim 14.40 (*)     Neutrophil Absolute 14.40 (*)     Lymphocyte Absolute 0.69 (*)     All other components within normal limits   PROTHROMBIN TIME (PT) - Abnormal; Notable for the following components:    PT 18.1 (*)     INR 1.48 (*)     All other components within normal limits   PTT, ACTIVATED - Normal   TROPONIN I HIGH SENSITIVITY - Normal     EKG    Rate, intervals and axes as noted on EKG Report.  Rate: 88  Rhythm: Sinus Rhythm  Reading: Ventricular paced rhythm with occasional PVCs     CT STROKE (EARNEST) CTA BRAIN/CTA NECK+PERF  Result Date: 5/24/2025  CONCLUSION:  1. Asymmetric perfusion within the left MCA distribution suggestive of an infarct. 2. There is occlusion of the insular M2 segment of the left middle cerebral artery. 3. Cerebral atrophy with chronic microvascular ischemic changes. 4. Occlusion of the V3 and V4 segments of the right vertebral artery which may be chronic.  5. This critical value result was called to Dr. Vivar on 5/24/2025 at 10:17 p.m.      CT STROKE BRAIN (NO IV)  Result Date: 5/24/2025  CONCLUSION: 1. No acute intracranial findings 2. Cerebral atrophy with chronic microvascular ischemic changes.  3. Maxillary sinusitis.  4. This critical value result was called to Dr. Vivar on 5/24/2025 at 9:57 p.m..  Results read back was performed.   LOCATION:  [place]   Dictated by (CST): Amanda Olguin MD on 5/24/2025 at 9:53 PM      Finalized by (CST): Amanda Olguin MD on 5/24/2025 at 9:59 PM         Patient was met on the stroke launching pad and was sent immediately to CT scan.  Stroke coordinator came down and did an assessment got an NIH of 15-16.  CT a did show a occlusion of the M2 segment of the left MCA.  Neurosurgery was consulted who came down and saw patient and discussed with family.  At this point patient is not a candidate for tPA and will monitor patient closely in the  ICU.  Also discussed case with neurointensivist and the hospitalist.      Medical Decision Making      Disposition and Plan     Clinical Impression:  1. Acute ischemic stroke (HCC)    2. Disorientation    3. Metabolic acidosis      Disposition:  Admit    Hospital Problems       Present on Admission  Date Reviewed: 2/19/2025          ICD-10-CM Noted POA    * (Principal) Acute ischemic stroke (HCC) I63.9 5/24/2025 Unknown         TNK/ NI Documentation:    Date/Time last known well:   5/24/2025 5:00 PM    NIHSS on presentation: N/A     Chief Complaint   Patient presents with    Stroke     IV Tenecteplase (TNK) administered: No; Patient is not a Candidate for IV TNK due to: DOAC- patient on DOAC and took a dose less than 48 hours    Candidate for Endovascular thrombectomy (EVT): Neurosurgery did not recommend      Neurosurgery Consult Note:   REASON FOR CONSULTATION:  Acute ischemic stroke, left M2 occlusion     HISTORY OF PRESENT ILLNESS:  Lisandro Harley is a 85 year old male patient with congestive heart failure with a pacemaker, coronary artery disease status post stenting, CKD, hypertension, diabetes, COPD, and hyperlipidemia on [facility] who lives in assisted living and ambulates with a walker and performs most ADLs per family who presents to the emergency department after developing acute onset right sided weakness.  Upon presentation to the ED, he was found to have right sided neglect and has weakness in the right upper and lower extremity but is antigravity.   Head CT demonstrates no acute abnormality.  CT perfusion demonstrates approximately 100 cc of penumbra.  CTA demonstrates a dominant left M2 occlusion.    PHYSICAL EXAMINATION:  VITAL SIGNS: /75   Pulse 70   Resp 24   Wt 194 lb 3.6 oz (88.1 kg)   SpO2 95%   BMI 31.35 kg/m²   GENERAL:  Patient is a 85 year old male in no acute distress.  HEENT:  Normocephalic, atraumatic  LUNGS: Nonlabored breathing  HEART:  Well perfused  NEUROLOGICAL:    Alert and oriented to self  Right facial droop  Right-sided neglect but able to cross midline with  Right upper and lower extremity antigravity, left side full strength    ASSESSMENT:  85-year-old male with acute ischemic stroke and left M2 occlusion     I had an extensive discussion with the patient's daughter at the bedside regarding the current clinical situation and plan of care.  We discussed the risks, benefits, terms, goals, expectations of stroke intervention.  I explained that with stroke intervention, there is risk of his neurological function worsening and as he is currently antigravity in his motor exam, it would be reasonable to optimize clinically.  The patient's daughter expressed that given his advanced age and medical comorbidities and expressed DNR order, she agreed with not proceeding with emergent intervention at this time.  I discussed that we will monitor him closely in the ICU and optimize him medically and should he have any change in his neurological exam, we may consider further intervention.  After this discussion and answering her questions, she expressed understanding and agreement with the plan.     Plan:  No acute neurovascular intervention at this time  Will admit to the ICU and optimize medically including head of bed to remain flat and and maintenance of normotension     Case discussed with neurocritical care who agrees with the plan and case discussed with emergency room physician          History and Physical      History of Present  Illness:      Lisandro Harley is a 85 year old male with a history of CAD sp PCI, essential hypertension, dyslipidemia, atrial fibrillation on DOAC, SSS sp PPM, chronic heart failure with preserved EF, pulmonary hypertension, COPD, diabetes mellitus type 2, chronic kidney disease stage 4, rheumatoid arthritis, prostate cancer who presents after a fall. Patient lives at an assisted living facility. At baseline, patient is AAOx3 and walks with walker. Family was with patient the day prior to admission. On day of admission, patient had a fall and noted to have confusion and right sided weakness prompting ER evaluation. Patient is awake, alert. He can recognize family members, he does have a speech impairment. He denies chest pain, shortness of breath. No headache or vision change. No double vision. No nausea, vomiting, diarrhea. No complaints of pain.     Assessment & Plan:  #Fall  #Acute CVA, occluded M2 segment of left MCA, V3 and V4 occlusion right vertebral (chronic?)  -Aspirin  -Lipitor  -Defer anticoagulation to NCC/USMAN  -MRI if PPM is compatible  -ECHO with  bubble  -Stroke protocol  -USMAN & NCC consult     #CAD sp PCI  #Essential hypertension  #Dyslipidemia  #Atrial fibrillation on DOAC  #SSS sp PPM  #Chronic heart failure with preserved EF  #Pulmonary hypertension  -Permissive hypertension  -Torsemide  -DOAC held - defer to NCC/USMAN  -Monitor hemodynamics  -Telemetry     #COPD     #Diabetes mellitus type 2  -Correctional scale  -Carb scale  -A1c     #Chronic kidney disease stage 4  -Monitor UOP, creatinine and electrolytes     #Rheumatoid arthritis     #Prostate cancer     #Leukocytosis  -Check UA & CXR     #Transaminitis   -Monitor LFTs      5/25:     Neuro Critical Care Consult:    History of Presenting Illness  85 year old male with PMH of lumbar stenosis, afib on eliquis, HFpEF, HTN, HLD, CAD, SSS s/p PPM, COPD on home O2, pHTN, CKD3b, prostate cancer s/p RT, gallstones, alcoholic cirrhosis, lymphocytic  colitis, rheumatoid arthritis, DM2, who now presents with aphasia, R VF cut, and R sided weakness/numbness, found to have new L MCA stroke.     Patient lives in assisted living and uses a walker for ambulation, mildly forgetful.  Per discussion with the ED, patient was found altered after falling not answering questions appropriately so was brought in as a code stroke.  CT head without bleed.  CTA showing left M2 occlusion with perfusion changes.  Not a candidate for TNK given home anticoagulation use. NIHSS 15, improving down to 10 at time of arrival to ICU where he was not talking and able to be antigravity on the right.  This along the location of occlusion and comorbidities, patient was not a candidate for intervention at that time but potentially if he would worsen so was brought to the cardiac neuro ICU for further monitoring and management.    PHYSICAL EXAM:  GENERAL APPEARANCE: Patient resting comfortably in bed, NAD  HEENT: Normocephalic, atraumatic.   LUNGS: Normal respiratory rate and effort   HEART: Regular rate and rhythm   ABDOMEN: Soft. No tenderness, guarding, or rebound.     NEUROLOGIC:    Mental Status:  Drowsy but arousable to verbal stim, oriented x 4 following simple commands, mild dysarthria, able to name and repeat.  Cranial nerves: Right pupil irregular, not reactive.  Left gaze preference, able to slightly past midline.  Right field cut.  Right facial droop,  Hearing grossly intact. Tongue midline w  Motor: Drift: Right lower extremity drift down to bed, right upper extremity 2/5.  Left at least 4/5  Sensation: Decree sensation right to light touch  Cerebellar: Normal Finger-To-Nose test on the left, too weak to complete on the right     NIH Stroke Scale:    1a.  Level of consciousness: 1   1b.  LOC questions:0   1c.  LOC commands: 0   2.    Best Gaze:1   3.    Visual: 2   4.    Facial Palsy: 2   5a.  Motor left arm: 0   5b.  Motor right arm: 3   6a.  Motor left le   6b.  Motor right  le   7.    Limb Ataxia: 0   8.    Sensory: 1   9.    Best Language:0  10.   Dysarthria: 1  11.   Extinction and Inattention: 0     Total NIHSS:  12     LABORATORY DATA:  Last 24 hour labs were reviewed in detail.       Recent Labs   Lab 25   * 136   K 4.5 4.6   CL 99 100   CO2 21.0 22.0   * 244*   BUN 49* 45*   CREATSERUM 2.08* 2.20*      Lab 25   WBC 16.4* 16.4*   HGB 13.6 13.1   .0 401.0      Lab 25   *   AST 66*      Lab 25   MG 2.5   PHOS 3.9         Radiology:    XR CHEST AP PORTABLE  (CPT=71045)  Result Date: 2025  CONCLUSION:  Stable cardiac and mediastinal contours.  Stable positioning of right chest wall ICD.  Findings of pulmonary venous hypertension without overt pulmonary edema.  Patchy left basilar opacity is suspicious for pneumonia or aspiration.  No associated pleural effusion or pneumothorax.   LOCATION:  [facility]      Dictated by (CST): Gaby Barfiedl MD on 2025 at 7:24 AM     Finalized by (CST): Gaby Barfield MD on 2025 at 7:25 AM        CT STROKE (EARNEST) CTA BRAIN/CTA NECK+PERF(CPT=70496/02769/0042T)  Result Date: 2025  CONCLUSION:  1. Asymmetric perfusion within the left MCA distribution suggestive of an infarct. 2. There is occlusion of the insular M2 segment of the left middle cerebral artery. 3. Cerebral atrophy with chronic microvascular ischemic changes. 4. Occlusion of the V3 and V4 segments of the right vertebral artery which may be chronic.  5. This critical value result was called to Dr. Vivar on 2025 at 10:17 p.m..  Results read back was performed. .    LOCATION:  [facility]   Dictated by (CST): Amanda Olguin MD on 2025 at 10:09 PM     Finalized by (CST): Amanda Olguin MD on 2025 at 10:25 PM        CT STROKE BRAIN (NO IV)(CPT=70450)  Result Date: 2025  CONCLUSION: 1. No acute intracranial findings 2. Cerebral atrophy with chronic microvascular  ischemic changes.  3. Maxillary sinusitis.  4. This critical value result was called to Dr. Vivar on 5/24/2025 at 9:57 p.m..  Results read back was performed.   LOCATION:  [facility]   Dictated by (CST): Amanda Olguin MD on 5/24/2025 at 9:53 PM     Finalized by (CST): Amanda Olguin MD on 5/24/2025 at 9:59 PM        ASSESSMENT/PLAN   85 year old male with PMH of lumbar stenosis, afib on eliquis, HFpEF, HTN, HLD, CAD, SSS s/p PPM, COPD on home O2, pHTN, CKD3b, prostate cancer s/p RT, gallstones, alcoholic cirrhosis, lymphocytic colitis, rheumatoid arthritis, DM2, who now presents with aphasia, R VF cut, and R sided weakness/numbness, found to have new L MCA stroke.     Neurological:  L MCA Ischemic Stroke         - Initial NIHSS 15, down to 10, slightly worse this morning         - Etiology either cardioembolic versus large vessel atherosclerotic disease given other findings of chronic occlusions in posterior circulation and therapeutic anticoagulation in setting of CKD without any missed doses         - No Tenecteplase given Eliquis use         - CTA showing left M2 occlusion with perfusion deficit on CTP                - No intervention given location of occlusion, comorbidities and improving exam                - Brought to ICU for further management, potential for intervention if exam worsens                - Discussed worsening exam this morning with POA, daughter, she does not feel that even if he worsened more that he would not want intervention given the risk associated with it.  Will raise head of bed today and work with therapy as on his exam still remains well today stable may transfer to the floor later today.         - Stroke labs: TSH wnl, A1c pending, Lipid panel with LDL 59         - Increase home statin to high intensity, start full dose aspirin                - Eventually need to resume home anticoagulation as well         - Permissive hypertension to goal SBP < 220 mm Hg         - MRI brain if able  to obtain          - TTE ordered         - Continue cardiac monitor         - Slowing with head of bed and allow therapy evaluation, lower back down if exam worsens     Cardiovascular:  Atrial fibrillation  HFpEF, HTN, HLD, CAD, SSS s/p PPM         - SBP goal as noted above          - Intervention: Hold home beta-blocker to allow hypertension in setting of above, spot dose IV Lopressor if tachycardia develops         - Hold anticoagulation for today pending neuro stability and possible MRI scan         - Increase home statin to high intensity         - IV labetalol/hydralazine pushes prn            Pulmonary:  Chronic respiratory failure  COPD  Pulmonary hypertension        - Admission CXR with possible left basilar opacities concerning for pneumonia or aspiration         - Does not appear overloaded         - O2 goal greater than 88%        - Satting well on RA         - Continue bronchopulmonary hygiene with IS/NebS prn     Renal:   CKD  BPH  Hyponatremia        - Monitor I&Os          - Volume down, hold IV fluid in setting of above         - Sodium normalizing         - Continue home Flomax, hold torsemide for today         - Avoid nephrotoxic medications     Gastrointestinal:  Nutrition:  History of alcoholic cirrhosis        - NPO pending speech evaluation        - Bowel regimen: ordered prn     Infectious Disease:   Leukocytosis          - Hypothermic initially, chest x-ray as noted above, UA concerning for infection, culture pending         - Start empiric ceftriaxone pending antibiotic results     Heme/Onc - Hb goal > 7, continue to monitor daily      Endocrine/Rheum:  DM Type II, uncontrolled          - Hold PO Meds in the hospital         - HbA1c pending         - Accuchecks q6 with SSI prn     Dispo: CNICU pending neuro stability while raising head of bed and working with therapy       Neurosurgery Progress Note:    Patient sleeping but awakens to voice. He has no complaints at present. He is moving  upper extremities symmetrically this morning. No acute events overnight.      OBJECTIVE/PHYSICAL EXAM      VITALS:  BP (!) 105/95   Pulse 70   Temp 97 °F (36.1 °C) (Temporal)   Resp 24   Wt 182 lb 8.7 oz (82.8 kg)   SpO2 93%   BMI 29.46 kg/m²      GENERAL: No acute distress, non-toxic appearing, mood appropriate     HEENT: Normocephalic, atraumatic     RESP:  Respirations non-labored, even     CV:  Well perfused     NEURO: Alert and oriented to self.  Follows commands. Right facial droop. Moves upper extremities symmetrically. BUE and BLE anti-gravity.       [facility]  Neurosurgery Progress Note           Lisandro Harley Patient Status:  Inpatient    1940 MRN [id number]   Location [facility] 6NE-A Attending Daniel Petit, DO   Hosp Day # 1 PCP Braydon Barrett MD      PRINCIPLE PROBLEM:   Acute ischemic stroke, left M2 occlusion      SUBJECTIVE      Patient sleeping but awakens to voice. He has no complaints at present. He is moving upper extremities symmetrically this morning. No acute events overnight.      OBJECTIVE/PHYSICAL EXAM      VITALS:  BP (!) 105/95   Pulse 70   Temp 97 °F (36.1 °C) (Temporal)   Resp 24   Wt 182 lb 8.7 oz (82.8 kg)   SpO2 93%   BMI 29.46 kg/m²      GENERAL: No acute distress, non-toxic appearing, mood appropriate     HEENT: Normocephalic, atraumatic     RESP:  Respirations non-labored, even     CV:  Well perfused     NEURO: Alert and oriented to self.  Follows commands. Right facial droop. Moves upper extremities symmetrically. BUE and BLE anti-gravity.      LABS            Lab Results   Component Value Date     WBC 16.4 2025     HGB 13.1 2025     HCT 40.5 2025     .0 2025     CREATSERUM 2.20 2025     BUN 45 2025      2025     K 4.6 2025      2025     CO2 22.0 2025      2025     CA 9.4 2025     ALB 4.5 2025     ALKPHO 121 2025     BILT 0.7 2025      TP 8.2 05/24/2025     AST 66 05/24/2025      05/24/2025     PTT 31.1 05/24/2025     INR 1.48 05/24/2025     PTP 18.1 05/24/2025     TSH 4.294 05/24/2025     MG 2.5 05/25/2025     PHOS 3.9 05/25/2025     PGLU 220 05/25/2025      IMAGING      XR CHEST AP PORTABLE  (CPT=71045)  Result Date: 5/25/2025  CONCLUSION:  Stable cardiac and mediastinal contours.  Stable positioning of right chest wall ICD.  Findings of pulmonary venous hypertension without overt pulmonary edema.  Patchy left basilar opacity is suspicious for pneumonia or aspiration.  No associated pleural effusion or pneumothorax.   LOCATION:  [facility]      Dictated by ([facility]): Gaby Barfield MD on 5/25/2025 at 7:24 AM     Finalized by (CST): Gaby Barfield MD on 5/25/2025 at 7:25 AM        CT STROKE (EARNEST) CTA BRAIN/CTA NECK+PERF(CPT=70496/23531/0042T)  Result Date: 5/24/2025  CONCLUSION:  1. Asymmetric perfusion within the left MCA distribution suggestive of an infarct. 2. There is occlusion of the insular M2 segment of the left middle cerebral artery. 3. Cerebral atrophy with chronic microvascular ischemic changes. 4. Occlusion of the V3 and V4 segments of the right vertebral artery which may be chronic.  5. This critical value result was called to Dr. Vivar on 5/24/2025 at 10:17 p.m..  Results read back was performed. .    LOCATION:  [facility]   Dictated by (CST): Amanda Olguin MD on 5/24/2025 at 10:09 PM     Finalized by (CST): Amanda Olguin MD on 5/24/2025 at 10:25 PM        CT STROKE BRAIN (NO IV)(CPT=70450)  Result Date: 5/24/2025  CONCLUSION: 1. No acute intracranial findings 2. Cerebral atrophy with chronic microvascular ischemic changes.  3. Maxillary sinusitis.  4. This critical value result was called to Dr. Vivar on 5/24/2025 at 9:57 p.m..  Results read back was performed.   LOCATION:  [facility]   Dictated by ([facility]): Amanda Olguin MD on 5/24/2025 at 9:53 PM     Finalized by (CST): Amanda Olguin MD on 5/24/2025 at 9:59 PM        ASSESSMENT & PLAN       ASSESSMENT:  Acute ischemic stroke, left M2 occlusion      PLAN:  No acute neurosurgical intervention is indicated at this time  Maintain normotension  Medical management per hospitalist  Optimization per Neurocritical care          Hospitalist Progress Note:   Assessment & Plan:  #Fall  #Acute CVA, occluded M2 segment of left MCA, V3 and V4 occlusion right vertebral (chronic?)  - CT head reviewed   - MRI brain ordered  - Echo bubble study ordered  - Stroke protocol  - Aspirin, statin   - Plan for video swallow study   - Neurosurgery following   - Neurocritical care following     #UTI  - Urine culture in lab   - IV antibiotics      #CAD s/p PCI  #Essential hypertension  #Dyslipidemia  #Atrial fibrillation on DOAC  #SSS sp PPM  #Chronic heart failure with preserved EF  #Pulmonary hypertension  - Torsemide  - DOAC on hold; defer to NCC/USMAN     #Transaminitis  - Monitor LFTs       Medications 05/25/25 05/26/25 05/27/25    aspirin 300 MG rectal suppository 300 mg  Dose: 300 mg  Freq: Daily Route: RE  Start: 05/25/25 0030   Admin Instructions:   If NPO; Please contact pharmacy if suppository is needed       0128 VF-Given   1058 ME-Given      0847 BR-Given           1019 DH-Given         Or   aspirin tab 325 mg  Dose: 325 mg  Freq: Daily Route: OR  Start: 05/25/25 0030    (0030 VF)-Not Given                    0930 DH-Hold            atorvastatin (Lipitor) tab 40 mg  Dose: 40 mg  Freq: Nightly Route: OR  Start: 05/25/25 2100    (2100 JJ)-Not Given [C]        2205 CF-Given        2100          atorvastatin (Lipitor) tab 80 mg  Dose: 80 mg  Freq: Nightly Route: OR  Start: 05/25/25 0030 End: 05/25/25 0913    (0030 VF)-Not Given   0913-D/C'd         atorvastatin (Lipitor) tab 80 mg  Dose: 80 mg  Freq: Nightly Route: OR  Start: 05/25/25 0030 End: 05/25/25 0022    0022-D/C'd          cefTRIAXone (Rocephin) 1 g in sodium chloride 0.9% 100 mL IVPB-ADDV  Dose: 1 g  Freq: Every 24 hours Route: IV  Last Dose: 1 g (05/27/25  0932)  Start: 05/25/25 1000   Admin Instructions:   Ceftriaxone must NOT be administered simultaneously with calcium containing IV solutions. Includes Y-site as well.  In patients other than neonates ceftriaxone and calcium containing products may administered sequentially, provided the line is flushed in between administrations.   Order specific questions:       1008 ME-New Bag        1058 BR-New Bag        0932 DH-New Bag          heparin (Porcine) 5000 UNIT/ML injection 5,000 Units  Dose: 5,000 Units  Freq: Every 8 hours scheduled Route: SC  Start: 05/25/25 0018    0127 VF-Given   0542 VF-Given   1503 ME-Given     2300 JJ-Given        0555 JJ-Given   1409 BR-Given   2205 CF-Given      0640 CF-Given   1339 DH-Given   2200        insulin aspart (NovoLOG) 100 Units/mL FlexPen 1-10 Units  Dose: 1-10 Units  Freq: Every 6 hours Route: SC  Start: 05/25/25 0600   Admin Instructions:   Correction Insulin - calculate insulin requirement  Give 1 unit of Novolog (aspart) insulin for every 20 points blood glucose is greater than 140 mg/dL  **DO NOT HOLD OR ALTER INSULIN DOSE WITHOUT A PHYSICIAN ORDER**  Give Novolog/aspart insulin subcutaneously within 30 minutes of scheduled finger-stick time  Notify physician for blood glucose >351mg/dL with time and last dose of correction insulin given.    0553 VF-Given   1215 ME-Given   1733 ME-Given     2308 JJ-Given        0601 JJ-Given   1101 BR-Given   1824 SW-Given      0233 CF-Given   0640 CF-Given   1234 DH-Given     1800          insulin aspart (NovoLOG) 100 Units/mL FlexPen 1-10 Units  Dose: 1-10 Units  Freq: 3 times daily before meals and nightly Route: SC  Start: 05/25/25 0100 End: 05/25/25 0548   Admin Instructions:   Correction Insulin - calculate insulin requirement  Give 1 unit of Novolog (aspart) insulin for every 30 points blood glucose is greater than 140 mg/dL  **DO NOT HOLD OR ALTER INSULIN DOSE WITHOUT A PHYSICIAN ORDER**  Give Novolog/aspart insulin subcutaneously  within 30 minutes of scheduled finger-stick time  Notify physician for blood glucose >351mg/dL with time and last dose of correction insulin given.    0145 VF-Given   0548-D/C'd         insulin aspart (NovoLOG) 100 Units/mL FlexPen 1-68 Units  Dose: 1-68 Units  Freq: 3 times daily with meals Route: SC  Start: 05/25/25 0800 End: 05/26/25 0929   Admin Instructions:   1 unit of Novolog (aspart) insulin for every 10 grams of carbohydrates eaten  Give within 10 minutes before or after a meal  Do not give if patient is NPO (carbohydrate is on hold)    (0800 ME)-Not Given   (1200 ME)-Not Given   (1700 ME)-Not Given      (0800 BR)-Not Given   0929-D/C'd        tamsulosin (Flomax) cap 0.4 mg  Dose: 0.4 mg  Freq: Daily Route: OR  Start: 05/25/25 0047    (0930 ME)-Not Given        (0930 BR)-Not Given        0930 DH-Hold          torsemide (Demadex) tab 40 mg  Dose: 40 mg  Freq: Daily Route: OR  Start: 05/25/25 0930    0219 MUNA-Held by provider [C]   0930 MUNA       0930 MUNA        0930 DH-Hold          Legend:       [id number]/25  User Information   Initial Name Initial Name   BR Darlin Cavanaugh RN  [[id number]] CF Stephanie Lambert RN  [[id number]]    Joni Shaw RN  [[id number]] MUNA Flavio Brown APRN  [[id number]]   Dalila Montes RN  [[id number]] ME Preethi Farias RN  [[id number]]   SW Shruthi Mcgee RN  [[id number]] VF Brice Connor RN  [[id number]]         Continuous Meds Sorted by Name  for Scar Harley as of 5/25/25 through 5/27/25    1 Day 3 Days 7 Days 10 Days < Today >   Legend:       Medications 05/25/25 05/26/25 05/27/25   dextrose 10% infusion (TPN no rate)  Freq: Continuous PRN Route: IV  PRN Comment: If patient is on basal insulin, for interruptions in tube feedings, BEGIN D10W at same rate.  Discontinue when tube feed resumes.  Start: 05/26/25 1325   Admin Instructions:   If patient is on basal insulin, for interruptions in tube feedings, BEGIN D10W at same rate.  Discontinue when tube feed resumes.          sodium chloride 0.9% infusion  Rate: 50 mL/hr  Freq: Continuous Route: IV  Last Dose: Stopped (05/26/25 1423)  Start: 05/25/25 1100    1134 ME-New Bag        0728 JJ-New Bag   1423 BR-Stopped              Vitals (last day)       Date/Time Temp Pulse Resp BP SpO2 Weight O2 Device O2 Flow Rate (L/min) Who    05/27/25 1230 97.8 °F (36.6 °C) 73 32 148/63 94 % -- None (Room air) -- DH    05/27/25 0730 97.8 °F (36.6 °C) 73 20 120/78 93 % -- None (Room air) -- MS    05/27/25 0400 97.5 °F (36.4 °C) 80 18 146/55 92 % -- None (Room air) -- CB    05/27/25 0000 97.4 °F (36.3 °C) 77 20 147/62 93 % -- None (Room air) -- CB    05/26/25 2000 97.7 °F (36.5 °C) 74 22 146/62 95 % -- None (Room air) -- CB    05/26/25 1600 97.9 °F (36.6 °C) 73 23 141/60 94 % -- None (Room air) --     05/26/25 1200 97.4 °F (36.3 °C) 73 26 138/55 95 % -- None (Room air) --     05/26/25 0800 97.3 °F (36.3 °C) 73 27 141/55 94 % -- None (Room air) -- BR    05/26/25 0300 -- 74 21 -- 93 % -- -- --     05/26/25 0200 -- 70 24 -- 92 % -- -- --     05/26/25 0100 -- 72 23 -- 93 % -- -- --     05/26/25 0000 98.4 °F (36.9 °C) 70 26 120/55 91 % -- None (Room air) --           CIWA Scores (since admission)       None

## 2025-05-27 NOTE — PAYOR COMM NOTE
--------------  CONTINUED STAY REVIEW    Payor: SAKINA CAMPUZANO O  Subscriber #:  [id number]  Authorization Number: [id number]    5/26:       Neurology Progress Note:    Neurological:  L MCA Ischemic Stroke         - Initial NIHSS 15, down to 10 in ED, now up to 12         - Etiology either cardioembolic versus large vessel atherosclerotic disease given other findings of chronic occlusions in posterior circulation and therapeutic anticoagulation in setting of CKD without any missed doses         - No Tenecteplase given Eliquis use         - CTA showing left M2 occlusion with perfusion deficit on CTP                - No intervention given location of occlusion, comorbidities and improving exam                - Brought to ICU for further management, potential for intervention if exam worsens                - Discussed worsening exam 5/25 with POA, daughter, she does not feel that even if he worsened more that he would not want intervention given the risk associated with it.           - Stroke labs: TSH wnl, A1c 8.9 poorly controlled, Lipid panel with LDL 59         - Increase home statin to high intensity, start full dose aspirin                - Eventually need to resume home anticoagulation as well after mri         - Permissive hypertension to goal SBP < 220 mm Hg         - MRI brain pending         - TTE ordered         - Continue cardiac monitor         - Slowing with head of bed and allow therapy evaluation     Cardiovascular:  Atrial fibrillation  HFpEF, HTN, HLD, CAD, SSS s/p PPM         - SBP goal as noted above          - Intervention: Hold home beta-blocker to allow hypertension in setting of above, spot dose IV Lopressor if tachycardia develops         - Hold anticoagulation for today pending neuro stability and possible MRI scan         - Increase home statin to high intensity         - IV labetalol/hydralazine pushes prn            Pulmonary:  Chronic respiratory failure  COPD  Pulmonary hypertension        -  Admission CXR with possible left basilar opacities concerning for pneumonia or aspiration         - Does not appear overloaded         - O2 goal greater than 88%        - Satting well on RA         - Continue bronchopulmonary hygiene with IS/NebS prn     Renal:   CKD  BPH  Hyponatremia        - Monitor I&Os          - Volume down, hold IV fluid in setting of above         - Sodium normalizing         - Continue home Flomax, hold torsemide for today         - Avoid nephrotoxic medications     Gastrointestinal:  Nutrition:  History of alcoholic cirrhosis        - NPO per speech, place DHT to start TF        - Bowel regimen: ordered prn     Infectious Disease:   Leukocytosis          - Hypothermic initially, chest x-ray as noted above, UA concerning for infection, culture pending         - Start empiric ceftriaxone pending antibiotic results     Heme/Onc - Hb goal > 7, continue to monitor daily      Endocrine/Rheum:  DM Type II, uncontrolled          - Hold PO Meds in the hospital         - HbA1c pending         - Accuchecks q6 with SSI prn         5/27:     Hospitalist Progress Note:   Subjective:  Patient resting in bed and reports feeling okay.  He denies any headaches, dizziness     Objective:  Review of Systems:   A comprehensive review of systems was completed; pertinent positive and negatives stated in subjective.     Vital signs:  Temp:  [97.4 °F (36.3 °C)-97.9 °F (36.6 °C)] 97.8 °F (36.6 °C)  Pulse:  [73-80] 73  Resp:  [18-26] 20  BP: (120-147)/(55-78) 120/78  SpO2:  [92 %-95 %] 93 %     Physical Exam:    General: No acute distress  Respiratory: No wheezes, no rhonchi  Cardiovascular: S1, S2, regular rate and rhythm  Abdomen: Soft, Non-tender, non-distended, positive bowel sounds  Neuro: Right facial droop.  Decreased motor and sensation in right extremities  Extremities: No edema     Diagnostic Data:    Labs:          Recent Labs   Lab 05/24/25  2143 05/24/25  2144 05/25/25  0432 05/26/25  0416 05/27/25  0541    WBC 16.4*  --  16.4* 14.2* 12.6*   HGB 13.6  --  13.1 13.4 13.8   MCV 85.1  --  86.5 87.1 88.7   .0  --  401.0 402.0 420.0   INR  --  1.48*  --   --   --                 Recent Labs   Lab 05/24/25  2143 05/25/25  0432 05/26/25  0416 05/27/25  0541   * 244* 219* 247*   BUN 49* 45* 43* 49*   CREATSERUM 2.08* 2.20* 2.20* 2.14*   CA 9.4 9.4 9.1 9.5   ALB 4.5 4.3  --   --    * 136 143 149*   K 4.5 4.6 4.0 4.1   CL 99 100 109 113*   CO2 21.0 22.0 18.0* 20.0*   ALKPHO 121* 107  --   --    AST 66* 46*  --   --    * 87*  --   --    BILT 0.7 0.6  --   --    TP 8.2 7.8  --   --          Estimated Glomerular Filtration Rate: 30 mL/min/1.73m2 (A) (result from lab).         Recent Labs   Lab 05/24/25 2143   TROPHS 11             Recent Labs   Lab 05/24/25 2144   PTP 18.1*   INR 1.48*                        Microbiology            Hospital Encounter on 05/24/25   1. Urine Culture, Routine     Status: None     Collection Time: 05/25/25  4:47 AM     Specimen: Urine, clean catch   Result Value Ref Range     Urine Culture   N/A       >100,000 cfu/ml Three or more species of Gram negative bacilli; none predominant. No further workup performed.            Imaging: Reviewed in Epic.     Medications: [Scheduled Medications]    [Scheduled Medications]   heparin  5,000 Units Subcutaneous Q8H ARAMIS    aspirin  300 mg Rectal Daily     Or    aspirin  325 mg Oral Daily    tamsulosin  0.4 mg Oral Daily    [Held by provider] torsemide  40 mg Oral Daily    insulin aspart  1-10 Units Subcutaneous Q6H    atorvastatin  40 mg Oral Nightly    cefTRIAXone  1 g Intravenous Q24H        Assessment & Plan:  #Fall  #Acute left MCA ischemic stroke  - CT head reviewed   - MRI brain ordered  - Echo bubble study ordered  - Aspirin, statin   - Neurology following  - Neurosurgery signed off  - Neurocritical care following  - PT OT recommend acute rehab     #Dysphagia  - NPO per SLP  - Plan for video swallow study today  - NG tube  pulled out overnight  - Nutrition following     #UTI  - Urine culture in lab   - IV antibiotics      #CAD s/p PCI  #Essential hypertension  #Dyslipidemia  #Atrial fibrillation   #SSS sp PPM  #Chronic heart failure with preserved EF  #Pulmonary hypertension  - Torsemide on hold  - DOAC on hold; defer to Neuro     #Transaminitis  - Monitor LFTs      #COPD     #Diabetes mellitus type 2  - Hyperglycemia protocol      #Chronic kidney disease stage 4     #Rheumatoid arthritis     #Prostate cancer      POC   Video swallow  today  may need PEG   DOAC on hold   Pt/ot to see today   needs to be up w/ lift  Will need AR  will talk to SW  Cont abx   Increase IVF while NPO   Needs MRI   Discussed at length w/ family  discussing PEG, da asked what would happen if no PEG placed, touched briefly on hospice . They see some improvement in his speech today  flaccid on r side   Had lived at [facility]  prior to CVA   Failed video swallow and aspirated per RN  need to replace Dobbhoff  and resume TF      Vitals (last day)       Date/Time Temp Pulse Resp BP SpO2 Weight O2 Device O2 Flow Rate (L/min) Who    05/27/25 1230 97.8 °F (36.6 °C) 73 32 148/63 94 % -- None (Room air) -- DH    05/27/25 0730 97.8 °F (36.6 °C) 73 20 120/78 93 % -- None (Room air) -- MS    05/27/25 0400 97.5 °F (36.4 °C) 80 18 146/55 92 % -- None (Room air) -- CB    05/27/25 0000 97.4 °F (36.3 °C) 77 20 147/62 93 % -- None (Room air) -- CB    05/26/25 2000 97.7 °F (36.5 °C) 74 22 146/62 95 % -- None (Room air) -- CB    05/26/25 1600 97.9 °F (36.6 °C) 73 23 141/60 94 % -- None (Room air) -- AH    05/26/25 1200 97.4 °F (36.3 °C) 73 26 138/55 95 % -- None (Room air) -- BR    05/26/25 0800 97.3 °F (36.3 °C) 73 27 141/55 94 % -- None (Room air) -- BR    05/26/25 0300 -- 74 21 -- 93 % -- -- --     05/26/25 0200 -- 70 24 -- 92 % -- -- --     05/26/25 0100 -- 72 23 -- 93 % -- -- --     05/26/25 0000 98.4 °F (36.9 °C) 70 26 120/55 91 % -- None (Room air) --

## 2025-05-27 NOTE — PROGRESS NOTES
Assumed care at 0730  Patient AOX1, oriented to self only, denies pain  Neuro exam every four hours  Received report patient removed dobhoff tube at 0400  IVF started, NS at 50 ml/hr  Echo done  MRI waiting on [facility] to fax pacemaker compatibility  Video swallow done in x-ray, failed, to maintain strict NPO  Re-inserted dobhoff tube, patient pulled it out again  Son agreed to re-insert with left mitten restraint applied  Tolerated being in chair for two hours, utilized mariana steady back to bed  Will start tube feeds and stop IVF   CT scan to be done this evening, US abdomen waiting  Will continue plan of care  Family updated plan of care

## 2025-05-27 NOTE — DISCHARGE PLANNING
Patient follow-up appointment information:     Visit Type: Stroke   Date of TIA/Stroke: 5/24/2025  Type of Stroke: Ischemic   Patient to follow-up: 4-6 week follow up. Appt made with Dr. Veloz, July 9th, 2025 at 1100.   OP Neurologist: Dr. Veloz   New patient to neurology service: Yes   Anticipated discharge (if known): TBD   Discharge disposition (if known): TBD   Was this an ICU Stay?: Yes         RN Stroke Navigator team  [phone]

## 2025-05-27 NOTE — SPIRITUAL CARE NOTE
Spiritual Care Visit Note    Patient Name: Lisandro Harley Date of Spiritual Care Visit: 25   : 1940 Primary Dx: Acute ischemic stroke (HCC)       Referred By: Referral From: Family    Spiritual Care Taxonomy:    Intended Effects: Build relationship of care and support    Methods: Offer emotional support, Offer spiritual/Alevism support    Interventions: Acknowledge current situation, Active listening, Explain  role, Discuss spirituality/Holiness with someone, Identify supportive relationship(s), Share words of hope and inspiration    Visit Type/Summary:     - Spiritual Care:  received a phone call from patient's son and DIL, Lisandro & Arianna. They shared their father's health and spiritual journey.  offered empathic listening and emotional support as they processed their emotions surrounding patient's current health condition. They requested a  visit the patient to assess him for spiritual care needs. Family expressed appreciation for  visit.  remains available as needed for follow up.    Spiritual Care support can be requested via an Epic consult. For urgent/immediate needs, please contact the On Call  at: [facility]: ext [phone]    Rev Macarena Gabriel MA

## 2025-05-27 NOTE — CONSULTS
[facility]                       Gastroenterology Consultation-[facility]astroenterology    Lisandro Harley Patient Status:  Inpatient    1940 MRN [id number]   Location [facility] 7NE-A Attending Daniel Petit, DO   Hosp Day # 3 PCP Braydon Barrett MD     Reason for consultation: Feeding difficulties   HPI: This is an 85-year-old man with a complex PMHx that includes AF (Eliquis--last dose ), HF with pulmonary hypertension, HTN, SSS s/p PPM, CAD s/p PCI with stenting, COPD on chronic oxygen, CKD, prostate cancer s/p RTX, DM, RA, lymphocytic colitis (Dx ), and cirrhosis who presented to ER  with aphasia and right-sided weakness found to have new left MCA CVA.  GI consulted today for feeding difficulties. Currently no family at bedside, call placed to documented number of patient's daughter however no response.  Patient completed a video swallow study today which revealed oropharyngeal dysphagia and has not been cleared for any p.o. intake. Patient had a Dobbhoff tube placed yesterday however within hours of placement patient dislodged tube.  Patient currently shakes head no to abdominal pain or nausea.  No documented history of abdominal surgery.  EGD 2021 (Dr. Gao) revealed a normal esophagus, normal stomach, and normal duodenum.  PMHx: Past Medical History[1]             PSHx: Past Surgical History[2]  Medications: Current Medications[3]  Allergies: Allergies[4]  Social HX: Short Social Hx on File[5]   FamHx: The patient has no family history of colon cancer or other gastrointestinal malignancies;  No family history of ulcer disease, or inflammatory bowel disease  ROS:  In addition to the pertinent positives described above:            Infectious Disease:  No chronic infections or recent fevers prior to the acute illness            Cardiovascular: + AF (Eliquis--last dose ), HF with pulmonary hypertension, HTN, SSS s/p PPM, CAD s/p PCI with stenting            Respiratory: +  COPD on chronic oxygen            Hematologic: The patient reports no easy bruising, frequent gum bleeding or nose bleeding;  The patient has no history of known chronic anemia            Dermatologic: The patient reports no recent rashes or chronic skin disorders            Rheumatologic: + RA            Genitourinary:  + CKD            Psychiatric: The patient reports no history of depression, anxiety, suicidal ideation, or homicidal ideation           Oncologic: + prostate cancer s/p RTX           ENT: The patient reports no hoarseness of voice, hearing loss, sinus congestion, tinnitus           Neurologic: + admitted with CVA  PE: /63 (BP Location: Left arm)   Pulse 73   Temp 97.8 °F (36.6 °C) (Oral)   Resp (!) 32   Wt 182 lb 8.7 oz (82.8 kg)   SpO2 94%   BMI 29.46 kg/m²   Gen: Awake, able to answer questions with one-word response; + right facial droop; + temporal muscle wasting  HENT: EOMI, PERRL, oropharynx is clear with dry mucosal membranes  Eyes: Sclerae are anicteric  Neck:  Supple without nuchal rigidity  CV: Regular rate and rhythm, with normal S1 and S2  Resp: Clear to auscultation bilaterally without wheezes; rubs, rhonchi, or rales  Abdomen: Soft, non-tender, non-distended with the presence of bowel sounds; No hepatosplenomegaly; no rebound or guarding; No ascites is clinically apparent; no tympany to percussion  Ext: No peripheral edema; + right sided weakness  Skin: Warm and dry  Psychiatric: Appropriate mood and congruent affect without obvious depression or anxiety  Neuro: No asterixis  Labs:   Lab Results   Component Value Date    WBC 12.6 05/27/2025    HGB 13.8 05/27/2025    HCT 43.9 05/27/2025    .0 05/27/2025    CREATSERUM 2.14 05/27/2025    BUN 49 05/27/2025     05/27/2025    K 4.1 05/27/2025     05/27/2025    CO2 20.0 05/27/2025     05/27/2025    CA 9.5 05/27/2025    MG 2.9 05/27/2025    PHOS 3.5 05/27/2025     Recent Labs   Lab 05/25/25  9762  05/26/25  0416 05/27/25  0541   * 219* 247*   BUN 45* 43* 49*   CREATSERUM 2.20* 2.20* 2.14*   CA 9.4 9.1 9.5    143 149*   K 4.6 4.0 4.1    109 113*   CO2 22.0 18.0* 20.0*     Recent Labs   Lab 05/26/25  0416 05/27/25  0541   RBC 4.79 4.95   HGB 13.4 13.8   HCT 41.7 43.9   MCV 87.1 88.7   MCH 28.0 27.9   MCHC 32.1 31.4   RDW 15.8 16.1   NEPRELIM 11.35*  --    WBC 14.2* 12.6*   .0 420.0       Recent Labs   Lab 05/24/25 2143 05/25/25  0432   * 87*   AST 66* 46*       Imaging:   PROCEDURE:  XR VIDEO SWALLOW (CPT=74230)      TECHNIQUE:  Video fluoroscopic swallowing study was performed in cooperation with the speech pathologist.  Barium of varying consistencies was administered orally with patient in lateral projection.      COMPARISON:  [facility] , CT, CT STROKE BRAIN (NO IV)(CPT=70450), 5/24/2025, 9:44 PM.      INDICATIONS:  assess swallow physiology, r/o aspiration, recommend LRD and guide POC; pt currently NPO      PATIENT STATED HISTORY: (As transcribed by Technologist)  Patient was admitted on Saturday for a stroke. Evaluation for swallowing.       CINE CAPTURES:  3   FLUORSCOPY TIME:  1 minute 46 seconds   RADIATION DOSE (AIR KERMA PRODUCT):  444.7 uGy/m2      FINDINGS:    There is penetration and then aspiration of nectar thick liquids without cough.  There is aspiration and penetration of honey thick liquids with a cough.  There is no significant penetration or aspiration with puree; however, there is significant   residual with a very weak and slow swallow.      PLEASE SEE SPEECH PATHOLOGIST REPORT FOR FULL ASSESSMENT OF SWALLOWING MECHANISM.       LOCATION:  [place]       Dictated by (CST): Christiano Thompson MD on 5/27/2025 at 12:00 PM       Finalized by (CST): Christiano Thompson MD on 5/27/2025 at 12:02 PM   _________________________________________________________________________________________  Impression: 85-year-old male with an extensive PMHx that includes AF (Eliquis--last dose  5/24), HF with pulmonary hypertension, HTN, SSS s/p PPM, CAD s/p PCI with stenting, COPD on chronic oxygen, CKD, prostate cancer s/p RTX, DM, RA, lymphocytic colitis (Dx 2015), and cirrhosis admitted 5/24 with acute CVA.  GI consulted for feeding difficulties.  Can consider EGD with PEG tube placement once family is agreeable.  Will await further discussions with family.  In the interim, we will plan for ultrasound abdomen to rule out ascites given history of cirrhosis while continuing to hold Eliquis and continue tube feeds via Dobbhoff    Recommendations:     Can consider EGD under MAC with PEG tube placement once family is agreeable to proceed  Ultrasound abdomen limited to assess for ascites  Continue to hold Eliquis for possible PEG tube placement; okay for ASA as needed + DVT prophylaxis with heparin  Reinsert Dobbhoff tube and resume tube feeds per dietary recommendations    Thank you for the consultation, we will follow the patient with you.  Attending addendum (Dr Gaytan) to follow later today and provide formal, final recommendations at that time   SETH Gillette  1:40 PM  5/27/2025  [facility]  [phone]    Attending physician addendum:   I have personally performed a face to face diagnostic evaluation on this patient.  I have reviewed and agree with the care plan.  History and exam by me shows:Acute CVA feeding difficulty chronic use of Eliquis now on hold, h/o cirrhosis, normal platelets, no ascites on prior imaging, plan fr EGD/PEG when consent obtained   Moris Gaytan MD MD  [facility]  5/27/2025  9:31 PM       [1]   Past Medical History:   Arrhythmia    Arthritis    Back problem    spinal stenosis    Cancer (HCC)    prostate/radiation treatments only    COPD (chronic obstructive pulmonary disease) (HCC)    Coronary atherosclerosis of unspecified type of vessel, native or graft    s/p 1 cardiac stent    Diabetes mellitus (HCC)    Diarrhea, unspecified     Disorder of liver    Easy bruising    Exposure to unspecified radiation    prostate    Frequent urination    Glaucoma    High blood pressure    High cholesterol    Irregular bowel habits    Leaking of urine    Leg swelling    Nausea vomiting and diarrhea    Osteoarthritis    Pacemaker    Renal disorder    Stage III-moderate    Rheumatoid arthritis (HCC)    Sleep apnea    cpap    Stented coronary artery    Type II or unspecified type diabetes mellitus without mention of complication, not stated as uncontrolled    Visual impairment    Wears glasses   [2]   Past Surgical History:  Procedure Laterality Date    Cardiac pacemaker placement  2010    medtronic    Colonoscopy N/A 09/25/2015    Procedure: COLONOSCOPY;  Surgeon: Yumiko Martin DO;  Location:  ENDOSCOPY    Hip replacement surgery Left 06/26/2018    Hip replacement surgery Right     2000    Knee replacement surgery Right     after 2000    Laminectomy      no hardware    Skin surgery      Total hip replacement Right     Total knee replacement Right    [3]    [COMPLETED] Perflutren Lipid Microsphere (DEFINITY) 6.52 MG/ML injection 1.5 mL  1.5 mL Intravenous ONCE PRN    dextrose 10% infusion (TPN no rate)   Intravenous Continuous PRN    sodium bicarbonate tab 650 mg  650 mg Per G Tube PRN    And    pancrelipase (Lip-Prot-Amyl) (Zenpep) DR particles cap 10,000 Units  10,000 Units Per G Tube PRN    acetaminophen (Tylenol) tab 650 mg  650 mg Oral Q4H PRN    Or    acetaminophen (Tylenol) rectal suppository 650 mg  650 mg Rectal Q4H PRN    labetalol (Trandate) 5 mg/mL injection 10 mg  10 mg Intravenous Q10 Min PRN    hydrALAzine (Apresoline) 20 mg/mL injection 10 mg  10 mg Intravenous Q2H PRN    metoclopramide (Reglan) 5 mg/mL injection 5 mg  5 mg Intravenous Q8H PRN    heparin (Porcine) 5000 UNIT/ML injection 5,000 Units  5,000 Units Subcutaneous Q8H [facility]    aspirin 300 MG rectal suppository 300 mg  300 mg Rectal Daily    Or    aspirin tab 325 mg  325 mg Oral Daily     tamsulosin (Flomax) cap 0.4 mg  0.4 mg Oral Daily    [Held by provider] torsemide (Demadex) tab 40 mg  40 mg Oral Daily    glucose (Dex4) 15 GM/59ML oral liquid 15 g  15 g Oral Q15 Min PRN    Or    glucose (Glutose) 40% oral gel 15 g  15 g Oral Q15 Min PRN    Or    glucose-vitamin C (Dex-4) chewable tab 4 tablet  4 tablet Oral Q15 Min PRN    Or    dextrose 50% injection 50 mL  50 mL Intravenous Q15 Min PRN    Or    glucose (Dex4) 15 GM/59ML oral liquid 30 g  30 g Oral Q15 Min PRN    Or    glucose (Glutose) 40% oral gel 30 g  30 g Oral Q15 Min PRN    Or    glucose-vitamin C (Dex-4) chewable tab 8 tablet  8 tablet Oral Q15 Min PRN    acetaminophen (Tylenol Extra Strength) tab 500 mg  500 mg Oral Q4H PRN    ondansetron (Zofran) 4 MG/2ML injection 4 mg  4 mg Intravenous Q6H PRN    polyethylene glycol (PEG 3350) (Miralax) 17 g oral packet 17 g  17 g Oral Daily PRN    sennosides (Senokot) tab 17.2 mg  17.2 mg Oral Nightly PRN    bisacodyl (Dulcolax) 10 MG rectal suppository 10 mg  10 mg Rectal Daily PRN    ipratropium-albuterol (Duoneb) 0.5-2.5 (3) MG/3ML inhalation solution 3 mL  3 mL Nebulization Q6H PRN    insulin aspart (NovoLOG) 100 Units/mL FlexPen 1-10 Units  1-10 Units Subcutaneous Q6H    atorvastatin (Lipitor) tab 40 mg  40 mg Oral Nightly    cefTRIAXone (Rocephin) 1 g in sodium chloride 0.9% 100 mL IVPB-ADDV  1 g Intravenous Q24H    sodium chloride 0.9% infusion   Intravenous Continuous    [COMPLETED] iopamidol 76% (ISOVUE-370) injection for power injector  115 mL Intravenous ONCE PRN   [4] No Known Allergies  [5]   Social History  Socioeconomic History    Marital status:    Tobacco Use    Smoking status: Former     Current packs/day: 0.00     Average packs/day: 1.5 packs/day for 40.0 years (60.0 ttl pk-yrs)     Types: Cigarettes     Start date: 1965     Quit date: 2005     Years since quittin.4    Smokeless tobacco: Never   Vaping Use    Vaping status: Never Used   Substance and Sexual  Activity    Alcohol use: Not Currently     Comment: rarely, 1 glass wine/month    Drug use: No   Other Topics Concern    Caffeine Concern Yes     Comment: decafe coffee 2 a day    Exercise No    Seat Belt Yes     Service No    Sleep Concern No     Social Drivers of Health     Food Insecurity: No Food Insecurity (5/25/2025)    NCSS - Food Insecurity     Worried About Running Out of Food in the Last Year: No     Ran Out of Food in the Last Year: No   Transportation Needs: No Transportation Needs (5/25/2025)    NCSS - Transportation     Lack of Transportation: No   Housing Stability: Not At Risk (5/25/2025)    NCSS - Housing/Utilities     Has Housing: Yes     Worried About Losing Housing: No     Unable to Get Utilities: No

## 2025-05-27 NOTE — PHYSICAL THERAPY NOTE
PHYSICAL THERAPY TREATMENT NOTE - INPATIENT    Room Number: 7615/7615-A     Session: 1     Number of Visits to Meet Established Goals: 6  History related to current admission: Patient is a 85 year old male admitted on 5/24/2025 from home ([facility]) for fall, AMS aphasia and neglect.  Pt diagnosed with ischemic CVA and metabolic acidosis per neuro no intervention at this time. Head CT demonstrates no acute abnormality. CT perfusion demonstrates approximately 100 cc of penumbra. CTA demonstrates a dominant left M2 occlusion.     HOME SITUATION  Type of Home: Assisted living facility  Home Layout: One level  Stairs to Enter : 0        Stairs to Bedroom: 0         Lives With: Alone    Drives: No           Prior Level of [place]: PTA per patient amb to dining andrade with rw and performed own ADLs in apartment, supportive daughter in the area.  Presenting Problem: Fall + CVA  Co-Morbidities : heart failure, COPD, DM2, cirrhosis, prostate CA, PM, a fib, NII    PHYSICAL THERAPY ASSESSMENT   Patient demonstrates fair progress this session, goals  remain in progress.      Patient is requiring moderate assist and maximum assist as a result of the following impairments: decreased functional strength, impaired static and dynamic balance, impaired coordination, impaired motor planning, decreased muscular endurance, and medical status.     Patient continues to function below baseline with bed mobility, transfers, and gait.  Next session anticipate patient to progress transfers.  Physical Therapy will continue to follow patient for duration of hospitalization.    Patient continues to benefit from continued skilled PT services.    PLAN DURING HOSPITALIZATION  Nursing Mobility Recommendation : Lift Equipment  PT Device Recommendation: Mechanical lift  PT Treatment Plan: Bed mobility, Coordination, Endurance, Energy conservation, Patient education, Family education, Gait training, Neuromuscular re-educate, Strengthening,  Transfer training, Balance training  Frequency (Obs): 3-5x/week     CURRENT GOALS     Goal #1 Patient is able to demonstrate supine - sit EOB @ level: minimum assistance      Goal #2 Patient is able to demonstrate transfers EOB to/from Chair/Wheelchair at assistance level: moderate assistance      Goal #3 Patient is able to sit at EOB with close sba x 5 minutes      Goal #4     Goal #5     Goal #6     Goal Comments: Goals established on 5/25/2025 5/27/2025 all goals ongoing     SUBJECTIVE  \"It don't work\" - the right side of his body     OBJECTIVE  Precautions:  (-220)    WEIGHT BEARING RESTRICTION     PAIN ASSESSMENT   Rating: Unable to rate  Location: LBP  Management Techniques: Activity promotion, Body mechanics, Repositioning    BALANCE                                                                                                                       Static Sitting: Fair -  Dynamic Sitting: Poor +           Static Standing: Poor -  Dynamic Standing: Not tested    ACTIVITY TOLERANCE                         O2 WALK       AM-PAC '6-Clicks' INPATIENT SHORT FORM - BASIC MOBILITY  How much difficulty does the patient currently have...  Patient Difficulty: Turning over in bed (including adjusting bedclothes, sheets and blankets)?: A Lot   Patient Difficulty: Sitting down on and standing up from a chair with arms (e.g., wheelchair, bedside commode, etc.): A Lot   Patient Difficulty: Moving from lying on back to sitting on the side of the bed?: A Lot   How much help from another person does the patient currently need...   Help from Another: Moving to and from a bed to a chair (including a wheelchair)?: A Lot   Help from Another: Need to walk in hospital room?: Total   Help from Another: Climbing 3-5 steps with a railing?: Total     AM-PAC Score:  Raw Score: 10   Approx Degree of Impairment: 76.75%   Standardized Score (AM-PAC Scale): 32.29   CMS Modifier (G-Code): CL    FUNCTIONAL ABILITY STATUS  Gait  Assessment   Functional Mobility/Gait Assessment  Gait Assistance: Not tested  Distance (ft): 0    Skilled Therapy Provided    Bed Mobility:  Rolling: mod A    Supine<>Sit: max A   Sit<>Supine: NT     Transfer Mobility:  Sit<>Stand: max A x2   Stand<>Sit: max A   Gait: NT  Stand Pivot Transfer LEFT: max A x1 and second person for safety    Therapist's Comments: Discussed case with RN prior to session initiation. Pt agreeable to participation in therapy. Gait belt donned for out of bed mobility. Worked on RLE mm recruitment via active and AAROM exercises as noted below. Facilitated max A stand pivot transfer LEFT with therapist blocking RLE. Facilitated STS transfer x2  for 20 seconds with 2 minute seated rest break between trials with RLE blocked and RUE support provided by therapist. Pt with difficultly obtaining upright posture/stand w/ LLE demonstrating insufficient strength to compensate for RLE weakness. Would benefit from sera steady transfers with assist x2, discussed with RN.         THERAPEUTIC EXERCISES  Lower Extremity Alternating marching  Ankle pumps  Heel slides  LAQ     Upper Extremity      Position Sitting and Supine     Repetitions   5-10   Sets   1     Patient End of Session: Up in chair, Needs met, Call light within reach, RN aware of session/findings, All patient questions and concerns addressed, Hospital anti-slip socks, Alarm set    PT Session Time: 23 minutes  Gait Training:  minutes  Therapeutic Activity: 23 minutes  Therapeutic Exercise:  minutes   Neuromuscular Re-education:  minutes

## 2025-05-27 NOTE — VIDEO SWALLOW STUDY NOTE
ADULT VIDEOFLUOROSCOPIC SWALLOWING STUDY    Admission Date: 5/24/2025  Evaluation Date: 05/27/25  Radiologist: Dr. Thompson    RECOMMENDATIONS   Diet Recommendations - Solids: NPO  Diet Recommendations - Liquids: NPO    Further Follow-up:  Follow Up Needed (Documentation Required): Yes  SLP Follow-up Date: 05/28/25        Medication Administration Recommendations: Non-oral  Treatment Plan/Recommendations: Dysphagia therapy    HISTORY   Background/Objective Information:    Problem List  Principal Problem:    Acute ischemic stroke (HCC)  Active Problems:    Primary hypertension    Coronary artery disease involving native heart    Disorientation    Metabolic acidosis    SSS (sick sinus syndrome) (HCC)    Acute cystitis without hematuria      Past Medical History  Past Medical History[1]    Current Diet Consistency: NPO  [facility]  Prior Living Situation: Home alone, Independent living  History of Recent: No recent respiratory difficulty  Precautions: Aspiration  Imaging results:     Reason for Referral: Stroke protocol      Family/Patient Goals:  To eat and drink     ASSESSMENT   DYSPHAGIA ASSESSMENT  Test completed in conjunction with Radiologist.  Patient Positioned: Upright, Midline, GEMMA chair.  Patient Viewed: Lateral.  Patient Alertness: Fully alert, Constant cues requied to stay on task.  Consistencies Presented: Nectar thick liquids/ Mildly thick, Honey thick liquids/ Moderately thick, Puree to assess oropharyngeal swallow function and assess for compensatory strategies to improve safe swallow function.       NECTAR THICK LIQUIDS/ MILDLY THICK  Method of Presentation: Teaspoon  Oral Phase of Swallow (VFSS - Nectar Thick Liquids): Impaired  Bolus Retrieval (VFSS - Nectar Thick Liquids): Intact  Bilabial Seal (VFSS - Nectar Thick Liquids): Intact  Bolus Formation (VFSS - Nectar Thick Liquids): Impaired  Bolus Propulsion (VFSS - Nectar Thick Liquids):  Impaired  Retention (VFSS - Nectar Thick Liquids): Intact  Triggered at: Pyriform sinuses  Delay (seconds): 3-4 seconds  Residue Severity, Location: Mod/Severe, Base of tongue, Throughout pharynx  Cleared/Reduced with: Secondary swallow, Attempted however ineffective  Laryngeal Penetration: During the swallow, After the swallow  Tracheal Aspiration: Before the swallow, After the swallow, SILENT  Cough/Throat Clear Response: No  Strategy(ies) Implemented (Nectar Thick): Liquids via spoon, Small bites and sips, Effortful swallow  Effectiveness: No  HONEY THICK LIQUIDS/ MODERATELY THICK   Method of Presentation: Teaspoon  Oral Phase of Swallow (VFSS - Honey Thick Liquids): Impaired  Bolus Retrieval (VFSS - Honey Thick Liquids): Intact  Bilabial Seal (VFSS - Honey Thick Liquids): Intact  Bolus Formation (VFSS - Honey Thick Liquids): Intact  Bolus Propulsion (VFSS - Honey Thick Liquids): Impaired  Triggered at: Pyriform sinuses  Delay (seconds):  (2-3)  Residue Severity, Location: Mod/Severe  Cleared/Reduced with: Attempted however ineffective, Secondary swallow  Tracheal Aspiration: During the swallow, After the swallow  Cough/Throat Clear Response: Yes  Cough/Throat Clear Effective: Partially  Strategy(ies) Implemented (Honey Thick Liquids): Slow rate, Liquids via spoon  Effectiveness: No  PUREE  Oral Phase of Swallow (VFSS - Puree): Impaired  Bolus Retrieval (VFSS - Puree): Intact  Bilabial Seal (VFSS - Puree): Intact  Bolus Formation (VFSS - Puree): Impaired  Bolus Propulsion (VFSS - Puree): Impaired  Retention (VFSS - Puree): Intact  Triggered at: Pyriform sinuses  Delay (seconds): 2-3  Residue Severity, Location: Mod/Severe  Cleared/Reduced with: Secondary swallow  Laryngeal Penetration: None  Tracheal Aspiration: None  Strategy(ies) Implemented (Puree): Small bites and sips, Multple swallows  Effectiveness: Partial      Overall Impression:   Videofluoroscopic swallow study was completed in conjunction with the  radiologist.  Patient was assessed with mildly thick and moderately thick liquids, and puree consistencies. Patient demonstrated an impaired oral phase and apparent impaired pharyngeal phase of swallow with laryngeal penetration and subsequent aspiration (silent with mildly thick and cough with moderately thick liquids) during the controlled conditions of this exam.    Oral phase revealed slow transit of liquids, slowed tongue motion, residue on tongue blade and in lateral sulci.    Pharyngeal phase revealed minimal superior movement of thyroid cartilage, partial anterior hyoid excursion, partial epiglottic inversion and recoil, impaired laryngeal closure at the height of the swallow, impaired distension and duration of pharyngoesophageal segment opening and impaired tongue base retraction.  Impairments of the oral phase resulted in increased oral transit times and oral residue throughout the oral cavity. Impairments of the pharyngeal swallow resulted in laryngeal penetration and aspiration of mildly and moderately thick liquids, moderate-severe residue across all consistencies. Effortful and multiple swallows were not effective in clearing residue.  Patient with decreased efficiency given poor oral phase and impaired timely initiation of pharyngeal phase. Patient with decreased safety of po intake as characterized by laryngeal penetration and aspiration as a result of impaired protection of airway.        Following exam, education provided to patient and family members regarding results, aspiration risks, NPO recommendation,  and plan with understanding verbalized. Reviewed images with family and options for feeding patient with understanding verbalized. Family reports they would like a bit of time before final decisions made on nutrition/hydration however in meantime patient to be NPO.    Collaborated with RN on results and recommendations with RN in agreement. This writer requested for oral hygiene to be  performed 3x daily to reduce oral pathogens and to minimize the potential development of pneumonia.  Will continue to follow as per plan of care.               GOALS  Goal #1 Patient will perform swallow drive exercises to improve oral and pharyngeal musculature strength:  A. Effortful swallow x 10  B. Berenice maneuver x 10  C. Effortful pitch glide x 10  D. Tongue press against roof of mouth x 10  Not Addressed   Goal #2 Patient will participate in a communication assessment.     Not Addressed     PLAN: Skilled dysphagia therapy targeting oral and pharyngeal musculature exercises to help strengthen and coordinate the muscles involved in swallowing.       EDUCATION/INSTRUCTION  Reviewed results and recommendations with patient and family.  Agreement/Understanding verbalized and all questions answered to their apparent satisfaction.    INTERDISCIPLINARY COMMUNICATION  Reviewed results with Radiologist; agreement verbalized.    FOLLOW UP  Treatment Plan/Recommendations: Dysphagia therapy; Communication assessment  Duration: 2 weeks      Thank you for your referral.   If you have any questions, please contact CHRISTINA Garcia         [1]   Past Medical History:   Arrhythmia    Arthritis    Back problem    spinal stenosis    Cancer (HCC)    prostate/radiation treatments only    COPD (chronic obstructive pulmonary disease) (HCC)    Coronary atherosclerosis of unspecified type of vessel, native or graft    s/p 1 cardiac stent    Diabetes mellitus (HCC)    Diarrhea, unspecified    Disorder of liver    Easy bruising    Exposure to unspecified radiation    prostate    Frequent urination    Glaucoma    High blood pressure    High cholesterol    Irregular bowel habits    Leaking of urine    Leg swelling    Nausea vomiting and diarrhea    Osteoarthritis    Pacemaker    Renal disorder    Stage III-moderate    Rheumatoid arthritis (HCC)    Sleep apnea    cpap    Stented coronary artery    Type II or unspecified type diabetes  mellitus without mention of complication, not stated as uncontrolled    Visual impairment    Wears glasses

## 2025-05-28 NOTE — PAYOR COMM NOTE
--------------  5/28 CONTINUED STAY REVIEW    Payor: SAKINA CAMPUZANO O  Subscriber #:  [id number]  Authorization Number: [id number]    HOSPITALIST:  Patient  waved to me   follows simple commands  Pulled out his donhoff again this am-   Son Andrea at bedside - discussed [place] family wants hospice care-  no PEG  return to [facility] w/ family providing care -     discussed w/ Andrea      Vital signs:  Temp:  [97.5 °F (36.4 °C)-97.8 °F (36.6 °C)] 97.5 °F (36.4 °C)  Pulse:  [70-91] 82  Resp:  [18-32] 18  BP: (126-148)/(44-78) 140/69  SpO2:  [93 %-94 %] 93 %     Physical Exam:    General: No acute distress  awake/ sleepy  + facial droop  voice soft  limited tongue actions   Respiratory: No wheezes, no rhonchi   course unlabored  RA   Cardiovascular: S1, S2, regular rate and rhythm  v paced   Abdomen: Soft,  sl diffuse tenderness , non-distended, positive bowel sounds  Neuro: Right facial droop.   Flaccid  right extremities, decreased sensation  RUE, RLE      Extremities: No edema       Medications: [Scheduled Medications]    [Scheduled Medications]   heparin  5,000 Units Subcutaneous Q8H ARAMIS    aspirin  300 mg Rectal Daily     Or    aspirin  325 mg Oral Daily    tamsulosin  0.4 mg Oral Daily    [Held by provider] torsemide  40 mg Oral Daily    insulin aspart  1-10 Units Subcutaneous Q6H    atorvastatin  40 mg Oral Nightly    cefTRIAXone  1 g Intravenous Q24H        Assessment & Plan:  # Comfort care / hospice  per family wishes   [facility]   - return to [facility] w/ family caring for him   #Fall  #Acute left MCA ischemic stroke  - Echo bubble study  No Shunt     - Aspirin,   - Neurology  updated      #Dysphagia  - NPO per SLP/ video swallow  failed       #UTI  - Urine culture  >100k > 3 GNbacilli from report    - IV antibiotics  rocephin   D4      #CAD s/p PCI  #Essential hypertension  #Dyslipidemia  #Atrial fibrillation uses  DOAC  on hold  for now   #SSS sp PPM  #Chronic  heart failure with preserved EF  #Pulmonary hypertension  - Torsemide on hold     #Transaminitis     #COPD no need for CPAP      #Diabetes mellitus type 2  A1c 8.9   Dc accuchecks        #Chronic kidney disease stage 4  #Rheumatoid arthritis   #Prostate cancer  POC  Neuro, GI updated  US, MRI dc'ed  Comfort order set  Hospice to see  plan dc back to his apt w/ family providing care       Sonia Hurtado NP       Addendum:     Agree with above note except as documented below.       Gen: NAD  CVS: s1s2  Resp: CTA  Abd: soft     #Acute left MCA CVA  #Afib  #Chronic diastolic heart failure  #SSS s/p PPM  #CKDIII  #Disposition  -Family has decided against PEG and plan to transition to hospice care at [facility]      CM/ SW:    Received order to send referral to [facility].           MEDICATIONS ADMINISTERED IN LAST 1 DAY:  acetaminophen (Tylenol) rectal suppository 650 mg       Date Action Dose Route User    5/28/2025 0858 Given 650 mg Rectal Russell Lentz RN          atorvastatin (Lipitor) tab 40 mg       Date Action Dose Route User    5/27/2025 2122 Given 40 mg Oral Stephanie Lambert RN          cefTRIAXone (Rocephin) 1 g in sodium chloride 0.9% 100 mL IVPB-ADDV       Date Action Dose Route User    5/28/2025 0904 New Bag 1 g Intravenous Russell Lentz RN          heparin (Porcine) 5000 UNIT/ML injection 5,000 Units       Date Action Dose Route User    5/28/2025 0653 Given 5,000 Units Subcutaneous (Left Lower Abdomen) Stephanie Lambert RN    5/27/2025 2125 Given 5,000 Units Subcutaneous (Right Lower Abdomen) Stephanie Lambert RN          insulin aspart (NovoLOG) 100 Units/mL FlexPen 1-10 Units       Date Action Dose Route User    5/28/2025 0655 Given 4 Units Subcutaneous (Left Lower Abdomen) Stephanie Lambert RN    5/28/2025 0049 Given 3 Units Subcutaneous (Left Upper Arm) Stephanie Lambert RN    5/27/2025 1824 Given 4 Units Subcutaneous (Right Lower Arm) Joni Shaw RN          LORazepam (Ativan) 2 mg/mL  injection 1 mg       Date Action Dose Route User    5/28/2025 1359 Given 1 mg Intravenous Russell Lentz RN    5/28/2025 0858 Given 1 mg Intravenous Russell Lentz, ANIKA          sodium chloride 0.9% infusion       Date Action Dose Route User    5/28/2025 0851 Rate/Dose Change (none) Intravenous Russell Lentz RN          traZODone (Desyrel) partial tab 25 mg       Date Action Dose Route User    5/27/2025 2310 Given 25 mg Per NG Tube Stephanie Lambert, ANIKA            Vitals (last day)       Date/Time Temp Pulse Resp BP SpO2 Weight O2 Device O2 Flow Rate (L/min) [facility]    05/28/25 1200 98.6 °F (37 °C) 84 28 120/57 93 % -- Nasal cannula -- MS    05/28/25 0800 98.6 °F (37 °C) 83 24 123/55 93 % -- None (Room air) -- MS    05/28/25 0645 -- 82 -- -- 93 % -- -- --     05/28/25 0400 97.5 °F (36.4 °C) 91 18 140/69 94 % -- None (Room air) --     05/28/25 0000 97.6 °F (36.4 °C) 77 18 135/62 94 % -- None (Room air) --     05/27/25 2000 97.6 °F (36.4 °C) 91 18 126/44 93 % -- None (Room air) --

## 2025-05-28 NOTE — PROGRESS NOTES
[facility]   part of [facility]     Hospitalist Progress Note     Lisandro Harley Patient Status:  Inpatient    1940 MRN [id number]   Location [facility] 6NE-A Attending Daniel Petit, DO   Hosp Day # 4 PCP Braydon Barrett MD     Chief Complaint: Fall     Subjective:     Patient  waved to me   follows simple commands  Pulled out his donhoff again this am-   Son Andrea at bedside - discussed [facility] family wants hospice care-  no PEG  return to [place] w/ family providing care -      Objective:    Review of Systems:   A comprehensive review of systems was completed; pertinent positive and negatives stated in subjective. Limited w/ pt  discussed w/ Andrea     Vital signs:  Temp:  [97.5 °F (36.4 °C)-97.8 °F (36.6 °C)] 97.5 °F (36.4 °C)  Pulse:  [70-91] 82  Resp:  [18-32] 18  BP: (126-148)/(44-78) 140/69  SpO2:  [93 %-94 %] 93 %    Physical Exam:    General: No acute distress  awake/ sleepy  + facial droop  voice soft  limited tongue actions   Respiratory: No wheezes, no rhonchi   course unlabored  RA   Cardiovascular: S1, S2, regular rate and rhythm  v paced   Abdomen: Soft,  sl diffuse tenderness , non-distended, positive bowel sounds  Neuro: Right facial droop.   Flaccid  right extremities, decreased sensation  RUE, RLE      Extremities: No edema    Diagnostic Data:    Labs:  Recent Labs   Lab 25  0541   WBC 16.4*  --  16.4* 14.2* 12.6*   HGB 13.6  --  13.1 13.4 13.8   MCV 85.1  --  86.5 87.1 88.7   .0  --  401.0 402.0 420.0   INR  --  1.48*  --   --   --        Recent Labs   Lab 25  0541   * 244* 219* 247*   BUN 49* 45* 43* 49*   CREATSERUM 2.08* 2.20* 2.20* 2.14*   CA 9.4 9.4 9.1 9.5   ALB 4.5 4.3  --   --    * 136 143 149*   K 4.5 4.6 4.0 4.1   CL 99 100 109 113*   CO2 21.0 22.0 18.0* 20.0*   ALKPHO 121* 107  --   --    AST 66* 46*  --   --    * 87*   --   --    BILT 0.7 0.6  --   --    TP 8.2 7.8  --   --        Estimated Glomerular Filtration Rate: 30 mL/min/1.73m2 (A) (result from lab).    Recent Labs   Lab 05/24/25 2143   TROPHS 11       Recent Labs   Lab 05/24/25 2144   PTP 18.1*   INR 1.48*                    Microbiology    Hospital Encounter on 05/24/25   1. Urine Culture, Routine     Status: None    Collection Time: 05/25/25  4:47 AM    Specimen: Urine, clean catch   Result Value Ref Range    Urine Culture  N/A     >100,000 cfu/ml Three or more species of Gram negative bacilli; none predominant. No further workup performed.         Imaging: Reviewed in Epic.    Medications: Scheduled Medications[1]    Assessment & Plan:    # Comfort care / hospice  per family wishes   - hospice consult [facility] hospice is family choice   - return to [facility] w/ family caring for him   #Fall  #Acute left MCA ischemic stroke  - Echo bubble study  No Shunt     - Aspirin,   - Neurology  updated      #Dysphagia  - NPO per SLP/ video swallow  failed      #UTI  - Urine culture  >100k > 3 GNbacilli from report    - IV antibiotics  rocephin   D4      #CAD s/p PCI  #Essential hypertension  #Dyslipidemia  #Atrial fibrillation uses  DOAC  on hold  for now   #SSS sp PPM  #Chronic heart failure with preserved EF  #Pulmonary hypertension  - Torsemide on hold    #Transaminitis     #COPD no need for CPAP      #Diabetes mellitus type 2  A1c 8.9   Dc accuchecks        #Chronic kidney disease stage 4  #Rheumatoid arthritis   #Prostate cancer  POC  Neuro, GI updated  US, MRI dc'ed  Comfort order set  Hospice to see  plan dc back to his apt w/ family providing care       Sonia Hurtado NP      Addendum:    Agree with above note except as documented below.      Gen: NAD  CVS: s1s2  Resp: CTA  Abd: soft    #Acute left MCA CVA  #Afib  #Chronic diastolic heart failure  #SSS s/p PPM  #CKDIII  #Disposition  -Family has decided against PEG and plan to transition to hospice care at  [facility]      Pt seen and examined independently. Chart reviewed. Labs and imaging over the last 24 hours have been personally reviewed.  I personally made/approved 100% of the management plan for this patient and take full responsibility for the patient management.   Note has been reviewed by me and modified as needed.  Exam and Impression/ Recs as noted above.  D/w staff.    David Mc DO     Supplementary Documentation:     Quality:  DVT Mechanical Prophylaxis:   SCDs,    DVT Pharmacologic Prophylaxis   Medication    heparin (Porcine) 5000 UNIT/ML injection 5,000 Units    DVT Pharmacologic prophylaxis: Aspirin 325 mg           Code Status: DNAR/Selective Treatment  St: External urinary catheter in place  St Duration (in days):   Central line:    XIAO:     Discharge is dependent on: Clinical improvement   At this point Mr. Harley is expected to be discharge to: [place]     The 21st Century Cures Act makes medical notes like these available to patients in the interest of transparency. Please be advised this is a medical document. Medical documents are intended to carry relevant information, facts as evident, and the clinical opinion of the practitioner. The medical note is intended as peer to peer communication and may appear blunt or direct. It is written in medical language and may contain abbreviations or verbiage that are unfamiliar.              **Certification      PHYSICIAN Certification of Need for Inpatient Hospitalization - Initial Certification    Patient will require inpatient services that will reasonably be expected to span two midnight's based on the clinical documentation in H+P.   Based on patients current state of illness, I anticipate that, after discharge, patient will require TBD.           [1]    heparin  5,000 Units Subcutaneous Q8H ARAMIS    aspirin  300 mg Rectal Daily    Or    aspirin  325 mg Oral Daily    tamsulosin  0.4 mg Oral Daily    [Held by provider] torsemide  40 mg Oral Daily    insulin  aspart  1-10 Units Subcutaneous Q6H    atorvastatin  40 mg Oral Nightly    cefTRIAXone  1 g Intravenous Q24H

## 2025-05-28 NOTE — SPIRITUAL CARE NOTE
Spiritual Care Visit Note    Patient Name: Lisandro Harley Date of Spiritual Care Visit: 25   : 1940 Primary Dx: Acute ischemic stroke (HCC)       Referred By: Referral From:     Spiritual Care Taxonomy:    Intended Effects: Demonstrate caring and concern    Methods: Accompany someone in their spiritual/Sabianism practice outside your hortensia tradition    Interventions: Connect someone with their hortensia community/clergy    Visit Type/Summary:    The patient was seen by  last night as per Chaplain Cano. Received prayer, Scripture, support and Sacrament of the Sick. Followed up with the patient's RN.    Spiritual Care support can be requested via an Epic consult.   For urgent/immediate needs, please contact the On Call  at: [place]: ext [phone]    Rev. TAHIRA Winchesterc., M.Div., M.T.S.,   Certified Grief Counseling Specialist  Advanced Practice Board Certified

## 2025-05-28 NOTE — CM/SW NOTE
Received order to send referral to [facility].  Discussed w/Sonia SANCHEZ and she also provided contact info for liaison:    Sangeeta Varma [phone]    Called Sangeeta and she has already been contacted and is on her way to evaluate the pt and determine needs for dc back to [facility]    / to remain available for support and/or discharge planning.     Dulce Candelaria MBA MSN, RN Case Manager  [phone]

## 2025-05-28 NOTE — PLAN OF CARE
Stroke booklet given to patient; the following education was provided:     What is stroke  BEFAST - Stroke warning signs and symptoms  How to initiate EMS  Secondary stroke prevention and personalized risk factors: HTN, HLD, Afib   Lifestyle modifications (nutrition and exercise)  Post-stroke recovery and follow-up  Community resources (stroke support group and non-emergent stroke line)    Patient, daughter and son present during education, Daughter and son receptive to teachings. All pertinent questions and concerns were addressed.        RN Stroke Navigator team  [phone]

## 2025-05-28 NOTE — PLAN OF CARE
Assumed care at 0700. Pt drowsy. 2 L O2 per NC.   Vpaced on tele. VSS. Dobhoff removed by patient.   Decision to make pt comfort/DNAR and tx to hospice care.   Pt kept comfortable throughout the day. Q2 reposition.  Plan for pt to discharge to [facility] at 0900.   Pt family at the bedside. Updated on POC.

## 2025-05-28 NOTE — DISCHARGE SUMMARY
[facility] HOSPITALIST  DISCHARGE SUMMARY     Lisandro Harley Patient Status:  Inpatient    1940 MRN [id number]   Location [facility] 7NE-A Attending David Mc,    Hosp Day # 4 PCP Braydon Barrett MD     Date of Admission: 2025  Date of Discharge:   2025 under hospice care    Discharge Disposition: Home or Self Care    Discharge Diagnosis:  Mechanical fall secondary to acute left MCA ischemic stroke  Acute left MCA ischemic stroke  Severe dysphagia  UTI gram-negative Mikayla x 3 treated with Rocephin POA  CAD status post PCI  Essential hypertension  Dyslipidemia  Atrial fibrillation was on DOAC  Sick sinus syndrome status post pacemaker  Chronic heart failure with preserved EF  Pulmonary hypertension  Transaminitis  COPD uses CPAP  Diabetes mellitus type 2 A1c 8.9  Chronic kidney disease stage IV  Rheumatoid arthritis  Prostate cancer    History of Present Illness:     Lisandro Harley is a 85 year old male with a history of CAD sp PCI, essential hypertension, dyslipidemia, atrial fibrillation on DOAC, SSS sp PPM, chronic heart failure with preserved EF, pulmonary hypertension, COPD, diabetes mellitus type 2, chronic kidney disease stage 4, rheumatoid arthritis, prostate cancer who presents after a fall. Patient lives at an assisted living facility. At baseline, patient is AAOx3 and walks with walker. Family was with patient the day prior to admission. On day of admission, patient had a fall and noted to have confusion and right sided weakness prompting ER evaluation. Patient is awake, alert. He can recognize family members, he does have a speech impairment. He denies chest pain, shortness of breath. No headache or vision change. No double vision. No nausea, vomiting, diarrhea. No complaints of pain.        Brief Synopsis:   85-year-old who lives at assisted living was found altered after falling was brought in CT of the head was negative for bleed but CTA showed a left M2 occlusion with  perfusion changes.  He was not a candidate for TNK as he is on Eliquis.  Patient was admitted to the ICU.  He was seen by neurosurgery and critical care neurology.  Patient with profound dysphagia and right-sided paralysis.  Patient stable was transferred out of the neuro ICU to the floor.  Patient underwent video swallow on 5/27 and unfortunately failed with aspiration noted.  After discussion with family and then amongst themselves they have decided not to pursue a PEG tube.  Patient has taken his Dobbhoff out 3 times.  They feel that he is not a candidate for acute rehab as his overall quality of life has been declining.  They have contracted with an [facility] hospice-we will be providing DME equipment to [facility] patient will be discharged 5/29.  Family is prepared to provide 24-hour care.  They are aware of these not taking in liquids and food that he has a very limited life expectancy.  Dc home today w/ hospice care       TCM Follow-Up Recommendation:  LACE < 29: Low Risk of readmission after discharge from the hospital. No TCM follow-up needed.  Home under hospice care    Procedures during hospitalization:   Echocardiogram    1. Left ventricle: The cavity size was normal. Wall thickness was at the      upper limits of normal. Systolic function was normal. The estimated      ejection fraction was 55-60%, by visual assessment. Unable to assess LV      diastolic function due to heart rhythm.   2. Right ventricle: Pacer wire noted in the right ventricle.   3. Left atrium: The left atrial volume was moderately increased.   4. Right atrium: The atrium was moderately dilated. Pacer wire noted in      right atrium.   5. Atrial septum: Agitated saline contrast study showed no atrial level      shunt, at baseline or with abdominal compression   6. Mitral valve: The annulus was moderately calcified. There was mild      regurgitation.   7. Tricuspid valve: There was mild-moderate regurgitation.   8. Pulmonary  arteries: Systolic pressure was moderately increased, in the      range of 60mm Hg to 65mm Hg. Estimated pulmonary artery diastolic      pressure was 17mm Hg.   9. Pericardium, extracardiac: A small pericardial effusion was identified.   Impressions:  This study is compared with previous dated 4/19/24: No change   other than mild increase in PASP.   5/24 CT of the head  1. No acute intracranial findings   2. Cerebral atrophy with chronic microvascular ischemic changes.    3. Maxillary sinusitis.   Repeat CT of the head    CONCLUSION:    1. Asymmetric perfusion within the left MCA distribution suggestive of an infarct.  2. There is occlusion of the insular M2 segment of the left middle cerebral artery.  3. Cerebral atrophy with chronic microvascular ischemic changes.  4. Occlusion of the V3 and V4 segments of the right vertebral artery which may be chronic.     Failed video swallow  Several chest x-rays to confirm placement of Dobbhoff    Incidental or significant findings and recommendations (brief descriptions):  Family decision patient to go home under hospice care to return to [facility] with family to care for him  Patient has failed video swallow aspirated silent and audible family declining PEG placement and rehab based on knowing patient's goals of care   [facility] hospice to deliver DME to patient apartment and provide hospice services plan discharge 5/29  Completed antibiotics for UTI  Patient will be n.p.o. for pleasure eating family aware risk for aspiration  Will stop Accu-Cheks here patient has pulled out Dobbhoff x 3 will not reinsert they do not want him restrained comfort care order set instituted  Lab/Test results pending at Discharge:   None    Consultants:  Neurosurgery, critical care neuro, neurology, GI, PT OT speech and social work    Discharge Medication List:     Discharge Medications        CONTINUE taking these medications        Instructions Prescription details    ipratropium-albuterol 0.5-2.5 (3) MG/3ML Soln  Commonly known as: Duoneb      Take 3 mL by nebulization every 6 (six) hours while awake.   Quantity: 120 each  Refills: 0     OneTouch Delica Lancets 33G Misc      Test blood sugar 3 times per day before each meal.   Quantity: 100 each  Refills: 6     OneTouch Verio Flex System w/Device Kit      Acccuchecks three times a day before each meal   Quantity: 1 kit  Refills: 0            STOP taking these medications      atorvastatin 10 MG Tabs  Commonly known as: Lipitor        Eliquis 2.5 MG Tabs  Generic drug: apixaban        ferrous sulfate 325 (65 FE) MG Tbec        Jardiance 10 MG Tabs  Generic drug: empagliflozin        metoprolol tartrate 100 MG Tabs  Commonly known as: Lopressor        OneTouch Verio Strp        Potassium Chloride ER 10 MEQ Tbcr        tamsulosin 0.4 MG Caps  Commonly known as: Flomax        torsemide 20 MG Tabs  Commonly known as: Demadex                 [place] reviewed[place] need    Follow-up appointment:   Braydon Barrett MD  [address]  [facility] [address]  [facility] [address]  [phone]    Schedule an appointment as soon as possible for a visit in 1 week(s)      Appointments for Next 30 Days 5/28/2025 - 6/27/2025      None            Vital signs:  Temp:  [97.5 °F (36.4 °C)-98.6 °F (37 °C)] 98.6 °F (37 °C)  Pulse:  [70-91] 84  Resp:  [18-28] 28  BP: (120-148)/(44-78) 120/57  SpO2:  [93 %-94 %] 93 %    Physical Exam:    General: No acute distress  lethargic   + facial droop  nonverbal   eyes closed    Respiratory: No wheezes, no rhonchi   course unlabored  RA   Cardiovascular: S1, S2, regular rate and rhythm  v paced / PVC's   Abdomen: Soft,  sl diffuse tenderness , non-distended, positive bowel sounds  no BM   Neuro: Right facial droop.   Flaccid  right extremities, decreased sensation  RUE, RLE      Extremities: No edema     -----------------------------------------------------------------------------------------------  PATIENT DISCHARGE  INSTRUCTIONS: See electronic chart    Sonia Hurtado NP    Total time spent on discharge planning:  x minutes     The 21st Century Cures Act makes medical notes like these available to patients in the interest of transparency. Please be advised this is a medical document. Medical documents are intended to carry relevant information, facts as evident, and the clinical opinion of the practitioner. The medical note is intended as peer to peer communication and may appear blunt or direct. It is written in medical language and may contain abbreviations or verbiage that are unfamiliar.

## 2025-05-28 NOTE — PLAN OF CARE
Received update from Medicine service, patient will be transitioning to hospice care and going home as per family. No further inpatient intervention indicated at this time from Neurological point of view. Dr. Conn aware of above. Please feel free to call with any new questions or concerns.     Jes ANN  [facility]  5/28/2025, 11 AM  [place] # [id number]

## 2025-05-28 NOTE — SPIRITUAL CARE NOTE
Spiritual Care Visit Note    Patient Name: Lisandro Harley Date of Spiritual Care Visit: 25   : 1940 Primary Dx: Acute ischemic stroke (HCC)       Referred By: Referral From:     Spiritual Care Taxonomy:    Intended Effects: Demonstrate caring and concern    Methods: Accompany someone in their spiritual/Synagogue practice outside your hortensia tradition    Interventions: Connect someone with their hortensia community/clergy    Visit Type/Summary:     - Spiritual Care: Consulted with RN prior to visit.  offered to follow up with Yarsanism requesting  for Anointing of the Sick.  Provided support for patient's spiritual/Synagogue requests. Coordinated  visit for Sacrament of the Sick.  remains available for follow up.    Spiritual Care support can be requested via an [facility] consult. For urgent/immediate needs, please contact the On Call  at: [facility]: ext [phone]    Vinicius Cano MDiv

## 2025-05-28 NOTE — CM/SW NOTE
Met with Sangeeta from [facility]. She requested transportation back to [facility] tomorrow. [facility] [employer]     [facility] at 0900 Thursday 5/29.   PCS completed in Epic      SW/ to remain available for support and/or discharge planning.      Dannielle Sepulveda, BSN RN, CMSRN  RN Case Manager

## 2025-05-28 NOTE — SLP NOTE
Family has made decision for hospice care. Will discharge from services at this time.     Delisa Bellamy MA, CCC-SLP  Pager [phone]

## 2025-05-28 NOTE — PLAN OF CARE
Assumed care at 1930,  Patient is alert and oriented X 2  Neuro checks Q 4 hours  V-paced on tele, On 2L of O2  Dobhoff TF going at 50ml/hr, currently at goal rate  Denies any pain or discomfort at this time.  Call light within reach, all needs met  Plan of care discussed with patient and family.

## 2025-05-28 NOTE — SPIRITUAL CARE NOTE
Spiritual Care Visit Note    Patient Name: Lisandro Harley Date of Spiritual Care Visit: 25   : 1940 Primary Dx: Acute ischemic stroke (HCC)       Referred By: Referral From:     Spiritual Care Taxonomy:    Intended Effects: Demonstrate caring and concern    Methods: Accompany someone in their spiritual/Latter day practice outside your hortensia tradition    Interventions: Connect someone with their hortensia community/clergy    Visit Type/Summary:     - Spiritual Care: Provided support for patient's spiritual/Latter day requests. Coordinated  visit for Sacrament of the Sick.  remains available for follow up.    Spiritual Care support can be requested via an Epic consult. For urgent/immediate needs, please contact the On Call  at: [facility]: ext [phone]    Vinicius Cano MDiv

## 2025-05-29 NOTE — PROGRESS NOTES
[facility]   part of [facility]     Hospitalist Progress Note     Lisandro Harley Patient Status:  Inpatient    1940 MRN [id number]   Location [facility] 6NE-A Attending Daniel Petit, DO   Hosp Day # 5 PCP Braydon Barrett MD     Chief Complaint: Fall     Subjective:     Patient   eyes closed   sl grimace when abd exam ed   Left hand grasp to command   Son Andrea at bedside     Objective:    Review of Systems:   A comprehensive review of systems was completed; pertinent positive and negatives stated in subjective. Limited w/ pt  discussed w/ Andrea     Vital signs:  Temp:  [97.5 °F (36.4 °C)-98.7 °F (37.1 °C)] 98.7 °F (37.1 °C)  Pulse:  [79-84] 79  Resp:  [15-28] 15  BP: (120-142)/(55-70) 139/70  SpO2:  [91 %-93 %] 93 %    Physical Exam:    General: No acute distress  lethargic   + facial droop  nonverbal   eyes closed    Respiratory: No wheezes, no rhonchi   course unlabored  RA   Cardiovascular: S1, S2, regular rate and rhythm  v paced / PVC's   Abdomen: Soft,  sl diffuse tenderness , non-distended, positive bowel sounds  no BM   Neuro: Right facial droop.   Flaccid  right extremities, decreased sensation  RUE, RLE      Extremities: No edema    Diagnostic Data:    Labs:  Recent Labs   Lab 25  0541   WBC 16.4*  --  16.4* 14.2* 12.6*   HGB 13.6  --  13.1 13.4 13.8   MCV 85.1  --  86.5 87.1 88.7   .0  --  401.0 402.0 420.0   INR  --  1.48*  --   --   --        Recent Labs   Lab 25  0541   * 244* 219* 247*   BUN 49* 45* 43* 49*   CREATSERUM 2.08* 2.20* 2.20* 2.14*   CA 9.4 9.4 9.1 9.5   ALB 4.5 4.3  --   --    * 136 143 149*   K 4.5 4.6 4.0 4.1   CL 99 100 109 113*   CO2 21.0 22.0 18.0* 20.0*   ALKPHO 121* 107  --   --    AST 66* 46*  --   --    * 87*  --   --    BILT 0.7 0.6  --   --    TP 8.2 7.8  --   --        Estimated Glomerular Filtration Rate: 30  mL/min/1.73m2 (A) (result from lab).    Recent Labs   Lab 05/24/25  2143   TROPHS 11       Recent Labs   Lab 05/24/25  2144   PTP 18.1*   INR 1.48*                    Microbiology    Hospital Encounter on 05/24/25   1. Urine Culture, Routine     Status: None    Collection Time: 05/25/25  4:47 AM    Specimen: Urine, clean catch   Result Value Ref Range    Urine Culture  N/A     >100,000 cfu/ml Three or more species of Gram negative bacilli; none predominant. No further workup performed.         Imaging: Reviewed in Epic.    Medications: Scheduled Medications[1]    Assessment & Plan:    # Comfort care / hospice  per family wishes   -  [facility] is family choice- DME arrived paperwork signed    - return to [facility] w/ family caring for him   #Fall  #Acute left MCA ischemic stroke  - Echo bubble study  No Shunt          #Dysphagia  - NPO per SLP/ video swallow  failed      #UTI  - Urine culture  >100k > 3 GNbacilli from report    - rocephin completed       #CAD s/p PCI  #Essential hypertension  #Dyslipidemia  #Atrial fibrillation uses  DOAC  on hold  for now   #SSS sp PPM  #Chronic heart failure with preserved EF  #Pulmonary hypertension    #Transaminitis     #COPD no need for CPAP      #Diabetes mellitus type 2  A1c 8.9      #Chronic kidney disease stage 4  #Rheumatoid arthritis   #Prostate cancer  POC  Home w/ hospice today    NPO   Discussed w/ Andrea   Dulcolax supp now     Sonia Hurtado NP      Addendum:     Agree with above note except as documented below.       Gen: NAD  CVS: s1s2  Resp: CTA  Abd: soft     #Acute left MCA CVA  #Afib  #Chronic diastolic heart failure  #SSS s/p PPM  #CKDIII  #Disposition  -Family has decided against PEG and plan to transition to hospice care at [place]        Pt seen and examined independently. Chart reviewed. Labs and imaging over the last 24 hours have been personally reviewed.  I personally made/approved 100% of the management plan for this patient and take full  responsibility for the patient management.   Note has been reviewed by me and modified as needed.  Exam and Impression/ Recs as noted above.  D/w staff.     David Mc DO     Supplementary Documentation:     Quality:  DVT Mechanical Prophylaxis:        DVT Pharmacologic Prophylaxis   Medication   None                Code Status: DNAR/Comfort Care  St: External urinary catheter in place  St Duration (in days):   Central line:    XIAO:     Discharge is dependent on: Clinical improvement   At this point Mr. Harley is expected to be discharge to: [place]     The 21st Century Cures Act makes medical notes like these available to patients in the interest of transparency. Please be advised this is a medical document. Medical documents are intended to carry relevant information, facts as evident, and the clinical opinion of the practitioner. The medical note is intended as peer to peer communication and may appear blunt or direct. It is written in medical language and may contain abbreviations or verbiage that are unfamiliar.            [1]    scopolamine  1 patch Transdermal Q72H    aspirin  300 mg Rectal Daily

## 2025-05-29 NOTE — PLAN OF CARE
Assumed care at 1930.  A&Ox1-2. Lethargic. Opens eyes to verbal stimuli. Intermittent verbal responses.  RA with adequate saturations.  Tele - V-Paced.  Plan for dc to [facility] in am.  Comfort care. Family at bedside. Care Ongoing.

## 2025-05-29 NOTE — PAYOR COMM NOTE
--------------  5/29 CONTINUED STAY REVIEW    Payor: SAKINA CAMPUZANO [facility]  Subscriber #:  [id number]  Authorization Number: [id number]    Plan for Home w/ hospice today         MEDICATIONS ADMINISTERED IN LAST 1 DAY:  bisacodyl (Dulcolax) 10 MG rectal suppository 10 mg       Date Action Dose Route User    5/29/2025 0812 Given 10 mg Rectal Timothy Jin RN          LORazepam (Ativan) 2 mg/mL injection 1 mg       Date Action Dose Route User    5/29/2025 0812 Given 1 mg Intravenous Timothy Jin RN    5/28/2025 1729 Given 1 mg Intravenous Russell Lentz RN    5/28/2025 1359 Given 1 mg Intravenous Russell Lentz RN          scopolamine (Transderm-Scop) 1 MG/3DAYS patch 1 patch       Date Action Dose Route User    5/28/2025 1729 Patch Applied 1 patch Transdermal (Behind Right Ear) Russell Lentz RN            Vitals (last day)       Date/Time Temp Pulse Resp BP SpO2 Weight O2 Device O2 Flow Rate (L/min) Who    05/29/25 0730 99.3 °F (37.4 °C) 81 16 122/53 93 % -- None (Room air) -- YO    05/29/25 0400 98.7 °F (37.1 °C) 79 15 139/70 93 % -- None (Room air) -- RAPHAEL

## 2025-05-29 NOTE — PHYSICAL THERAPY NOTE
Pt with transition to hospice care, PT will dc as continued skilled IPPT intervention is not consistent with end of life care.

## 2025-05-29 NOTE — PROGRESS NOTES
05/28/25 2241   BiPAP   $ RT Standby Charge (per 15 min) 1   $ NII Follow up charge Yes   BiPAP/CPAP Monitored Parameters   Toleration Refused     Patient does not want to wear during his stay In the hospital

## 2025-05-29 NOTE — DIETARY NOTE
Clinical Nutrition  In Patient Comfort Care Order Set noted. Nutrition services will sign off at this time. Re-consult RD if further nutrition care is needed.    Tonie Acosta RD, LDN, [facility]  Clinical Dietitian  Phone [phone]

## 2025-05-29 NOTE — PLAN OF CARE
A&O to self. Lethargic.   Intermittent mumbling noted  RA with adequate saturations.  Tele - V-Paced. VSS. Ativan IV given once  Discharged to [place] this am.  Comfort care. Family at bedside. Care Ongoing  Updated family on POC and hospice transition

## 2025-05-30 NOTE — PAYOR COMM NOTE
--------------  DISCHARGE REVIEW    Payor: SAKINA CAMPUZANO O  Subscriber #:  [id number]  Authorization Number: [id number]    Admit date: 25  Admit time:  11:34 PM  Discharge Date: 2025  9:30 AM     Admitting Physician: Chloé Fox MD  Attending Physician:  No att. providers found  Primary Care Physician: Braydon Barrett MD          Discharge Summary Notes        Discharge Summary signed by Sonia Hurtado NP at 2025  3:00 PM       Author: Sonia Hurtado NP Specialty: Internal Medicine, Clinical Nurse Specialist Author Type: Nurse Practitioner    Filed: 2025  3:00 PM Date of Service: 2025  3:26 PM Status: Attested    : Sonia Hurtado NP (Nurse Practitioner) Cosigner: David Mc DO at 2025  3:52 PM           [facility]     Lisandro Harley Patient Status:  Inpatient    1940 MRN [id number]   Location [facility] 7NE-A Attending David Mc DO   Hosp Day # 4 PCP Braydon Barrett MD     Date of Admission: 2025  Date of Discharge:   2025 under hospice care    Discharge Disposition: Home or Self Care    Discharge Diagnosis:  Mechanical fall secondary to acute left MCA ischemic stroke  Acute left MCA ischemic stroke  Severe dysphagia  UTI gram-negative Mikayla x 3 treated with Rocephin POA  CAD status post PCI  Essential hypertension  Dyslipidemia  Atrial fibrillation was on DOAC  Sick sinus syndrome status post pacemaker  Chronic heart failure with preserved EF  Pulmonary hypertension  Transaminitis  COPD uses CPAP  Diabetes mellitus type 2 A1c 8.9  Chronic kidney disease stage IV  Rheumatoid arthritis  Prostate cancer    History of Present Illness:     Lisandro Harley is a 85 year old male with a history of CAD sp PCI, essential hypertension, dyslipidemia, atrial fibrillation on DOAC, SSS sp PPM, chronic heart failure with preserved EF, pulmonary hypertension, COPD, diabetes mellitus type 2, chronic kidney disease stage 4,  rheumatoid arthritis, prostate cancer who presents after a fall. Patient lives at an assisted living facility. At baseline, patient is AAOx3 and walks with walker. Family was with patient the day prior to admission. On day of admission, patient had a fall and noted to have confusion and right sided weakness prompting ER evaluation. Patient is awake, alert. He can recognize family members, he does have a speech impairment. He denies chest pain, shortness of breath. No headache or vision change. No double vision. No nausea, vomiting, diarrhea. No complaints of pain.        Brief Synopsis:   85-year-old who lives at assisted living was found altered after falling was brought in CT of the head was negative for bleed but CTA showed a left M2 occlusion with perfusion changes.  He was not a candidate for TNK as he is on Eliquis.  Patient was admitted to the ICU.  He was seen by neurosurgery and critical care neurology.  Patient with profound dysphagia and right-sided paralysis.  Patient stable was transferred out of the neuro ICU to the floor.  Patient underwent video swallow on 5/27 and unfortunately failed with aspiration noted.  After discussion with family and then amongst themselves they have decided not to pursue a PEG tube.  Patient has taken his Dobbhoff out 3 times.  They feel that he is not a candidate for acute rehab as his overall quality of life has been declining.  They have contracted with an [facility] hospice-we will be providing DME equipment to [place] patient will be discharged 5/29.  Family is prepared to provide 24-hour care.  They are aware of these not taking in liquids and food that he has a very limited life expectancy.  Dc home today w/ hospice care       TCM Follow-Up Recommendation:  LACE < 29: Low Risk of readmission after discharge from the hospital. No TCM follow-up needed.  Home under hospice care    Procedures during hospitalization:   Echocardiogram    1. Left ventricle: The  cavity size was normal. Wall thickness was at the      upper limits of normal. Systolic function was normal. The estimated      ejection fraction was 55-60%, by visual assessment. Unable to assess LV      diastolic function due to heart rhythm.   2. Right ventricle: Pacer wire noted in the right ventricle.   3. Left atrium: The left atrial volume was moderately increased.   4. Right atrium: The atrium was moderately dilated. Pacer wire noted in      right atrium.   5. Atrial septum: Agitated saline contrast study showed no atrial level      shunt, at baseline or with abdominal compression   6. Mitral valve: The annulus was moderately calcified. There was mild      regurgitation.   7. Tricuspid valve: There was mild-moderate regurgitation.   8. Pulmonary arteries: Systolic pressure was moderately increased, in the      range of 60mm Hg to 65mm Hg. Estimated pulmonary artery diastolic      pressure was 17mm Hg.   9. Pericardium, extracardiac: A small pericardial effusion was identified.   Impressions:  This study is compared with previous dated 4/19/24: No change   other than mild increase in PASP.   5/24 CT of the head  1. No acute intracranial findings   2. Cerebral atrophy with chronic microvascular ischemic changes.    3. Maxillary sinusitis.   Repeat CT of the head    CONCLUSION:    1. Asymmetric perfusion within the left MCA distribution suggestive of an infarct.  2. There is occlusion of the insular M2 segment of the left middle cerebral artery.  3. Cerebral atrophy with chronic microvascular ischemic changes.  4. Occlusion of the V3 and V4 segments of the right vertebral artery which may be chronic.     Failed video swallow  Several chest x-rays to confirm placement of Dobbhoff    Incidental or significant findings and recommendations (brief descriptions):  Family decision patient to go home under hospice care to return to independent [facility] with family to care for him  Patient has failed video swallow  aspirated silent and audible family declining PEG placement and rehab based on knowing patient's goals of care   [facility] to deliver DME to patient apartment and provide hospice services plan discharge 5/29  Completed antibiotics for UTI  Patient will be n.p.o. for pleasure eating family aware risk for aspiration  Will stop Accu-Cheks here patient has pulled out Dobbhoff x 3 will not reinsert they do not want him restrained comfort care order set instituted  Lab/Test results pending at Discharge:   None    Consultants:  Neurosurgery, critical care neuro, neurology, GI, PT OT speech and social work    Discharge Medication List:     Discharge Medications        CONTINUE taking these medications        Instructions Prescription details   ipratropium-albuterol 0.5-2.5 (3) MG/3ML Soln  Commonly known as: Duoneb      Take 3 mL by nebulization every 6 (six) hours while awake.   Quantity: 120 each  Refills: 0     OneTouch Delica Lancets 33G Misc      Test blood sugar 3 times per day before each meal.   Quantity: 100 each  Refills: 6     OneTouch Verio Flex System w/Device Kit      Acccuchecks three times a day before each meal   Quantity: 1 kit  Refills: 0            STOP taking these medications      atorvastatin 10 MG Tabs  Commonly known as: Lipitor        Eliquis 2.5 MG Tabs  Generic drug: apixaban        ferrous sulfate 325 (65 FE) MG Tbec        Jardiance 10 MG Tabs  Generic drug: empagliflozin        metoprolol tartrate 100 MG Tabs  Commonly known as: Lopressor        OneTouch Verio Strp        Potassium Chloride ER 10 MEQ Tbcr        tamsulosin 0.4 MG Caps  Commonly known as: Flomax        torsemide 20 MG Tabs  Commonly known as: Demadex                 [place] need    Follow-up appointment:   Braydon Barrett MD  [address]  [facility] [id number] [phone]    Schedule an appointment as soon as possible for a visit in 1 week(s)      Appointments for Next 30 Days 5/28/2025 -  6/27/2025      None            Vital signs:  Temp:  [97.5 °F (36.4 °C)-98.6 °F (37 °C)] 98.6 °F (37 °C)  Pulse:  [70-91] 84  Resp:  [18-28] 28  BP: (120-148)/(44-78) 120/57  SpO2:  [93 %-94 %] 93 %    Physical Exam:    General: No acute distress  lethargic   + facial droop  nonverbal   eyes closed    Respiratory: No wheezes, no rhonchi   course unlabored  RA   Cardiovascular: S1, S2, regular rate and rhythm  v paced / PVC's   Abdomen: Soft,  sl diffuse tenderness , non-distended, positive bowel sounds  no BM   Neuro: Right facial droop.   Flaccid  right extremities, decreased sensation  RUE, RLE      Extremities: No edema     -----------------------------------------------------------------------------------------------  PATIENT DISCHARGE INSTRUCTIONS: See electronic chart    Sonia Hurtado NP    Total time spent on discharge planning:  x minutes     The 21st Century Cures Act makes medical notes like these available to patients in the interest of transparency. Please be advised this is a medical document. Medical documents are intended to carry relevant information, facts as evident, and the clinical opinion of the practitioner. The medical note is intended as peer to peer communication and may appear blunt or direct. It is written in medical language and may contain abbreviations or verbiage that are unfamiliar.        Attestation signed by David Mc DO at 5/29/2025  3:52 PM      Agree with discharge summary above. Please refer to progress note earlier in day for further details. 35 minutes spent on discharge.     David Mc DO                Electronically signed by David Mc DO on 5/29/2025  3:52 PM         REVIEWER COMMENTS

## (undated) DEVICE — SPHINCTEROTOME: Brand: HYDRATOME RX 44

## (undated) DEVICE — DRAPE,U/SHT,SPLIT,FILM,60X84,STERILE: Brand: MEDLINE

## (undated) DEVICE — MEDI-VAC NON-CONDUCTIVE SUCTION TUBING: Brand: CARDINAL HEALTH

## (undated) DEVICE — GLOVE SURG TRIUMPH SZ 8-1/2

## (undated) DEVICE — BIPOLAR SEALER 23-112-1 AQM 6.0: Brand: AQUAMANTYS™

## (undated) DEVICE — DRAPE C-ARM UNIVERSAL

## (undated) DEVICE — PILLOW ABDUCTION HIP SM

## (undated) DEVICE — ABDOMINAL PAD: Brand: DERMACEA

## (undated) DEVICE — VIOLET BRAIDED (POLYGLACTIN 910), SYNTHETIC ABSORBABLE SUTURE: Brand: COATED VICRYL

## (undated) DEVICE — GLOVE SURG SENSICARE SZ 8

## (undated) DEVICE — BLADE ELECTRODE: Brand: EDGE

## (undated) DEVICE — GLOVE RADIATION SZ 8-1/2

## (undated) DEVICE — MASK ETCO2 PANORAMIC

## (undated) DEVICE — PADDING CAST COTTON  4

## (undated) DEVICE — NON-ADHERENT STRIPS,OIL EMULSION: Brand: CURITY

## (undated) DEVICE — REM POLYHESIVE ADULT PATIENT RETURN ELECTRODE: Brand: VALLEYLAB

## (undated) DEVICE — CHLORAPREP 26ML APPLICATOR

## (undated) DEVICE — GOWN SURG AERO CHROME XXL

## (undated) DEVICE — 3M™ COBAN™ NL STERILE NON-LATEX SELF-ADHERENT WRAP, 2084S, 4 IN X 5 YD (10 CM X 4,5 M), 18 ROLLS/CASE: Brand: 3M™ COBAN™

## (undated) DEVICE — RETRIEVAL BALLOON CATHETER: Brand: EXTRACTOR™ PRO RX

## (undated) DEVICE — Device: Brand: STABLECUT®

## (undated) DEVICE — SYRINGE 50ML LL TIP

## (undated) DEVICE — HOWELL D.A.S.H. SPHINCTEROTOME: Brand: D.A.S.H.

## (undated) DEVICE — FILTERLINE NASAL ADULT O2/CO2

## (undated) DEVICE — MEDI-VAC SUCTION HANDLE REGULAR CAPACITY: Brand: CARDINAL HEALTH

## (undated) DEVICE — Device: Brand: PLUMEPEN

## (undated) DEVICE — SHEET,DRAPE,70X100,STERILE: Brand: MEDLINE

## (undated) DEVICE — 6617 IOBAN II PATIENT ISOLATION DRAPE 5/BX,4BX/CS: Brand: STERI-DRAPE™ IOBAN™ 2

## (undated) DEVICE — SUTURE VICRYL 2-0 CP-1

## (undated) DEVICE — SYRINGE 60ML SLIP TIP

## (undated) DEVICE — KENDALL SCD EXPRESS SLEEVES, KNEE LENGTH, MEDIUM: Brand: KENDALL SCD

## (undated) DEVICE — DRAIN RELIAVAC W/DRN MED 1/8

## (undated) DEVICE — NEEDLE SPINAL 20X3-1/2 YELLOW

## (undated) DEVICE — Device: Brand: DEFENDO AIR/WATER/SUCTION AND BIOPSY VALVE

## (undated) DEVICE — SHEET, DRAPE, SPLIT, STERILE: Brand: MEDLINE

## (undated) DEVICE — 3M™ RED DOT™ MONITORING ELECTRODE WITH FOAM TAPE AND STICKY GEL, 50/BAG, 20/CASE, 72/PLT 2570: Brand: RED DOT™

## (undated) DEVICE — DRESSING AQUACEL AG 3.5 X 10

## (undated) DEVICE — HOOD, PEEL-AWAY: Brand: FLYTE

## (undated) DEVICE — ENDOSCOPY PACK UPPER: Brand: MEDLINE INDUSTRIES, INC.

## (undated) DEVICE — STERILE POLYISOPRENE POWDER-FREE SURGICAL GLOVES: Brand: PROTEXIS

## (undated) DEVICE — 1200CC GUARDIAN II: Brand: GUARDIAN

## (undated) DEVICE — ANTERIOR HIP: Brand: MEDLINE INDUSTRIES, INC.

## (undated) NOTE — Clinical Note
TCM call completed. A TE was sent to the office for an appointment request. The patient declined Rx review but did confirm taking antibiotic and prednisone as prescribed. The patient reports improvement with symptoms at home. Thank you.

## (undated) NOTE — Clinical Note
Can we call over to [facility] and have them check his blood work tomorrow morning. Patient says if we let them know they are able to collect there.

## (undated) NOTE — MR AVS SNAPSHOT
After Visit Summary   2/11/2020    Neli Mancini    MRN: [id number]           Visit Information     Date & Time  2/11/2020  8:00 AM Provider  [facility] [place] [address] [facility]  Phone  [phone]      Allergies as of 2/ messages to your doctor, view test results, renew prescriptions and request appointments. How Do I Sign Up? 1. In your Internet browser, go to [email or web address]  2.  Click on the Activate your Account if you have an activation code i experience and are looking for ways to make improvements. Your feedback will help us do so. For more information on [employer], please visit [email or web address]                   DO YOU KNOW WHERE TO GO?   Injury & illness are neve *Cost varies based on your insurance coverage  For more information about hours, locations or appointment options available at [facility],   visit [email or web address] or call [phone]. ([phone])

## (undated) NOTE — LETTER
Last Revised 02/07/06  Obstructive Sleep Apnea Questionnaire    Clinical signs and symptoms suggesting the possibility of NII    1. Predisposing physical characteristics (positive with any of the following present)  ? BMI 35kg/m²  ?  Craniofacial abnormalit pauses which are frightening to the observer, patient regularly falls asleep within minutes after being left unstimulated) in which case they should be treated as though they have severe sleep apnea.     The sleep laboratory’s assessment (none, mild, modera C. Requirement for postoperative opioids.                Opioid requirement             Points   None 0    Low dose oral opiod 1    High dose oral opioids, parenteral or neuraxial opiods 3      Point Total for C        Estimation of Perioperative Ris

## (undated) NOTE — LETTER
Geovanna Kauffman [address]  [address] [facility] [address] [facility]  Authorization for Surgical Operation and Procedure     Date:___________                                                                                                         Time:__________ of the potential risks that can occur: fever and allergic reactions, hemolytic reactions, transmission of diseases such as Hepatitis, AIDS and Cytomegalovirus (CMV) and fluid overload.   In the event that I wish to have an autologous transfusion of my own b physician will determine when the applicable recovery period ends for purposes of reinstating the DNAR order.   10. Patients having a sterilization procedure: I understand that if the procedure is successful the results will be permanent and it will therefo anesthesiologist (anesthesia doctor) to give me medicine and do additional procedures as necessary.  Some examples are: Starting or using an “IV” to give me medicine, fluids or blood during my procedure, and having a breathing tube placed to help me breathe “epidural”, & “nerve blocks”): I understand that rare but potential complications include headache, bleeding, infection, seizure, irregular heart rhythms, and nerve injury.     I can change my mind about having anesthesia services at any time before I get

## (undated) NOTE — LETTER
[address] [facility] FOR BLOOD OR BLOOD COMPONENTS      In the course of your treatment, it may become necessary to administer a transfusion of blood or blood components.  This form provides basic information concerning this proc alternatives to you if it has not already been done. I,Lisandro Harley, have read/had read to me the above. I understand the matters bearing on the decision whether or not to authorize a transfusion of blood or blood components.  I have no questions which ha

## (undated) NOTE — LETTER
Geovanna Kauffman [address]  [address] [facility] [address], [address] [facility]  Authorization for Surgical Operation and Procedure     Date:___________                                                                                                         Time:__________ products. The following are some, but not all, of the potential risks that can occur: fever and allergic reactions, hemolytic reactions, transmission of diseases such as Hepatitis, AIDS and Cytomegalovirus (CMV) and fluid overload.   In the event that I wi on my behalf). The surgeon or my attending physician will determine when the applicable recovery period ends for purposes of reinstating the DNAR order.   10. Patients having a sterilization procedure: I understand that if the procedure is successful the re services, I agree to:  a. Allow the anesthesiologist (anesthesia doctor) to give me medicine and do additional procedures as necessary.  Some examples are: Starting or using an “IV” to give me medicine, fluids or blood during my procedure, and having a rajan injury. 7. Regional Anesthesia (“spinal”, “epidural”, & “nerve blocks”): I understand that rare but potential complications include headache, bleeding, infection, seizure, irregular heart rhythms, and nerve injury.     I can change my mind about having an

## (undated) NOTE — ED AVS SNAPSHOT
[facility] Emergency Department  [place]  [address] [place] [address]  Phone:  [phone]  Fax:  [address] [facility]   MRN: [id number]    Department:  [facility] Emergency Department   Date of Visit:  7/17/2017 CARE PHYSICIAN AT ONCE OR RETURN IMMEDIATELY TO THE EMERGENCY DEPARTMENT.     If you have been prescribed any medication(s), please fill your prescription right away and begin taking the medication(s) as directed    If the emergency physician has read X-ray